# Patient Record
Sex: MALE | Race: WHITE | NOT HISPANIC OR LATINO
[De-identification: names, ages, dates, MRNs, and addresses within clinical notes are randomized per-mention and may not be internally consistent; named-entity substitution may affect disease eponyms.]

---

## 2017-02-24 ENCOUNTER — APPOINTMENT (OUTPATIENT)
Dept: HEMATOLOGY ONCOLOGY | Facility: CLINIC | Age: 82
End: 2017-02-24

## 2017-03-02 ENCOUNTER — APPOINTMENT (OUTPATIENT)
Dept: ORTHOPEDIC SURGERY | Facility: CLINIC | Age: 82
End: 2017-03-02

## 2017-03-02 VITALS
BODY MASS INDEX: 24.01 KG/M2 | HEIGHT: 67 IN | DIASTOLIC BLOOD PRESSURE: 73 MMHG | SYSTOLIC BLOOD PRESSURE: 131 MMHG | WEIGHT: 153 LBS

## 2017-03-02 DIAGNOSIS — C80.1 MALIGNANT (PRIMARY) NEOPLASM, UNSPECIFIED: ICD-10-CM

## 2017-03-02 DIAGNOSIS — Z87.891 PERSONAL HISTORY OF NICOTINE DEPENDENCE: ICD-10-CM

## 2017-03-02 DIAGNOSIS — I51.9 HEART DISEASE, UNSPECIFIED: ICD-10-CM

## 2017-03-02 RX ORDER — METOPROLOL SUCCINATE 50 MG/1
50 TABLET, EXTENDED RELEASE ORAL
Refills: 0 | Status: ACTIVE | COMMUNITY

## 2017-03-02 RX ORDER — ASPIRIN 325 MG/1
TABLET, FILM COATED ORAL
Refills: 0 | Status: ACTIVE | COMMUNITY

## 2017-03-02 RX ORDER — HYDROCHLOROTHIAZIDE 12.5 MG/1
CAPSULE, GELATIN COATED ORAL
Refills: 0 | Status: ACTIVE | COMMUNITY

## 2017-03-02 RX ORDER — TRAMADOL HYDROCHLORIDE 50 MG/1
50 TABLET, COATED ORAL
Qty: 60 | Refills: 0 | Status: ACTIVE | COMMUNITY
Start: 2017-03-02 | End: 1900-01-01

## 2017-06-05 ENCOUNTER — APPOINTMENT (OUTPATIENT)
Dept: GASTROENTEROLOGY | Facility: CLINIC | Age: 82
End: 2017-06-05

## 2017-06-05 VITALS
HEIGHT: 67 IN | BODY MASS INDEX: 24.64 KG/M2 | WEIGHT: 157 LBS | DIASTOLIC BLOOD PRESSURE: 70 MMHG | SYSTOLIC BLOOD PRESSURE: 130 MMHG | TEMPERATURE: 98 F | HEART RATE: 82 BPM | OXYGEN SATURATION: 99 %

## 2017-06-05 RX ORDER — METOPROLOL SUCCINATE 50 MG/1
50 TABLET, EXTENDED RELEASE ORAL
Qty: 180 | Refills: 0 | Status: ACTIVE | COMMUNITY
Start: 2016-06-29

## 2017-06-05 RX ORDER — ROSUVASTATIN CALCIUM 10 MG/1
10 TABLET, FILM COATED ORAL
Qty: 90 | Refills: 0 | Status: ACTIVE | COMMUNITY
Start: 2016-12-04

## 2017-06-14 ENCOUNTER — RESULT REVIEW (OUTPATIENT)
Age: 82
End: 2017-06-14

## 2017-06-14 ENCOUNTER — OUTPATIENT (OUTPATIENT)
Dept: OUTPATIENT SERVICES | Facility: HOSPITAL | Age: 82
LOS: 1 days | Discharge: HOME | End: 2017-06-14
Payer: MEDICARE

## 2017-06-14 ENCOUNTER — APPOINTMENT (OUTPATIENT)
Dept: GASTROENTEROLOGY | Facility: HOSPITAL | Age: 82
End: 2017-06-14

## 2017-06-14 DIAGNOSIS — K92.2 GASTROINTESTINAL HEMORRHAGE, UNSPECIFIED: ICD-10-CM

## 2017-06-14 DIAGNOSIS — Z95.1 PRESENCE OF AORTOCORONARY BYPASS GRAFT: Chronic | ICD-10-CM

## 2017-06-14 PROCEDURE — 88312 SPECIAL STAINS GROUP 1: CPT | Mod: 26

## 2017-06-14 PROCEDURE — 88305 TISSUE EXAM BY PATHOLOGIST: CPT | Mod: 26

## 2017-06-16 LAB — SURGICAL PATHOLOGY STUDY: SIGNIFICANT CHANGE UP

## 2017-06-19 ENCOUNTER — OTHER (OUTPATIENT)
Age: 82
End: 2017-06-19

## 2017-06-19 DIAGNOSIS — K29.80 DUODENITIS W/OUT BLEEDING: ICD-10-CM

## 2017-08-18 ENCOUNTER — RX RENEWAL (OUTPATIENT)
Age: 82
End: 2017-08-18

## 2017-09-27 ENCOUNTER — APPOINTMENT (OUTPATIENT)
Dept: GASTROENTEROLOGY | Facility: CLINIC | Age: 82
End: 2017-09-27
Payer: MEDICARE

## 2017-09-27 VITALS
TEMPERATURE: 97.8 F | HEIGHT: 67 IN | DIASTOLIC BLOOD PRESSURE: 70 MMHG | HEART RATE: 96 BPM | OXYGEN SATURATION: 98 % | BODY MASS INDEX: 24.8 KG/M2 | WEIGHT: 158 LBS | SYSTOLIC BLOOD PRESSURE: 130 MMHG

## 2017-09-27 PROCEDURE — 99214 OFFICE O/P EST MOD 30 MIN: CPT

## 2017-09-27 RX ORDER — PANTOPRAZOLE 40 MG/1
40 TABLET, DELAYED RELEASE ORAL DAILY
Qty: 90 | Refills: 3 | Status: DISCONTINUED | COMMUNITY
Start: 2017-06-19 | End: 2017-09-27

## 2018-03-28 ENCOUNTER — APPOINTMENT (OUTPATIENT)
Dept: ORTHOPEDIC SURGERY | Facility: CLINIC | Age: 83
End: 2018-03-28
Payer: MEDICARE

## 2018-03-28 PROCEDURE — 99213 OFFICE O/P EST LOW 20 MIN: CPT | Mod: 25

## 2018-03-28 PROCEDURE — 20610 DRAIN/INJ JOINT/BURSA W/O US: CPT | Mod: RT

## 2018-04-11 ENCOUNTER — APPOINTMENT (OUTPATIENT)
Dept: GASTROENTEROLOGY | Facility: CLINIC | Age: 83
End: 2018-04-11
Payer: MEDICARE

## 2018-04-11 VITALS
TEMPERATURE: 98.7 F | BODY MASS INDEX: 24.8 KG/M2 | SYSTOLIC BLOOD PRESSURE: 130 MMHG | WEIGHT: 158 LBS | RESPIRATION RATE: 16 BRPM | HEART RATE: 96 BPM | HEIGHT: 67 IN | DIASTOLIC BLOOD PRESSURE: 80 MMHG | OXYGEN SATURATION: 98 %

## 2018-04-11 DIAGNOSIS — M19.90 UNSPECIFIED OSTEOARTHRITIS, UNSPECIFIED SITE: ICD-10-CM

## 2018-04-11 PROCEDURE — 99214 OFFICE O/P EST MOD 30 MIN: CPT

## 2018-04-11 RX ORDER — DABIGATRAN ETEXILATE MESYLATE 75 MG/1
75 CAPSULE ORAL
Qty: 60 | Refills: 0 | Status: DISCONTINUED | COMMUNITY
End: 2018-04-11

## 2018-04-11 RX ORDER — SUCRALFATE 1 G/1
1 TABLET ORAL
Qty: 60 | Refills: 5 | Status: DISCONTINUED | COMMUNITY
Start: 2017-06-19 | End: 2018-04-11

## 2018-04-11 RX ORDER — FAMOTIDINE 20 MG/1
20 TABLET, FILM COATED ORAL TWICE DAILY
Qty: 180 | Refills: 3 | Status: DISCONTINUED | COMMUNITY
Start: 2016-06-29 | End: 2018-04-11

## 2018-04-17 ENCOUNTER — APPOINTMENT (OUTPATIENT)
Dept: ORTHOPEDIC SURGERY | Facility: CLINIC | Age: 83
End: 2018-04-17
Payer: MEDICARE

## 2018-04-17 PROCEDURE — 20610 DRAIN/INJ JOINT/BURSA W/O US: CPT | Mod: RT

## 2018-04-24 ENCOUNTER — APPOINTMENT (OUTPATIENT)
Dept: ORTHOPEDIC SURGERY | Facility: CLINIC | Age: 83
End: 2018-04-24
Payer: MEDICARE

## 2018-04-24 PROCEDURE — 20610 DRAIN/INJ JOINT/BURSA W/O US: CPT | Mod: RT

## 2018-05-02 ENCOUNTER — APPOINTMENT (OUTPATIENT)
Dept: ORTHOPEDIC SURGERY | Facility: CLINIC | Age: 83
End: 2018-05-02
Payer: MEDICARE

## 2018-05-02 DIAGNOSIS — M17.11 UNILATERAL PRIMARY OSTEOARTHRITIS, RIGHT KNEE: ICD-10-CM

## 2018-05-02 PROCEDURE — 20611 DRAIN/INJ JOINT/BURSA W/US: CPT | Mod: RT

## 2018-05-15 LAB — HEMOCCULT STL QL IA: NEGATIVE

## 2018-06-05 ENCOUNTER — APPOINTMENT (OUTPATIENT)
Dept: ORTHOPEDIC SURGERY | Facility: CLINIC | Age: 83
End: 2018-06-05

## 2018-09-05 ENCOUNTER — APPOINTMENT (OUTPATIENT)
Dept: GASTROENTEROLOGY | Facility: CLINIC | Age: 83
End: 2018-09-05
Payer: MEDICARE

## 2018-09-05 VITALS
HEIGHT: 67 IN | WEIGHT: 150 LBS | BODY MASS INDEX: 23.54 KG/M2 | HEART RATE: 70 BPM | TEMPERATURE: 98.3 F | DIASTOLIC BLOOD PRESSURE: 65 MMHG | OXYGEN SATURATION: 97 % | SYSTOLIC BLOOD PRESSURE: 119 MMHG | RESPIRATION RATE: 16 BRPM

## 2018-09-05 DIAGNOSIS — R63.4 ABNORMAL WEIGHT LOSS: ICD-10-CM

## 2018-09-05 DIAGNOSIS — K29.60 OTHER GASTRITIS W/OUT BLEEDING: ICD-10-CM

## 2018-09-05 PROCEDURE — 99214 OFFICE O/P EST MOD 30 MIN: CPT

## 2018-09-05 RX ORDER — MINERAL OIL 1000 MG/ML
LIQUID ORAL
Qty: 1 | Refills: 0 | Status: DISCONTINUED | OUTPATIENT
Start: 2018-04-11 | End: 2018-09-05

## 2018-09-05 RX ORDER — LINACLOTIDE 290 UG/1
290 CAPSULE, GELATIN COATED ORAL
Qty: 30 | Refills: 3 | Status: DISCONTINUED | OUTPATIENT
Start: 2018-04-11 | End: 2018-09-05

## 2018-09-05 RX ORDER — STANDARDIZED SENNA CONCENTRATE AND DOCUSATE SODIUM 8.6; 5 MG/1; MG/1
8.6-5 TABLET ORAL DAILY
Qty: 60 | Refills: 2 | Status: DISCONTINUED | COMMUNITY
Start: 2017-06-05 | End: 2018-09-05

## 2018-09-05 RX ORDER — STANDARDIZED SENNA CONCENTRATE AND DOCUSATE SODIUM 8.6; 5 MG/1; MG/1
8.6-5 TABLET ORAL DAILY
Qty: 60 | Refills: 2 | Status: DISCONTINUED | OUTPATIENT
Start: 2018-04-11 | End: 2018-09-05

## 2018-09-17 ENCOUNTER — RX RENEWAL (OUTPATIENT)
Age: 83
End: 2018-09-17

## 2018-09-26 ENCOUNTER — RX RENEWAL (OUTPATIENT)
Age: 83
End: 2018-09-26

## 2018-09-27 ENCOUNTER — RX RENEWAL (OUTPATIENT)
Age: 83
End: 2018-09-27

## 2018-10-16 ENCOUNTER — OTHER (OUTPATIENT)
Age: 83
End: 2018-10-16

## 2018-11-07 ENCOUNTER — APPOINTMENT (OUTPATIENT)
Dept: GASTROENTEROLOGY | Facility: CLINIC | Age: 83
End: 2018-11-07
Payer: MEDICARE

## 2018-11-07 VITALS
DIASTOLIC BLOOD PRESSURE: 80 MMHG | SYSTOLIC BLOOD PRESSURE: 122 MMHG | WEIGHT: 152 LBS | HEIGHT: 67 IN | OXYGEN SATURATION: 99 % | HEART RATE: 90 BPM | BODY MASS INDEX: 23.86 KG/M2

## 2018-11-07 DIAGNOSIS — M79.606 PAIN IN LEG, UNSPECIFIED: ICD-10-CM

## 2018-11-07 DIAGNOSIS — Z86.79 PERSONAL HISTORY OF OTHER DISEASES OF THE CIRCULATORY SYSTEM: ICD-10-CM

## 2018-11-07 DIAGNOSIS — Z86.73 PERSONAL HISTORY OF TRANSIENT ISCHEMIC ATTACK (TIA), AND CEREBRAL INFARCTION W/OUT RESIDUAL DEFICITS: ICD-10-CM

## 2018-11-07 DIAGNOSIS — K59.00 CONSTIPATION, UNSPECIFIED: ICD-10-CM

## 2018-11-07 PROCEDURE — 99214 OFFICE O/P EST MOD 30 MIN: CPT

## 2019-05-08 ENCOUNTER — APPOINTMENT (OUTPATIENT)
Dept: GASTROENTEROLOGY | Facility: CLINIC | Age: 84
End: 2019-05-08

## 2019-05-30 ENCOUNTER — APPOINTMENT (OUTPATIENT)
Dept: ORTHOPEDIC SURGERY | Facility: CLINIC | Age: 84
End: 2019-05-30
Payer: MEDICARE

## 2019-05-30 VITALS
BODY MASS INDEX: 23.07 KG/M2 | DIASTOLIC BLOOD PRESSURE: 75 MMHG | HEART RATE: 78 BPM | WEIGHT: 147 LBS | HEIGHT: 67 IN | SYSTOLIC BLOOD PRESSURE: 139 MMHG

## 2019-05-30 PROCEDURE — 73502 X-RAY EXAM HIP UNI 2-3 VIEWS: CPT | Mod: RT

## 2019-05-30 PROCEDURE — 99215 OFFICE O/P EST HI 40 MIN: CPT | Mod: 25

## 2019-05-30 PROCEDURE — 20610 DRAIN/INJ JOINT/BURSA W/O US: CPT | Mod: RT

## 2019-05-30 PROCEDURE — 73562 X-RAY EXAM OF KNEE 3: CPT | Mod: RT

## 2019-05-30 NOTE — HISTORY OF PRESENT ILLNESS
[0] : a current pain level of 0/10 [None] : No exacerbating factors are noted [de-identified] : Patient PMhx HTN, triple bypass (many years ago) on blood thinners due to afib, stroke 2004,  presents to the office walking with a rollator (x 3 years) complaining of right leg pain x 2 years, insidious onset, progressively worsening x few months. Reports instability, weakness and limping due to symptoms. Pain indicated to right groin radiating to right knee, 7/10, constant, achy and sharp at times.  Worsening with any activity that required leg to move to point affecting quality of life ans preventing adl's.  Some relief with rest, tramadol/oxycodone. \par Patient denies any fever, chills, trauma, swelling, erythema, hematomas, numbness or tingling sensation, buckling.\par

## 2019-05-30 NOTE — REASON FOR VISIT
[Initial Visit] : an initial visit for [Hip Pain] : hip pain [Knee Pain] : knee pain [FreeTextEntry2] : right hip and right knee pain

## 2019-05-30 NOTE — PHYSICAL EXAM
[Antalgic] : antalgic [Wide-Based] : wide-based [Cane] : ambulates with cane [Walker] : ambulates with walker [Normal RUE] : Right Upper Extremity: No scars, rashes, lesions, ulcers, skin intact [Normal Finger/nose] : finger to nose coordination [Poor Appearance] : well-appearing [Acute Distress] : not in acute distress [Obese] : not obese [Normal] : no peripheral adenopathy appreciated [de-identified] : Patient appears stated age in no acute respiratory distress. Patient is alert oriented x3. Patient has normal mood and affect. Gait \par Bilateral knee exam\par Range of motion of the knee is 0-120°. \par Skin is normal.  No rash.\par There is no effusion. No medial or lateral joint line tenderness. No swelling, no pitting edema.\par Overall alignment of the knee is then slight varus. Good anterior posterior stability. Firm endpoints on anterior and posterior drawer. \par Medial lateral stability is intact. Firm endpoint on medial and lateral stress testing .\par Jerry test is negative.\par Quadriceps strength 5/ 5. There is no loss of muscle volume in the thigh. \par Good anterior posterior and mediolateral stability.\par Sensation in the extremities intact. \par Discrimination is intact. Good DP and PT pulses.\par 		\par \par Left hip exam\par On inspection of the hip shows skin is normal. No evidence of rash. \par No loss of muscle.  Abductor strength is 5 out of 5. Hip flexor strength is 5.\par Range of motion of the hip at 90° flexion internal rotation is 15° external rotation is 30° pain-free. \par Hip has good stability in anterior and posterior direction. \par On lateral decubitus  examination there is no tenderness in the greater trochanter. \par Lower Extremity Examination \par Bilateral lower extremity skin is normal. There is no rash. There is no edema and lymphadenopathy.  DP and PT pulses intact. Sensation is intact.\par \par Right hip exam\par The hip is mildly painful at the groin on log roll. \par At 70° flexion patient has minimal discomfort in the groin. At 90° flexion internal rotation is limited to less than 10°. External rotation is 25 no pain.\par Skin is normal. \par There is no loss of muscle wall remained the extremity. On lateral decubitus examination there is no tenderness in the greater trochanter. \par Resisted abduction is 4-5. There is no pain on abduction.\par Straight leg raise up to his 80° pain-free. No pain in the lower back.\par There is no adduction contracture.\par \par  [de-identified] : X-rays of the right hip 2 views shows advanced degenerative bone-on-bone arthritis joint disruption and extensive osteophyte formation and joint subluxation\par \par Right knee 3 views has mild arthritis

## 2019-05-30 NOTE — DISCUSSION/SUMMARY
[Surgical risks reviewed] : Surgical risks reviewed [de-identified] : 86-year-old male with advanced right hip arthritis and mild right knee arthritis. We will try conservative treatment. NSAIDs physical therapy range of motion strengthening.\par \par For the right hip he has advanced arthritis it is not getting better. He can barely walk. He is very physically deconditioned.\par \par Patient has tried and failed all conservative treatment options including the activity modification NSAIDs. Patient is considering surgical treatment . All the risks and benefits of hip replacement especially anterior replacement discussed with the patient. All the possible risks including possible infection possible bleeding possible dislocation possible leg length discrepancy possible blood clots possible medical complications discussed with the patient. Also the risks of possible readmission discussed with the patient. Patient completely understands risks and benefits.\par The need for postoperative DVT prophylaxis discussed with the patient as well.\par Patient completely understands all this. He should do so also advised to get medical clearance. Patient is also advised to attend a joint replacement education class.\par patient also has a history of cardiac stents and previous history of stroke. He is a high-risk candidate \par He also has a history of well taking blood thinners and anticoagulation.\par \par The risks associated with this was discussed with the patient in detail. He understands the risks and benefits and wants to undergo a right hip replacement.\par patient is scheduled for a right hip replacement\par \par mild lRight kneeknee arthritis we will try conservative treatment and physical therapy range of motion strengthening.\par Right knee injection\par The risks and benefits of injection was discussed with the patient. Verbal consent was obtained from the patient. The area was prepped with Betadine. A lateral suprapatellar approach was used. The knee was injected with 2 cc of Depo-Medrol 2 cc of lidocaine. before the injection 35 cc of straw fluid was aspirated from the knee.  The procedure well. Band-Aid was applied.\par

## 2019-06-09 PROBLEM — I51.9 HEART DISEASE: Status: ACTIVE | Noted: 2017-03-02

## 2019-06-10 ENCOUNTER — OUTPATIENT (OUTPATIENT)
Dept: OUTPATIENT SERVICES | Facility: HOSPITAL | Age: 84
LOS: 1 days | End: 2019-06-10
Payer: MEDICARE

## 2019-06-10 VITALS
DIASTOLIC BLOOD PRESSURE: 87 MMHG | SYSTOLIC BLOOD PRESSURE: 152 MMHG | HEART RATE: 91 BPM | RESPIRATION RATE: 17 BRPM | WEIGHT: 139.99 LBS | OXYGEN SATURATION: 99 % | HEIGHT: 65 IN | TEMPERATURE: 98 F

## 2019-06-10 DIAGNOSIS — E03.9 HYPOTHYROIDISM, UNSPECIFIED: ICD-10-CM

## 2019-06-10 DIAGNOSIS — I48.91 UNSPECIFIED ATRIAL FIBRILLATION: ICD-10-CM

## 2019-06-10 DIAGNOSIS — Z29.9 ENCOUNTER FOR PROPHYLACTIC MEASURES, UNSPECIFIED: ICD-10-CM

## 2019-06-10 DIAGNOSIS — Z98.890 OTHER SPECIFIED POSTPROCEDURAL STATES: Chronic | ICD-10-CM

## 2019-06-10 DIAGNOSIS — I10 ESSENTIAL (PRIMARY) HYPERTENSION: ICD-10-CM

## 2019-06-10 DIAGNOSIS — M19.90 UNSPECIFIED OSTEOARTHRITIS, UNSPECIFIED SITE: ICD-10-CM

## 2019-06-10 DIAGNOSIS — Z95.1 PRESENCE OF AORTOCORONARY BYPASS GRAFT: Chronic | ICD-10-CM

## 2019-06-10 DIAGNOSIS — Z01.818 ENCOUNTER FOR OTHER PREPROCEDURAL EXAMINATION: ICD-10-CM

## 2019-06-10 DIAGNOSIS — M16.11 UNILATERAL PRIMARY OSTEOARTHRITIS, RIGHT HIP: ICD-10-CM

## 2019-06-10 DIAGNOSIS — I25.10 ATHEROSCLEROTIC HEART DISEASE OF NATIVE CORONARY ARTERY WITHOUT ANGINA PECTORIS: ICD-10-CM

## 2019-06-10 DIAGNOSIS — Z98.49 CATARACT EXTRACTION STATUS, UNSPECIFIED EYE: Chronic | ICD-10-CM

## 2019-06-10 DIAGNOSIS — N18.9 CHRONIC KIDNEY DISEASE, UNSPECIFIED: ICD-10-CM

## 2019-06-10 LAB
ANION GAP SERPL CALC-SCNC: 11 MMOL/L — SIGNIFICANT CHANGE UP (ref 5–17)
BLD GP AB SCN SERPL QL: NEGATIVE — SIGNIFICANT CHANGE UP
BUN SERPL-MCNC: 31 MG/DL — HIGH (ref 7–23)
CALCIUM SERPL-MCNC: 10.5 MG/DL — SIGNIFICANT CHANGE UP (ref 8.4–10.5)
CHLORIDE SERPL-SCNC: 102 MMOL/L — SIGNIFICANT CHANGE UP (ref 96–108)
CO2 SERPL-SCNC: 27 MMOL/L — SIGNIFICANT CHANGE UP (ref 22–31)
CREAT SERPL-MCNC: 1.25 MG/DL — SIGNIFICANT CHANGE UP (ref 0.5–1.3)
GLUCOSE SERPL-MCNC: 154 MG/DL — HIGH (ref 70–99)
POTASSIUM SERPL-MCNC: 5 MMOL/L — SIGNIFICANT CHANGE UP (ref 3.5–5.3)
POTASSIUM SERPL-SCNC: 5 MMOL/L — SIGNIFICANT CHANGE UP (ref 3.5–5.3)
RH IG SCN BLD-IMP: NEGATIVE — SIGNIFICANT CHANGE UP
SODIUM SERPL-SCNC: 140 MMOL/L — SIGNIFICANT CHANGE UP (ref 135–145)

## 2019-06-10 PROCEDURE — 86900 BLOOD TYPING SEROLOGIC ABO: CPT

## 2019-06-10 PROCEDURE — 80048 BASIC METABOLIC PNL TOTAL CA: CPT

## 2019-06-10 PROCEDURE — 86850 RBC ANTIBODY SCREEN: CPT

## 2019-06-10 PROCEDURE — 83036 HEMOGLOBIN GLYCOSYLATED A1C: CPT

## 2019-06-10 PROCEDURE — 87640 STAPH A DNA AMP PROBE: CPT

## 2019-06-10 PROCEDURE — G0463: CPT

## 2019-06-10 PROCEDURE — 86901 BLOOD TYPING SEROLOGIC RH(D): CPT

## 2019-06-10 PROCEDURE — 87641 MR-STAPH DNA AMP PROBE: CPT

## 2019-06-10 RX ORDER — VANCOMYCIN HCL 1 G
1000 VIAL (EA) INTRAVENOUS ONCE
Refills: 0 | Status: DISCONTINUED | OUTPATIENT
Start: 2019-06-25 | End: 2019-06-25

## 2019-06-10 NOTE — H&P PST ADULT - NSICDXPASTMEDICALHX_GEN_ALL_CORE_FT
PAST MEDICAL HISTORY:  Atrial fibrillation     CAD (coronary artery disease)     CRI (chronic renal insufficiency)     CVA (cerebral vascular accident)     HLD (hyperlipidemia)     HTN (hypertension)     Hyperthyroidism treated with radioactive iodine    Hypothyroid     Prostate cancer s/p radiation    Spinal stenosis PAST MEDICAL HISTORY:  Atrial fibrillation diagnosed many years ago   on anticoagulant    CAD (coronary artery disease)     Chronic dryness of both eyes     CRI (chronic renal insufficiency)     CVA (cerebral vascular accident) 2004  no residual    History of colitis     History of hemorrhoids     HLD (hyperlipidemia)     HTN (hypertension)     Hyperthyroidism treated with radioactive iodine    Hypothyroid     Lumbar spinal stenosis     PAD (peripheral artery disease)     Prostate cancer s/p radiation ? 10 years ago    Spinal stenosis h/o epidural injection PAST MEDICAL HISTORY:  Atrial fibrillation diagnosed many years ago   on anticoagulant    CAD (coronary artery disease)     Chronic dryness of both eyes     CRI (chronic renal insufficiency)     CVA (cerebral vascular accident) 2004  no residual    History of colitis     History of hemorrhoids     HLD (hyperlipidemia)     HTN (hypertension)     Hyperthyroidism treated with radioactive iodine    Hypothyroid     Lumbar spinal stenosis     OA (osteoarthritis) right hip    PAD (peripheral artery disease)     Prostate cancer s/p radiation ? 10 years ago    Spinal stenosis h/o epidural injection

## 2019-06-10 NOTE — H&P PST ADULT - NSICDXPASTSURGICALHX_GEN_ALL_CORE_FT
PAST SURGICAL HISTORY:  S/P CABG x 3 PAST SURGICAL HISTORY:  H/O cataract extraction     History of herniorrhaphy right groin    S/P CABG x 3 10/2011

## 2019-06-10 NOTE — H&P PST ADULT - NSICDXPROBLEM_GEN_ALL_CORE_FT
PROBLEM DIAGNOSES  Problem: Need for prophylactic measure  Assessment and Plan: The Caprini score indicates that this patient is at high risk for a VTE event (score 6 or greater). Surgical patients in this group will benefit from both pharmacologic prophylaxis and intermittent compression devices.  The surgical team will determine the balance between VTE risk and bleeding risk, and other clinical considerations      Problem: OA (osteoarthritis)  Assessment and Plan: scheduled for right anterior total hip replacement   preop instruction and chlorhexidine wash given, verbalized understanding     Problem: Hypothyroidism  Assessment and Plan: instructed to continue thyroid medication katherine-op    Problem: CAD (coronary artery disease)  Assessment and Plan: will continue aspirin katherine-op  most recent echo to obtain from cardiologist    Problem: Afib  Assessment and Plan: on eliquis to hold 3 days prior to surgery and will confirm plan with his cardiologist     Problem: CRI (chronic renal insufficiency)  Assessment and Plan: monitor     Problem: HTN (hypertension)  Assessment and Plan: EKG report from 4/2019 to obtain from cardiologist  will continue antihypertensive medication katherine-op  patient on diuretic medication, BMP repeat at PST

## 2019-06-10 NOTE — H&P PST ADULT - ASSESSMENT
CAPRINI SCORE [CLOT updated 18]    AGE RELATED RISK FACTORS                                                       MOBILITY RELATED FACTORS  [ ] Age 41-60 years                                            (1 Point)                    [ ] Bed rest                                                        (1 Point)  [ ] Age: 61-74 years                                           (2 Points)                  [ ] Plaster cast                                                   (2 Points)  [3 ] Age= 75 years                                              (3 Points)                    [ ] Bed bound for more than 72 hours                 (2 Points)    DISEASE RELATED RISK FACTORS                                               GENDER SPECIFIC FACTORS  [ ] Edema in the lower extremities                       (1 Point)              [ ] Pregnancy                                                     (1 Point)  [ ] Varicose veins                                               (1 Point)                     [ ] Post-partum < 6 weeks                                   (1 Point)             [ ] BMI > 25 Kg/m2                                            (1 Point)                     [ ] Hormonal therapy  or oral contraception          (1 Point)                 [ ] Sepsis (in the previous month)                        (1 Point)               [ ] History of pregnancy complications                 (1 point)  [ ] Pneumonia or serious lung disease                                               [ ] Unexplained or recurrent                     (1 Point)           (in the previous month)                               (1 Point)  [ ] Abnormal pulmonary function test                     (1 Point)                 SURGERY RELATED RISK FACTORS  [ ] Acute myocardial infarction                              (1 Point)               [ ]  Section                                             (1 Point)  [ ] Congestive heart failure (in the previous month)  (1 Point)      [ ] Minor surgery                                                  (1 Point)   [ ] Inflammatory bowel disease                             (1 Point)               [ ] Arthroscopic surgery                                        (2 Points)  [ ] Central venous access                                      (2 Points)                [ ] General surgery lasting more than 45 minutes (2 points)  [2 ] Present or previous malignancy                     (2 Points)                [ 5] Elective arthroplasty                                         (5 points)    [ ] Stroke (in the previous month)                          (5 Points)                                                                                                                                                           HEMATOLOGY RELATED FACTORS                                                 TRAUMA RELATED RISK FACTORS  [ ] Prior episodes of VTE                                     (3 Points)                [ ] Fracture of the hip, pelvis, or leg                       (5 Points)  [ ] Positive family history for VTE                         (3 Points)             [ ] Acute spinal cord injury (in the previous month)  (5 Points)  [ ] Prothrombin 82220 A                                     (3 Points)               [ ] Paralysis  (less than 1 month)                             (5 Points)  [ ] Factor V Leiden                                             (3 Points)                  [ ] Multiple Trauma within 1 month                        (5 Points)  [ ] Lupus anticoagulants                                     (3 Points)                                                           [ ] Anticardiolipin antibodies                               (3 Points)                                                       [ ] High homocysteine in the blood                      (3 Points)                                             [ ] Other congenital or acquired thrombophilia      (3 Points)                                                [ ] Heparin induced thrombocytopenia                  (3 Points)                                     Total Score [ 10 ]

## 2019-06-10 NOTE — H&P PST ADULT - HISTORY OF PRESENT ILLNESS
86 year old male with h/o chronic A-fib on anticoagulation, CAD s/p CABG x3 (2011), HTN, hyperlipidemia, previous CVA (2004, no residual), PAD, prostate cancer s/p radiation therapy (x ?10+years), hypothyroidism, lumbar spine stenosis, chronic renal insufficiency, is been seeing in preadmission testing for scheduled right anterior total hip replacement on 6/25/2019.

## 2019-06-11 LAB
HBA1C BLD-MCNC: 5.8 % — HIGH (ref 4–5.6)
MRSA PCR RESULT.: SIGNIFICANT CHANGE UP
S AUREUS DNA NOSE QL NAA+PROBE: SIGNIFICANT CHANGE UP

## 2019-06-24 ENCOUNTER — TRANSCRIPTION ENCOUNTER (OUTPATIENT)
Age: 84
End: 2019-06-24

## 2019-06-25 ENCOUNTER — INPATIENT (INPATIENT)
Facility: HOSPITAL | Age: 84
LOS: 5 days | Discharge: NURSING, REHAB OR LONG-TERM CARE | DRG: 470 | End: 2019-07-01
Attending: ORTHOPAEDIC SURGERY | Admitting: ORTHOPAEDIC SURGERY
Payer: MEDICARE

## 2019-06-25 ENCOUNTER — APPOINTMENT (OUTPATIENT)
Dept: ORTHOPEDIC SURGERY | Facility: HOSPITAL | Age: 84
End: 2019-06-25

## 2019-06-25 VITALS
HEIGHT: 65 IN | DIASTOLIC BLOOD PRESSURE: 82 MMHG | HEART RATE: 93 BPM | OXYGEN SATURATION: 100 % | RESPIRATION RATE: 18 BRPM | SYSTOLIC BLOOD PRESSURE: 130 MMHG | WEIGHT: 139.99 LBS | TEMPERATURE: 98 F

## 2019-06-25 DIAGNOSIS — M16.11 UNILATERAL PRIMARY OSTEOARTHRITIS, RIGHT HIP: ICD-10-CM

## 2019-06-25 DIAGNOSIS — Z98.49 CATARACT EXTRACTION STATUS, UNSPECIFIED EYE: Chronic | ICD-10-CM

## 2019-06-25 DIAGNOSIS — Z95.1 PRESENCE OF AORTOCORONARY BYPASS GRAFT: Chronic | ICD-10-CM

## 2019-06-25 DIAGNOSIS — Z98.890 OTHER SPECIFIED POSTPROCEDURAL STATES: Chronic | ICD-10-CM

## 2019-06-25 LAB
ANION GAP SERPL CALC-SCNC: 16 MMOL/L — SIGNIFICANT CHANGE UP (ref 5–17)
BUN SERPL-MCNC: 30 MG/DL — HIGH (ref 7–23)
CALCIUM SERPL-MCNC: 9.4 MG/DL — SIGNIFICANT CHANGE UP (ref 8.4–10.5)
CHLORIDE SERPL-SCNC: 99 MMOL/L — SIGNIFICANT CHANGE UP (ref 96–108)
CO2 SERPL-SCNC: 22 MMOL/L — SIGNIFICANT CHANGE UP (ref 22–31)
CREAT SERPL-MCNC: 1.2 MG/DL — SIGNIFICANT CHANGE UP (ref 0.5–1.3)
GLUCOSE BLDC GLUCOMTR-MCNC: 99 MG/DL — SIGNIFICANT CHANGE UP (ref 70–99)
GLUCOSE SERPL-MCNC: 174 MG/DL — HIGH (ref 70–99)
HCT VFR BLD CALC: 35.8 % — LOW (ref 39–50)
HGB BLD-MCNC: 11.9 G/DL — LOW (ref 13–17)
MCHC RBC-ENTMCNC: 33.1 GM/DL — SIGNIFICANT CHANGE UP (ref 32–36)
MCHC RBC-ENTMCNC: 34.5 PG — HIGH (ref 27–34)
MCV RBC AUTO: 104 FL — HIGH (ref 80–100)
PLATELET # BLD AUTO: 234 K/UL — SIGNIFICANT CHANGE UP (ref 150–400)
POTASSIUM SERPL-MCNC: 4.4 MMOL/L — SIGNIFICANT CHANGE UP (ref 3.5–5.3)
POTASSIUM SERPL-SCNC: 4.4 MMOL/L — SIGNIFICANT CHANGE UP (ref 3.5–5.3)
RBC # BLD: 3.44 M/UL — LOW (ref 4.2–5.8)
RBC # FLD: 14 % — SIGNIFICANT CHANGE UP (ref 10.3–14.5)
RH IG SCN BLD-IMP: NEGATIVE — SIGNIFICANT CHANGE UP
SODIUM SERPL-SCNC: 137 MMOL/L — SIGNIFICANT CHANGE UP (ref 135–145)
WBC # BLD: 21.8 K/UL — HIGH (ref 3.8–10.5)
WBC # FLD AUTO: 21.8 K/UL — HIGH (ref 3.8–10.5)

## 2019-06-25 PROCEDURE — 72170 X-RAY EXAM OF PELVIS: CPT | Mod: 26

## 2019-06-25 PROCEDURE — 27130 TOTAL HIP ARTHROPLASTY: CPT | Mod: RT

## 2019-06-25 RX ORDER — ACETAMINOPHEN 500 MG
975 TABLET ORAL EVERY 8 HOURS
Refills: 0 | Status: COMPLETED | OUTPATIENT
Start: 2019-06-26 | End: 2019-06-28

## 2019-06-25 RX ORDER — SODIUM CHLORIDE 9 MG/ML
1000 INJECTION INTRAMUSCULAR; INTRAVENOUS; SUBCUTANEOUS
Refills: 0 | Status: DISCONTINUED | OUTPATIENT
Start: 2019-06-25 | End: 2019-06-27

## 2019-06-25 RX ORDER — SODIUM CHLORIDE 9 MG/ML
3 INJECTION INTRAMUSCULAR; INTRAVENOUS; SUBCUTANEOUS EVERY 8 HOURS
Refills: 0 | Status: DISCONTINUED | OUTPATIENT
Start: 2019-06-25 | End: 2019-07-01

## 2019-06-25 RX ORDER — ONDANSETRON 8 MG/1
4 TABLET, FILM COATED ORAL EVERY 6 HOURS
Refills: 0 | Status: DISCONTINUED | OUTPATIENT
Start: 2019-06-25 | End: 2019-06-26

## 2019-06-25 RX ORDER — OXYCODONE HYDROCHLORIDE 5 MG/1
10 TABLET ORAL EVERY 4 HOURS
Refills: 0 | Status: DISCONTINUED | OUTPATIENT
Start: 2019-06-26 | End: 2019-06-26

## 2019-06-25 RX ORDER — TRAMADOL HYDROCHLORIDE 50 MG/1
50 TABLET ORAL EVERY 6 HOURS
Refills: 0 | Status: DISCONTINUED | OUTPATIENT
Start: 2019-06-26 | End: 2019-07-01

## 2019-06-25 RX ORDER — OXYCODONE HYDROCHLORIDE 5 MG/1
5 TABLET ORAL EVERY 4 HOURS
Refills: 0 | Status: DISCONTINUED | OUTPATIENT
Start: 2019-06-26 | End: 2019-06-26

## 2019-06-25 RX ORDER — SODIUM CHLORIDE 9 MG/ML
500 INJECTION INTRAMUSCULAR; INTRAVENOUS; SUBCUTANEOUS ONCE
Refills: 0 | Status: COMPLETED | OUTPATIENT
Start: 2019-06-25 | End: 2019-06-25

## 2019-06-25 RX ORDER — ATORVASTATIN CALCIUM 80 MG/1
40 TABLET, FILM COATED ORAL AT BEDTIME
Refills: 0 | Status: DISCONTINUED | OUTPATIENT
Start: 2019-06-25 | End: 2019-06-25

## 2019-06-25 RX ORDER — CHLORHEXIDINE GLUCONATE 213 G/1000ML
1 SOLUTION TOPICAL ONCE
Refills: 0 | Status: COMPLETED | OUTPATIENT
Start: 2019-06-25 | End: 2019-06-25

## 2019-06-25 RX ORDER — DOCUSATE SODIUM 100 MG
100 CAPSULE ORAL THREE TIMES A DAY
Refills: 0 | Status: DISCONTINUED | OUTPATIENT
Start: 2019-06-25 | End: 2019-07-01

## 2019-06-25 RX ORDER — PANTOPRAZOLE SODIUM 20 MG/1
40 TABLET, DELAYED RELEASE ORAL
Refills: 0 | Status: DISCONTINUED | OUTPATIENT
Start: 2019-06-25 | End: 2019-07-01

## 2019-06-25 RX ORDER — LIDOCAINE HCL 20 MG/ML
0.2 VIAL (ML) INJECTION ONCE
Refills: 0 | Status: COMPLETED | OUTPATIENT
Start: 2019-06-25 | End: 2019-06-25

## 2019-06-25 RX ORDER — LEVOTHYROXINE SODIUM 125 MCG
100 TABLET ORAL DAILY
Refills: 0 | Status: DISCONTINUED | OUTPATIENT
Start: 2019-06-25 | End: 2019-07-01

## 2019-06-25 RX ORDER — ASPIRIN/CALCIUM CARB/MAGNESIUM 324 MG
81 TABLET ORAL DAILY
Refills: 0 | Status: DISCONTINUED | OUTPATIENT
Start: 2019-06-25 | End: 2019-07-01

## 2019-06-25 RX ORDER — SENNA PLUS 8.6 MG/1
2 TABLET ORAL AT BEDTIME
Refills: 0 | Status: DISCONTINUED | OUTPATIENT
Start: 2019-06-25 | End: 2019-07-01

## 2019-06-25 RX ORDER — HYDROCHLOROTHIAZIDE 25 MG
12.5 TABLET ORAL DAILY
Refills: 0 | Status: DISCONTINUED | OUTPATIENT
Start: 2019-06-25 | End: 2019-06-30

## 2019-06-25 RX ORDER — LACTULOSE 10 G/15ML
15 SOLUTION ORAL DAILY
Refills: 0 | Status: DISCONTINUED | OUTPATIENT
Start: 2019-06-25 | End: 2019-07-01

## 2019-06-25 RX ORDER — ATORVASTATIN CALCIUM 80 MG/1
40 TABLET, FILM COATED ORAL AT BEDTIME
Refills: 0 | Status: DISCONTINUED | OUTPATIENT
Start: 2019-06-25 | End: 2019-07-01

## 2019-06-25 RX ORDER — MAGNESIUM HYDROXIDE 400 MG/1
30 TABLET, CHEWABLE ORAL DAILY
Refills: 0 | Status: DISCONTINUED | OUTPATIENT
Start: 2019-06-25 | End: 2019-07-01

## 2019-06-25 RX ORDER — GABAPENTIN 400 MG/1
100 CAPSULE ORAL ONCE
Refills: 0 | Status: COMPLETED | OUTPATIENT
Start: 2019-06-25 | End: 2019-06-25

## 2019-06-25 RX ORDER — ACETAMINOPHEN 500 MG
975 TABLET ORAL ONCE
Refills: 0 | Status: DISCONTINUED | OUTPATIENT
Start: 2019-06-25 | End: 2019-06-25

## 2019-06-25 RX ORDER — ONDANSETRON 8 MG/1
4 TABLET, FILM COATED ORAL ONCE
Refills: 0 | Status: DISCONTINUED | OUTPATIENT
Start: 2019-06-25 | End: 2019-06-25

## 2019-06-25 RX ORDER — SODIUM CHLORIDE 9 MG/ML
3 INJECTION INTRAMUSCULAR; INTRAVENOUS; SUBCUTANEOUS EVERY 8 HOURS
Refills: 0 | Status: DISCONTINUED | OUTPATIENT
Start: 2019-06-25 | End: 2019-06-25

## 2019-06-25 RX ORDER — SODIUM CHLORIDE 9 MG/ML
500 INJECTION INTRAMUSCULAR; INTRAVENOUS; SUBCUTANEOUS ONCE
Refills: 0 | Status: COMPLETED | OUTPATIENT
Start: 2019-06-26 | End: 2019-06-26

## 2019-06-25 RX ORDER — PANTOPRAZOLE SODIUM 20 MG/1
40 TABLET, DELAYED RELEASE ORAL ONCE
Refills: 0 | Status: COMPLETED | OUTPATIENT
Start: 2019-06-25 | End: 2019-06-25

## 2019-06-25 RX ORDER — HYDROMORPHONE HYDROCHLORIDE 2 MG/ML
0.25 INJECTION INTRAMUSCULAR; INTRAVENOUS; SUBCUTANEOUS
Refills: 0 | Status: DISCONTINUED | OUTPATIENT
Start: 2019-06-25 | End: 2019-06-25

## 2019-06-25 RX ORDER — ACETAMINOPHEN 500 MG
1000 TABLET ORAL ONCE
Refills: 0 | Status: COMPLETED | OUTPATIENT
Start: 2019-06-25 | End: 2019-06-25

## 2019-06-25 RX ORDER — ACETAMINOPHEN 500 MG
1000 TABLET ORAL ONCE
Refills: 0 | Status: COMPLETED | OUTPATIENT
Start: 2019-06-26 | End: 2019-06-26

## 2019-06-25 RX ORDER — TRAMADOL HYDROCHLORIDE 50 MG/1
50 TABLET ORAL ONCE
Refills: 0 | Status: DISCONTINUED | OUTPATIENT
Start: 2019-06-25 | End: 2019-06-25

## 2019-06-25 RX ORDER — HYDROMORPHONE HYDROCHLORIDE 2 MG/ML
0.5 INJECTION INTRAMUSCULAR; INTRAVENOUS; SUBCUTANEOUS
Refills: 0 | Status: DISCONTINUED | OUTPATIENT
Start: 2019-06-25 | End: 2019-06-25

## 2019-06-25 RX ORDER — APIXABAN 2.5 MG/1
5 TABLET, FILM COATED ORAL
Refills: 0 | Status: DISCONTINUED | OUTPATIENT
Start: 2019-06-26 | End: 2019-07-01

## 2019-06-25 RX ORDER — AMLODIPINE BESYLATE 2.5 MG/1
5 TABLET ORAL DAILY
Refills: 0 | Status: DISCONTINUED | OUTPATIENT
Start: 2019-06-25 | End: 2019-06-30

## 2019-06-25 RX ORDER — VANCOMYCIN HCL 1 G
1000 VIAL (EA) INTRAVENOUS ONCE
Refills: 0 | Status: COMPLETED | OUTPATIENT
Start: 2019-06-25 | End: 2019-06-25

## 2019-06-25 RX ORDER — POLYETHYLENE GLYCOL 3350 17 G/17G
17 POWDER, FOR SOLUTION ORAL DAILY
Refills: 0 | Status: DISCONTINUED | OUTPATIENT
Start: 2019-06-25 | End: 2019-06-25

## 2019-06-25 RX ORDER — SODIUM CHLORIDE 9 MG/ML
500 INJECTION INTRAMUSCULAR; INTRAVENOUS; SUBCUTANEOUS ONCE
Refills: 0 | Status: DISCONTINUED | OUTPATIENT
Start: 2019-06-25 | End: 2019-06-27

## 2019-06-25 RX ORDER — METOPROLOL TARTRATE 50 MG
50 TABLET ORAL
Refills: 0 | Status: DISCONTINUED | OUTPATIENT
Start: 2019-06-25 | End: 2019-06-28

## 2019-06-25 RX ADMIN — PANTOPRAZOLE SODIUM 40 MILLIGRAM(S): 20 TABLET, DELAYED RELEASE ORAL at 10:29

## 2019-06-25 RX ADMIN — Medication 250 MILLIGRAM(S): at 23:10

## 2019-06-25 RX ADMIN — TRAMADOL HYDROCHLORIDE 50 MILLIGRAM(S): 50 TABLET ORAL at 11:37

## 2019-06-25 RX ADMIN — Medication 0.2 MILLILITER(S): at 10:29

## 2019-06-25 RX ADMIN — HYDROMORPHONE HYDROCHLORIDE 0.25 MILLIGRAM(S): 2 INJECTION INTRAMUSCULAR; INTRAVENOUS; SUBCUTANEOUS at 15:55

## 2019-06-25 RX ADMIN — HYDROMORPHONE HYDROCHLORIDE 0.25 MILLIGRAM(S): 2 INJECTION INTRAMUSCULAR; INTRAVENOUS; SUBCUTANEOUS at 16:15

## 2019-06-25 RX ADMIN — CHLORHEXIDINE GLUCONATE 1 APPLICATION(S): 213 SOLUTION TOPICAL at 10:28

## 2019-06-25 RX ADMIN — Medication 50 MILLIGRAM(S): at 21:17

## 2019-06-25 RX ADMIN — SODIUM CHLORIDE 1000 MILLILITER(S): 9 INJECTION INTRAMUSCULAR; INTRAVENOUS; SUBCUTANEOUS at 15:45

## 2019-06-25 RX ADMIN — SODIUM CHLORIDE 3 MILLILITER(S): 9 INJECTION INTRAMUSCULAR; INTRAVENOUS; SUBCUTANEOUS at 22:52

## 2019-06-25 RX ADMIN — HYDROMORPHONE HYDROCHLORIDE 0.25 MILLIGRAM(S): 2 INJECTION INTRAMUSCULAR; INTRAVENOUS; SUBCUTANEOUS at 16:30

## 2019-06-25 RX ADMIN — ONDANSETRON 4 MILLIGRAM(S): 8 TABLET, FILM COATED ORAL at 23:09

## 2019-06-25 RX ADMIN — Medication 400 MILLIGRAM(S): at 21:17

## 2019-06-25 RX ADMIN — SODIUM CHLORIDE 3 MILLILITER(S): 9 INJECTION INTRAMUSCULAR; INTRAVENOUS; SUBCUTANEOUS at 10:31

## 2019-06-25 RX ADMIN — SODIUM CHLORIDE 75 MILLILITER(S): 9 INJECTION INTRAMUSCULAR; INTRAVENOUS; SUBCUTANEOUS at 15:13

## 2019-06-25 RX ADMIN — Medication 1000 MILLIGRAM(S): at 21:47

## 2019-06-25 RX ADMIN — HYDROMORPHONE HYDROCHLORIDE 0.25 MILLIGRAM(S): 2 INJECTION INTRAMUSCULAR; INTRAVENOUS; SUBCUTANEOUS at 16:10

## 2019-06-25 RX ADMIN — Medication 100 MILLIGRAM(S): at 21:17

## 2019-06-25 RX ADMIN — ATORVASTATIN CALCIUM 40 MILLIGRAM(S): 80 TABLET, FILM COATED ORAL at 21:16

## 2019-06-25 RX ADMIN — GABAPENTIN 100 MILLIGRAM(S): 400 CAPSULE ORAL at 10:29

## 2019-06-25 NOTE — PHYSICAL THERAPY INITIAL EVALUATION ADULT - ADDITIONAL COMMENTS
as per pt: PTA pt was living in an apartament co-op on 7th floor, elevator access (however co-op will have power shut down this thursday 6/27) and was independent in all functional mobility and ADL's. Rolator for gait.

## 2019-06-25 NOTE — CHART NOTE - NSCHARTNOTEFT_GEN_A_CORE
Resting in RR, still sleepy  Family @ Bedside   No Chest Pain, SOB, N/V.    T(C): 36.8 (06-25-19 @ 14:28), Max: 36.8 (06-25-19 @ 14:28)  HR: 105 (06-25-19 @ 16:30) (84 - 115)  BP: 125/70 (06-25-19 @ 16:30) (102/65 - 143/71)  RR: 17 (06-25-19 @ 16:30) (15 - 18)  SpO2: 98% (06-25-19 @ 16:30) (95% - 100%)  Wt(kg): --    Exam:  Alert and [place], No Acute Distress  Card: +S1/S2, RRR  Pulm: CTAB  Abdomen soft / benign  Lott  [n ]   EXT   RLE       Aquacel dressing C/D [x ]        Calves soft       (+) DF  PT;  EHL/FHL 5/5        No Sensory Deficits noted        2+ pulses    Xray:----  prosthesis in good alignment      A/P: S/p R MICHELLE    -PT/OT-WBAT-  -Chk AM Labs  -DVT PPx: Eliquis BID / ASA 81 mg QD  -Pain Control PO/IV Pain Rx  -Continue Current Tx  -Dispo planning: family already req rehab:  TBD      ***See Above  René MENDIETA  Orthopedics  B: [phone]  S: [phone]

## 2019-06-25 NOTE — PHYSICAL THERAPY INITIAL EVALUATION ADULT - GENERAL OBSERVATIONS, REHAB EVAL
Pt encountered supine in bed, AO x 3. + PIV, Pt encountered supine in bed, AO x 3. + PIV, 6/26 received PRBC; now Pt received supine, PRBC tx finished, BP monitored, orthostatics recorded, +IVL, A&Ox4, follows simple commands, spouse and dtr are at bedside.

## 2019-06-25 NOTE — PHYSICAL THERAPY INITIAL EVALUATION ADULT - PERTINENT HX OF CURRENT PROBLEM, REHAB EVAL
86 year old male with h/o chronic A-fib on anticoagulation, CAD s/p CABG x3 (2011), HTN, hyperlipidemia, previous CVA (2004, no residual), PAD, prostate cancer s/p radiation therapy (x ?10+years), hypothyroidism, lumbar spine stenosis, chronic renal insufficiency, s/p right anterior total hip replacement on 6/25/2019.

## 2019-06-25 NOTE — PHYSICAL THERAPY INITIAL EVALUATION ADULT - CRITERIA FOR SKILLED THERAPEUTIC INTERVENTIONS
impairments found/functional limitations in following categories/risk reduction/prevention/anticipated equipment needs at discharge/predicted duration of therapy intervention/therapy frequency/rehab potential/anticipated discharge recommendation

## 2019-06-26 LAB
ANION GAP SERPL CALC-SCNC: 11 MMOL/L — SIGNIFICANT CHANGE UP (ref 5–17)
BUN SERPL-MCNC: 33 MG/DL — HIGH (ref 7–23)
CALCIUM SERPL-MCNC: 8.7 MG/DL — SIGNIFICANT CHANGE UP (ref 8.4–10.5)
CHLORIDE SERPL-SCNC: 102 MMOL/L — SIGNIFICANT CHANGE UP (ref 96–108)
CO2 SERPL-SCNC: 24 MMOL/L — SIGNIFICANT CHANGE UP (ref 22–31)
CREAT SERPL-MCNC: 1.29 MG/DL — SIGNIFICANT CHANGE UP (ref 0.5–1.3)
GLUCOSE SERPL-MCNC: 160 MG/DL — HIGH (ref 70–99)
HCT VFR BLD CALC: 26.3 % — LOW (ref 39–50)
HGB BLD-MCNC: 8.4 G/DL — LOW (ref 13–17)
MCHC RBC-ENTMCNC: 31.9 GM/DL — LOW (ref 32–36)
MCHC RBC-ENTMCNC: 33.7 PG — SIGNIFICANT CHANGE UP (ref 27–34)
MCV RBC AUTO: 105.6 FL — HIGH (ref 80–100)
PLATELET # BLD AUTO: 185 K/UL — SIGNIFICANT CHANGE UP (ref 150–400)
POTASSIUM SERPL-MCNC: 4.6 MMOL/L — SIGNIFICANT CHANGE UP (ref 3.5–5.3)
POTASSIUM SERPL-SCNC: 4.6 MMOL/L — SIGNIFICANT CHANGE UP (ref 3.5–5.3)
RBC # BLD: 2.49 M/UL — LOW (ref 4.2–5.8)
RBC # FLD: 14.7 % — HIGH (ref 10.3–14.5)
SODIUM SERPL-SCNC: 137 MMOL/L — SIGNIFICANT CHANGE UP (ref 135–145)
WBC # BLD: 13.34 K/UL — HIGH (ref 3.8–10.5)
WBC # FLD AUTO: 13.34 K/UL — HIGH (ref 3.8–10.5)

## 2019-06-26 RX ORDER — METOCLOPRAMIDE HCL 10 MG
10 TABLET ORAL EVERY 6 HOURS
Refills: 0 | Status: DISCONTINUED | OUTPATIENT
Start: 2019-06-26 | End: 2019-07-01

## 2019-06-26 RX ORDER — OXYCODONE HYDROCHLORIDE 5 MG/1
2.5 TABLET ORAL ONCE
Refills: 0 | Status: DISCONTINUED | OUTPATIENT
Start: 2019-06-26 | End: 2019-06-26

## 2019-06-26 RX ADMIN — Medication 100 MILLIGRAM(S): at 05:52

## 2019-06-26 RX ADMIN — APIXABAN 5 MILLIGRAM(S): 2.5 TABLET, FILM COATED ORAL at 18:10

## 2019-06-26 RX ADMIN — ATORVASTATIN CALCIUM 40 MILLIGRAM(S): 80 TABLET, FILM COATED ORAL at 22:23

## 2019-06-26 RX ADMIN — Medication 975 MILLIGRAM(S): at 22:22

## 2019-06-26 RX ADMIN — Medication 100 MILLIGRAM(S): at 13:42

## 2019-06-26 RX ADMIN — Medication 100 MILLIGRAM(S): at 22:22

## 2019-06-26 RX ADMIN — SODIUM CHLORIDE 1000 MILLILITER(S): 9 INJECTION INTRAMUSCULAR; INTRAVENOUS; SUBCUTANEOUS at 05:53

## 2019-06-26 RX ADMIN — Medication 1000 MILLIGRAM(S): at 06:00

## 2019-06-26 RX ADMIN — SODIUM CHLORIDE 3 MILLILITER(S): 9 INJECTION INTRAMUSCULAR; INTRAVENOUS; SUBCUTANEOUS at 13:43

## 2019-06-26 RX ADMIN — Medication 10 MILLIGRAM(S): at 16:49

## 2019-06-26 RX ADMIN — Medication 975 MILLIGRAM(S): at 13:42

## 2019-06-26 RX ADMIN — SODIUM CHLORIDE 75 MILLILITER(S): 9 INJECTION INTRAMUSCULAR; INTRAVENOUS; SUBCUTANEOUS at 09:32

## 2019-06-26 RX ADMIN — Medication 975 MILLIGRAM(S): at 14:15

## 2019-06-26 RX ADMIN — LACTULOSE 15 GRAM(S): 10 SOLUTION ORAL at 13:42

## 2019-06-26 RX ADMIN — OXYCODONE HYDROCHLORIDE 5 MILLIGRAM(S): 5 TABLET ORAL at 05:54

## 2019-06-26 RX ADMIN — OXYCODONE HYDROCHLORIDE 2.5 MILLIGRAM(S): 5 TABLET ORAL at 22:21

## 2019-06-26 RX ADMIN — ONDANSETRON 4 MILLIGRAM(S): 8 TABLET, FILM COATED ORAL at 08:57

## 2019-06-26 RX ADMIN — SODIUM CHLORIDE 3 MILLILITER(S): 9 INJECTION INTRAMUSCULAR; INTRAVENOUS; SUBCUTANEOUS at 05:57

## 2019-06-26 RX ADMIN — Medication 81 MILLIGRAM(S): at 13:41

## 2019-06-26 RX ADMIN — Medication 400 MILLIGRAM(S): at 05:51

## 2019-06-26 RX ADMIN — OXYCODONE HYDROCHLORIDE 5 MILLIGRAM(S): 5 TABLET ORAL at 06:00

## 2019-06-26 RX ADMIN — OXYCODONE HYDROCHLORIDE 2.5 MILLIGRAM(S): 5 TABLET ORAL at 22:51

## 2019-06-26 RX ADMIN — PANTOPRAZOLE SODIUM 40 MILLIGRAM(S): 20 TABLET, DELAYED RELEASE ORAL at 05:52

## 2019-06-26 RX ADMIN — APIXABAN 5 MILLIGRAM(S): 2.5 TABLET, FILM COATED ORAL at 05:51

## 2019-06-26 RX ADMIN — SODIUM CHLORIDE 3 MILLILITER(S): 9 INJECTION INTRAMUSCULAR; INTRAVENOUS; SUBCUTANEOUS at 22:23

## 2019-06-26 RX ADMIN — Medication 100 MICROGRAM(S): at 05:52

## 2019-06-26 RX ADMIN — Medication 50 MILLIGRAM(S): at 05:52

## 2019-06-26 RX ADMIN — Medication 10 MILLIGRAM(S): at 09:50

## 2019-06-26 NOTE — OCCUPATIONAL THERAPY INITIAL EVALUATION ADULT - PERTINENT HX OF CURRENT PROBLEM, REHAB EVAL
85yo M with h/o chronic A-fib on anticoagulation, CAD s/p CABG x3 (2011), HTN, hyperlipidemia, previous CVA (2004, no residual), PAD, prostate cancer s/p radiation therapy (x ?10+years), hypothyroidism, lumbar spine stenosis, chronic renal insufficiency, is been seeing in preadmission testing for scheduled right anterior total hip replacement on 6/25/2019.

## 2019-06-26 NOTE — OCCUPATIONAL THERAPY INITIAL EVALUATION ADULT - TRANSFER TRAINING, PT EVAL
Goal: Pt will perform sit to stand, bed <-> chair, toilet and shower transfers independently within 4 weeks

## 2019-06-26 NOTE — OCCUPATIONAL THERAPY INITIAL EVALUATION ADULT - BALANCE TRAINING, PT EVAL
Goal: Pt will demonstrate improved static/dynamic standing balance by 1 grade in order to increase safety and independence in ADLs within 4 weeks

## 2019-06-26 NOTE — OCCUPATIONAL THERAPY INITIAL EVALUATION ADULT - DIAGNOSIS, OT EVAL
Pt currently presents with decreased endurance, balance, flexibility, ROM and strength limiting independence with ADLs and functional mobility.

## 2019-06-26 NOTE — OCCUPATIONAL THERAPY INITIAL EVALUATION ADULT - GENERAL OBSERVATIONS, REHAB EVAL
Pt received sitting in chair in care of spouse and daughter, A+Ox4, received blood transfusion  this AM, s/p R MICHELLE, anterior approach

## 2019-06-26 NOTE — OCCUPATIONAL THERAPY INITIAL EVALUATION ADULT - ADL RETRAINING, OT EVAL
Goal: Pt will perform bathing independently with appropriate AD within 4 weeks. Goal: Pt will perform UE/LE dressing using compensatory strategies independently within 4 weeks.

## 2019-06-26 NOTE — OCCUPATIONAL THERAPY INITIAL EVALUATION ADULT - ADDITIONAL COMMENTS
XRay pelvis 6/25/19: Status post right total hip arthroplasty with noncemented components in the usual position. Lucency in the native right acetabulum is consistent with subchondral cystic change from previous arthrosis. Soft tissue swelling and air around the right hip joint is consistent with recent operative change. Surgical clips are noted at the left proximal thigh medially.

## 2019-06-26 NOTE — OCCUPATIONAL THERAPY INITIAL EVALUATION ADULT - AMBULATORY DEVICES NEEDED
Pt ambulatory with rollator prior to admission; pt and wife use car service for community mobility/yes

## 2019-06-26 NOTE — OCCUPATIONAL THERAPY INITIAL EVALUATION ADULT - RANGE OF MOTION EXAMINATION, LOWER EXTREMITY
RLE limited 2/2 to discomfort/Left LE Active ROM was WFL (within functional limits)/Right LE Active Assistive ROM was WFL  (within functional limits)

## 2019-06-26 NOTE — OCCUPATIONAL THERAPY INITIAL EVALUATION ADULT - LIVES WITH, PROFILE
As per pt, lives with spouse in elevator coop bl, 7th flr with large ramp entrance from side of bldg, steps from front entrance to lobby. Apt is directly next to elevator well. tub shower with nonskid mat, 2 grab bars, shower chair. Daughter lives in [place]/spouse

## 2019-06-27 ENCOUNTER — TRANSCRIPTION ENCOUNTER (OUTPATIENT)
Age: 84
End: 2019-06-27

## 2019-06-27 LAB
ANION GAP SERPL CALC-SCNC: 12 MMOL/L — SIGNIFICANT CHANGE UP (ref 5–17)
BUN SERPL-MCNC: 36 MG/DL — HIGH (ref 7–23)
CALCIUM SERPL-MCNC: 8.7 MG/DL — SIGNIFICANT CHANGE UP (ref 8.4–10.5)
CHLORIDE SERPL-SCNC: 100 MMOL/L — SIGNIFICANT CHANGE UP (ref 96–108)
CO2 SERPL-SCNC: 26 MMOL/L — SIGNIFICANT CHANGE UP (ref 22–31)
CREAT SERPL-MCNC: 1.31 MG/DL — HIGH (ref 0.5–1.3)
GLUCOSE SERPL-MCNC: 130 MG/DL — HIGH (ref 70–99)
HCT VFR BLD CALC: 28.2 % — LOW (ref 39–50)
HGB BLD-MCNC: 9.7 G/DL — LOW (ref 13–17)
MCHC RBC-ENTMCNC: 33.8 PG — SIGNIFICANT CHANGE UP (ref 27–34)
MCHC RBC-ENTMCNC: 34.4 GM/DL — SIGNIFICANT CHANGE UP (ref 32–36)
MCV RBC AUTO: 98.3 FL — SIGNIFICANT CHANGE UP (ref 80–100)
PLATELET # BLD AUTO: 159 K/UL — SIGNIFICANT CHANGE UP (ref 150–400)
POTASSIUM SERPL-MCNC: 4.2 MMOL/L — SIGNIFICANT CHANGE UP (ref 3.5–5.3)
POTASSIUM SERPL-SCNC: 4.2 MMOL/L — SIGNIFICANT CHANGE UP (ref 3.5–5.3)
RBC # BLD: 2.87 M/UL — LOW (ref 4.2–5.8)
RBC # FLD: 19.2 % — HIGH (ref 10.3–14.5)
SODIUM SERPL-SCNC: 138 MMOL/L — SIGNIFICANT CHANGE UP (ref 135–145)
WBC # BLD: 11.1 K/UL — HIGH (ref 3.8–10.5)
WBC # FLD AUTO: 11.1 K/UL — HIGH (ref 3.8–10.5)

## 2019-06-27 RX ORDER — DOCUSATE SODIUM 100 MG
1 CAPSULE ORAL
Qty: 0 | Refills: 0 | DISCHARGE
Start: 2019-06-27

## 2019-06-27 RX ORDER — ACETAMINOPHEN 500 MG
3 TABLET ORAL
Qty: 0 | Refills: 0 | DISCHARGE
Start: 2019-06-27

## 2019-06-27 RX ORDER — OXYCODONE HYDROCHLORIDE 5 MG/1
2.5 TABLET ORAL ONCE
Refills: 0 | Status: DISCONTINUED | OUTPATIENT
Start: 2019-06-27 | End: 2019-06-27

## 2019-06-27 RX ORDER — OXYCODONE HYDROCHLORIDE 5 MG/1
2.5 TABLET ORAL ONCE
Refills: 0 | Status: DISCONTINUED | OUTPATIENT
Start: 2019-06-27 | End: 2019-06-28

## 2019-06-27 RX ORDER — TRAMADOL HYDROCHLORIDE 50 MG/1
1 TABLET ORAL
Qty: 0 | Refills: 0 | DISCHARGE
Start: 2019-06-27

## 2019-06-27 RX ORDER — PANTOPRAZOLE SODIUM 20 MG/1
1 TABLET, DELAYED RELEASE ORAL
Qty: 0 | Refills: 0 | DISCHARGE
Start: 2019-06-27

## 2019-06-27 RX ADMIN — Medication 975 MILLIGRAM(S): at 05:37

## 2019-06-27 RX ADMIN — Medication 50 MILLIGRAM(S): at 17:21

## 2019-06-27 RX ADMIN — APIXABAN 5 MILLIGRAM(S): 2.5 TABLET, FILM COATED ORAL at 05:36

## 2019-06-27 RX ADMIN — Medication 100 MILLIGRAM(S): at 05:36

## 2019-06-27 RX ADMIN — Medication 81 MILLIGRAM(S): at 12:18

## 2019-06-27 RX ADMIN — LACTULOSE 15 GRAM(S): 10 SOLUTION ORAL at 12:18

## 2019-06-27 RX ADMIN — SENNA PLUS 2 TABLET(S): 8.6 TABLET ORAL at 21:57

## 2019-06-27 RX ADMIN — SODIUM CHLORIDE 3 MILLILITER(S): 9 INJECTION INTRAMUSCULAR; INTRAVENOUS; SUBCUTANEOUS at 13:13

## 2019-06-27 RX ADMIN — Medication 100 MICROGRAM(S): at 05:36

## 2019-06-27 RX ADMIN — Medication 975 MILLIGRAM(S): at 13:13

## 2019-06-27 RX ADMIN — Medication 975 MILLIGRAM(S): at 22:25

## 2019-06-27 RX ADMIN — PANTOPRAZOLE SODIUM 40 MILLIGRAM(S): 20 TABLET, DELAYED RELEASE ORAL at 05:37

## 2019-06-27 RX ADMIN — ATORVASTATIN CALCIUM 40 MILLIGRAM(S): 80 TABLET, FILM COATED ORAL at 21:55

## 2019-06-27 RX ADMIN — SODIUM CHLORIDE 3 MILLILITER(S): 9 INJECTION INTRAMUSCULAR; INTRAVENOUS; SUBCUTANEOUS at 21:51

## 2019-06-27 RX ADMIN — Medication 975 MILLIGRAM(S): at 13:43

## 2019-06-27 RX ADMIN — Medication 100 MILLIGRAM(S): at 21:56

## 2019-06-27 RX ADMIN — OXYCODONE HYDROCHLORIDE 2.5 MILLIGRAM(S): 5 TABLET ORAL at 11:30

## 2019-06-27 RX ADMIN — OXYCODONE HYDROCHLORIDE 2.5 MILLIGRAM(S): 5 TABLET ORAL at 11:00

## 2019-06-27 RX ADMIN — APIXABAN 5 MILLIGRAM(S): 2.5 TABLET, FILM COATED ORAL at 17:21

## 2019-06-27 RX ADMIN — Medication 975 MILLIGRAM(S): at 21:55

## 2019-06-27 RX ADMIN — SODIUM CHLORIDE 3 MILLILITER(S): 9 INJECTION INTRAMUSCULAR; INTRAVENOUS; SUBCUTANEOUS at 07:08

## 2019-06-27 RX ADMIN — Medication 100 MILLIGRAM(S): at 13:12

## 2019-06-27 NOTE — DISCHARGE NOTE PROVIDER - CARE PROVIDER_API CALL
Mahesh Eduardo)  Orthopaedic Surgery  [address], [address]  [place] [address]  Phone: [phone]  Fax: [phone]  Follow Up Time:

## 2019-06-27 NOTE — DISCHARGE NOTE PROVIDER - NSDCACTIVITY_GEN_ALL_CORE
Walking - Outdoors allowed/Do not make important decisions/Walking - Indoors allowed/No heavy lifting/straining/Do not drive or operate machinery/Stairs allowed

## 2019-06-27 NOTE — DISCHARGE NOTE PROVIDER - HOSPITAL COURSE
86 year old male with h/o chronic A-fib on anticoagulation, CAD s/p CABG x3 (2011), HTN, hyperlipidemia, previous CVA (2004, no residual), PAD, prostate cancer s/p radiation therapy  hypothyroidism, lumbar spine stenosis, and chronic renal insufficiency, presented to [facility] on 6/25 for a scheduled R total hip replacement with Dr. Eduardo. Patient underwent procedure without complication. PT/OT evaluated patient and recommended discharge to subacute rehab. Rest of hospital stay unremarkable and was discharged to rehab when bed became available. Reason for Admission    "pain right hip, can't walk, swollen right knee, they drained the water out of my knee."         History of Present Illness:    History of Present Illness        86 year old male with h/o chronic A-fib on anticoagulation, CAD s/p CABG x3 (2011), HTN, hyperlipidemia, previous CVA (2004, no residual), PAD, prostate cancer s/p radiation therapy (x ?10+years), hypothyroidism, lumbar spine stenosis, chronic renal insufficiency, is been seeing in preadmission testing for scheduled right anterior total hip replacement on 6/25/2019.         Past Medical, Past Surgical History:    PAST MEDICAL HISTORY:    Atrial fibrillation diagnosed many years ago     on anticoagulant    CAD (coronary artery disease)     Chronic dryness of both eyes     CRI (chronic renal insufficiency)     CVA (cerebral vascular accident) 2004    no residual    History of colitis     History of hemorrhoids     HLD (hyperlipidemia)     HTN (hypertension)     Hyperthyroidism treated with radioactive iodine    Hypothyroid     Lumbar spinal stenosis     OA (osteoarthritis) right hip    PAD (peripheral artery disease)     Prostate cancer s/p radiation ? 10 years ago    Spinal stenosis h/o epidural injection.         PAST SURGICAL HISTORY:    H/O cataract extraction     History of herniorrhaphy right groin    S/P CABG x 3 10/2011.        HOSPITAL COURSE    86 year old male with h/o chronic A-fib on anticoagulation, CAD s/p CABG x3 (2011), HTN, hyperlipidemia, previous CVA (2004, no residual), PAD, prostate cancer s/p radiation therapy  hypothyroidism, lumbar spine stenosis, and chronic renal insufficiency, presented to [facility] on 6/25 for a scheduled R total hip replacement with Dr. Eduardo. Patient underwent procedure without complication.  Patient was evaluated by Cardiologist Dr. Le who advised increasing Metoprolol to 50mg BID, and holding norvasc and HCTZ until 7/2/19.  Physical and occupational therapists evaluated patient for weight bearing as tolerated ambulation with rolling walker and recommended discharge to subacute rehab. Rest of hospital stay unremarkable and was discharged to rehab when bed became available.

## 2019-06-27 NOTE — DISCHARGE NOTE PROVIDER - NSDCFUADDINST_GEN_ALL_CORE_FT
Rehab staff may remove sutures/staples on POD 14 if applicable. Keep dressing clean and dry. Schedule followup appointment with Dr. Eduardo upon discharge from rehab. Continue taking Eliquis and ASA 81 for DVT prophylaxis. PT/OT: CHETAN BECK. Recommend scheduling followup appointment with PMD within 1 month of surgery. Rehab staff may remove sutures/staples on POD 14 if applicable. TOV once patient is more ambulatory at rehab. Keep dressing clean and dry. Schedule followup appointment with Dr. Eduardo upon discharge from rehab. Continue taking Eliquis and ASA 81 for DVT prophylaxis. PT/OT: CHETAN BECK. Recommend scheduling followup appointment with PMD within 1 month of surgery. Rehab staff may remove sutures/staples on POD 14 if applicable.  Keep dressing clean and dry. Schedule followup appointment with Dr. Eduardo upon discharge from rehab. Continue taking Eliquis and ASA 81 for DVT prophylaxis. PT/OT: CHETAN BECK. Recommend scheduling followup appointment with PMD within 1 month of surgery.

## 2019-06-28 LAB
ANION GAP SERPL CALC-SCNC: 9 MMOL/L — SIGNIFICANT CHANGE UP (ref 5–17)
BUN SERPL-MCNC: 29 MG/DL — HIGH (ref 7–23)
CALCIUM SERPL-MCNC: 9 MG/DL — SIGNIFICANT CHANGE UP (ref 8.4–10.5)
CHLORIDE SERPL-SCNC: 105 MMOL/L — SIGNIFICANT CHANGE UP (ref 96–108)
CO2 SERPL-SCNC: 25 MMOL/L — SIGNIFICANT CHANGE UP (ref 22–31)
CREAT SERPL-MCNC: 1.12 MG/DL — SIGNIFICANT CHANGE UP (ref 0.5–1.3)
GLUCOSE SERPL-MCNC: 125 MG/DL — HIGH (ref 70–99)
HCT VFR BLD CALC: 25.3 % — LOW (ref 39–50)
HGB BLD-MCNC: 8.5 G/DL — LOW (ref 13–17)
MCHC RBC-ENTMCNC: 32.7 PG — SIGNIFICANT CHANGE UP (ref 27–34)
MCHC RBC-ENTMCNC: 33.5 GM/DL — SIGNIFICANT CHANGE UP (ref 32–36)
MCV RBC AUTO: 97.6 FL — SIGNIFICANT CHANGE UP (ref 80–100)
PLATELET # BLD AUTO: 162 K/UL — SIGNIFICANT CHANGE UP (ref 150–400)
POTASSIUM SERPL-MCNC: 3.6 MMOL/L — SIGNIFICANT CHANGE UP (ref 3.5–5.3)
POTASSIUM SERPL-SCNC: 3.6 MMOL/L — SIGNIFICANT CHANGE UP (ref 3.5–5.3)
RBC # BLD: 2.59 M/UL — LOW (ref 4.2–5.8)
RBC # FLD: 18.2 % — HIGH (ref 10.3–14.5)
SODIUM SERPL-SCNC: 139 MMOL/L — SIGNIFICANT CHANGE UP (ref 135–145)
WBC # BLD: 10.6 K/UL — HIGH (ref 3.8–10.5)
WBC # FLD AUTO: 10.6 K/UL — HIGH (ref 3.8–10.5)

## 2019-06-28 RX ORDER — METOPROLOL TARTRATE 50 MG
50 TABLET ORAL
Refills: 0 | Status: DISCONTINUED | OUTPATIENT
Start: 2019-06-29 | End: 2019-06-30

## 2019-06-28 RX ORDER — ONDANSETRON 8 MG/1
4 TABLET, FILM COATED ORAL ONCE
Refills: 0 | Status: COMPLETED | OUTPATIENT
Start: 2019-06-28 | End: 2019-06-28

## 2019-06-28 RX ORDER — METOPROLOL TARTRATE 50 MG
25 TABLET ORAL
Refills: 0 | Status: DISCONTINUED | OUTPATIENT
Start: 2019-06-28 | End: 2019-06-30

## 2019-06-28 RX ORDER — LANOLIN ALCOHOL/MO/W.PET/CERES
3 CREAM (GRAM) TOPICAL AT BEDTIME
Refills: 0 | Status: DISCONTINUED | OUTPATIENT
Start: 2019-06-28 | End: 2019-07-01

## 2019-06-28 RX ADMIN — Medication 81 MILLIGRAM(S): at 11:52

## 2019-06-28 RX ADMIN — ONDANSETRON 4 MILLIGRAM(S): 8 TABLET, FILM COATED ORAL at 23:54

## 2019-06-28 RX ADMIN — APIXABAN 5 MILLIGRAM(S): 2.5 TABLET, FILM COATED ORAL at 06:31

## 2019-06-28 RX ADMIN — OXYCODONE HYDROCHLORIDE 2.5 MILLIGRAM(S): 5 TABLET ORAL at 13:35

## 2019-06-28 RX ADMIN — OXYCODONE HYDROCHLORIDE 2.5 MILLIGRAM(S): 5 TABLET ORAL at 14:35

## 2019-06-28 RX ADMIN — Medication 975 MILLIGRAM(S): at 06:55

## 2019-06-28 RX ADMIN — PANTOPRAZOLE SODIUM 40 MILLIGRAM(S): 20 TABLET, DELAYED RELEASE ORAL at 06:31

## 2019-06-28 RX ADMIN — Medication 25 MILLIGRAM(S): at 18:10

## 2019-06-28 RX ADMIN — SODIUM CHLORIDE 3 MILLILITER(S): 9 INJECTION INTRAMUSCULAR; INTRAVENOUS; SUBCUTANEOUS at 23:55

## 2019-06-28 RX ADMIN — Medication 100 MICROGRAM(S): at 06:31

## 2019-06-28 RX ADMIN — Medication 100 MILLIGRAM(S): at 06:31

## 2019-06-28 RX ADMIN — LACTULOSE 15 GRAM(S): 10 SOLUTION ORAL at 11:51

## 2019-06-28 RX ADMIN — SODIUM CHLORIDE 3 MILLILITER(S): 9 INJECTION INTRAMUSCULAR; INTRAVENOUS; SUBCUTANEOUS at 13:39

## 2019-06-28 RX ADMIN — APIXABAN 5 MILLIGRAM(S): 2.5 TABLET, FILM COATED ORAL at 17:19

## 2019-06-28 RX ADMIN — Medication 50 MILLIGRAM(S): at 06:31

## 2019-06-28 RX ADMIN — Medication 975 MILLIGRAM(S): at 06:31

## 2019-06-28 RX ADMIN — SODIUM CHLORIDE 3 MILLILITER(S): 9 INJECTION INTRAMUSCULAR; INTRAVENOUS; SUBCUTANEOUS at 06:08

## 2019-06-28 RX ADMIN — Medication 100 MILLIGRAM(S): at 13:38

## 2019-06-28 NOTE — CONSULT NOTE ADULT - ASSESSMENT
1. pod 3 hip arthroplasy  2.anemia  3. releative hypotension  4. afib with increased VR    Recommend  no amlodipine  transfuse as needed  metoprolol 50 AM if HR increases 25 PM  hopefully BP and anemia will stabilize and physical therapy can be pursued

## 2019-06-28 NOTE — CONSULT NOTE ADULT - SUBJECTIVE AND OBJECTIVE BOX
relative hypotension  pod 3 hip arthroplasty  feels weak no sob hypotensive afib increased VR  family anxious about BP  Rexcent rperop echo normal LV funciton  s/p CABG long hx of afib on anticoagulation  MEDICATIONS:  MEDICATIONS  (STANDING):  acetaminophen   Tablet .. 975 milliGRAM(s) Oral every 8 hours  amLODIPine   Tablet 5 milliGRAM(s) Oral daily  apixaban 5 milliGRAM(s) Oral two times a day  aspirin enteric coated 81 milliGRAM(s) Oral daily  atorvastatin 40 milliGRAM(s) Oral at bedtime  docusate sodium 100 milliGRAM(s) Oral three times a day  hydrochlorothiazide 12.5 milliGRAM(s) Oral daily  lactulose Syrup 15 Gram(s) Oral daily  levothyroxine 100 MICROGram(s) Oral daily  metoprolol tartrate 50 milliGRAM(s) Oral two times a day  pantoprazole    Tablet 40 milliGRAM(s) Oral before breakfast  sodium chloride 0.9% lock flush 3 milliLiter(s) IV Push every 8 hours      PHYSICAL EXAM:  T(C): 36.9 (06-28-19 @ 13:18), Max: 37.1 (06-28-19 @ 06:05)  HR: 105 (06-28-19 @ 13:18) (71 - 109)  BP: 111/66 (06-28-19 @ 13:18) (94/62 - 118/68)  RR: 16 (06-28-19 @ 13:18) (16 - 18)  SpO2: 99% (06-28-19 @ 13:18) (95% - 99%)  Wt(kg): --  I&O's Summary    27 Jun 2019 07:01  -  28 Jun 2019 07:00  --------------------------------------------------------  IN: 980 mL / OUT: 1550 mL / NET: -570 mL    28 Jun 2019 07:01  -  28 Jun 2019 13:57  --------------------------------------------------------  IN: 880 mL / OUT: 350 mL / NET: 530 mL          Appearance: Normal NA anxious  HEENT:   Normal oral mucosa, PERRL, EOMI	  Cardiovascular: Normal S1 S2,irregularly irregular No JVD, No murmurs ,  Respiratory: Lungs clear to auscultation, normal effort 	  Gastrointestinal:  Soft, Non-tender, + BS	  1 + edema RLE    LABS:    CARDIAC MARKERS:                                8.5    10.6  )-----------( 162      ( 28 Jun 2019 06:45 )             25.3     06-28    139  |  105  |  29<H>  ----------------------------<  125<H>  3.6   |  25  |  1.12    Ca    9.0      28 Jun 2019 06:44      proBNP:   Lipid Profile:   HgA1c:   TSH:           TELEMETRY: 	    ECG:  	  RADIOLOGY:   DIAGNOSTIC TESTING:  [ ] Echocardiogram:  [ ]  Catheterization:  [ ] Stress Test:    OTHER:

## 2019-06-29 LAB
ANION GAP SERPL CALC-SCNC: 12 MMOL/L — SIGNIFICANT CHANGE UP (ref 5–17)
BLD GP AB SCN SERPL QL: NEGATIVE — SIGNIFICANT CHANGE UP
BUN SERPL-MCNC: 24 MG/DL — HIGH (ref 7–23)
CALCIUM SERPL-MCNC: 9 MG/DL — SIGNIFICANT CHANGE UP (ref 8.4–10.5)
CHLORIDE SERPL-SCNC: 104 MMOL/L — SIGNIFICANT CHANGE UP (ref 96–108)
CO2 SERPL-SCNC: 25 MMOL/L — SIGNIFICANT CHANGE UP (ref 22–31)
CREAT SERPL-MCNC: 1.07 MG/DL — SIGNIFICANT CHANGE UP (ref 0.5–1.3)
GLUCOSE SERPL-MCNC: 137 MG/DL — HIGH (ref 70–99)
HCT VFR BLD CALC: 29.4 % — LOW (ref 39–50)
HGB BLD-MCNC: 10 G/DL — LOW (ref 13–17)
MCHC RBC-ENTMCNC: 33.5 PG — SIGNIFICANT CHANGE UP (ref 27–34)
MCHC RBC-ENTMCNC: 34.1 GM/DL — SIGNIFICANT CHANGE UP (ref 32–36)
MCV RBC AUTO: 98.1 FL — SIGNIFICANT CHANGE UP (ref 80–100)
PLATELET # BLD AUTO: 171 K/UL — SIGNIFICANT CHANGE UP (ref 150–400)
POTASSIUM SERPL-MCNC: 4.5 MMOL/L — SIGNIFICANT CHANGE UP (ref 3.5–5.3)
POTASSIUM SERPL-SCNC: 4.5 MMOL/L — SIGNIFICANT CHANGE UP (ref 3.5–5.3)
RBC # BLD: 3 M/UL — LOW (ref 4.2–5.8)
RBC # FLD: 18.6 % — HIGH (ref 10.3–14.5)
RH IG SCN BLD-IMP: NEGATIVE — SIGNIFICANT CHANGE UP
SODIUM SERPL-SCNC: 141 MMOL/L — SIGNIFICANT CHANGE UP (ref 135–145)
WBC # BLD: 10.1 K/UL — SIGNIFICANT CHANGE UP (ref 3.8–10.5)
WBC # FLD AUTO: 10.1 K/UL — SIGNIFICANT CHANGE UP (ref 3.8–10.5)

## 2019-06-29 PROCEDURE — 93010 ELECTROCARDIOGRAM REPORT: CPT

## 2019-06-29 RX ORDER — METOPROLOL TARTRATE 50 MG
25 TABLET ORAL ONCE
Refills: 0 | Status: COMPLETED | OUTPATIENT
Start: 2019-06-29 | End: 2019-06-29

## 2019-06-29 RX ORDER — MAGNESIUM HYDROXIDE 400 MG/1
30 TABLET, CHEWABLE ORAL ONCE
Refills: 0 | Status: COMPLETED | OUTPATIENT
Start: 2019-06-29 | End: 2019-06-29

## 2019-06-29 RX ORDER — DIAZEPAM 5 MG
2 TABLET ORAL EVERY 12 HOURS
Refills: 0 | Status: DISCONTINUED | OUTPATIENT
Start: 2019-06-29 | End: 2019-07-01

## 2019-06-29 RX ADMIN — PANTOPRAZOLE SODIUM 40 MILLIGRAM(S): 20 TABLET, DELAYED RELEASE ORAL at 06:41

## 2019-06-29 RX ADMIN — Medication 25 MILLIGRAM(S): at 18:05

## 2019-06-29 RX ADMIN — Medication 25 MILLIGRAM(S): at 16:16

## 2019-06-29 RX ADMIN — SODIUM CHLORIDE 3 MILLILITER(S): 9 INJECTION INTRAMUSCULAR; INTRAVENOUS; SUBCUTANEOUS at 07:17

## 2019-06-29 RX ADMIN — Medication 100 MILLIGRAM(S): at 21:55

## 2019-06-29 RX ADMIN — Medication 100 MICROGRAM(S): at 06:41

## 2019-06-29 RX ADMIN — AMLODIPINE BESYLATE 5 MILLIGRAM(S): 2.5 TABLET ORAL at 09:07

## 2019-06-29 RX ADMIN — APIXABAN 5 MILLIGRAM(S): 2.5 TABLET, FILM COATED ORAL at 06:53

## 2019-06-29 RX ADMIN — ATORVASTATIN CALCIUM 40 MILLIGRAM(S): 80 TABLET, FILM COATED ORAL at 21:55

## 2019-06-29 RX ADMIN — Medication 100 MILLIGRAM(S): at 13:38

## 2019-06-29 RX ADMIN — SODIUM CHLORIDE 3 MILLILITER(S): 9 INJECTION INTRAMUSCULAR; INTRAVENOUS; SUBCUTANEOUS at 13:40

## 2019-06-29 RX ADMIN — Medication 81 MILLIGRAM(S): at 11:09

## 2019-06-29 RX ADMIN — Medication 10 MILLIGRAM(S): at 09:07

## 2019-06-29 RX ADMIN — MAGNESIUM HYDROXIDE 30 MILLILITER(S): 400 TABLET, CHEWABLE ORAL at 11:09

## 2019-06-29 RX ADMIN — SODIUM CHLORIDE 3 MILLILITER(S): 9 INJECTION INTRAMUSCULAR; INTRAVENOUS; SUBCUTANEOUS at 20:51

## 2019-06-29 RX ADMIN — Medication 10 MILLIGRAM(S): at 07:50

## 2019-06-29 RX ADMIN — APIXABAN 5 MILLIGRAM(S): 2.5 TABLET, FILM COATED ORAL at 18:05

## 2019-06-29 RX ADMIN — Medication 50 MILLIGRAM(S): at 06:41

## 2019-06-29 RX ADMIN — LACTULOSE 15 GRAM(S): 10 SOLUTION ORAL at 13:37

## 2019-06-29 RX ADMIN — Medication 12.5 MILLIGRAM(S): at 09:07

## 2019-06-30 RX ORDER — HYDROCHLOROTHIAZIDE 25 MG
12.5 TABLET ORAL DAILY
Refills: 0 | Status: DISCONTINUED | OUTPATIENT
Start: 2019-07-02 | End: 2019-07-01

## 2019-06-30 RX ORDER — METOPROLOL TARTRATE 50 MG
50 TABLET ORAL
Refills: 0 | Status: DISCONTINUED | OUTPATIENT
Start: 2019-06-30 | End: 2019-07-01

## 2019-06-30 RX ADMIN — Medication 81 MILLIGRAM(S): at 12:27

## 2019-06-30 RX ADMIN — Medication 100 MILLIGRAM(S): at 06:18

## 2019-06-30 RX ADMIN — AMLODIPINE BESYLATE 5 MILLIGRAM(S): 2.5 TABLET ORAL at 08:37

## 2019-06-30 RX ADMIN — SODIUM CHLORIDE 3 MILLILITER(S): 9 INJECTION INTRAMUSCULAR; INTRAVENOUS; SUBCUTANEOUS at 06:15

## 2019-06-30 RX ADMIN — Medication 50 MILLIGRAM(S): at 19:41

## 2019-06-30 RX ADMIN — SODIUM CHLORIDE 3 MILLILITER(S): 9 INJECTION INTRAMUSCULAR; INTRAVENOUS; SUBCUTANEOUS at 14:37

## 2019-06-30 RX ADMIN — APIXABAN 5 MILLIGRAM(S): 2.5 TABLET, FILM COATED ORAL at 18:43

## 2019-06-30 RX ADMIN — APIXABAN 5 MILLIGRAM(S): 2.5 TABLET, FILM COATED ORAL at 06:59

## 2019-06-30 RX ADMIN — Medication 12.5 MILLIGRAM(S): at 08:37

## 2019-06-30 RX ADMIN — Medication 100 MICROGRAM(S): at 06:18

## 2019-06-30 RX ADMIN — PANTOPRAZOLE SODIUM 40 MILLIGRAM(S): 20 TABLET, DELAYED RELEASE ORAL at 06:18

## 2019-06-30 RX ADMIN — Medication 50 MILLIGRAM(S): at 06:18

## 2019-06-30 RX ADMIN — SODIUM CHLORIDE 3 MILLILITER(S): 9 INJECTION INTRAMUSCULAR; INTRAVENOUS; SUBCUTANEOUS at 21:31

## 2019-06-30 RX ADMIN — Medication 2 MILLIGRAM(S): at 14:36

## 2019-06-30 RX ADMIN — ATORVASTATIN CALCIUM 40 MILLIGRAM(S): 80 TABLET, FILM COATED ORAL at 21:30

## 2019-06-30 RX ADMIN — Medication 3 MILLIGRAM(S): at 21:30

## 2019-06-30 NOTE — CHART NOTE - NSCHARTNOTEFT_GEN_A_CORE
Ortho PA Note    As per Dr. Le -Cardiology- in attendance, discontinuing Norvasc 5mg po daily.  Also, as per Dr. Le, HOLD HCTZ tomorrow AM and resume 7/2/19 and  continue Metoprolol 50mg PO BID as of today.      ANAM Abbasi  Orthopedic Surgery  [phone]

## 2019-06-30 NOTE — PROVIDER CONTACT NOTE (EICU) - ACTION/TREATMENT ORDERED:
NO FURTHER INTERVENTIONS FOR NOW AS PER MD. MOHAN. pLEASE CONT TO MONITOR. PLEASE CALL IF PT HR IS IN TEENS

## 2019-07-01 ENCOUNTER — TRANSCRIPTION ENCOUNTER (OUTPATIENT)
Age: 84
End: 2019-07-01

## 2019-07-01 VITALS
DIASTOLIC BLOOD PRESSURE: 72 MMHG | RESPIRATION RATE: 18 BRPM | SYSTOLIC BLOOD PRESSURE: 111 MMHG | OXYGEN SATURATION: 98 % | TEMPERATURE: 98 F | HEART RATE: 87 BPM

## 2019-07-01 PROCEDURE — 86923 COMPATIBILITY TEST ELECTRIC: CPT

## 2019-07-01 PROCEDURE — 86850 RBC ANTIBODY SCREEN: CPT

## 2019-07-01 PROCEDURE — 80048 BASIC METABOLIC PNL TOTAL CA: CPT

## 2019-07-01 PROCEDURE — 97116 GAIT TRAINING THERAPY: CPT

## 2019-07-01 PROCEDURE — P9040: CPT

## 2019-07-01 PROCEDURE — 85027 COMPLETE CBC AUTOMATED: CPT

## 2019-07-01 PROCEDURE — 82962 GLUCOSE BLOOD TEST: CPT

## 2019-07-01 PROCEDURE — C1713: CPT

## 2019-07-01 PROCEDURE — 93005 ELECTROCARDIOGRAM TRACING: CPT

## 2019-07-01 PROCEDURE — 97165 OT EVAL LOW COMPLEX 30 MIN: CPT

## 2019-07-01 PROCEDURE — 72170 X-RAY EXAM OF PELVIS: CPT

## 2019-07-01 PROCEDURE — 97161 PT EVAL LOW COMPLEX 20 MIN: CPT

## 2019-07-01 PROCEDURE — 76000 FLUOROSCOPY <1 HR PHYS/QHP: CPT

## 2019-07-01 PROCEDURE — 36430 TRANSFUSION BLD/BLD COMPNT: CPT

## 2019-07-01 PROCEDURE — 97530 THERAPEUTIC ACTIVITIES: CPT

## 2019-07-01 PROCEDURE — 97110 THERAPEUTIC EXERCISES: CPT

## 2019-07-01 PROCEDURE — 86900 BLOOD TYPING SEROLOGIC ABO: CPT

## 2019-07-01 PROCEDURE — P9016: CPT

## 2019-07-01 PROCEDURE — C1776: CPT

## 2019-07-01 PROCEDURE — 86901 BLOOD TYPING SEROLOGIC RH(D): CPT

## 2019-07-01 RX ORDER — ACETAMINOPHEN 500 MG
1000 TABLET ORAL ONCE
Refills: 0 | Status: COMPLETED | OUTPATIENT
Start: 2019-07-01 | End: 2019-07-01

## 2019-07-01 RX ORDER — LANOLIN ALCOHOL/MO/W.PET/CERES
1 CREAM (GRAM) TOPICAL
Qty: 0 | Refills: 0 | DISCHARGE
Start: 2019-07-01

## 2019-07-01 RX ADMIN — SODIUM CHLORIDE 3 MILLILITER(S): 9 INJECTION INTRAMUSCULAR; INTRAVENOUS; SUBCUTANEOUS at 06:33

## 2019-07-01 RX ADMIN — Medication 2 MILLIGRAM(S): at 09:32

## 2019-07-01 RX ADMIN — PANTOPRAZOLE SODIUM 40 MILLIGRAM(S): 20 TABLET, DELAYED RELEASE ORAL at 06:29

## 2019-07-01 RX ADMIN — Medication 400 MILLIGRAM(S): at 12:02

## 2019-07-01 RX ADMIN — APIXABAN 5 MILLIGRAM(S): 2.5 TABLET, FILM COATED ORAL at 06:29

## 2019-07-01 RX ADMIN — SODIUM CHLORIDE 3 MILLILITER(S): 9 INJECTION INTRAMUSCULAR; INTRAVENOUS; SUBCUTANEOUS at 13:37

## 2019-07-01 RX ADMIN — Medication 81 MILLIGRAM(S): at 12:02

## 2019-07-01 RX ADMIN — Medication 100 MICROGRAM(S): at 06:28

## 2019-07-01 RX ADMIN — Medication 50 MILLIGRAM(S): at 06:28

## 2019-07-01 RX ADMIN — Medication 1000 MILLIGRAM(S): at 12:15

## 2019-07-01 NOTE — PROGRESS NOTE ADULT - ASSESSMENT
1. anemia improved  2. afib with increased VR better today  3.     Recommend  discontinue amlodipine  hold HCTz  increase metoprolol 50 BID
1. anemia improved  2. afib with increased VR better today  3.     Recommend discharge to rehab   metoprolol 50 BID  eliquis  no further cardiac w/u or RX
85 y/o M  s/p right total hip arthroplasty (anterior approach)  POD#3, physical therapy, rehab  Leticia Gibbs PA-C  Orthopaedic Surgery  Team pager [phone]  [facility] [phone]  [phone]
85 y/o M s/p right total hip arthroplasty POD#4, dulcolax suppository, physical therapy, TOV overnight  Leticia Gibbs PA-C  Orthopaedic Surgery  Team pager [phone]  [facility] [phone]  [phone]
A/P: 85 y/o M POD#5 s/p R MICHELLE    DVT ppx- Eliquis, ASA 81mg  TOV  Encouraged PO fluid intake  Pain management prn  Metoprolol 50mg PO BID  Discharge planning to Rehab      ANAM Abbasi  Orthopedic Surgery  [phone]
A/P: 86M s/p R MICHELLE POD 6  Analgesia  DVT ppx  WBAT  PT/OT  Encourage incentive spirometry  Cardio recs appreciated: Metoprolol increased to 50BID, Hold HCTZ this AM (resume 7/2)  DC planning to [facility]  Will discuss with attending and advise if plan changes
A/P: 86y Male s/p R Total Hip Arthroplasty.  VSS. NAD.    PT/OT- WBAT RLE  DVT PPx with Eliquis and ASA 81 mg  IS  Followup AM labs   Pain Control  Continue Current Tx.    Nicole Hunt PA-C  Team Pager: [phone]
s/p  Rt THR    Plan    ck labs  monitor B/P's  IVF as needed  OOB/PT/WBAT       Liana Hart PA-C   Beeper    [phone]

## 2019-07-01 NOTE — DISCHARGE NOTE NURSING/CASE MANAGEMENT/SOCIAL WORK - NSDCDPATPORTLINK_GEN_ALL_CORE
You can access the [facility] Patient Portal, offered by [facility], by registering with the following website: [email or web address]

## 2019-07-01 NOTE — PROGRESS NOTE ADULT - SUBJECTIVE AND OBJECTIVE BOX
Subjective: Patient seen and examined. No new events except as noted.   fels better but weak not ambulating  no sob or palpitations    beta blocker increased yesterday  MEDICATIONS:  MEDICATIONS  (STANDING):  apixaban 5 milliGRAM(s) Oral two times a day  aspirin enteric coated 81 milliGRAM(s) Oral daily  atorvastatin 40 milliGRAM(s) Oral at bedtime  docusate sodium 100 milliGRAM(s) Oral three times a day  lactulose Syrup 15 Gram(s) Oral daily  levothyroxine 100 MICROGram(s) Oral daily  metoprolol tartrate 50 milliGRAM(s) Oral two times a day  pantoprazole    Tablet 40 milliGRAM(s) Oral before breakfast  sodium chloride 0.9% lock flush 3 milliLiter(s) IV Push every 8 hours      PHYSICAL EXAM:  T(C): 37.3 (06-30-19 @ 04:43), Max: 37.6 (06-29-19 @ 17:36)  HR: 103 (06-30-19 @ 08:43) (99 - 118)  BP: 106/71 (06-30-19 @ 08:43) (106/70 - 119/77)  RR: 16 (06-30-19 @ 04:43) (16 - 18)  SpO2: 96% (06-30-19 @ 04:43) (94% - 100%)  Wt(kg): --  I&O's Summary    29 Jun 2019 07:01  -  30 Jun 2019 07:00  --------------------------------------------------------  IN: 2160 mL / OUT: 950 mL / NET: 1210 mL          Appearance: Normal depressed anxious NAD	  HEENT:   Normal oral mucosa, PERRL, EOMI	  Cardiovascular: Normal S1 S2, No JVD, No murmurs ,  Respiratory: Lungs clear to auscultation, normal effort 	  Gastrointestinal:  Soft, Non-tender, + BS	  Skin: No rashes, No ecchymoses, No cyanosis, warm to touch  Musculoskeletal: Normal range of motion, normal strength  Psychiatry:  Mood & affect appropriate  Ext: No edema  Peripheral pulses palpable 2+ bilaterally      LABS:    CARDIAC MARKERS:                                10.0   10.1  )-----------( 171      ( 29 Jun 2019 07:22 )             29.4     06-29    141  |  104  |  24<H>  ----------------------------<  137<H>  4.5   |  25  |  1.07    Ca    9.0      29 Jun 2019 07:22      proBNP:   Lipid Profile:   HgA1c:   TSH:           TELEMETRY: 	    ECG:  afib 
Patient comfortable. Reports poor po fluid intake. Was OOB yesterday. No events overnight.  Denies cp, palpitations, Sob.    T(C): 37.3 (06-30-19 @ 04:43), Max: 37.6 (06-29-19 @ 17:36)  HR: 111 (06-30-19 @ 04:43) (94 - 118)  BP: 116/73 (06-30-19 @ 04:43) (106/70 - 119/77)  RR: 16 (06-30-19 @ 04:43) (16 - 18)  SpO2: 96% (06-30-19 @ 04:43) (94% - 100%)      PHYSICAL EXAM:  NAD, Alert and oriented X3   Right Hip: Aquacel Dressing C/D/I; dull sensation grossly intact to light touch; (+) DF/PF; (+) Distal Pulses; No Calf tenderness B/L, PAS     LABS:                        10.0   10.1  )-----------( 171      ( 29 Jun 2019 07:22 )             29.4     06-29    141  |  104  |  24<H>  ----------------------------<  137<H>  4.5   |  25  |  1.07    Ca    9.0      29 Jun 2019 07:22
Patient is a 86y old  Male who presents with a chief complaint of "pain right hip, can't walk, swollen right knee, they drained the water out of my knee." (10 Felice 2019 16:23)      POST OPERATIVE DAY #:  1  Patient comfortable. B/P's running a little low. Asympotmatic       VS:  T(C): 36.8 (06-26-19 @ 05:29), Max: 36.8 (06-25-19 @ 14:28)  T(F): 98.3 (06-26-19 @ 05:29), Max: 98.3 (06-26-19 @ 05:29)  HR: 84 (06-26-19 @ 05:29) (63 - 120)  BP: 90/56 (06-26-19 @ 05:29) (84/54 - 143/71)  RR: 18 (06-26-19 @ 05:29) (12 - 18)  SpO2: 96% (06-26-19 @ 05:29) (95% - 100%)  Wt(kg): --      PHYSICAL EXAM:  NAD, Alert  EXT:   Rt Hip: Aquacel Dressing C/D/I  No Calf Tenderness  (+) DF/PF; (+) Distal Pulses;  Sensation: No gross deficits noted  Pulses 2+   B/L, PAS     LABS:                        11.9<L>  21.8<H> )-----------( 234      ( 25 Jun 2019 19:53 )             35.8<L>    06-25    137  |  99  |  30<H>  ----------------------------<  174<H>  4.4   |  22  |  1.20
Patient is a 86y old  Male who presents with a chief complaint of Right hip arthritis  Patient s/p right total hip arthroplasty (anterior approach)   POST OPERATIVE DAY #:  [3]   Patient comfortable  No complaints    T(C): 37.1 (06-28-19 @ 06:05), Max: 37.1 (06-28-19 @ 06:05)  HR: 104 (06-28-19 @ 06:05) (71 - 109)  BP: 103/57 (06-28-19 @ 06:05) (100/65 - 118/68)  RR: 16 (06-28-19 @ 06:05) (16 - 18)  SpO2: 95% (06-28-19 @ 06:05) (94% - 99%)    PHYSICAL EXAM:  NAD, Alert  [Right ] Hip: Dressing C/D/I; sensation grossly intact to light touch; (+) DF/PF; (+) Distal Pulses; No Calf tenderness B/L, PAS     LABS:                      9.7    11.1  )-----------( 159      ( 27 Jun 2019 07:12 )             28.2   06-27    138  |  100  |  36<H>  ----------------------------<  130<H>  4.2   |  26  |  1.31<H>  Ca    8.7      27 Jun 2019 07:12
Patient is a 86y old  Male who presents with a chief complaint of right hip pain  Patient s/p right total hip arthroplasty  (AA)  POST OPERATIVE DAY #:  [4 ]   Patient comfortable, c/o constipation    T(C): 37.5 (06-29-19 @ 04:54), Max: 37.5 (06-29-19 @ 04:54)  HR: 96 (06-29-19 @ 04:54) (74 - 109)  BP: 112/70 (06-29-19 @ 04:54) (94/62 - 117/75)  RR: 16 (06-29-19 @ 04:54) (16 - 16)  SpO2: 94% (06-29-19 @ 04:54) (94% - 99%)    PHYSICAL EXAM:  NAD, Alert  [Right ] Hip: Aquacel dressing C/D/I; sensation grossly intact to light touch; (+) DF/PF; (+) Distal Pulses; No Calf tenderness B/L, PAS     LABS:                      8.5    10.6  )-----------( 162      ( 28 Jun 2019 06:45 )             25.3   06-28  139  |  105  |  29<H>  ----------------------------<  125<H>  3.6   |  25  |  1.12  Ca    9.0      28 Jun 2019 06:44
Patient seen and examined at bedside. Reports no acute complaints at this time. Pain is well controlled. HR low 100s overnight. No other acute events.    PHYSICAL EXAM:  Vital Signs Last 24 Hrs  T(C): 37.2 (01 Jul 2019 05:25), Max: 37.9 (30 Jun 2019 21:11)  T(F): 99 (01 Jul 2019 05:25), Max: 100.2 (30 Jun 2019 21:11)  HR: 99 (01 Jul 2019 05:25) (92 - 114)  BP: 122/70 (01 Jul 2019 05:25) (100/70 - 122/70)  BP(mean): --  RR: 17 (01 Jul 2019 05:25) (16 - 18)  SpO2: 93% (01 Jul 2019 05:25) (93% - 99%)    Gen: NAD, AAOx3    Right Lower Extremity:  Dressing clean dry intact  +EHL/FHL/TA/GS  SILT L3-S1  +DP/PT Pulses  Compartments soft  No calf TTP B/L
Post op Day [2]    Patient resting without complaints.  No chest pain, SOB, N/V.    T(C): 36.9 (06-27-19 @ 05:32), Max: 37.2 (06-26-19 @ 15:45)  HR: 95 (06-27-19 @ 05:32) (76 - 95)  BP: 96/59 (06-27-19 @ 05:32) (87/58 - 102/66)  RR: 18 (06-27-19 @ 05:32) (18 - 18)  SpO2: 96% (06-27-19 @ 05:32) (93% - 99%)    Exam:  Alert and Oriented, No Acute Distress  Lower Ext:  RLE hip aquacel in place, incision site C/D/I  Calves Soft, Non-tender bilaterally  +PF/DF/EHL/FHL  +DP Pulse  SILT                            8.4    13.34 )-----------( 185      ( 26 Jun 2019 09:12 )             26.3    06-26    137  |  102  |  33<H>  ----------------------------<  160<H>  4.6   |  24  |  1.29    Ca    8.7      26 Jun 2019 07:11
Subjective: Patient seen and examined. No new events except as noted.   tolerating physical therapy  BP and HR stable  ready for discharge to rehab  MEDICATIONS:  MEDICATIONS  (STANDING):  apixaban 5 milliGRAM(s) Oral two times a day  aspirin enteric coated 81 milliGRAM(s) Oral daily  atorvastatin 40 milliGRAM(s) Oral at bedtime  docusate sodium 100 milliGRAM(s) Oral three times a day  lactulose Syrup 15 Gram(s) Oral daily  levothyroxine 100 MICROGram(s) Oral daily  metoprolol tartrate 50 milliGRAM(s) Oral two times a day  pantoprazole    Tablet 40 milliGRAM(s) Oral before breakfast  sodium chloride 0.9% lock flush 3 milliLiter(s) IV Push every 8 hours      PHYSICAL EXAM:  T(C): 36.5 (07-01-19 @ 09:00), Max: 37.9 (06-30-19 @ 21:11)  HR: 71 (07-01-19 @ 09:00) (71 - 114)  BP: 105/70 (07-01-19 @ 09:00) (100/70 - 122/70)  RR: 18 (07-01-19 @ 09:00) (16 - 18)  SpO2: 96% (07-01-19 @ 09:00) (93% - 99%)  Wt(kg): --  I&O's Summary    30 Jun 2019 07:01  -  01 Jul 2019 07:00  --------------------------------------------------------  IN: 1200 mL / OUT: 1150 mL / NET: 50 mL    01 Jul 2019 07:01  -  01 Jul 2019 11:10  --------------------------------------------------------  IN: 0 mL / OUT: 200 mL / NET: -200 mL          Appearance: Normal	  HEENT:   Normal oral mucosa, PERRL, EOMI	  Cardiovascular: Normal S1 S2, No JVD, No murmurs ,  Respiratory: Lungs clear to auscultation, normal effort 	  Psychiatry:  Mood & affect appropriate depressed  Ext: No edema  Peripheral pulses palpable 2+ bilaterally      LABS:    CARDIAC MARKERS:                  proBNP:   Lipid Profile:   HgA1c:   TSH:           TELEMETRY: 	    ECG:  	  RADIOLOGY:   DIAGNOSTIC TESTING:  [ ] Echocardiogram:  [ ]  Catheterization:  [ ] Stress Test:    OTHER:

## 2019-07-08 PROBLEM — M19.90 UNSPECIFIED OSTEOARTHRITIS, UNSPECIFIED SITE: Chronic | Status: ACTIVE | Noted: 2019-06-10

## 2019-07-08 PROBLEM — M48.061 SPINAL STENOSIS, LUMBAR REGION WITHOUT NEUROGENIC CLAUDICATION: Chronic | Status: ACTIVE | Noted: 2019-06-10

## 2019-07-08 PROBLEM — H04.123 DRY EYE SYNDROME OF BILATERAL LACRIMAL GLANDS: Chronic | Status: ACTIVE | Noted: 2019-06-10

## 2019-07-08 PROBLEM — I73.9 PERIPHERAL VASCULAR DISEASE, UNSPECIFIED: Chronic | Status: ACTIVE | Noted: 2019-06-10

## 2019-07-08 PROBLEM — Z87.19 PERSONAL HISTORY OF OTHER DISEASES OF THE DIGESTIVE SYSTEM: Chronic | Status: ACTIVE | Noted: 2019-06-10

## 2019-07-18 ENCOUNTER — APPOINTMENT (OUTPATIENT)
Dept: ORTHOPEDIC SURGERY | Facility: CLINIC | Age: 84
End: 2019-07-18
Payer: MEDICARE

## 2019-07-18 VITALS
HEART RATE: 108 BPM | DIASTOLIC BLOOD PRESSURE: 77 MMHG | SYSTOLIC BLOOD PRESSURE: 115 MMHG | WEIGHT: 163 LBS | BODY MASS INDEX: 25.58 KG/M2 | HEIGHT: 67 IN

## 2019-07-18 PROCEDURE — 72170 X-RAY EXAM OF PELVIS: CPT

## 2019-07-18 PROCEDURE — 99024 POSTOP FOLLOW-UP VISIT: CPT

## 2019-08-20 NOTE — OCCUPATIONAL THERAPY INITIAL EVALUATION ADULT - LEVEL OF INDEPENDENCE: DRESS LOWER BODY, OT EVAL
[FreeTextEntry3] : This note accurately reflects the work and decisions made by me.  maximum assist (25% patients effort)

## 2019-09-08 ENCOUNTER — TRANSCRIPTION ENCOUNTER (OUTPATIENT)
Age: 84
End: 2019-09-08

## 2019-09-08 ENCOUNTER — INPATIENT (INPATIENT)
Facility: HOSPITAL | Age: 84
LOS: 3 days | Discharge: OTHER INSTITUTION | DRG: 668 | End: 2019-09-12
Attending: UROLOGY | Admitting: UROLOGY
Payer: MEDICARE

## 2019-09-08 VITALS
RESPIRATION RATE: 20 BRPM | WEIGHT: 149.03 LBS | SYSTOLIC BLOOD PRESSURE: 150 MMHG | OXYGEN SATURATION: 100 % | TEMPERATURE: 98 F | HEIGHT: 68 IN | DIASTOLIC BLOOD PRESSURE: 86 MMHG | HEART RATE: 119 BPM

## 2019-09-08 DIAGNOSIS — Z95.1 PRESENCE OF AORTOCORONARY BYPASS GRAFT: Chronic | ICD-10-CM

## 2019-09-08 DIAGNOSIS — Z98.890 OTHER SPECIFIED POSTPROCEDURAL STATES: Chronic | ICD-10-CM

## 2019-09-08 DIAGNOSIS — R31.9 HEMATURIA, UNSPECIFIED: ICD-10-CM

## 2019-09-08 DIAGNOSIS — Z98.49 CATARACT EXTRACTION STATUS, UNSPECIFIED EYE: Chronic | ICD-10-CM

## 2019-09-08 LAB
ALBUMIN SERPL ELPH-MCNC: 4.4 G/DL — SIGNIFICANT CHANGE UP (ref 3.3–5)
ALP SERPL-CCNC: 99 U/L — SIGNIFICANT CHANGE UP (ref 40–120)
ALT FLD-CCNC: 13 U/L — SIGNIFICANT CHANGE UP (ref 10–45)
ANION GAP SERPL CALC-SCNC: 16 MMOL/L — SIGNIFICANT CHANGE UP (ref 5–17)
APPEARANCE UR: ABNORMAL
APTT BLD: 33.8 SEC — SIGNIFICANT CHANGE UP (ref 27.5–36.3)
AST SERPL-CCNC: 21 U/L — SIGNIFICANT CHANGE UP (ref 10–40)
BACTERIA # UR AUTO: NEGATIVE — SIGNIFICANT CHANGE UP
BASOPHILS # BLD AUTO: 0 K/UL — SIGNIFICANT CHANGE UP (ref 0–0.2)
BASOPHILS NFR BLD AUTO: 0.2 % — SIGNIFICANT CHANGE UP (ref 0–2)
BILIRUB SERPL-MCNC: 0.8 MG/DL — SIGNIFICANT CHANGE UP (ref 0.2–1.2)
BILIRUB UR-MCNC: NEGATIVE — SIGNIFICANT CHANGE UP
BLD GP AB SCN SERPL QL: NEGATIVE — SIGNIFICANT CHANGE UP
BUN SERPL-MCNC: 34 MG/DL — HIGH (ref 7–23)
CALCIUM SERPL-MCNC: 9.9 MG/DL — SIGNIFICANT CHANGE UP (ref 8.4–10.5)
CHLORIDE SERPL-SCNC: 100 MMOL/L — SIGNIFICANT CHANGE UP (ref 96–108)
CO2 SERPL-SCNC: 23 MMOL/L — SIGNIFICANT CHANGE UP (ref 22–31)
COLOR SPEC: ABNORMAL
CREAT SERPL-MCNC: 1.32 MG/DL — HIGH (ref 0.5–1.3)
DIFF PNL FLD: ABNORMAL
EOSINOPHIL # BLD AUTO: 0.1 K/UL — SIGNIFICANT CHANGE UP (ref 0–0.5)
EOSINOPHIL NFR BLD AUTO: 0.8 % — SIGNIFICANT CHANGE UP (ref 0–6)
EPI CELLS # UR: 2 /HPF — SIGNIFICANT CHANGE UP
GLUCOSE SERPL-MCNC: 167 MG/DL — HIGH (ref 70–99)
GLUCOSE UR QL: NEGATIVE — SIGNIFICANT CHANGE UP
HCT VFR BLD CALC: 34.3 % — LOW (ref 39–50)
HGB BLD-MCNC: 10.7 G/DL — LOW (ref 13–17)
HYALINE CASTS # UR AUTO: 0 /LPF — SIGNIFICANT CHANGE UP (ref 0–2)
INR BLD: 1.69 RATIO — HIGH (ref 0.88–1.16)
KETONES UR-MCNC: NEGATIVE — SIGNIFICANT CHANGE UP
LEUKOCYTE ESTERASE UR-ACNC: NEGATIVE — SIGNIFICANT CHANGE UP
LYMPHOCYTES # BLD AUTO: 1.3 K/UL — SIGNIFICANT CHANGE UP (ref 1–3.3)
LYMPHOCYTES # BLD AUTO: 8.1 % — LOW (ref 13–44)
MCHC RBC-ENTMCNC: 31.1 GM/DL — LOW (ref 32–36)
MCHC RBC-ENTMCNC: 31.4 PG — SIGNIFICANT CHANGE UP (ref 27–34)
MCV RBC AUTO: 101 FL — HIGH (ref 80–100)
MONOCYTES # BLD AUTO: 0.9 K/UL — SIGNIFICANT CHANGE UP (ref 0–0.9)
MONOCYTES NFR BLD AUTO: 5.6 % — SIGNIFICANT CHANGE UP (ref 2–14)
NEUTROPHILS # BLD AUTO: 13.6 K/UL — HIGH (ref 1.8–7.4)
NEUTROPHILS NFR BLD AUTO: 85.3 % — HIGH (ref 43–77)
NITRITE UR-MCNC: NEGATIVE — SIGNIFICANT CHANGE UP
PH UR: 7.5 — SIGNIFICANT CHANGE UP (ref 5–8)
PLATELET # BLD AUTO: 328 K/UL — SIGNIFICANT CHANGE UP (ref 150–400)
POTASSIUM SERPL-MCNC: 4 MMOL/L — SIGNIFICANT CHANGE UP (ref 3.5–5.3)
POTASSIUM SERPL-SCNC: 4 MMOL/L — SIGNIFICANT CHANGE UP (ref 3.5–5.3)
PROT SERPL-MCNC: 7.3 G/DL — SIGNIFICANT CHANGE UP (ref 6–8.3)
PROT UR-MCNC: >600
PROTHROM AB SERPL-ACNC: 19.8 SEC — HIGH (ref 10–12.9)
RBC # BLD: 3.39 M/UL — LOW (ref 4.2–5.8)
RBC # FLD: 15.3 % — HIGH (ref 10.3–14.5)
RBC CASTS # UR COMP ASSIST: >50 /HPF — SIGNIFICANT CHANGE UP (ref 0–4)
RH IG SCN BLD-IMP: NEGATIVE — SIGNIFICANT CHANGE UP
SODIUM SERPL-SCNC: 139 MMOL/L — SIGNIFICANT CHANGE UP (ref 135–145)
SP GR SPEC: 1.03 — HIGH (ref 1.01–1.02)
UROBILINOGEN FLD QL: NEGATIVE — SIGNIFICANT CHANGE UP
WBC # BLD: 16 K/UL — HIGH (ref 3.8–10.5)
WBC # FLD AUTO: 16 K/UL — HIGH (ref 3.8–10.5)
WBC UR QL: 7 /HPF — HIGH (ref 0–5)

## 2019-09-08 PROCEDURE — 99285 EMERGENCY DEPT VISIT HI MDM: CPT | Mod: GC

## 2019-09-08 PROCEDURE — ZZZZZ: CPT

## 2019-09-08 PROCEDURE — 93010 ELECTROCARDIOGRAM REPORT: CPT

## 2019-09-08 PROCEDURE — 71045 X-RAY EXAM CHEST 1 VIEW: CPT | Mod: 26

## 2019-09-08 RX ORDER — METOPROLOL TARTRATE 50 MG
50 TABLET ORAL
Refills: 0 | Status: DISCONTINUED | OUTPATIENT
Start: 2019-09-08 | End: 2019-09-09

## 2019-09-08 RX ORDER — PANTOPRAZOLE SODIUM 20 MG/1
40 TABLET, DELAYED RELEASE ORAL
Refills: 0 | Status: DISCONTINUED | OUTPATIENT
Start: 2019-09-08 | End: 2019-09-09

## 2019-09-08 RX ORDER — METOPROLOL TARTRATE 50 MG
50 TABLET ORAL
Refills: 0 | Status: DISCONTINUED | OUTPATIENT
Start: 2019-09-08 | End: 2019-09-08

## 2019-09-08 RX ORDER — HEPARIN SODIUM 5000 [USP'U]/ML
5000 INJECTION INTRAVENOUS; SUBCUTANEOUS EVERY 8 HOURS
Refills: 0 | Status: DISCONTINUED | OUTPATIENT
Start: 2019-09-08 | End: 2019-09-09

## 2019-09-08 RX ORDER — ACETAMINOPHEN 500 MG
650 TABLET ORAL ONCE
Refills: 0 | Status: COMPLETED | OUTPATIENT
Start: 2019-09-08 | End: 2019-09-08

## 2019-09-08 RX ORDER — SODIUM CHLORIDE 9 MG/ML
1000 INJECTION INTRAMUSCULAR; INTRAVENOUS; SUBCUTANEOUS
Refills: 0 | Status: DISCONTINUED | OUTPATIENT
Start: 2019-09-08 | End: 2019-09-09

## 2019-09-08 RX ORDER — CIPROFLOXACIN LACTATE 400MG/40ML
250 VIAL (ML) INTRAVENOUS
Refills: 0 | Status: DISCONTINUED | OUTPATIENT
Start: 2019-09-08 | End: 2019-09-09

## 2019-09-08 RX ORDER — HYDROCHLOROTHIAZIDE 25 MG
12.5 TABLET ORAL DAILY
Refills: 0 | Status: DISCONTINUED | OUTPATIENT
Start: 2019-09-08 | End: 2019-09-09

## 2019-09-08 RX ORDER — LEVOTHYROXINE SODIUM 125 MCG
100 TABLET ORAL DAILY
Refills: 0 | Status: DISCONTINUED | OUTPATIENT
Start: 2019-09-09 | End: 2019-09-09

## 2019-09-08 RX ORDER — AMLODIPINE BESYLATE 2.5 MG/1
5 TABLET ORAL AT BEDTIME
Refills: 0 | Status: DISCONTINUED | OUTPATIENT
Start: 2019-09-08 | End: 2019-09-09

## 2019-09-08 RX ORDER — ATORVASTATIN CALCIUM 80 MG/1
20 TABLET, FILM COATED ORAL AT BEDTIME
Refills: 0 | Status: DISCONTINUED | OUTPATIENT
Start: 2019-09-08 | End: 2019-09-09

## 2019-09-08 RX ORDER — HYDROMORPHONE HYDROCHLORIDE 2 MG/ML
0.5 INJECTION INTRAMUSCULAR; INTRAVENOUS; SUBCUTANEOUS ONCE
Refills: 0 | Status: DISCONTINUED | OUTPATIENT
Start: 2019-09-08 | End: 2019-09-08

## 2019-09-08 RX ORDER — ACETAMINOPHEN 500 MG
975 TABLET ORAL EVERY 8 HOURS
Refills: 0 | Status: DISCONTINUED | OUTPATIENT
Start: 2019-09-08 | End: 2019-09-09

## 2019-09-08 RX ORDER — LEVOTHYROXINE SODIUM 125 MCG
100 TABLET ORAL DAILY
Refills: 0 | Status: DISCONTINUED | OUTPATIENT
Start: 2019-09-08 | End: 2019-09-08

## 2019-09-08 RX ORDER — DOCUSATE SODIUM 100 MG
100 CAPSULE ORAL THREE TIMES A DAY
Refills: 0 | Status: DISCONTINUED | OUTPATIENT
Start: 2019-09-08 | End: 2019-09-09

## 2019-09-08 RX ORDER — LANOLIN ALCOHOL/MO/W.PET/CERES
3 CREAM (GRAM) TOPICAL AT BEDTIME
Refills: 0 | Status: DISCONTINUED | OUTPATIENT
Start: 2019-09-08 | End: 2019-09-09

## 2019-09-08 RX ADMIN — Medication 100 MILLIGRAM(S): at 22:28

## 2019-09-08 RX ADMIN — HEPARIN SODIUM 5000 UNIT(S): 5000 INJECTION INTRAVENOUS; SUBCUTANEOUS at 22:27

## 2019-09-08 RX ADMIN — HYDROMORPHONE HYDROCHLORIDE 0.5 MILLIGRAM(S): 2 INJECTION INTRAMUSCULAR; INTRAVENOUS; SUBCUTANEOUS at 13:25

## 2019-09-08 RX ADMIN — Medication 975 MILLIGRAM(S): at 22:28

## 2019-09-08 RX ADMIN — SODIUM CHLORIDE 75 MILLILITER(S): 9 INJECTION INTRAMUSCULAR; INTRAVENOUS; SUBCUTANEOUS at 20:30

## 2019-09-08 RX ADMIN — Medication 975 MILLIGRAM(S): at 22:58

## 2019-09-08 RX ADMIN — Medication 50 MILLIGRAM(S): at 17:13

## 2019-09-08 RX ADMIN — Medication 3 MILLIGRAM(S): at 22:28

## 2019-09-08 RX ADMIN — AMLODIPINE BESYLATE 5 MILLIGRAM(S): 2.5 TABLET ORAL at 22:29

## 2019-09-08 RX ADMIN — Medication 250 MILLIGRAM(S): at 17:13

## 2019-09-08 RX ADMIN — ATORVASTATIN CALCIUM 20 MILLIGRAM(S): 80 TABLET, FILM COATED ORAL at 22:28

## 2019-09-08 NOTE — H&P ADULT - NSICDXPASTMEDICALHX_GEN_ALL_CORE_FT
PAST MEDICAL HISTORY:  Atrial fibrillation diagnosed many years ago   on anticoagulant    CAD (coronary artery disease)     Chronic dryness of both eyes     CRI (chronic renal insufficiency)     CVA (cerebral vascular accident) 2004  no residual    History of colitis     History of hemorrhoids     HLD (hyperlipidemia)     HTN (hypertension)     Hyperthyroidism treated with radioactive iodine    Hypothyroid     Lumbar spinal stenosis     OA (osteoarthritis) right hip    PAD (peripheral artery disease)     Prostate cancer s/p radiation ? 10 years ago    Spinal stenosis h/o epidural injection

## 2019-09-08 NOTE — ED PROVIDER NOTE - TOBACCO USE
Unknown if ever smoked
Pt. with multiple caries, c/o intermittent tooth pain, sent by private dentist for eval. Plan: dental consult.

## 2019-09-08 NOTE — ED CLERICAL - NS ED CLERK NOTE PRE-ARRIVAL INFORMATION; ADDITIONAL PRE-ARRIVAL INFORMATION
This patient is enrolled in the comprehensive joint replacement (CJR) program and has active care navigation.  This patient can be followed up by the care navigation team within 24 hours. To arrange close follow-up or to obtain additional clinical information about this patient, please call the contact number above. Please call the orthopedic resident ([phone]) for ALL patients who are admitted or placed in observation.

## 2019-09-08 NOTE — H&P ADULT - NSHPPHYSICALEXAM_GEN_ALL_CORE
Physical Exam  Gen: NAD  Pulm: No respiratory distress, no subcostal retractions  CV: RRR, no JVD  Abd: Pelvic distention  : Circumcised, no lesions.  Blood at urethral meatus.  Testes descended bilaterally.  Testes and epididymis nontender bilaterally.  Cremasteric reflex present bilaterally.  MSK: No edema present

## 2019-09-08 NOTE — ED PROVIDER NOTE - PROGRESS NOTE DETAILS
Attending Nicole Cross: dw urology will come to evaluate Attending Nicole Cross: d/w urology will admit

## 2019-09-08 NOTE — ED ADULT TRIAGE NOTE - CADM TRG TX PRIOR TO ARRIVAL
Patient and family would like to be able to go home. Hospice referral made. Patient and family in agreement with hospice at home. Palliative will sign off at this time. Please reconsult as needed. none

## 2019-09-08 NOTE — CONSULT NOTE ADULT - SUBJECTIVE AND OBJECTIVE BOX
HPI:  Patient is a 86y Male who presented with gross hematuria for 2 days. Patient with history of A-fib on Eliquis, HTN, HLD, CVA, CABG (2012), Prostate CA s/p radiation. Patient states he has been having blood in the urine on and off for the past one month, but became darker and with clots 4 days ago. Patient reports significant pelvic discomfort since this AM. Patient reports having a bladder polyp that was scheduled to be removed next week by Dr Smith. Patient states he has mild shortness of breath with ambulation for the past week. Patient denies fever, chills, chest pain, palpitations, burning or pain with urination.    PAST MEDICAL & SURGICAL HISTORY:  OA (osteoarthritis): right hip  Lumbar spinal stenosis  History of colitis  PAD (peripheral artery disease)  Chronic dryness of both eyes  History of hemorrhoids  CRI (chronic renal insufficiency)  CAD (coronary artery disease)  Prostate cancer: s/p radiation ? 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  Blood at urethral meatus.  Testes descended bilaterally.  Testes and epididymis nontender bilaterally.  Cremasteric reflex present bilaterally.  MSK: No edema present    LABS:          09-08 @ 12:45    WBC 16.0  / Hct 34.3  / SCr 1.32     09-08    139  |  100  |  34<H>  ----------------------------<  167<H>  4.0   |  23  |  1.32<H>    Ca    9.9      08 Sep 2019 12:45    TPro  7.3  /  Alb  4.4  /  TBili  0.8  /  DBili  x   /  AST  21  /  ALT  13  /  AlkPhos  99  09-08    PT/INR - ( 08 Sep 2019 12:45 )   PT: 19.8 sec;   INR: 1.69 ratio         PTT - ( 08 Sep 2019 12:45 )  PTT:33.8 sec  Urinalysis Basic - ( 08 Sep 2019 13:33 )    Color: Red / Appearance: Turbid / SG: x / pH: x  Gluc: x / Ketone: Negative  / Bili: Negative / Urobili: Negative   Blood: x / Protein: >600 / Nitrite: Negative   Leuk Esterase: Negative / RBC: x / WBC x   Sq Epi: x / Non Sq Epi: x / Bacteria: x    Urine Cx:   Blood Cx:    RADIOLOGY: HPI:  Patient is a 86y Male who presented with gross hematuria for 4 days. Patient with history of A-fib on Eliquis, HTN, HLD, CVA, CABG (2012), Prostate CA s/p radiation. Patient states he has been having blood in the urine on and off for the past one month, urine became darker and with clots 4 days ago and associated with incontinence. Patient reports significant pelvic discomfort since this morning. Patient reports having a bladder polyp that was scheduled to be removed next week by Dr Smith. States his cardiologist was sarbjit to stop Eliquis for 3 days prior the procedure. Last Eliquis dose- last night. Patient states he has mild shortness of breath with ambulation for the past week. Patient denies fever, chills, chest pain, palpitations, burning or pain with urination, diarrhea, constipation- last BM this morning.    PAST MEDICAL & SURGICAL HISTORY:  OA (osteoarthritis): right hip  Lumbar spinal stenosis  History of colitis  PAD (peripheral artery disease)  Chronic dryness of both eyes  History of hemorrhoids  CRI (chronic renal insufficiency)  CAD (coronary artery disease)  Prostate cancer: s/p radiation ? 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    MEDICATIONS:    acetaminophen 325 mg oral tablet: 3 tab(s) orally every 8 hours for mild pain (1-3) (27 Jun 2019 07:41)  amlodipine 5 mg oral tablet: 1 tab(s) orally once a day (at bedtime) resume 7/2/19 (01 Jul 2019 07:54)  Crestor 10 mg oral tablet: 1 tab(s) orally once a day (at bedtime) (25 Jun 2019 10:03)  docusate sodium 100 mg oral capsule: 1 cap(s) orally 3 times a day (27 Jun 2019 07:41)  Ecotrin 81 mg oral delayed release tablet: 1 tab(s) orally once a day (25 Jun 2019 10:03)  Eliquis 5 mg oral tablet: 1 tab(s) orally 2 times a day (25 Jun 2019 10:03)  hydrochlorothiazide 12.5 mg oral capsule: 1 cap(s) orally once a day resume 7/2/19 (01 Jul 2019 07:54)  lactulose 10 g oral powder for reconstitution: 1 each orally once a day (25 Jun 2019 10:03)  melatonin 3 mg oral tablet: 1 tab(s) orally once a day (at bedtime), As needed, Insomnia (01 Jul 2019 07:54)  metoprolol tartrate 50 mg oral tablet: 1 tab(s) orally 2 times a day (25 Jun 2019 10:03)  pantoprazole 40 mg oral delayed release tablet: 1 tab(s) orally once a day (before a meal) (27 Jun 2019 07:41)  Synthroid 100 mcg (0.1 mg) oral tablet: 1 tab(s) orally once a day (25 Jun 2019 10:03)  traMADol 50 mg oral tablet: 1 tab(s) orally every 6 hours, As needed, Moderate-Severe Pain (4 - 10) (27 Jun 2019 07:41)    Allergies: penicillin (Rash)    Intolerances      SOCIAL HISTORY:   Tobacco hx:    REVIEW OF SYSTEMS: Pertinent positives and negatives as stated in HPI, otherwise negative    Vital signs  T(C): 36.7, Max: 36.7 (09-08 @ 11:54)  HR: 102  BP: 149/77  SpO2: 100%    Output    UOP    Physical Exam  Gen: NAD  Pulm: No respiratory distress, no subcostal retractions  CV: RRR, no JVD  Abd: Pelvic distention  : Circumcised, no lesions.  Blood at urethral meatus.  Testes descended bilaterally.  Testes and epididymis nontender bilaterally.  Cremasteric reflex present bilaterally.  MSK: No edema present    LABS:          09-08 @ 12:45    WBC 16.0  / Hct 34.3  / SCr 1.32     09-08    139  |  100  |  34<H>  ----------------------------<  167<H>  4.0   |  23  |  1.32<H>    Ca    9.9      08 Sep 2019 12:45    TPro  7.3  /  Alb  4.4  /  TBili  0.8  /  DBili  x   /  AST  21  /  ALT  13  /  AlkPhos  99  09-08    PT/INR - ( 08 Sep 2019 12:45 )   PT: 19.8 sec;   INR: 1.69 ratio         PTT - ( 08 Sep 2019 12:45 )  PTT:33.8 sec  Urinalysis Basic - ( 08 Sep 2019 13:33 )    Color: Red / Appearance: Turbid / SG: x / pH: x  Gluc: x / Ketone: Negative  / Bili: Negative / Urobili: Negative   Blood: x / Protein: >600 / Nitrite: Negative   Leuk Esterase: Negative / RBC: x / WBC x   Sq Epi: x / Non Sq Epi: x / Bacteria: x    Urine Cx:   Blood Cx:    RADIOLOGY:

## 2019-09-08 NOTE — ED ADULT NURSE NOTE - CAS EDN DISCHARGE INTERVENTIONS
IV intact/Arm band on/Indwelling catheter secured and draining IV intact/Indwelling catheter secured and draining/Arm band on/Lott bag drained 2x in entirety in ED

## 2019-09-08 NOTE — ED PROVIDER NOTE - PHYSICAL EXAMINATION
VITALS: reviewed  GEN: NAD, A & O x 4  HEAD/EYES: NCAT, EOMI, anicteric sclerae  ENT: mucus membranes moist, oropharynx WNL, trachea midline, no JVD  RESP: lungs CTA with equal breath sounds bilaterally, chest wall nontender and atraumatic  CV: heart with irregular rhythm, S1, S2, distal pulses intact and symmetric bilaterally  ABDOMEN: soft, nondistended, suprapubic ttp, no palpable masses  : no CVAT, gross blood from meatus, with clots noted in diaper  MSK: extremities atraumatic and nontender, no edema, no asymmetry.  SKIN: warm, dry, no rash, no bruising, no cyanosis. color appropriate for ethnicity  NEURO: alert, mentating appropriately, no facial asymmetry.  PSYCH: Affect appropriate VITALS: reviewed  GEN: NAD, A & O x 4  HEAD/EYES: NCAT, EOMI, anicteric sclerae  ENT: mucus membranes moist, oropharynx WNL, trachea midline, no JVD  RESP: lungs CTA with equal breath sounds bilaterally, chest wall nontender and atraumatic  CV: heart with irregular rhythm, S1, S2, distal pulses intact and symmetric bilaterally  ABDOMEN: soft, nondistended, suprapubic ttp, no palpable masses  : no CVAT, gross blood from meatus, with clots noted in diaper  MSK: extremities atraumatic and nontender, no edema, no asymmetry.  SKIN: warm, dry, no rash, no bruising, no cyanosis. color appropriate for ethnicity  NEURO: alert, mentating appropriately, no facial asymmetry.  PSYCH: Affect appropriate  Attending Nicole Cross: Gen: NAD, heent: atrauamtic, eomi, perrla conjunctiva pale, mmm, op pink, uvula midline, neck; nttp, no nuchal rigidity, chest: nttp, no crepitus, cv: irregular, tachycardic, lungs: ctab, abd: soft, tenderness suprapubic area, no peritoneal signs, +BS, no guarding, ext: wwp,mild LE edema, skin: no rash, neuro: awake and alert, following commands, speech clear, sensation and strength intact, no focal deficits

## 2019-09-08 NOTE — ED PROVIDER NOTE - CLINICAL SUMMARY MEDICAL DECISION MAKING FREE TEXT BOX
85 yo M hx prostate ca s/p radiation therapy "years ago," presenting with hematuria, dysuria, clots, PVRV via US shows 500 cc urine with heterogeneous echogenicity likely clots in bladder concern for obstruction, R sided hydronephrosis,- labs, including t&s, ua/uc, urology cs 87 yo M hx prostate ca s/p radiation therapy "years ago," presenting with hematuria, dysuria, clots, PVRV via US shows 500 cc urine with heterogeneous echogenicity likely clots in bladder concern for obstruction, R sided hydronephrosis,- labs, including t&s, ua/uc, urology cs  Attending Nicole Cross: 85 y/o male h/o prostate ca in the plast as well as prostate polyp presenting with gross hematuria and lower abdominal pain. upon arrival pt complaining of suprarpubic pain. pocus shows bladder distention with likely sig clot within the bladder. pt afebrile. urology called as concern pt will likely need cbi. no fevers or chills. will d/w urology and re-eval

## 2019-09-08 NOTE — ED PROVIDER NOTE - OBJECTIVE STATEMENT
87 yo M hx A-fib on eliquis, CAD s/p CABG x3 (2011), HTN, hyperlipidemia, previous CVA (2004, no residual), PAD, prostate cancer s/p radiation therapy (x ?10+years), hypothyroidism, lumbar spine stenosis, chronic renal insufficiency presenting with two days of hematuria/bleeding from penis. Denies trauma. Known bladder polyp. Denies dysuria. Endorses bleeding even when not urinating, "as soon as I change my diaper, it fills with blood again."   Urologist: Dr. Smith, spoke with Dr. Gilliam 85 yo M hx A-fib on eliquis, CAD s/p CABG x3 (2011), HTN, hyperlipidemia, previous CVA (2004, no residual), PAD, prostate cancer s/p radiation therapy (x ?10+years), hypothyroidism, lumbar spine stenosis, chronic renal insufficiency presenting with 4 days of hematuria/bleeding from penis, urinary incontinence, some dysuria. Denies trauma. Known bladder polyp. Endorses bleeding even when not urinating, "as soon as I change my diaper, it fills with blood again."   Urologist: Dr. Smith, pt spoke with Dr. Gilliam today

## 2019-09-08 NOTE — H&P ADULT - HISTORY OF PRESENT ILLNESS
HPI:  Patient is a 86y Male who presented with gross hematuria for 4 days. Patient with history of A-fib on Eliquis, HTN, HLD, CVA, CABG (2012), Prostate CA s/p radiation. Patient states he has been having blood in the urine on and off for the past one month, urine became darker and with clots 4 days ago and associated with incontinence. Patient reports significant pelvic discomfort since this morning. Patient reports having a bladder polyp that was scheduled to be removed next week by Dr Smith. States his cardiologist was sarbjit to stop Eliquis for 3 days prior the procedure. Last Eliquis dose- last night. Patient states he has mild shortness of breath with ambulation for the past week. Patient denies fever, chills, chest pain, palpitations, burning or pain with urination, diarrhea, constipation- last BM this morning.    PAST MEDICAL & SURGICAL HISTORY:  OA (osteoarthritis): right hip  Lumbar spinal stenosis  History of colitis  PAD (peripheral artery disease)  Chronic dryness of both eyes  History of hemorrhoids  CRI (chronic renal insufficiency)  CAD (coronary artery disease)  Prostate cancer: s/p radiation ? 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    MEDICATIONS:    acetaminophen 325 mg oral tablet: 3 tab(s) orally every 8 hours for mild pain (1-3) (27 Jun 2019 07:41)  amlodipine 5 mg oral tablet: 1 tab(s) orally once a day (at bedtime) resume 7/2/19 (01 Jul 2019 07:54)  Crestor 10 mg oral tablet: 1 tab(s) orally once a day (at bedtime) (25 Jun 2019 10:03)  docusate sodium 100 mg oral capsule: 1 cap(s) orally 3 times a day (27 Jun 2019 07:41)  Ecotrin 81 mg oral delayed release tablet: 1 tab(s) orally once a day (25 Jun 2019 10:03)  Eliquis 5 mg oral tablet: 1 tab(s) orally 2 times a day (25 Jun 2019 10:03)  hydrochlorothiazide 12.5 mg oral capsule: 1 cap(s) orally once a day resume 7/2/19 (01 Jul 2019 07:54)  lactulose 10 g oral powder for reconstitution: 1 each orally once a day (25 Jun 2019 10:03)  melatonin 3 mg oral tablet: 1 tab(s) orally once a day (at bedtime), As needed, Insomnia (01 Jul 2019 07:54)  metoprolol tartrate 50 mg oral tablet: 1 tab(s) orally 2 times a day (25 Jun 2019 10:03)  pantoprazole 40 mg oral delayed release tablet: 1 tab(s) orally once a day (before a meal) (27 Jun 2019 07:41)  Synthroid 100 mcg (0.1 mg) oral tablet: 1 tab(s) orally once a day (25 Jun 2019 10:03)  traMADol 50 mg oral tablet: 1 tab(s) orally every 6 hours, As needed, Moderate-Severe Pain (4 - 10) (27 Jun 2019 07:41)    Allergies: penicillin (Rash)    Intolerances      SOCIAL HISTORY:   Tobacco hx:    REVIEW OF SYSTEMS: Pertinent positives and negatives as stated in HPI, otherwise negative    Vital signs  T(C): 36.7, Max: 36.7 (09-08 @ 11:54)  HR: 102  BP: 149/77  SpO2: 100%    Output    UOP    Physical Exam  Gen: NAD  Pulm: No respiratory distress, no subcostal retractions  CV: RRR, no JVD  Abd: Pelvic distention  : Circumcised, no lesions.  Blood at urethral meatus.  Testes descended bilaterally.  Testes and epididymis nontender bilaterally.  Cremasteric reflex present bilaterally.  MSK: No edema present    LABS:          09-08 @ 12:45    WBC 16.0  / Hct 34.3  / SCr 1.32     09-08    139  |  100  |  34<H>  ----------------------------<  167<H>  4.0   |  23  |  1.32<H>    Ca    9.9      08 Sep 2019 12:45    TPro  7.3  /  Alb  4.4  /  TBili  0.8  /  DBili  x   /  AST  21  /  ALT  13  /  AlkPhos  99  09-08    PT/INR - ( 08 Sep 2019 12:45 )   PT: 19.8 sec;   INR: 1.69 ratio         PTT - ( 08 Sep 2019 12:45 )  PTT:33.8 sec  Urinalysis Basic - ( 08 Sep 2019 13:33 )    Color: Red / Appearance: Turbid / SG: x / pH: x  Gluc: x / Ketone: Negative  / Bili: Negative / Urobili: Negative   Blood: x / Protein: >600 / Nitrite: Negative   Leuk Esterase: Negative / RBC: x / WBC x   Sq Epi: x / Non Sq Epi: x / Bacteria: x    Urine Cx:   Blood Cx:    RADIOLOGY:

## 2019-09-08 NOTE — ED PROVIDER NOTE - PMH
Atrial fibrillation  diagnosed many years ago   on anticoagulant  CAD (coronary artery disease)    Chronic dryness of both eyes    CRI (chronic renal insufficiency)    CVA (cerebral vascular accident)  2004  no residual  History of colitis    History of hemorrhoids    HLD (hyperlipidemia)    HTN (hypertension)    Hyperthyroidism  treated with radioactive iodine  Hypothyroid    Lumbar spinal stenosis    OA (osteoarthritis)  right hip  PAD (peripheral artery disease)    Prostate cancer  s/p radiation ? 10 years ago  Spinal stenosis  h/o epidural injection

## 2019-09-08 NOTE — ED ADULT NURSE NOTE - OBJECTIVE STATEMENT
86 year old male presented to ED with c/o of hematuria starting yesterday. PMH prostate ca, on Eliquis. pt denies pain, denies CP, SOB, nausea/vomiting, numbness/tingling, fever, cough, chills, dizziness, headache, blurred vision, neuro intact. pt a&ox3, lung sounds clear, heart rate tachy to ~112, EKG completed handed to MD, abdomen soft nontender nondistended to palp. skin intact. IV in right AC 20G and patent. pt currently resting in bed comfortably on cardiac monitor with RN and MD and family member at bedside. side rails up for safety. Will continue to monitor and assess while offering support and reassurance.

## 2019-09-08 NOTE — ED PROVIDER NOTE - ATTENDING CONTRIBUTION TO CARE
Attending MD Nicole Cross:  I personally have seen and examined this patient.  Resident note reviewed and agree on plan of care and except where noted.  See HPI, PE, and MDM for details.

## 2019-09-08 NOTE — H&P ADULT - NSICDXPASTSURGICALHX_GEN_ALL_CORE_FT
PAST SURGICAL HISTORY:  H/O cataract extraction     History of herniorrhaphy right groin    S/P CABG x 3 10/2011

## 2019-09-09 ENCOUNTER — RESULT REVIEW (OUTPATIENT)
Age: 84
End: 2019-09-09

## 2019-09-09 LAB
ANION GAP SERPL CALC-SCNC: 15 MMOL/L — SIGNIFICANT CHANGE UP (ref 5–17)
ANION GAP SERPL CALC-SCNC: 9 MMOL/L — SIGNIFICANT CHANGE UP (ref 5–17)
BUN SERPL-MCNC: 28 MG/DL — HIGH (ref 7–23)
BUN SERPL-MCNC: 30 MG/DL — HIGH (ref 7–23)
CALCIUM SERPL-MCNC: 8.9 MG/DL — SIGNIFICANT CHANGE UP (ref 8.4–10.5)
CALCIUM SERPL-MCNC: 9.4 MG/DL — SIGNIFICANT CHANGE UP (ref 8.4–10.5)
CHLORIDE SERPL-SCNC: 103 MMOL/L — SIGNIFICANT CHANGE UP (ref 96–108)
CHLORIDE SERPL-SCNC: 104 MMOL/L — SIGNIFICANT CHANGE UP (ref 96–108)
CO2 SERPL-SCNC: 22 MMOL/L — SIGNIFICANT CHANGE UP (ref 22–31)
CO2 SERPL-SCNC: 25 MMOL/L — SIGNIFICANT CHANGE UP (ref 22–31)
CREAT SERPL-MCNC: 1.23 MG/DL — SIGNIFICANT CHANGE UP (ref 0.5–1.3)
CREAT SERPL-MCNC: 1.32 MG/DL — HIGH (ref 0.5–1.3)
GLUCOSE SERPL-MCNC: 117 MG/DL — HIGH (ref 70–99)
GLUCOSE SERPL-MCNC: 124 MG/DL — HIGH (ref 70–99)
HCT VFR BLD CALC: 22.1 % — LOW (ref 39–50)
HCT VFR BLD CALC: 31.1 % — LOW (ref 39–50)
HGB BLD-MCNC: 10.2 G/DL — LOW (ref 13–17)
HGB BLD-MCNC: 7 G/DL — CRITICAL LOW (ref 13–17)
MCHC RBC-ENTMCNC: 31.3 PG — SIGNIFICANT CHANGE UP (ref 27–34)
MCHC RBC-ENTMCNC: 31.9 GM/DL — LOW (ref 32–36)
MCHC RBC-ENTMCNC: 32.2 PG — SIGNIFICANT CHANGE UP (ref 27–34)
MCHC RBC-ENTMCNC: 32.8 GM/DL — SIGNIFICANT CHANGE UP (ref 32–36)
MCV RBC AUTO: 101 FL — HIGH (ref 80–100)
MCV RBC AUTO: 95.4 FL — SIGNIFICANT CHANGE UP (ref 80–100)
PLATELET # BLD AUTO: 146 K/UL — LOW (ref 150–400)
PLATELET # BLD AUTO: 286 K/UL — SIGNIFICANT CHANGE UP (ref 150–400)
POTASSIUM SERPL-MCNC: 3.9 MMOL/L — SIGNIFICANT CHANGE UP (ref 3.5–5.3)
POTASSIUM SERPL-MCNC: 4.3 MMOL/L — SIGNIFICANT CHANGE UP (ref 3.5–5.3)
POTASSIUM SERPL-SCNC: 3.9 MMOL/L — SIGNIFICANT CHANGE UP (ref 3.5–5.3)
POTASSIUM SERPL-SCNC: 4.3 MMOL/L — SIGNIFICANT CHANGE UP (ref 3.5–5.3)
RBC # BLD: 2.19 M/UL — LOW (ref 4.2–5.8)
RBC # BLD: 3.26 M/UL — LOW (ref 4.2–5.8)
RBC # FLD: 15.2 % — HIGH (ref 10.3–14.5)
RBC # FLD: 18 % — HIGH (ref 10.3–14.5)
SODIUM SERPL-SCNC: 138 MMOL/L — SIGNIFICANT CHANGE UP (ref 135–145)
SODIUM SERPL-SCNC: 140 MMOL/L — SIGNIFICANT CHANGE UP (ref 135–145)
WBC # BLD: 14.5 K/UL — HIGH (ref 3.8–10.5)
WBC # BLD: 9.5 K/UL — SIGNIFICANT CHANGE UP (ref 3.8–10.5)
WBC # FLD AUTO: 14.5 K/UL — HIGH (ref 3.8–10.5)
WBC # FLD AUTO: 9.5 K/UL — SIGNIFICANT CHANGE UP (ref 3.8–10.5)

## 2019-09-09 PROCEDURE — 93010 ELECTROCARDIOGRAM REPORT: CPT

## 2019-09-09 PROCEDURE — 88305 TISSUE EXAM BY PATHOLOGIST: CPT | Mod: 26

## 2019-09-09 RX ORDER — DOCUSATE SODIUM 100 MG
100 CAPSULE ORAL THREE TIMES A DAY
Refills: 0 | Status: DISCONTINUED | OUTPATIENT
Start: 2019-09-09 | End: 2019-09-12

## 2019-09-09 RX ORDER — ATORVASTATIN CALCIUM 80 MG/1
20 TABLET, FILM COATED ORAL AT BEDTIME
Refills: 0 | Status: DISCONTINUED | OUTPATIENT
Start: 2019-09-09 | End: 2019-09-12

## 2019-09-09 RX ORDER — SODIUM CHLORIDE 9 MG/ML
1000 INJECTION INTRAMUSCULAR; INTRAVENOUS; SUBCUTANEOUS
Refills: 0 | Status: DISCONTINUED | OUTPATIENT
Start: 2019-09-09 | End: 2019-09-12

## 2019-09-09 RX ORDER — PANTOPRAZOLE SODIUM 20 MG/1
40 TABLET, DELAYED RELEASE ORAL
Refills: 0 | Status: DISCONTINUED | OUTPATIENT
Start: 2019-09-09 | End: 2019-09-12

## 2019-09-09 RX ORDER — CIPROFLOXACIN LACTATE 400MG/40ML
250 VIAL (ML) INTRAVENOUS
Refills: 0 | Status: DISCONTINUED | OUTPATIENT
Start: 2019-09-09 | End: 2019-09-09

## 2019-09-09 RX ORDER — HEPARIN SODIUM 5000 [USP'U]/ML
5000 INJECTION INTRAVENOUS; SUBCUTANEOUS EVERY 8 HOURS
Refills: 0 | Status: DISCONTINUED | OUTPATIENT
Start: 2019-09-09 | End: 2019-09-12

## 2019-09-09 RX ORDER — LEVOTHYROXINE SODIUM 125 MCG
100 TABLET ORAL DAILY
Refills: 0 | Status: DISCONTINUED | OUTPATIENT
Start: 2019-09-09 | End: 2019-09-12

## 2019-09-09 RX ORDER — ONDANSETRON 8 MG/1
4 TABLET, FILM COATED ORAL ONCE
Refills: 0 | Status: DISCONTINUED | OUTPATIENT
Start: 2019-09-09 | End: 2019-09-09

## 2019-09-09 RX ORDER — METOPROLOL TARTRATE 50 MG
50 TABLET ORAL
Refills: 0 | Status: DISCONTINUED | OUTPATIENT
Start: 2019-09-09 | End: 2019-09-10

## 2019-09-09 RX ORDER — MORPHINE SULFATE 50 MG/1
2 CAPSULE, EXTENDED RELEASE ORAL
Refills: 0 | Status: DISCONTINUED | OUTPATIENT
Start: 2019-09-09 | End: 2019-09-09

## 2019-09-09 RX ORDER — FUROSEMIDE 40 MG
20 TABLET ORAL ONCE
Refills: 0 | Status: COMPLETED | OUTPATIENT
Start: 2019-09-09 | End: 2019-09-09

## 2019-09-09 RX ORDER — HYDROCHLOROTHIAZIDE 25 MG
12.5 TABLET ORAL DAILY
Refills: 0 | Status: DISCONTINUED | OUTPATIENT
Start: 2019-09-09 | End: 2019-09-12

## 2019-09-09 RX ORDER — CIPROFLOXACIN LACTATE 400MG/40ML
500 VIAL (ML) INTRAVENOUS EVERY 12 HOURS
Refills: 0 | Status: DISCONTINUED | OUTPATIENT
Start: 2019-09-09 | End: 2019-09-10

## 2019-09-09 RX ORDER — LANOLIN ALCOHOL/MO/W.PET/CERES
3 CREAM (GRAM) TOPICAL AT BEDTIME
Refills: 0 | Status: DISCONTINUED | OUTPATIENT
Start: 2019-09-09 | End: 2019-09-12

## 2019-09-09 RX ADMIN — Medication 50 MILLIGRAM(S): at 18:54

## 2019-09-09 RX ADMIN — MORPHINE SULFATE 2 MILLIGRAM(S): 50 CAPSULE, EXTENDED RELEASE ORAL at 18:58

## 2019-09-09 RX ADMIN — HEPARIN SODIUM 5000 UNIT(S): 5000 INJECTION INTRAVENOUS; SUBCUTANEOUS at 22:20

## 2019-09-09 RX ADMIN — PANTOPRAZOLE SODIUM 40 MILLIGRAM(S): 20 TABLET, DELAYED RELEASE ORAL at 06:23

## 2019-09-09 RX ADMIN — Medication 100 MICROGRAM(S): at 06:23

## 2019-09-09 RX ADMIN — MORPHINE SULFATE 2 MILLIGRAM(S): 50 CAPSULE, EXTENDED RELEASE ORAL at 19:42

## 2019-09-09 RX ADMIN — SODIUM CHLORIDE 75 MILLILITER(S): 9 INJECTION INTRAMUSCULAR; INTRAVENOUS; SUBCUTANEOUS at 19:41

## 2019-09-09 RX ADMIN — Medication 20 MILLIGRAM(S): at 14:09

## 2019-09-09 RX ADMIN — Medication 100 MILLIGRAM(S): at 06:23

## 2019-09-09 RX ADMIN — MORPHINE SULFATE 2 MILLIGRAM(S): 50 CAPSULE, EXTENDED RELEASE ORAL at 19:30

## 2019-09-09 RX ADMIN — ATORVASTATIN CALCIUM 20 MILLIGRAM(S): 80 TABLET, FILM COATED ORAL at 22:20

## 2019-09-09 RX ADMIN — HEPARIN SODIUM 5000 UNIT(S): 5000 INJECTION INTRAVENOUS; SUBCUTANEOUS at 06:23

## 2019-09-09 RX ADMIN — Medication 975 MILLIGRAM(S): at 06:53

## 2019-09-09 RX ADMIN — Medication 975 MILLIGRAM(S): at 14:13

## 2019-09-09 RX ADMIN — Medication 12.5 MILLIGRAM(S): at 06:23

## 2019-09-09 RX ADMIN — Medication 100 MILLIGRAM(S): at 22:20

## 2019-09-09 RX ADMIN — Medication 975 MILLIGRAM(S): at 06:23

## 2019-09-09 RX ADMIN — Medication 250 MILLIGRAM(S): at 06:23

## 2019-09-09 RX ADMIN — Medication 3 MILLIGRAM(S): at 22:21

## 2019-09-09 RX ADMIN — MORPHINE SULFATE 2 MILLIGRAM(S): 50 CAPSULE, EXTENDED RELEASE ORAL at 20:05

## 2019-09-09 NOTE — PROVIDER CONTACT NOTE (OTHER) - SITUATION
Pt c/o L forearm throbbing pain. Pt states he has been having this pain for the past two weeks at bedtime indicating that some nights the pain is more worse than others

## 2019-09-09 NOTE — CONSULT NOTE ADULT - CONSULT REASON
Preop evaluation, Help in management
prop hematuria evaluate cardiac status
Gross hematuria, clot retention.

## 2019-09-09 NOTE — CONSULT NOTE ADULT - ASSESSMENT
86 years old male with hematuria with clot retention.   CBI stated.   H/h- stable    Discussed with dr Goldberg:  Will admit for observation and management of CBI
1. hematuria anemia  2. weakness  3. s/p cabg no angina or CHF hemodynamically stable  4. BP 90 amlodpine stopped as outpatient off eliquis  5. afib RBBB LAHB    Recommend  no cardiac contraindication to planned surgery  transfuse as needed  restart eliquis for afib when ok with urolgoy
86 m with     Hematuria  - CBI  - Urology follow   - possible cysto    Anemia posthemhoragic  - transfusion support  - DC Heparin  - PAS for DVT prophylaxis    Low BP  - hold Amlodipine, HCTZ  - follow    CAD  - stable  - cardiology evaluation noted    A Fib  - rate control with BB  - restart Eliquis when surgically cleared    Hypothyroidism  - follow TSH    No acute medical condition identified to postpone planned surgical intervention.     Nitin Foster MD pager [phone]

## 2019-09-09 NOTE — CONSULT NOTE ADULT - SUBJECTIVE AND OBJECTIVE BOX
HPI:  HPI:  Patient is a 86y Male who presented with gross hematuria for 4 days. Patient with history of A-fib on Eliquis, HTN, HLD, CVA, CABG (2012), Prostate CA s/p radiation. Patient states he has been having blood in the urine on and off for the past one month, urine became darker and with clots 4 days ago and associated with incontinence. Patient reports significant pelvic discomfort since this morning. Patient reports having a bladder polyp that was scheduled to be removed next week by Dr Smith. States his cardiologist was sarbjit to stop Eliquis for 3 days prior the procedure. Last Eliquis dose- last night. Patient states he has mild shortness of breath with ambulation for the past week. Patient denies fever, chills, chest pain, palpitations, burning or pain with urination, diarrhea, constipation- last BM this morning.    S/p recent hp surgery preop evalution cardiac unremarkable toelrated procedure well  now with hematuria anemia weakness no cp or sob    PAST MEDICAL & SURGICAL HISTORY:  OA (osteoarthritis): right hip  Lumbar spinal stenosis  History of colitis  PAD (peripheral artery disease)  Chronic dryness of both eyes  History of hemorrhoids  CRI (chronic renal insufficiency)  CAD (coronary artery disease)  Prostate cancer: s/p radiation ? 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    MEDICATIONS:    acetaminophen 325 mg oral tablet: 3 tab(s) orally every 8 hours for mild pain (1-3) (27 Jun 2019 07:41)  amlodipine 5 mg oral tablet: 1 tab(s) orally once a day (at bedtime) resume 7/2/19 (01 Jul 2019 07:54)  Crestor 10 mg oral tablet: 1 tab(s) orally once a day (at bedtime) (25 Jun 2019 10:03)  docusate sodium 100 mg oral capsule: 1 cap(s) orally 3 times a day (27 Jun 2019 07:41)  Ecotrin 81 mg oral delayed release tablet: 1 tab(s) orally once a day (25 Jun 2019 10:03)  Eliquis 5 mg oral tablet: 1 tab(s) orally 2 times a day (25 Jun 2019 10:03)  hydrochlorothiazide 12.5 mg oral capsule: 1 cap(s) orally once a day resume 7/2/19 (01 Jul 2019 07:54)  lactulose 10 g oral powder for reconstitution: 1 each orally once a day (25 Jun 2019 10:03)  melatonin 3 mg oral tablet: 1 tab(s) orally once a day (at bedtime), As needed, Insomnia (01 Jul 2019 07:54)  metoprolol tartrate 50 mg oral tablet: 1 tab(s) orally 2 times a day (25 Jun 2019 10:03)  pantoprazole 40 mg oral delayed release tablet: 1 tab(s) orally once a day (before a meal) (27 Jun 2019 07:41)  Synthroid 100 mcg (0.1 mg) oral tablet: 1 tab(s) orally once a day (25 Jun 2019 10:03)  traMADol 50 mg oral tablet: 1 tab(s) orally every 6 hours, As needed, Moderate-Severe Pain (4 - 10) (27 Jun 2019 07:41)    Allergies: penicillin (Rash)    Intolerances      SOCIAL HISTORY:   Tobacco hx:      LABS:          09-08 @ 12:45    WBC 16.0  / Hct 34.3  / SCr 1.32     09-08    139  |  100  |  34<H>  ----------------------------<  167<H>  4.0   |  23  |  1.32<H>    Ca    9.9      08 Sep 2019 12:45    TPro  7.3  /  Alb  4.4  /  TBili  0.8  /  DBili  x   /  AST  21  /  ALT  13  /  AlkPhos  99  09-08    PT/INR - ( 08 Sep 2019 12:45 )   PT: 19.8 sec;   INR: 1.69 ratio         PTT - ( 08 Sep 2019 12:45 )  PTT:33.8 sec  Urinalysis Basic - ( 08 Sep 2019 13:33 )    Color: Red / Appearance: Turbid / SG: x / pH: x  Gluc: x / Ketone: Negative  / Bili: Negative / Urobili: Negative   Blood: x / Protein: >600 / Nitrite: Negative   Leuk Esterase: Negative / RBC: x / WBC x   Sq Epi: x / Non Sq Epi: x / Bacteria: x    Urine Cx:   Blood Cx:    RADIOLOGY: (08 Sep 2019 15:18)      MEDICATIONS:  MEDICATIONS  (STANDING):  acetaminophen   Tablet .. 975 milliGRAM(s) Oral every 8 hours  amLODIPine   Tablet 5 milliGRAM(s) Oral at bedtime  atorvastatin 20 milliGRAM(s) Oral at bedtime  ciprofloxacin     Tablet 250 milliGRAM(s) Oral two times a day  docusate sodium 100 milliGRAM(s) Oral three times a day  furosemide   Injectable 20 milliGRAM(s) IV Push once  heparin  Injectable 5000 Unit(s) SubCutaneous every 8 hours  hydrochlorothiazide 12.5 milliGRAM(s) Oral daily  levothyroxine 100 MICROGram(s) Oral daily  metoprolol tartrate 50 milliGRAM(s) Oral two times a day  pantoprazole    Tablet 40 milliGRAM(s) Oral before breakfast  sodium chloride 0.9%. 1000 milliLiter(s) (75 mL/Hr) IV Continuous <Continuous>      PHYSICAL EXAM:  T(C): 36.9 (09-09-19 @ 09:17), Max: 36.9 (09-08-19 @ 21:59)  HR: 94 (09-09-19 @ 09:17) (73 - 110)  BP: 91/58 (09-09-19 @ 09:17) (91/58 - 142/82)  RR: 18 (09-09-19 @ 09:17) (17 - 18)  SpO2: 96% (09-09-19 @ 09:17) (95% - 98%)  Wt(kg): --  I&O's Summary    08 Sep 2019 07:01  -  09 Sep 2019 07:00  --------------------------------------------------------  IN: 1620 mL / OUT: 0 mL / NET: 1620 mL    09 Sep 2019 07:01  -  09 Sep 2019 13:21  --------------------------------------------------------  IN: 225 mL / OUT: 0 mL / NET: 225 mL          Appearance: Normal thin temporal wasting	  HEENT:   Normal oral mucosa, PERRL, EOMI	  Cardiovascular: Normal S1 S2,irregualrly irregular    Respiratory: Lungs clear to auscultation, normal effort 	  Gastrointestinal:  Soft, Non-tender, + BS	  Skin: No rashes, No ecchymoses, No cyanosis, warm to touch  Musculoskeletal: Normal range of motion, normal strength  Psychiatry:  Mood & affect appropriate  Ext: No edema        LABS:    CARDIAC MARKERS:                                7.0    9.5   )-----------( 146      ( 09 Sep 2019 07:07 )             22.1     09-09    138  |  104  |  30<H>  ----------------------------<  117<H>  3.9   |  25  |  1.23    Ca    8.9      09 Sep 2019 07:07    TPro  7.3  /  Alb  4.4  /  TBili  0.8  /  DBili  x   /  AST  21  /  ALT  13  /  AlkPhos  99  09-08    proBNP:   Lipid Profile:   HgA1c:   TSH:           TELEMETRY: 	    ECG:  	  RADIOLOGY:   DIAGNOSTIC TESTING:  [ ] Echocardiogram:  [ ]  Catheterization:  [ ] Stress Test:    OTHER:

## 2019-09-09 NOTE — CONSULT NOTE ADULT - SUBJECTIVE AND OBJECTIVE BOX
HPI:  HPI:  Patient is a 86y Male who presented with gross hematuria for 4 days. Patient with history of A-fib on Eliquis, HTN, HLD, CVA, CABG (), Prostate CA s/p radiation. Patient states he has been having blood in the urine on and off for the past one month, urine became darker and with clots 4 days ago and associated with incontinence. Patient reports significant pelvic discomfort since this morning. Patient reports having a bladder polyp that was scheduled to be removed next week by Dr Smith. States his cardiologist was sarbjit to stop Eliquis for 3 days prior the procedure. Last Eliquis dose- last night. Patient states he has mild shortness of breath with ambulation for the past week. Patient denies fever, chills, chest pain, palpitations, burning or pain with urination, diarrhea, constipation- last BM this morning.    PAST MEDICAL & SURGICAL HISTORY:  OA (osteoarthritis): right hip  Lumbar spinal stenosis  History of colitis  PAD (peripheral artery disease)  Chronic dryness of both eyes  History of hemorrhoids  CRI (chronic renal insufficiency)  CAD (coronary artery disease)  Prostate cancer: s/p radiation ? 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    MEDICATIONS:    acetaminophen 325 mg oral tablet: 3 tab(s) orally every 8 hours for mild pain (1-3) (2019 07:41)  amlodipine 5 mg oral tablet: 1 tab(s) orally once a day (at bedtime) resume 19 (2019 07:54)  Crestor 10 mg oral tablet: 1 tab(s) orally once a day (at bedtime) (2019 10:03)  docusate sodium 100 mg oral capsule: 1 cap(s) orally 3 times a day (2019 07:41)  Ecotrin 81 mg oral delayed release tablet: 1 tab(s) orally once a day (2019 10:03)  Eliquis 5 mg oral tablet: 1 tab(s) orally 2 times a day (2019 10:03)  hydrochlorothiazide 12.5 mg oral capsule: 1 cap(s) orally once a day resume 19 (2019 07:54)  lactulose 10 g oral powder for reconstitution: 1 each orally once a day (2019 10:03)  melatonin 3 mg oral tablet: 1 tab(s) orally once a day (at bedtime), As needed, Insomnia (2019 07:54)  metoprolol tartrate 50 mg oral tablet: 1 tab(s) orally 2 times a day (2019 10:03)  pantoprazole 40 mg oral delayed release tablet: 1 tab(s) orally once a day (before a meal) (2019 07:41)  Synthroid 100 mcg (0.1 mg) oral tablet: 1 tab(s) orally once a day (2019 10:03)  traMADol 50 mg oral tablet: 1 tab(s) orally every 6 hours, As needed, Moderate-Severe Pain (4 - 10) (2019 07:41)    Allergies: penicillin (Rash)    Intolerances      SOCIAL HISTORY:   Tobacco hx:    REVIEW OF SYSTEMS: Pertinent positives and negatives as stated in HPI, otherwise negative    Vital signs  T(C): 36.7, Max: 36.7 ( @ 11:54)  HR: 102  BP: 149/77  SpO2: 100%     @ 12:45    WBC 16.0  / Hct 34.3  / SCr 1.32         139  |  100  |  34<H>  ----------------------------<  167<H>  4.0   |  23  |  1.32<H>    Ca    9.9      08 Sep 2019 12:45    TPro  7.3  /  Alb  4.4  /  TBili  0.8  /  DBili  x   /  AST  21  /  ALT  13  /  AlkPhos  99      PT/INR - ( 08 Sep 2019 12:45 )   PT: 19.8 sec;   INR: 1.69 ratio         PTT - ( 08 Sep 2019 12:45 )  PTT:33.8 sec  Urinalysis Basic - ( 08 Sep 2019 13:33 )    Color: Red / Appearance: Turbid / SG: x / pH: x  Gluc: x / Ketone: Negative  / Bili: Negative / Urobili: Negative   Blood: x / Protein: >600 / Nitrite: Negative   Leuk Esterase: Negative / RBC: x / WBC x   Sq Epi: x / Non Sq Epi: x / Bacteria: x    Urine Cx:   Blood Cx:    RADIOLOGY: (08 Sep 2019 15:18)      PAST MEDICAL & SURGICAL HISTORY:  OA (osteoarthritis): right hip  Lumbar spinal stenosis  History of colitis  PAD (peripheral artery disease)  Chronic dryness of both eyes  History of hemorrhoids  CRI (chronic renal insufficiency)  CAD (coronary artery disease)  Prostate cancer: s/p radiation ? 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 No nausea, vomiting, or hematemesis; No diarrhea or constipation. No melena or hematochezia.  GENITOURINARY: No dysuria, frequency, + hematuria, or incontinence  NEUROLOGICAL: No headaches, memory loss, loss of strength, numbness, or tremors  SKIN: No itching, burning, rashes, or lesions   LYMPH NODES: No enlarged glands  ENDOCRINE: No heat or cold intolerance; No hair loss  MUSCULOSKELETAL: No joint pain or swelling; No muscle, back, or extremity pain  PSYCHIATRIC: No depression, anxiety, mood swings, or difficulty sleeping  HEME/LYMPH: No easy bruising, or bleeding gums  ALLERY AND IMMUNOLOGIC: No hives or eczema    Allergies    penicillin (Rash)    Intolerances        Social History:     FAMILY HISTORY:      MEDICATIONS  (STANDING):  acetaminophen   Tablet .. 975 milliGRAM(s) Oral every 8 hours  atorvastatin 20 milliGRAM(s) Oral at bedtime  ciprofloxacin     Tablet 250 milliGRAM(s) Oral two times a day  docusate sodium 100 milliGRAM(s) Oral three times a day  furosemide   Injectable 20 milliGRAM(s) IV Push once  heparin  Injectable 5000 Unit(s) SubCutaneous every 8 hours  hydrochlorothiazide 12.5 milliGRAM(s) Oral daily  levothyroxine 100 MICROGram(s) Oral daily  metoprolol tartrate 50 milliGRAM(s) Oral two times a day  pantoprazole    Tablet 40 milliGRAM(s) Oral before breakfast  sodium chloride 0.9%. 1000 milliLiter(s) (75 mL/Hr) IV Continuous <Continuous>    MEDICATIONS  (PRN):  melatonin 3 milliGRAM(s) Oral at bedtime PRN Insomnia        CAPILLARY BLOOD GLUCOSE        I&O's Summary    08 Sep 2019 07:  -  09 Sep 2019 07:00  --------------------------------------------------------  IN: 1620 mL / OUT: 0 mL / NET: 1620 mL    09 Sep 2019 07:  -  09 Sep 2019 13:52  --------------------------------------------------------  IN: 225 mL / OUT: 0 mL / NET: 225 mL        PHYSICAL EXAM:  GENERAL: NAD, well-developed  HEAD:  Atraumatic, Normocephalic  EYES: EOMI, PERRLA, conjunctiva and sclera clear  NECK: Supple, No JVD  CHEST/LUNG: Clear to auscultation bilaterally; No wheeze  HEART: Regular rate and rhythm; No murmurs, rubs, or gallops  ABDOMEN: Soft, Nontender, Nondistended; Bowel sounds present CBI + hematuria  EXTREMITIES:  2+ Peripheral Pulses, No clubbing, cyanosis, or edema  PSYCH: AAOx3  NEUROLOGY: non-focal  SKIN: No rashes or lesions    LABS:                        7.0    9.5   )-----------( 146      ( 09 Sep 2019 07:07 )             22.1         138  |  104  |  30<H>  ----------------------------<  117<H>  3.9   |  25  |  1.23    Ca    8.9      09 Sep 2019 07:07    TPro  7.3  /  Alb  4.4  /  TBili  0.8  /  DBili  x   /  AST  21  /  ALT  13  /  AlkPhos  99  -08    PT/INR - ( 08 Sep 2019 12:45 )   PT: 19.8 sec;   INR: 1.69 ratio         PTT - ( 08 Sep 2019 12:45 )  PTT:33.8 sec      Urinalysis Basic - ( 08 Sep 2019 13:33 )    Color: Red / Appearance: Turbid / S.027 / pH: x  Gluc: x / Ketone: Negative  / Bili: Negative / Urobili: Negative   Blood: x / Protein: >600 / Nitrite: Negative   Leuk Esterase: Negative / RBC: >50 /hpf / WBC 7 /HPF   Sq Epi: x / Non Sq Epi: 2 /hpf / Bacteria: Negative        RADIOLOGY & ADDITIONAL TESTS:    Imaging Personally Reviewed:  < from: Xray Chest 1 View- PORTABLE-Urgent (19 @ 12:57) >  IMPRESSION:   Left-sided pleural effusion. Clear right lung.      < end of copied text >  EKG A Fib NSST/T  Consultant(s) Notes Reviewed:      Care Discussed with Consultants/Other Providers:

## 2019-09-09 NOTE — BRIEF OPERATIVE NOTE - NSICDXBRIEFPREOP_GEN_ALL_CORE_FT
PRE-OP DIAGNOSIS:  Bladder mass 09-Sep-2019 16:50:11  Ann Keen  Gross hematuria 09-Sep-2019 16:49:54  Ann Keen

## 2019-09-09 NOTE — BRIEF OPERATIVE NOTE - OPERATION/FINDINGS
mass vs edematous post-radiation changes R lateral wall - resected w/ loop  b/l UOs observed  Areas of erythema/bleeding fulgurated

## 2019-09-09 NOTE — BRIEF OPERATIVE NOTE - NSICDXBRIEFPOSTOP_GEN_ALL_CORE_FT
POST-OP DIAGNOSIS:  Bladder tumor 09-Sep-2019 16:50:28  Ann Keen  Gross hematuria 09-Sep-2019 16:50:20  Ann Keen

## 2019-09-10 LAB
-  AMPICILLIN: SIGNIFICANT CHANGE UP
-  CIPROFLOXACIN: SIGNIFICANT CHANGE UP
-  LEVOFLOXACIN: SIGNIFICANT CHANGE UP
-  NITROFURANTOIN: SIGNIFICANT CHANGE UP
-  TETRACYCLINE: SIGNIFICANT CHANGE UP
-  VANCOMYCIN: SIGNIFICANT CHANGE UP
ANION GAP SERPL CALC-SCNC: 11 MMOL/L — SIGNIFICANT CHANGE UP (ref 5–17)
BUN SERPL-MCNC: 27 MG/DL — HIGH (ref 7–23)
CALCIUM SERPL-MCNC: 9.1 MG/DL — SIGNIFICANT CHANGE UP (ref 8.4–10.5)
CHLORIDE SERPL-SCNC: 104 MMOL/L — SIGNIFICANT CHANGE UP (ref 96–108)
CO2 SERPL-SCNC: 24 MMOL/L — SIGNIFICANT CHANGE UP (ref 22–31)
CREAT SERPL-MCNC: 1.3 MG/DL — SIGNIFICANT CHANGE UP (ref 0.5–1.3)
CULTURE RESULTS: SIGNIFICANT CHANGE UP
GLUCOSE SERPL-MCNC: 109 MG/DL — HIGH (ref 70–99)
HCT VFR BLD CALC: 28.5 % — LOW (ref 39–50)
HCT VFR BLD CALC: 28.7 % — LOW (ref 39–50)
HGB BLD-MCNC: 9.2 G/DL — LOW (ref 13–17)
HGB BLD-MCNC: 9.6 G/DL — LOW (ref 13–17)
MCHC RBC-ENTMCNC: 30.5 PG — SIGNIFICANT CHANGE UP (ref 27–34)
MCHC RBC-ENTMCNC: 31.9 PG — SIGNIFICANT CHANGE UP (ref 27–34)
MCHC RBC-ENTMCNC: 32.2 GM/DL — SIGNIFICANT CHANGE UP (ref 32–36)
MCHC RBC-ENTMCNC: 33.6 GM/DL — SIGNIFICANT CHANGE UP (ref 32–36)
MCV RBC AUTO: 95 FL — SIGNIFICANT CHANGE UP (ref 80–100)
MCV RBC AUTO: 95 FL — SIGNIFICANT CHANGE UP (ref 80–100)
METHOD TYPE: SIGNIFICANT CHANGE UP
ORGANISM # SPEC MICROSCOPIC CNT: SIGNIFICANT CHANGE UP
ORGANISM # SPEC MICROSCOPIC CNT: SIGNIFICANT CHANGE UP
PLATELET # BLD AUTO: 123 K/UL — LOW (ref 150–400)
PLATELET # BLD AUTO: 296 K/UL — SIGNIFICANT CHANGE UP (ref 150–400)
POTASSIUM SERPL-MCNC: 3.9 MMOL/L — SIGNIFICANT CHANGE UP (ref 3.5–5.3)
POTASSIUM SERPL-SCNC: 3.9 MMOL/L — SIGNIFICANT CHANGE UP (ref 3.5–5.3)
RBC # BLD: 3 M/UL — LOW (ref 4.2–5.8)
RBC # BLD: 3.02 M/UL — LOW (ref 4.2–5.8)
RBC # FLD: 17.3 % — HIGH (ref 10.3–14.5)
RBC # FLD: 17.9 % — HIGH (ref 10.3–14.5)
SODIUM SERPL-SCNC: 139 MMOL/L — SIGNIFICANT CHANGE UP (ref 135–145)
SPECIMEN SOURCE: SIGNIFICANT CHANGE UP
TSH SERPL-MCNC: 3.8 UIU/ML — SIGNIFICANT CHANGE UP (ref 0.27–4.2)
WBC # BLD: 17.6 K/UL — HIGH (ref 3.8–10.5)
WBC # BLD: 23.9 K/UL — HIGH (ref 3.8–10.5)
WBC # FLD AUTO: 17.6 K/UL — HIGH (ref 3.8–10.5)
WBC # FLD AUTO: 23.9 K/UL — HIGH (ref 3.8–10.5)

## 2019-09-10 RX ORDER — METOPROLOL TARTRATE 50 MG
50 TABLET ORAL
Refills: 0 | Status: DISCONTINUED | OUTPATIENT
Start: 2019-09-10 | End: 2019-09-12

## 2019-09-10 RX ORDER — CEFAZOLIN SODIUM 1 G
1000 VIAL (EA) INJECTION ONCE
Refills: 0 | Status: COMPLETED | OUTPATIENT
Start: 2019-09-10 | End: 2019-09-10

## 2019-09-10 RX ORDER — CEFAZOLIN SODIUM 1 G
VIAL (EA) INJECTION
Refills: 0 | Status: DISCONTINUED | OUTPATIENT
Start: 2019-09-10 | End: 2019-09-10

## 2019-09-10 RX ORDER — ATROPA BELLADONNA AND OPIUM 16.2; 6 MG/1; MG/1
1 SUPPOSITORY RECTAL EVERY 8 HOURS
Refills: 0 | Status: DISCONTINUED | OUTPATIENT
Start: 2019-09-10 | End: 2019-09-12

## 2019-09-10 RX ORDER — METOCLOPRAMIDE HCL 10 MG
10 TABLET ORAL EVERY 6 HOURS
Refills: 0 | Status: DISCONTINUED | OUTPATIENT
Start: 2019-09-10 | End: 2019-09-12

## 2019-09-10 RX ORDER — VANCOMYCIN HCL 1 G
750 VIAL (EA) INTRAVENOUS EVERY 12 HOURS
Refills: 0 | Status: DISCONTINUED | OUTPATIENT
Start: 2019-09-10 | End: 2019-09-12

## 2019-09-10 RX ORDER — ONDANSETRON 8 MG/1
4 TABLET, FILM COATED ORAL EVERY 6 HOURS
Refills: 0 | Status: DISCONTINUED | OUTPATIENT
Start: 2019-09-10 | End: 2019-09-12

## 2019-09-10 RX ORDER — ONDANSETRON 8 MG/1
4 TABLET, FILM COATED ORAL ONCE
Refills: 0 | Status: COMPLETED | OUTPATIENT
Start: 2019-09-10 | End: 2019-09-10

## 2019-09-10 RX ORDER — OXYCODONE AND ACETAMINOPHEN 5; 325 MG/1; MG/1
1 TABLET ORAL EVERY 4 HOURS
Refills: 0 | Status: DISCONTINUED | OUTPATIENT
Start: 2019-09-10 | End: 2019-09-12

## 2019-09-10 RX ADMIN — Medication 100 MILLIGRAM(S): at 14:45

## 2019-09-10 RX ADMIN — Medication 250 MILLIGRAM(S): at 23:31

## 2019-09-10 RX ADMIN — Medication 10 MILLIGRAM(S): at 23:31

## 2019-09-10 RX ADMIN — ONDANSETRON 4 MILLIGRAM(S): 8 TABLET, FILM COATED ORAL at 18:58

## 2019-09-10 RX ADMIN — OXYCODONE AND ACETAMINOPHEN 1 TABLET(S): 5; 325 TABLET ORAL at 02:31

## 2019-09-10 RX ADMIN — Medication 100 MILLIGRAM(S): at 19:03

## 2019-09-10 RX ADMIN — OXYCODONE AND ACETAMINOPHEN 1 TABLET(S): 5; 325 TABLET ORAL at 02:01

## 2019-09-10 RX ADMIN — ATROPA BELLADONNA AND OPIUM 1 SUPPOSITORY(S): 16.2; 6 SUPPOSITORY RECTAL at 02:01

## 2019-09-10 RX ADMIN — Medication 12.5 MILLIGRAM(S): at 05:19

## 2019-09-10 RX ADMIN — HEPARIN SODIUM 5000 UNIT(S): 5000 INJECTION INTRAVENOUS; SUBCUTANEOUS at 23:31

## 2019-09-10 RX ADMIN — HEPARIN SODIUM 5000 UNIT(S): 5000 INJECTION INTRAVENOUS; SUBCUTANEOUS at 14:45

## 2019-09-10 RX ADMIN — Medication 100 MILLIGRAM(S): at 05:19

## 2019-09-10 RX ADMIN — Medication 500 MILLIGRAM(S): at 05:19

## 2019-09-10 RX ADMIN — Medication 500 MILLIGRAM(S): at 17:49

## 2019-09-10 RX ADMIN — PANTOPRAZOLE SODIUM 40 MILLIGRAM(S): 20 TABLET, DELAYED RELEASE ORAL at 05:19

## 2019-09-10 RX ADMIN — Medication 50 MILLIGRAM(S): at 17:49

## 2019-09-10 RX ADMIN — Medication 100 MICROGRAM(S): at 05:19

## 2019-09-10 RX ADMIN — ATROPA BELLADONNA AND OPIUM 1 SUPPOSITORY(S): 16.2; 6 SUPPOSITORY RECTAL at 02:31

## 2019-09-10 RX ADMIN — HEPARIN SODIUM 5000 UNIT(S): 5000 INJECTION INTRAVENOUS; SUBCUTANEOUS at 05:20

## 2019-09-11 LAB
ANION GAP SERPL CALC-SCNC: 12 MMOL/L — SIGNIFICANT CHANGE UP (ref 5–17)
BLD GP AB SCN SERPL QL: NEGATIVE — SIGNIFICANT CHANGE UP
BUN SERPL-MCNC: 23 MG/DL — SIGNIFICANT CHANGE UP (ref 7–23)
CALCIUM SERPL-MCNC: 8.8 MG/DL — SIGNIFICANT CHANGE UP (ref 8.4–10.5)
CHLORIDE SERPL-SCNC: 103 MMOL/L — SIGNIFICANT CHANGE UP (ref 96–108)
CO2 SERPL-SCNC: 23 MMOL/L — SIGNIFICANT CHANGE UP (ref 22–31)
CREAT SERPL-MCNC: 1.34 MG/DL — HIGH (ref 0.5–1.3)
GLUCOSE SERPL-MCNC: 158 MG/DL — HIGH (ref 70–99)
HCT VFR BLD CALC: 21.6 % — LOW (ref 39–50)
HCT VFR BLD CALC: 28 % — LOW (ref 39–50)
HGB BLD-MCNC: 7 G/DL — CRITICAL LOW (ref 13–17)
HGB BLD-MCNC: 9 G/DL — LOW (ref 13–17)
LACTATE SERPL-SCNC: 1.8 MMOL/L — SIGNIFICANT CHANGE UP (ref 0.7–2)
MCHC RBC-ENTMCNC: 30.4 PG — SIGNIFICANT CHANGE UP (ref 27–34)
MCHC RBC-ENTMCNC: 31.2 PG — SIGNIFICANT CHANGE UP (ref 27–34)
MCHC RBC-ENTMCNC: 32.3 GM/DL — SIGNIFICANT CHANGE UP (ref 32–36)
MCHC RBC-ENTMCNC: 32.5 GM/DL — SIGNIFICANT CHANGE UP (ref 32–36)
MCV RBC AUTO: 94.2 FL — SIGNIFICANT CHANGE UP (ref 80–100)
MCV RBC AUTO: 95.8 FL — SIGNIFICANT CHANGE UP (ref 80–100)
PLATELET # BLD AUTO: 142 K/UL — LOW (ref 150–400)
PLATELET # BLD AUTO: 206 K/UL — SIGNIFICANT CHANGE UP (ref 150–400)
POTASSIUM SERPL-MCNC: 3.9 MMOL/L — SIGNIFICANT CHANGE UP (ref 3.5–5.3)
POTASSIUM SERPL-SCNC: 3.9 MMOL/L — SIGNIFICANT CHANGE UP (ref 3.5–5.3)
RBC # BLD: 2.25 M/UL — LOW (ref 4.2–5.8)
RBC # BLD: 2.97 M/UL — LOW (ref 4.2–5.8)
RBC # FLD: 15.7 % — HIGH (ref 10.3–14.5)
RBC # FLD: 17.3 % — HIGH (ref 10.3–14.5)
RH IG SCN BLD-IMP: NEGATIVE — SIGNIFICANT CHANGE UP
SODIUM SERPL-SCNC: 138 MMOL/L — SIGNIFICANT CHANGE UP (ref 135–145)
SURGICAL PATHOLOGY STUDY: SIGNIFICANT CHANGE UP
WBC # BLD: 23 K/UL — HIGH (ref 3.8–10.5)
WBC # BLD: 23.8 K/UL — HIGH (ref 3.8–10.5)
WBC # FLD AUTO: 23 K/UL — HIGH (ref 3.8–10.5)
WBC # FLD AUTO: 23.8 K/UL — HIGH (ref 3.8–10.5)

## 2019-09-11 RX ORDER — METOPROLOL TARTRATE 50 MG
25 TABLET ORAL ONCE
Refills: 0 | Status: COMPLETED | OUTPATIENT
Start: 2019-09-11 | End: 2019-09-11

## 2019-09-11 RX ORDER — METOPROLOL TARTRATE 50 MG
25 TABLET ORAL ONCE
Refills: 0 | Status: DISCONTINUED | OUTPATIENT
Start: 2019-09-11 | End: 2019-09-11

## 2019-09-11 RX ORDER — METOPROLOL TARTRATE 50 MG
5 TABLET ORAL ONCE
Refills: 0 | Status: COMPLETED | OUTPATIENT
Start: 2019-09-11 | End: 2019-09-11

## 2019-09-11 RX ORDER — MULTIVIT WITH MIN/MFOLATE/K2 340-15/3 G
1 POWDER (GRAM) ORAL ONCE
Refills: 0 | Status: COMPLETED | OUTPATIENT
Start: 2019-09-11 | End: 2019-09-11

## 2019-09-11 RX ORDER — SODIUM CHLORIDE 9 MG/ML
500 INJECTION, SOLUTION INTRAVENOUS ONCE
Refills: 0 | Status: COMPLETED | OUTPATIENT
Start: 2019-09-11 | End: 2019-09-11

## 2019-09-11 RX ORDER — POLYETHYLENE GLYCOL 3350 17 G/17G
17 POWDER, FOR SOLUTION ORAL DAILY
Refills: 0 | Status: DISCONTINUED | OUTPATIENT
Start: 2019-09-12 | End: 2019-09-12

## 2019-09-11 RX ADMIN — Medication 250 MILLIGRAM(S): at 12:25

## 2019-09-11 RX ADMIN — SODIUM CHLORIDE 1000 MILLILITER(S): 9 INJECTION, SOLUTION INTRAVENOUS at 04:40

## 2019-09-11 RX ADMIN — ONDANSETRON 4 MILLIGRAM(S): 8 TABLET, FILM COATED ORAL at 00:40

## 2019-09-11 RX ADMIN — HEPARIN SODIUM 5000 UNIT(S): 5000 INJECTION INTRAVENOUS; SUBCUTANEOUS at 16:02

## 2019-09-11 RX ADMIN — Medication 5 MILLIGRAM(S): at 04:04

## 2019-09-11 RX ADMIN — Medication 250 MILLIGRAM(S): at 21:17

## 2019-09-11 RX ADMIN — Medication 1 BOTTLE: at 16:03

## 2019-09-11 RX ADMIN — PANTOPRAZOLE SODIUM 40 MILLIGRAM(S): 20 TABLET, DELAYED RELEASE ORAL at 06:19

## 2019-09-11 RX ADMIN — Medication 25 MILLIGRAM(S): at 02:44

## 2019-09-11 RX ADMIN — Medication 100 MICROGRAM(S): at 06:19

## 2019-09-11 RX ADMIN — ATORVASTATIN CALCIUM 20 MILLIGRAM(S): 80 TABLET, FILM COATED ORAL at 21:16

## 2019-09-11 RX ADMIN — HEPARIN SODIUM 5000 UNIT(S): 5000 INJECTION INTRAVENOUS; SUBCUTANEOUS at 21:17

## 2019-09-11 RX ADMIN — Medication 50 MILLIGRAM(S): at 18:50

## 2019-09-11 RX ADMIN — Medication 3 MILLIGRAM(S): at 21:17

## 2019-09-11 RX ADMIN — Medication 100 MILLIGRAM(S): at 16:02

## 2019-09-11 RX ADMIN — Medication 100 MILLIGRAM(S): at 21:16

## 2019-09-11 RX ADMIN — HEPARIN SODIUM 5000 UNIT(S): 5000 INJECTION INTRAVENOUS; SUBCUTANEOUS at 06:19

## 2019-09-11 RX ADMIN — ATORVASTATIN CALCIUM 20 MILLIGRAM(S): 80 TABLET, FILM COATED ORAL at 01:04

## 2019-09-11 NOTE — PROVIDER CONTACT NOTE (OTHER) - ACTION/TREATMENT ORDERED:
ANAM Dejesus stated he will notify the Urology team to discuss with pt during morning rounds. Will continue to monitor pt
MD White made aware. Will continue to monitor pt
PA aware, hydrochlorothiazide held. no new orders at this time, will continue to monitor
ANAM Dejesus aware, pt has allyvn to L heel, b/l zflow boots in place
ANAM Noonan made aware and ordered PRN Zofran and Reglan. Will continue to monitor pt
ANAM Noonan stated she will call RN back in a few minutes after discussing with team. Will continue to monitor pt and follow up

## 2019-09-11 NOTE — PROVIDER CONTACT NOTE (OTHER) - ASSESSMENT
Pt is A&Ox4, , all other vitals stable. No c/o chest pain, SOB, or headache at this time
Pt is A&Ox4, VSS at this time. Pt in normal sinus rhythm at this time
Pt is A&Ox4, VSS. At this time, pt states the L forearm throbbing pain is a 3/10
pt asymptomatic, no c/o of CP, palpitations, discomfort
see flowsheet for measurements
Pt is A&Ox4, VSS. C/o nausea and vomiting. So far total of 75cc of emesis noted

## 2019-09-11 NOTE — PHYSICAL THERAPY INITIAL EVALUATION ADULT - PERTINENT HX OF CURRENT PROBLEM, REHAB EVAL
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

## 2019-09-11 NOTE — PROVIDER CONTACT NOTE (OTHER) - BACKGROUND
S/p cysto w/ biopsy of bladder tumor and fulguration of bladder. Admitted w/ hematuria. CBI currently in progress

## 2019-09-11 NOTE — PROVIDER CONTACT NOTE (CRITICAL VALUE NOTIFICATION) - ASSESSMENT
py assymptomatic, resting in bed at this time with no c/o no pain/discomfort
pt resting in bed, asymptomatic

## 2019-09-11 NOTE — PROVIDER CONTACT NOTE (OTHER) - SITUATION
Call from tele technician at 0450 that pt was in Atrial flutter from 0311 to 0314, which HR went up to the 150s at that time

## 2019-09-11 NOTE — PROVIDER CONTACT NOTE (OTHER) - BACKGROUND
S/p cysto w/ biopsy of bladder tumor and fulguration of bladder. Admitted w/ S/p cysto w/ biopsy of bladder tumor and fulguration of bladder. Admitted w/ hematuria. CBI currently in progress

## 2019-09-11 NOTE — PROVIDER CONTACT NOTE (CRITICAL VALUE NOTIFICATION) - BACKGROUND
pt admitted for hematuria, francis placed with CBI
admitted for hematuria s/p cysto w/ bladder biopsy

## 2019-09-12 ENCOUNTER — INPATIENT (INPATIENT)
Facility: HOSPITAL | Age: 84
LOS: 5 days | Discharge: NURSING, REHAB OR LONG-TERM CARE | End: 2019-09-18
Attending: UROLOGY | Admitting: UROLOGY
Payer: MEDICARE

## 2019-09-12 ENCOUNTER — TRANSCRIPTION ENCOUNTER (OUTPATIENT)
Age: 84
End: 2019-09-12

## 2019-09-12 VITALS
SYSTOLIC BLOOD PRESSURE: 109 MMHG | HEART RATE: 116 BPM | OXYGEN SATURATION: 92 % | RESPIRATION RATE: 18 BRPM | DIASTOLIC BLOOD PRESSURE: 60 MMHG | TEMPERATURE: 98 F

## 2019-09-12 VITALS
DIASTOLIC BLOOD PRESSURE: 69 MMHG | RESPIRATION RATE: 20 BRPM | OXYGEN SATURATION: 98 % | HEART RATE: 111 BPM | SYSTOLIC BLOOD PRESSURE: 117 MMHG | TEMPERATURE: 98 F

## 2019-09-12 DIAGNOSIS — R31.9 HEMATURIA, UNSPECIFIED: ICD-10-CM

## 2019-09-12 DIAGNOSIS — Z98.890 OTHER SPECIFIED POSTPROCEDURAL STATES: Chronic | ICD-10-CM

## 2019-09-12 DIAGNOSIS — I48.2 CHRONIC ATRIAL FIBRILLATION: ICD-10-CM

## 2019-09-12 DIAGNOSIS — Z98.49 CATARACT EXTRACTION STATUS, UNSPECIFIED EYE: Chronic | ICD-10-CM

## 2019-09-12 DIAGNOSIS — K59.00 CONSTIPATION, UNSPECIFIED: ICD-10-CM

## 2019-09-12 DIAGNOSIS — R31.0 GROSS HEMATURIA: ICD-10-CM

## 2019-09-12 DIAGNOSIS — Z95.1 PRESENCE OF AORTOCORONARY BYPASS GRAFT: Chronic | ICD-10-CM

## 2019-09-12 DIAGNOSIS — N39.0 URINARY TRACT INFECTION, SITE NOT SPECIFIED: ICD-10-CM

## 2019-09-12 LAB
ANION GAP SERPL CALC-SCNC: 12 MMOL/L — SIGNIFICANT CHANGE UP (ref 5–17)
BUN SERPL-MCNC: 30 MG/DL — HIGH (ref 7–23)
CALCIUM SERPL-MCNC: 8.4 MG/DL — SIGNIFICANT CHANGE UP (ref 8.4–10.5)
CHLORIDE SERPL-SCNC: 103 MMOL/L — SIGNIFICANT CHANGE UP (ref 96–108)
CO2 SERPL-SCNC: 23 MMOL/L — SIGNIFICANT CHANGE UP (ref 22–31)
CREAT SERPL-MCNC: 1.56 MG/DL — HIGH (ref 0.5–1.3)
GLUCOSE SERPL-MCNC: 145 MG/DL — HIGH (ref 70–99)
HCT VFR BLD CALC: 24.6 % — LOW (ref 39–50)
HCT VFR BLD CALC: 28.5 % — LOW (ref 39–50)
HGB BLD-MCNC: 8 G/DL — LOW (ref 13–17)
HGB BLD-MCNC: 9.3 G/DL — LOW (ref 13–17)
MCHC RBC-ENTMCNC: 30.2 PG — SIGNIFICANT CHANGE UP (ref 27–34)
MCHC RBC-ENTMCNC: 31.1 PG — SIGNIFICANT CHANGE UP (ref 27–34)
MCHC RBC-ENTMCNC: 32.6 % — SIGNIFICANT CHANGE UP (ref 32–36)
MCHC RBC-ENTMCNC: 32.7 GM/DL — SIGNIFICANT CHANGE UP (ref 32–36)
MCV RBC AUTO: 92.5 FL — SIGNIFICANT CHANGE UP (ref 80–100)
MCV RBC AUTO: 94.9 FL — SIGNIFICANT CHANGE UP (ref 80–100)
NRBC # FLD: 0.04 K/UL — SIGNIFICANT CHANGE UP (ref 0–0)
PLATELET # BLD AUTO: 272 K/UL — SIGNIFICANT CHANGE UP (ref 150–400)
PLATELET # BLD AUTO: ABNORMAL (ref 150–400)
PMV BLD: 10.7 FL — SIGNIFICANT CHANGE UP (ref 7–13)
POTASSIUM SERPL-MCNC: 4 MMOL/L — SIGNIFICANT CHANGE UP (ref 3.5–5.3)
POTASSIUM SERPL-SCNC: 4 MMOL/L — SIGNIFICANT CHANGE UP (ref 3.5–5.3)
RBC # BLD: 2.59 M/UL — LOW (ref 4.2–5.8)
RBC # BLD: 3.08 M/UL — LOW (ref 4.2–5.8)
RBC # FLD: 16.1 % — HIGH (ref 10.3–14.5)
RBC # FLD: 18.6 % — HIGH (ref 10.3–14.5)
SODIUM SERPL-SCNC: 138 MMOL/L — SIGNIFICANT CHANGE UP (ref 135–145)
VANCOMYCIN TROUGH SERPL-MCNC: 13.3 UG/ML — SIGNIFICANT CHANGE UP (ref 10–20)
WBC # BLD: 21.1 K/UL — HIGH (ref 3.8–10.5)
WBC # BLD: 23.17 K/UL — HIGH (ref 3.8–10.5)
WBC # FLD AUTO: 21.1 K/UL — HIGH (ref 3.8–10.5)
WBC # FLD AUTO: 23.17 K/UL — HIGH (ref 3.8–10.5)

## 2019-09-12 RX ORDER — ATORVASTATIN CALCIUM 80 MG/1
40 TABLET, FILM COATED ORAL AT BEDTIME
Refills: 0 | Status: DISCONTINUED | OUTPATIENT
Start: 2019-09-12 | End: 2019-09-18

## 2019-09-12 RX ORDER — VANCOMYCIN HCL 1 G
750 VIAL (EA) INTRAVENOUS EVERY 12 HOURS
Refills: 0 | Status: DISCONTINUED | OUTPATIENT
Start: 2019-09-12 | End: 2019-09-12

## 2019-09-12 RX ORDER — MINERAL OIL
133 OIL (ML) MISCELLANEOUS ONCE
Refills: 0 | Status: DISCONTINUED | OUTPATIENT
Start: 2019-09-12 | End: 2019-09-12

## 2019-09-12 RX ORDER — POLYETHYLENE GLYCOL 3350 17 G/17G
17 POWDER, FOR SOLUTION ORAL DAILY
Refills: 0 | Status: DISCONTINUED | OUTPATIENT
Start: 2019-09-12 | End: 2019-09-13

## 2019-09-12 RX ORDER — LEVOTHYROXINE SODIUM 125 MCG
100 TABLET ORAL DAILY
Refills: 0 | Status: DISCONTINUED | OUTPATIENT
Start: 2019-09-12 | End: 2019-09-18

## 2019-09-12 RX ORDER — ATROPA BELLADONNA AND OPIUM 16.2; 6 MG/1; MG/1
1 SUPPOSITORY RECTAL EVERY 8 HOURS
Refills: 0 | Status: DISCONTINUED | OUTPATIENT
Start: 2019-09-12 | End: 2019-09-18

## 2019-09-12 RX ORDER — HEPARIN SODIUM 5000 [USP'U]/ML
5000 INJECTION INTRAVENOUS; SUBCUTANEOUS EVERY 8 HOURS
Refills: 0 | Status: DISCONTINUED | OUTPATIENT
Start: 2019-09-12 | End: 2019-09-18

## 2019-09-12 RX ORDER — LANOLIN ALCOHOL/MO/W.PET/CERES
3 CREAM (GRAM) TOPICAL AT BEDTIME
Refills: 0 | Status: DISCONTINUED | OUTPATIENT
Start: 2019-09-12 | End: 2019-09-18

## 2019-09-12 RX ORDER — VANCOMYCIN HCL 1 G
750 VIAL (EA) INTRAVENOUS DAILY
Refills: 0 | Status: DISCONTINUED | OUTPATIENT
Start: 2019-09-12 | End: 2019-09-17

## 2019-09-12 RX ORDER — METOPROLOL TARTRATE 50 MG
50 TABLET ORAL
Refills: 0 | Status: DISCONTINUED | OUTPATIENT
Start: 2019-09-12 | End: 2019-09-18

## 2019-09-12 RX ORDER — METOPROLOL TARTRATE 50 MG
25 TABLET ORAL ONCE
Refills: 0 | Status: COMPLETED | OUTPATIENT
Start: 2019-09-12 | End: 2019-09-12

## 2019-09-12 RX ORDER — SENNA PLUS 8.6 MG/1
2 TABLET ORAL AT BEDTIME
Refills: 0 | Status: DISCONTINUED | OUTPATIENT
Start: 2019-09-12 | End: 2019-09-13

## 2019-09-12 RX ORDER — PANTOPRAZOLE SODIUM 20 MG/1
40 TABLET, DELAYED RELEASE ORAL
Refills: 0 | Status: DISCONTINUED | OUTPATIENT
Start: 2019-09-12 | End: 2019-09-18

## 2019-09-12 RX ORDER — DOCUSATE SODIUM 100 MG
100 CAPSULE ORAL THREE TIMES A DAY
Refills: 0 | Status: DISCONTINUED | OUTPATIENT
Start: 2019-09-12 | End: 2019-09-13

## 2019-09-12 RX ORDER — APIXABAN 2.5 MG/1
1 TABLET, FILM COATED ORAL
Qty: 0 | Refills: 0 | DISCHARGE

## 2019-09-12 RX ADMIN — ATROPA BELLADONNA AND OPIUM 1 SUPPOSITORY(S): 16.2; 6 SUPPOSITORY RECTAL at 22:30

## 2019-09-12 RX ADMIN — POLYETHYLENE GLYCOL 3350 17 GRAM(S): 17 POWDER, FOR SOLUTION ORAL at 14:01

## 2019-09-12 RX ADMIN — Medication 25 MILLIGRAM(S): at 21:14

## 2019-09-12 RX ADMIN — Medication 100 MILLIGRAM(S): at 14:02

## 2019-09-12 RX ADMIN — Medication 50 MILLIGRAM(S): at 05:22

## 2019-09-12 RX ADMIN — Medication 100 MICROGRAM(S): at 05:22

## 2019-09-12 RX ADMIN — PANTOPRAZOLE SODIUM 40 MILLIGRAM(S): 20 TABLET, DELAYED RELEASE ORAL at 05:22

## 2019-09-12 RX ADMIN — HEPARIN SODIUM 5000 UNIT(S): 5000 INJECTION INTRAVENOUS; SUBCUTANEOUS at 22:32

## 2019-09-12 RX ADMIN — ATORVASTATIN CALCIUM 40 MILLIGRAM(S): 80 TABLET, FILM COATED ORAL at 22:31

## 2019-09-12 RX ADMIN — HEPARIN SODIUM 5000 UNIT(S): 5000 INJECTION INTRAVENOUS; SUBCUTANEOUS at 14:02

## 2019-09-12 RX ADMIN — Medication 10 MILLIGRAM(S): at 07:52

## 2019-09-12 RX ADMIN — ONDANSETRON 4 MILLIGRAM(S): 8 TABLET, FILM COATED ORAL at 05:22

## 2019-09-12 RX ADMIN — HEPARIN SODIUM 5000 UNIT(S): 5000 INJECTION INTRAVENOUS; SUBCUTANEOUS at 05:22

## 2019-09-12 RX ADMIN — ATROPA BELLADONNA AND OPIUM 1 SUPPOSITORY(S): 16.2; 6 SUPPOSITORY RECTAL at 23:30

## 2019-09-12 RX ADMIN — Medication 100 MILLIGRAM(S): at 05:22

## 2019-09-12 RX ADMIN — Medication 12.5 MILLIGRAM(S): at 05:22

## 2019-09-12 NOTE — DISCHARGE NOTE PROVIDER - REASON FOR ADMISSION
Gross hematuria, on CBI requiring transfer to [facility] for Alum CBI Gross hematuria, on CBI

## 2019-09-12 NOTE — H&P ADULT - ASSESSMENT
86 year old male with a medical history significant for Afib (on Eliquis, currently held), HTN, HLD, CVA (no residual), CABG 2012, and prostate cancer s/p radiation, presenting for transfer from [facility] for persistent hematuria, despite CBI, fulguration and 4 units of PRBCs, transferred for Alum administration, continued Vancomycin therapy for Enterococcus UTI, and telemetry monitoring for rapid Afib and hypotension.

## 2019-09-12 NOTE — H&P ADULT - NSHPLABSRESULTS_GEN_ALL_CORE
8.0    21.1  )-----------( CLUMPED    ( 12 Sep 2019 07:12 )             24.6   09-12    138  |  103  |  30<H>  ----------------------------<  145<H>  4.0   |  23  |  1.56<H>    Ca    8.4      12 Sep 2019 07:12    Culture - Urine (09.08.19 @ 17:48)    -  Ampicillin: S <=2 Predicts results to ampicillin/sulbactam, amoxacillin-clavulanate and  piperacillin-tazobactam.    -  Ciprofloxacin: R >2    -  Levofloxacin: R >4    -  Nitrofurantoin: S <=32 Should not be used to treat pyelonephritis.    -  Tetra/Doxy: R >8    -  Vancomycin: S 2    Specimen Source: .Urine    Culture Results:   >100,000 CFU/ml Enterococcus faecalis    Organism Identification: Enterococcus faecalis    Organism: Enterococcus faecalis    Method Type: MAURO    < from: Xray Chest 1 View- PORTABLE-Urgent (09.08.19 @ 12:57) >    IMPRESSION:   Left-sided pleural effusion. Clear right lung.    < end of copied text >    < from: US PV Residual Urine/Bladder Cap (ED) (09.08.19 @ 12:49) >    IMPRESSION:  A distended bladder was visualized containing mixed   echogenicities and hyperechoic foci. In the setting of hematuria this   could represent clot however an underlying malignancy cannot be excluded  Bilateral mild hydronephrosis seen  .     < end of copied text >

## 2019-09-12 NOTE — CHART NOTE - NSCHARTNOTEFT_GEN_A_CORE
Upon Nutritional Assessment by the Registered Dietitian your patient was determined to meet criteria / has evidence of the following diagnosis/diagnoses:          [x ]  Mild Protein Calorie Malnutrition        [ ]  Moderate Protein Calorie Malnutrition        [ ] Severe Protein Calorie Malnutrition        [ ] Unspecified Protein Calorie Malnutrition        [ ] Underweight / BMI <19        [ ] Morbid Obesity / BMI > 40      Findings as based on:  [x ] Comprehensive nutrition assessment   [x ] Nutrition Focused Physical Exam  [x ] Other:    WT loss 4% BW x 3 months, poor po <75% >1 week.     Nutrition Plan/Recommendations:  Regular diet, Add Glucerna 8 ounces x2, multivitamin and Vit D3        PROVIDER Section:     By signing this assessment you are acknowledging and agree with the diagnosis/diagnoses assigned by the Registered Dietitian    Comments:

## 2019-09-12 NOTE — DISCHARGE NOTE PROVIDER - HOSPITAL COURSE
Pt is a 87 yo male with a pmh of Pt is a 87 yo male with a pmh of afib on eliquis, prostate cancer s/p radiation 20 yrs ago, cabg and hypothy who arrived at [facility] on 9/8/19 with gross hematuria     pt was admitted for cbi. He suffered from acute blood loss anemia requiring blood transfusion. this was attributed to past radiation exposure ( radiation cystitis)    he underwent cystoscopy, turbt and fulguration of the bladder on 9/9/19. Please see operative report  for further details.    postop he remained on fast cbi and hct cont to drop. he was transfused an additional three units of blood.    His urine cx grew enterococcus and he was changed to vacno.    He was seen by his cardiologist Dr. Trip Le for his afib. While admitted he suffered from rapid afib and hypotension. It improved with blood transfusion    he remained on tele for observation. He remains asymptomatic.    Decision was made to transfer the pt to [facility] to get Alum CBI after blood transfusion today

## 2019-09-12 NOTE — DIETITIAN INITIAL EVALUATION ADULT. - ENERGY NEEDS
HT 68 inches,  pounds,  pounds, BMI 2.7, Highest wt 175 pounds  Dxd with Hematuria and clot retention.    Skin with IAD

## 2019-09-12 NOTE — PROGRESS NOTE ADULT - REASON FOR ADMISSION
Gross hematuria, on CBI

## 2019-09-12 NOTE — H&P ADULT - PROBLEM SELECTOR PLAN 3
-Monitor on Tele  -Continue Metoprolol  -Eliquis remains on hold  -Dr. Le, cardiology aware of transfer  -Monitor vitals closely    -Plan discussed with Dr. Smith

## 2019-09-12 NOTE — PROGRESS NOTE ADULT - ASSESSMENT
86 m with     Hematuria  - CBI  - Urology follow   - s/p cysto    Anemia posthemhoragic  - transfusion support  - DC Heparin  - PAS for DVT prophylaxis    Low BP  - hold Amlodipine, HCTZ  - follow    CAD  - stable  - cardiology evaluation noted    A Fib  - rate control with BB  - restart Eliquis when surgically cleared    Hypothyroidism  - follow TSH    Postop care.    Nitin Foster MD pager [phone]
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
1. anemia   2. hypotension ?dehydration sepsis  3. elevated WBC- sepsis  4. bladder tumor s/p bx  5. afib elevated VR    Recommend  transfuse  hydrate  check lactate  antibiotics blood cultures
1. anemia  bleeding  2. hypotension improved  3. elevated WBC- sepsis  4. bladder tumor s/p bx  5. afib elevated VR  6. renal insufficiency    Recommend  transfuse as needed  hydrate  antibiotics blood cultures  may need repeat urologic procedure-no cardiac contraindication
1. bladder tumor  2. afib VR 95  3. hemodynamically stable  4. no angina or sob    Recommend  continue present regimen of medications  keep hydrated  continue lopressor 50 BID  restart eliquis when OK with urology
86 m with     Hematuria  - CBI  - Urology follow   - s/p cysto    Anemia posthemhoragic  - transfusion support  - DC Heparin  - PAS for DVT prophylaxis    Low BP  - hold Amlodipine, HCTZ  - follow    CAD  - stable  - cardiology evaluation noted    A Fib  - rate control with BB  - restart Eliquis when surgically cleared    Hypothyroidism  - follow TSH    Postop care.    Nitin Foster MD pager [phone]
86 m with     Hematuria  - CBI  - Urology follow   - s/p cysto    Anemia posthemhoragic  - transfusion support  - DC Heparin  - PAS for DVT prophylaxis    Low BP  - hold Amlodipine, HCTZ  - follow    CAD  - stable  - cardiology evaluation noted    A Fib  - rate control with BB  - restart Eliquis when surgically cleared    Hypothyroidism  - follow TSH    Postop care.    Nitin Foster MD pager [phone]
86 year old male admitted with gross hematuria s/p cystoscopy, TURBT, clot evacuation, fulguration    - wean CBI  -AM labs  -rate control for afib  - f/u urine cx  - cont cipro  - eliquis on hold  - PT / OOB
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
86 year old male s/p cystoscopy, TURBT, clot evacuation, fulguration    -continue and wean CBI  -AM labs  -DVT prophylaxis  -incentive spirometry  -pain control as needed  -rate control for afib
Hematuria  - transfuse 2 U PRBC  - NPO  - Medicine clearance  - Continue CBI  - Holding Eliquis   - possible OR later today   - f/u urine culture

## 2019-09-12 NOTE — DIETITIAN INITIAL EVALUATION ADULT. - PERTINENT MEDS FT
MEDICATIONS  (STANDING):  atorvastatin 20 milliGRAM(s) Oral at bedtime  docusate sodium 100 milliGRAM(s) Oral three times a day  heparin  Injectable 5000 Unit(s) SubCutaneous every 8 hours  hydrochlorothiazide 12.5 milliGRAM(s) Oral daily  levothyroxine 100 MICROGram(s) Oral daily  metoprolol tartrate 50 milliGRAM(s) Oral two times a day  pantoprazole    Tablet 40 milliGRAM(s) Oral before breakfast  polyethylene glycol 3350 17 Gram(s) Oral daily  sodium chloride 0.9%. 1000 milliLiter(s) (75 mL/Hr) IV Continuous <Continuous>  vancomycin  IVPB 750 milliGRAM(s) IV Intermittent every 12 hours    MEDICATIONS  (PRN):  belladonna 16.2 mG/opium 30 mg Suppository 1 Suppository(s) Rectal every 8 hours PRN bladder spasms  melatonin 3 milliGRAM(s) Oral at bedtime PRN Insomnia  metoclopramide Injectable 10 milliGRAM(s) IV Push every 6 hours PRN N/V  ondansetron Injectable 4 milliGRAM(s) IV Push every 6 hours PRN Nausea and/or Vomiting  oxyCODONE    5 mG/acetaminophen 325 mG 1 Tablet(s) Oral every 4 hours PRN Mild Pain (1 - 3)

## 2019-09-12 NOTE — DISCHARGE NOTE PROVIDER - CARE PROVIDER_API CALL
Denilson Smith)  Urology  [address] [place] [address]  Phone: [phone]  Fax: [phone]  Follow Up Time:

## 2019-09-12 NOTE — DIETITIAN INITIAL EVALUATION ADULT. - OTHER INFO
Patient referred by patient to speak with RDN.  Patient reports a poor appetite in last week and fair appetite and intake since June.  Patient admits that he has been debilitated since June when he had a hip replacement and was then in rehab until August 1st.  Patient also notes hx of prostate cancer with radiation tx and hx of hematuria in past.  Complains of chronic nausea with no vomiting, as well as constipation.  Highest weight 175 pounds years back.  Gradual recent weight loss from 155 pounds to 149 pounds likely since June 25th with hip surgery.  Patient lives with someone who helps with shopping and cooking but also receives meals on wheels which has been a big help.  Enjoys Glucerna and accepts once a day to replace a meal and would like in hospital.

## 2019-09-12 NOTE — DISCHARGE NOTE NURSING/CASE MANAGEMENT/SOCIAL WORK - PATIENT PORTAL LINK FT
You can access the FollowMyHealth Patient Portal offered by [facility] by registering at the following website: [email or web address]. By joining [facility]’s FollowMyHealth portal, you will also be able to view your health information using other applications (apps) compatible with our system.

## 2019-09-12 NOTE — PROGRESS NOTE ADULT - PROVIDER SPECIALTY LIST ADULT
Cardiology
Internal Medicine
Internal Medicine
Urology
Internal Medicine

## 2019-09-12 NOTE — DIETITIAN INITIAL EVALUATION ADULT. - ADD RECOMMEND
Resume regular diet with Glucerna 8 ounces x2, Monitor diet tolerance, po intake, GI tolerance, weight trends, labs, and skin integrity, Vit D3

## 2019-09-12 NOTE — H&P ADULT - PROBLEM SELECTOR PLAN 1
-Continue CBI  -Start Alum in the am  -Monitor color  -s/p 1 unit PRBC prior to transfer, check CBC  -monitor H+H

## 2019-09-12 NOTE — PROGRESS NOTE ADULT - SUBJECTIVE AND OBJECTIVE BOX
Patient is a 86y old  Male who presents with a chief complaint of Gross hematuria, on CBI (11 Sep 2019 10:31)      SUBJECTIVE / OVERNIGHT EVENTS: Comfortable without new complaints.   Review of Systems  chest pain no  palpitations no  sob no  nausea no  headache no    MEDICATIONS  (STANDING):  atorvastatin 20 milliGRAM(s) Oral at bedtime  docusate sodium 100 milliGRAM(s) Oral three times a day  heparin  Injectable 5000 Unit(s) SubCutaneous every 8 hours  hydrochlorothiazide 12.5 milliGRAM(s) Oral daily  levothyroxine 100 MICROGram(s) Oral daily  metoprolol tartrate 50 milliGRAM(s) Oral two times a day  pantoprazole    Tablet 40 milliGRAM(s) Oral before breakfast  sodium chloride 0.9%. 1000 milliLiter(s) (75 mL/Hr) IV Continuous <Continuous>  vancomycin  IVPB 750 milliGRAM(s) IV Intermittent every 12 hours    MEDICATIONS  (PRN):  belladonna 16.2 mG/opium 30 mg Suppository 1 Suppository(s) Rectal every 8 hours PRN bladder spasms  melatonin 3 milliGRAM(s) Oral at bedtime PRN Insomnia  metoclopramide Injectable 10 milliGRAM(s) IV Push every 6 hours PRN N/V  ondansetron Injectable 4 milliGRAM(s) IV Push every 6 hours PRN Nausea and/or Vomiting  oxyCODONE    5 mG/acetaminophen 325 mG 1 Tablet(s) Oral every 4 hours PRN Mild Pain (1 - 3)      Vital Signs Last 24 Hrs  T(C): 36.7 (11 Sep 2019 19:40), Max: 37.6 (11 Sep 2019 06:07)  T(F): 98.1 (11 Sep 2019 19:40), Max: 99.6 (11 Sep 2019 06:07)  HR: 116 (11 Sep 2019 19:40) (108 - 132)  BP: 112/64 (11 Sep 2019 19:40) (84/50 - 112/72)  BP(mean): --  RR: 16 (11 Sep 2019 19:40) (16 - 18)  SpO2: 97% (11 Sep 2019 19:40) (93% - 98%)    PHYSICAL EXAM:  GENERAL: NAD, well-developed  HEAD:  Atraumatic, Normocephalic  EYES: EOMI, PERRLA, conjunctiva and sclera clear  NECK: Supple, No JVD  CHEST/LUNG: Clear to auscultation bilaterally; No wheeze  HEART: Regular rate and rhythm; No murmurs, rubs, or gallops  ABDOMEN: Soft, Nontender, Nondistended; Bowel sounds present CBI in place + hematuria  EXTREMITIES:  2+ Peripheral Pulses, No clubbing, cyanosis, or edema  PSYCH: AAOx3  NEUROLOGY: non-focal  SKIN: No rashes or lesions    LABS:                        9.0    23.0  )-----------( 206      ( 11 Sep 2019 20:15 )             28.0     09-11    138  |  103  |  23  ----------------------------<  158<H>  3.9   |  23  |  1.34<H>    Ca    8.8      11 Sep 2019 06:52                  RADIOLOGY & ADDITIONAL TESTS:    Imaging Personally Reviewed:    Consultant(s) Notes Reviewed:      Care Discussed with Consultants/Other Providers:
Subjective  feeling well, cbi ran overnight medium pace   Objective    Vital signs  T(F): , Max: 99.7 (09-10-19 @ 20:54)  HR: 108 (09-11-19 @ 06:07)  BP: 84/50 (09-11-19 @ 06:07)  SpO2: 95% (09-11-19 @ 06:07)  Wt(kg): --    Output     09-10 @ 07:01  -  09-11 @ 07:00  --------------------------------------------------------  IN: 3870 mL / OUT: 75 mL / NET: 3795 mL        Gen awake alert nad axox3  Abd soft ntnd    no cvat bl   urine light pink on medium drip     Labs      09-11 @ 06:52    WBC 23.8  / Hct 21.6  / SCr 1.34     09-10 @ 15:06    WBC 23.9  / Hct 28.7  / SCr --         Urine Cx: Culture - Urine (09.08.19 @ 17:48)    -  Ampicillin: S <=2 Predicts results to ampicillin/sulbactam, amoxacillin-clavulanate and  piperacillin-tazobactam.    -  Ciprofloxacin: R >2    -  Levofloxacin: R >4    -  Nitrofurantoin: S <=32 Should not be used to treat pyelonephritis.    -  Tetra/Doxy: R >8    -  Vancomycin: S 2    Specimen Source: .Urine    Culture Results:   >100,000 CFU/ml Enterococcus faecalis    Organism Identification: Enterococcus faecalis    Organism: Enterococcus faecalis    Method Type: MAURO
Patient is a 86y old  Male who presents with a chief complaint of Gross hematuria, on CBI (10 Sep 2019 10:37)      SUBJECTIVE / OVERNIGHT EVENTS: Comfortable without new complaints. CBI with hematuria but improving   Review of Systems  chest pain no  palpitations no  sob no  nausea no  headache no    MEDICATIONS  (STANDING):  atorvastatin 20 milliGRAM(s) Oral at bedtime  ceFAZolin   IVPB      docusate sodium 100 milliGRAM(s) Oral three times a day  heparin  Injectable 5000 Unit(s) SubCutaneous every 8 hours  hydrochlorothiazide 12.5 milliGRAM(s) Oral daily  levothyroxine 100 MICROGram(s) Oral daily  metoprolol tartrate 50 milliGRAM(s) Oral two times a day  pantoprazole    Tablet 40 milliGRAM(s) Oral before breakfast  sodium chloride 0.9%. 1000 milliLiter(s) (75 mL/Hr) IV Continuous <Continuous>    MEDICATIONS  (PRN):  belladonna 16.2 mG/opium 30 mg Suppository 1 Suppository(s) Rectal every 8 hours PRN bladder spasms  melatonin 3 milliGRAM(s) Oral at bedtime PRN Insomnia  oxyCODONE    5 mG/acetaminophen 325 mG 1 Tablet(s) Oral every 4 hours PRN Mild Pain (1 - 3)      Vital Signs Last 24 Hrs  T(C): 36.8 (10 Sep 2019 16:44), Max: 37 (10 Sep 2019 12:33)  T(F): 98.2 (10 Sep 2019 16:44), Max: 98.6 (10 Sep 2019 12:33)  HR: 91 (10 Sep 2019 16:44) (91 - 102)  BP: 104/72 (10 Sep 2019 16:44) (98/65 - 126/63)  BP(mean): 85 (09 Sep 2019 21:00) (82 - 85)  RR: 16 (10 Sep 2019 16:44) (16 - 17)  SpO2: 98% (10 Sep 2019 16:44) (95% - 100%)    PHYSICAL EXAM:  GENERAL: NAD, well-developed  HEAD:  Atraumatic, Normocephalic  EYES: EOMI, PERRLA, conjunctiva and sclera clear  NECK: Supple, No JVD  CHEST/LUNG: Clear to auscultation bilaterally; No wheeze  HEART: Regular rate and rhythm; No murmurs, rubs, or gallops  ABDOMEN: Soft, Nontender, Nondistended; Bowel sounds present CBI  EXTREMITIES:  2+ Peripheral Pulses, No clubbing, cyanosis, or edema  PSYCH: AAOx3  NEUROLOGY: non-focal  SKIN: No rashes or lesions    LABS:                        9.2    23.9  )-----------( 296      ( 10 Sep 2019 15:06 )             28.7     09-10    139  |  104  |  27<H>  ----------------------------<  109<H>  3.9   |  24  |  1.30    Ca    9.1      10 Sep 2019 07:05                Culture - Urine (collected 08 Sep 2019 17:48)  Source: .Urine  Preliminary Report (09 Sep 2019 18:10):    >100,000 CFU/ml Gram positive organisms        RADIOLOGY & ADDITIONAL TESTS:    Imaging Personally Reviewed:    Consultant(s) Notes Reviewed:      Care Discussed with Consultants/Other Providers:
Patient is a 86y old  Male who presents with a chief complaint of Gross hematuria, on CBI (12 Sep 2019 11:44)      SUBJECTIVE / OVERNIGHT EVENTS: Comfortable without new complaints.   Review of Systems  chest pain no  palpitations no  sob no  nausea no  headache no    MEDICATIONS  (STANDING):  atorvastatin 20 milliGRAM(s) Oral at bedtime  docusate sodium 100 milliGRAM(s) Oral three times a day  heparin  Injectable 5000 Unit(s) SubCutaneous every 8 hours  hydrochlorothiazide 12.5 milliGRAM(s) Oral daily  levothyroxine 100 MICROGram(s) Oral daily  metoprolol tartrate 50 milliGRAM(s) Oral two times a day  mineral oil enema 133 milliLiter(s) Rectal once  pantoprazole    Tablet 40 milliGRAM(s) Oral before breakfast  polyethylene glycol 3350 17 Gram(s) Oral daily  sodium chloride 0.9%. 1000 milliLiter(s) (75 mL/Hr) IV Continuous <Continuous>  vancomycin  IVPB 750 milliGRAM(s) IV Intermittent every 12 hours    MEDICATIONS  (PRN):  belladonna 16.2 mG/opium 30 mg Suppository 1 Suppository(s) Rectal every 8 hours PRN bladder spasms  melatonin 3 milliGRAM(s) Oral at bedtime PRN Insomnia  metoclopramide Injectable 10 milliGRAM(s) IV Push every 6 hours PRN N/V  ondansetron Injectable 4 milliGRAM(s) IV Push every 6 hours PRN Nausea and/or Vomiting  oxyCODONE    5 mG/acetaminophen 325 mG 1 Tablet(s) Oral every 4 hours PRN Mild Pain (1 - 3)      Vital Signs Last 24 Hrs  T(C): 37.2 (12 Sep 2019 19:58), Max: 37.4 (12 Sep 2019 12:00)  T(F): 99 (12 Sep 2019 19:58), Max: 99.3 (12 Sep 2019 12:00)  HR: 121 (12 Sep 2019 19:58) (101 - 121)  BP: 112/57 (12 Sep 2019 19:58) (90/53 - 117/69)  BP(mean): --  RR: 18 (12 Sep 2019 19:58) (16 - 20)  SpO2: 93% (12 Sep 2019 19:58) (91% - 98%)    PHYSICAL EXAM:  GENERAL: NAD, well-developed  HEAD:  Atraumatic, Normocephalic  EYES: EOMI, PERRLA, conjunctiva and sclera clear  NECK: Supple, No JVD  CHEST/LUNG: Clear to auscultation bilaterally; No wheeze  HEART: Regular rate and rhythm; No murmurs, rubs, or gallops  ABDOMEN: Soft, Nontender, Nondistended; Bowel sounds present CBI in place with hematuria  EXTREMITIES:  2+ Peripheral Pulses, No clubbing, cyanosis, or edema  PSYCH: AAOx3  NEUROLOGY: non-focal  SKIN: No rashes or lesions    LABS:                        9.3    23.17 )-----------( 272      ( 12 Sep 2019 22:20 )             28.5     09-12    138  |  103  |  30<H>  ----------------------------<  145<H>  4.0   |  23  |  1.56<H>    Ca    8.4      12 Sep 2019 07:12                  RADIOLOGY & ADDITIONAL TESTS:    Imaging Personally Reviewed:    Consultant(s) Notes Reviewed:      Care Discussed with Consultants/Other Providers:
Subjective: Patient seen and examined. No new events except as noted.   Nausea and vomiting last nite  elevatee WBC hypotensive  no cp or sob   MEDICATIONS:  MEDICATIONS  (STANDING):  atorvastatin 20 milliGRAM(s) Oral at bedtime  docusate sodium 100 milliGRAM(s) Oral three times a day  heparin  Injectable 5000 Unit(s) SubCutaneous every 8 hours  hydrochlorothiazide 12.5 milliGRAM(s) Oral daily  levothyroxine 100 MICROGram(s) Oral daily  metoprolol tartrate 50 milliGRAM(s) Oral two times a day  pantoprazole    Tablet 40 milliGRAM(s) Oral before breakfast  sodium chloride 0.9%. 1000 milliLiter(s) (75 mL/Hr) IV Continuous <Continuous>  vancomycin  IVPB 750 milliGRAM(s) IV Intermittent every 12 hours      PHYSICAL EXAM:  T(C): 37.1 (09-11-19 @ 09:00), Max: 37.6 (09-10-19 @ 20:54)  HR: 120 (09-11-19 @ 09:00) (91 - 132)  BP: 90/56 (09-11-19 @ 09:00) (84/50 - 104/72)  RR: 16 (09-11-19 @ 09:00) (16 - 18)  SpO2: 95% (09-11-19 @ 09:00) (95% - 98%)  Wt(kg): --  I&O's Summary    10 Sep 2019 07:01  -  11 Sep 2019 07:00  --------------------------------------------------------  IN: 3870 mL / OUT: 75 mL / NET: 3795 mL          Appearance: Normal anxious	  HEENT:   Normal oral mucosa, PERRL, EOMI	  Cardiovascular: Normal S1 S2, irregualrly irregular LD340Ik JVD, No murmurs ,  Respiratory: Lungs clear to auscultation, normal effort 	  Gastrointestinal:  Soft, Non-tender, + BS	  Skin: No rashes, No ecchymoses, No cyanosis, warm to touch  Musculoskeletal: Normal range of motion, normal strength  Psychiatry:  Mood & affect appropriate  Ext: No edema        LABS:    CARDIAC MARKERS:                                7.0    23.8  )-----------( 142      ( 11 Sep 2019 06:52 )             21.6     09-11    138  |  103  |  23  ----------------------------<  158<H>  3.9   |  23  |  1.34<H>    Ca    8.8      11 Sep 2019 06:52      proBNP:   Lipid Profile:   HgA1c:   TSH: Thyroid Stimulating Hormone, Serum: 3.80 uIU/mL (09-10 @ 09:32)            TELEMETRY: 	    ECG:  	  RADIOLOGY:   DIAGNOSTIC TESTING:  [ ] Echocardiogram:  [ ]  Catheterization:  [ ] Stress Test:    OTHER:
Subjective: Patient seen and examined. No new events except as noted.   No longer hypotensive received 2 units hgb/hct styill low   elevated creatinine  no fever or chills    MEDICATIONS:  MEDICATIONS  (STANDING):  atorvastatin 20 milliGRAM(s) Oral at bedtime  docusate sodium 100 milliGRAM(s) Oral three times a day  heparin  Injectable 5000 Unit(s) SubCutaneous every 8 hours  hydrochlorothiazide 12.5 milliGRAM(s) Oral daily  levothyroxine 100 MICROGram(s) Oral daily  metoprolol tartrate 50 milliGRAM(s) Oral two times a day  pantoprazole    Tablet 40 milliGRAM(s) Oral before breakfast  polyethylene glycol 3350 17 Gram(s) Oral daily  sodium chloride 0.9%. 1000 milliLiter(s) (75 mL/Hr) IV Continuous <Continuous>  vancomycin  IVPB 750 milliGRAM(s) IV Intermittent every 12 hours      PHYSICAL EXAM:  T(C): 37.2 (09-12-19 @ 05:01), Max: 37.6 (09-11-19 @ 21:54)  HR: 101 (09-12-19 @ 05:01) (101 - 121)  BP: 106/64 (09-12-19 @ 05:01) (85/50 - 115/73)  RR: 17 (09-12-19 @ 05:01) (16 - 18)  SpO2: 92% (09-12-19 @ 05:01) (92% - 97%)  Wt(kg): --  I&O's Summary    11 Sep 2019 07:01  -  12 Sep 2019 07:00  --------------------------------------------------------  IN: 2290 mL / OUT: 0 mL / NET: 2290 mL          Appearance: Normal NAD depressed anxious	  HEENT:   Normal oral mucosa, PERRL, EOMI	  Cardiovascular: Normal S1 S2, irregulrly irregular 110 No JVD, No murmurs ,  Respiratory: Lungs clear to auscultation, normal effort 	  Gastrointestinal:  Soft, Non-tender, + BS	  Skin: No rashes, No ecchymoses, No cyanosis, warm to touch  Musculoskeletal: Normal range of motion, normal strength  Psychiatry:  Mood & affect appropriate  Ext: No edema  francis in place      LABS:    CARDIAC MARKERS:                                8.0    21.1  )-----------( CLUMPED    ( 12 Sep 2019 07:12 )             24.6     09-12    138  |  103  |  30<H>  ----------------------------<  145<H>  4.0   |  23  |  1.56<H>    Ca    8.4      12 Sep 2019 07:12      proBNP:   Lipid Profile:   HgA1c:   TSH:           TELEMETRY: 	    ECG:  	  RADIOLOGY:   DIAGNOSTIC TESTING:  [ ] Echocardiogram:  [ ]  Catheterization:  [ ] Stress Test:    OTHER:
Subjective: Patient seen and examined. No new events except as noted.   POD 1 biopsy of bladder tumor less hematuria hct stable  no cp orsob  MEDICATIONS:  MEDICATIONS  (STANDING):  atorvastatin 20 milliGRAM(s) Oral at bedtime  ciprofloxacin     Tablet 500 milliGRAM(s) Oral every 12 hours  docusate sodium 100 milliGRAM(s) Oral three times a day  heparin  Injectable 5000 Unit(s) SubCutaneous every 8 hours  hydrochlorothiazide 12.5 milliGRAM(s) Oral daily  levothyroxine 100 MICROGram(s) Oral daily  metoprolol tartrate 50 milliGRAM(s) Oral two times a day  pantoprazole    Tablet 40 milliGRAM(s) Oral before breakfast  sodium chloride 0.9%. 1000 milliLiter(s) (75 mL/Hr) IV Continuous <Continuous>      PHYSICAL EXAM:  T(C): 36.9 (09-10-19 @ 10:00), Max: 36.9 (09-10-19 @ 05:15)  HR: 102 (09-10-19 @ 10:00) (86 - 122)  BP: 98/65 (09-10-19 @ 10:00) (93/58 - 155/69)  RR: 16 (09-10-19 @ 10:00) (12 - 17)  SpO2: 98% (09-10-19 @ 10:00) (93% - 100%)  Wt(kg): --  I&O's Summary    09 Sep 2019 07:01  -  10 Sep 2019 07:00  --------------------------------------------------------  IN: 1815 mL / OUT: 0 mL / NET: 1815 mL    10 Sep 2019 07:01  -  10 Sep 2019 10:37  --------------------------------------------------------  IN: 360 mL / OUT: 0 mL / NET: 360 mL      Height (cm): 172.72 (09-09 @ 16:44)  Weight (kg): 67.6 (09-09 @ 16:44)  BMI (kg/m2): 22.7 (09-09 @ 16:44)  BSA (m2): 1.8 (09-09 @ 16:44)    Appearance: Normal thin in goo spirits NAD	  HEENT:   Normal oral mucosa, PERRL, EOMI	  Cardiovascular: Normal S1 S2, irregularly irregular No JVD, No murmurs ,  Respiratory: Lungs clear to auscultation, normal effort 	  Gastrointestinal:  Soft, Non-tender, + BS	  Ext: No edema  Peripheral pulses palpable 2+ bilaterally  francis in place CBI    LABS:    CARDIAC MARKERS:                                9.6    17.6  )-----------( 123      ( 10 Sep 2019 07:08 )             28.5     09-10    139  |  104  |  27<H>  ----------------------------<  109<H>  3.9   |  24  |  1.30    Ca    9.1      10 Sep 2019 07:05    TPro  7.3  /  Alb  4.4  /  TBili  0.8  /  DBili  x   /  AST  21  /  ALT  13  /  AlkPhos  99  09-08    proBNP:   Lipid Profile:   HgA1c:   TSH:           TELEMETRY: 	    ECG:  	  RADIOLOGY:   DIAGNOSTIC TESTING:  [ ] Echocardiogram:  [ ]  Catheterization:  [ ] Stress Test:    OTHER:
The patient continues with gross hematuria. Will plan cytoscopy under anesthesia.    Vital Signs Last 24 Hrs  T(C): 36.9 (09 Sep 2019 09:17), Max: 36.9 (08 Sep 2019 21:59)  T(F): 98.5 (09 Sep 2019 09:17), Max: 98.5 (09 Sep 2019 09:17)  HR: 94 (09 Sep 2019 09:17) (73 - 110)  BP: 91/58 (09 Sep 2019 09:17) (91/58 - 142/82)  BP(mean): --  RR: 18 (09 Sep 2019 09:17) (17 - 18)  SpO2: 96% (09 Sep 2019 09:17) (95% - 98%)    PHYSICAL EXAM:    NAD, well-developed  Abd: soft, NT/ND, No CVAT    Urine:     I&O's Summary    08 Sep 2019 07:01  -  09 Sep 2019 07:00  --------------------------------------------------------  IN: 1620 mL / OUT: 0 mL / NET: 1620 mL    09 Sep 2019 07:01  -  09 Sep 2019 14:20  --------------------------------------------------------  IN: 225 mL / OUT: 0 mL / NET: 225 mL        LABS:                        7.0    9.5   )-----------( 146      ( 09 Sep 2019 07:07 )             22.1         138  |  104  |  30<H>  ----------------------------<  117<H>  3.9   |  25  |  1.23    Ca    8.9      09 Sep 2019 07:07    TPro  7.3  /  Alb  4.4  /  TBili  0.8  /  DBili  x   /  AST  21  /  ALT  13  /  AlkPhos  99      Urinalysis Basic - ( 08 Sep 2019 13:33 )    Color: Red / Appearance: Turbid / S.027 / pH: x  Gluc: x / Ketone: Negative  / Bili: Negative / Urobili: Negative   Blood: x / Protein: >600 / Nitrite: Negative   Leuk Esterase: Negative / RBC: >50 /hpf / WBC 7 /HPF   Sq Epi: x / Non Sq Epi: 2 /hpf / Bacteria: Negative      Urine Culture: p    Prior notes/chart reviewed.        Office: [phone]
The patient is clear on slow rip CBI.    Vital Signs Last 24 Hrs  T(C): 36.7 (11 Sep 2019 09:55), Max: 37.6 (10 Sep 2019 20:54)  T(F): 98.1 (11 Sep 2019 09:55), Max: 99.7 (10 Sep 2019 20:54)  HR: 121 (11 Sep 2019 09:55) (91 - 132)  BP: 90/50 (11 Sep 2019 09:55) (84/50 - 104/72)  BP(mean): --  RR: 18 (11 Sep 2019 09:55) (16 - 18)  SpO2: 93% (11 Sep 2019 09:55) (93% - 98%)    PHYSICAL EXAM:    NAD, well-developed  Abd: soft, NT/ND, No CVAT    Urine: clear    I&O's Summary    10 Sep 2019 07:01  -  11 Sep 2019 07:00  --------------------------------------------------------  IN: 3870 mL / OUT: 75 mL / NET: 3795 mL        LABS:                        7.0    23.8  )-----------( 142      ( 11 Sep 2019 06:52 )             21.6     09-11    138  |  103  |  23  ----------------------------<  158<H>  3.9   |  23  |  1.34<H>    Ca    8.8      11 Sep 2019 06:52        Urine Culture:  neg  Prior notes/chart reviewed.    Continue CBI as needed.  Will observe closely.  Monitor labs.  Will consider bladder wash, Alum, Formalin if needed.  Likely candidate for hyperbaric oxygen as outpatient.    Office: [phone]
The patient was seen and examined at bedside.  He admits to having pain in the groin area.  Denies complaints of chest pain, shortness of breath, dizziness/lightheadedness, nausea.    T(C): 36.7 (09-09-19 @ 16:44), Max: 36.9 (09-08-19 @ 21:59)  HR: 110 (09-09-19 @ 18:45) (73 - 122)  BP: 143/65 (09-09-19 @ 18:45) (91/58 - 144/65)  RR: 14 (09-09-19 @ 18:45) (12 - 18)  SpO2: 100% (09-09-19 @ 18:45) (93% - 100%)  Wt(kg): --    Physical Exam:    General: NAD, A+Ox3  Abdomen: soft, non-tender, non-distended      09-08 @ 07:01  -  09-09 @ 07:00  --------------------------------------------------------  IN: 1620 mL / OUT: 0 mL / NET: 1620 mL    09-09 @ 07:01  -  09-09 @ 20:00  --------------------------------------------------------  IN: 225 mL / OUT: 0 mL / NET: 225 mL    CBI - light red                          10.2   14.5  )-----------( 286      ( 09 Sep 2019 18:00 )             31.1       140  |  103  |  28<H>  ----------------------------<  124<H>  4.3   |  22  |  1.32<H>    Ca    9.4      09 Sep 2019 18:00    TPro  7.3  /  Alb  4.4  /  TBili  0.8  /  DBili  x   /  AST  21  /  ALT  13  /  AlkPhos  99  09-08      EKG: atrial fibrillation
UROLOGY DAILY PROGRESS NOTE:     Subjective: Patient seen and examined at bedside.   No acute events overnight  CBI continue at moderate rate     Objective:  Vital signs  T(F): , Max: 99.7 (19 @ 21:54)  HR: 101 (19 @ 05:01)  BP: 106/64 (19 @ 05:01)  SpO2: 92% (19 @ 05:01)  Wt(kg): --    Output     I&O's Detail    10 Sep 2019 07:  -  11 Sep 2019 07:00  --------------------------------------------------------  IN:    IV PiggyBack: 250 mL    Lactated Ringers IV Bolus: 500 mL    Oral Fluid: 1320 mL    sodium chloride 0.9%.: 1800 mL  Total IN: 3870 mL    OUT:    Emesis: 75 mL  Total OUT: 75 mL    Total NET: 3795 mL      11 Sep 2019 07:01  -  12 Sep 2019 06:51  --------------------------------------------------------  IN:    IV PiggyBack: 250 mL    Oral Fluid: 840 mL    Packed Red Blood Cells: 300 mL    sodium chloride 0.9%.: 900 mL  Total IN: 2290 mL    OUT:  Total OUT: 0 mL    Total NET: 2290 mL          Physical Exam:  Gen: NAD  Abd: soft NTND  Back: no CVAT  : CBI light red no clots CBI weaned to slow drip     Labs:    23.0  / 28.0  /x        23.8  / 21.6  /1.34                           9.0    23.0  )-----------( 206      ( 11 Sep 2019 20:15 )             28.0         138  |  103  |  23  ----------------------------<  158<H>  3.9   |  23  |  1.34<H>    Ca    8.8      11 Sep 2019 06:52    LABS/RADIOLOGY RESULTS:                          9.0    23.0  )-----------( 206      ( 11 Sep 2019 20:15 )             28.0   09-11    138  |  103  |  23  ----------------------------<  158<H>  3.9   |  23  |  1.34<H>    Ca    8.8      11 Sep 2019 06:52    Blood Cultures    Urinalysis Basic - ( 08 Sep 2019 13:33 )    Color: Red / Appearance: Turbid / S.027 / pH:   Gluc:  / Ketone: Negative  / Bili: Negative / Urobili: Negative   Blood:  / Protein: >600 / Nitrite: Negative   Leuk Esterase: Negative / RBC: >50 /hpf / WBC 7 /HPF   Sq Epi:  / Non Sq Epi: 2 /hpf / Bacteria: Negative                Urine Cx:
UROLOGY DAILY PROGRESS NOTE:     Subjective: Patient seen and examined at bedside. Unable to wean CBI overnight.  Denies any dizziness or SOB.       Objective:  Vital signs  T(F): , Max: 98.4 (09-08-19 @ 21:59)  HR: 73 (09-09-19 @ 06:17)  BP: 94/59 (09-09-19 @ 06:17)  SpO2: 98% (09-09-19 @ 06:17)  Wt(kg): --    I&O's Summary    08 Sep 2019 07:01  -  09 Sep 2019 07:00  --------------------------------------------------------  IN: 1620 mL / OUT: 0 mL / NET: 1620 mL        Gen: NAD  Pulm: No respiratory distress, no subcostal retractions  CV: RRR, no JVD  Abd: Soft, NT, ND  : CBI running, light pink but darkens when stopped; irrigated some clots     Labs:  09-09  9.5   / 22.1  /1.23   09-08  16.0  / 34.3  /1.32                           7.0    9.5   )-----------( 146      ( 09 Sep 2019 07:07 )             22.1     09-09    138  |  104  |  30<H>  ----------------------------<  117<H>  3.9   |  25  |  1.23    Ca    8.9      09 Sep 2019 07:07    TPro  7.3  /  Alb  4.4  /  TBili  0.8  /  DBili  x   /  AST  21  /  ALT  13  /  AlkPhos  99  09-08    PT/INR - ( 08 Sep 2019 12:45 )   PT: 19.8 sec;   INR: 1.69 ratio         PTT - ( 08 Sep 2019 12:45 )  PTT:33.8 sec    Urine Cx:
UROLOGY PA PROGRESS NOTE:     Subjective:  no acute events overnight, feeling well, no complaints. unable to wean cbi overnight.     Objective:  Vital signs  T(F): , Max: 98.5 (19 @ 09:17)  HR: 95 (09-10-19 @ 05:15)  BP: 104/68 (09-10-19 @ 05:15)  SpO2: 96% (09-10-19 @ 05:15)  Wt(kg): --    Output     CBI    Physical Exam:  Gen: NAD  Abd: soft, NT/ND  : +francis draining red on CBI--> irrigated small amount of clot, resumed CBI at a moderately fast rate--> now draining light pink     Labs:    14.5  / 31.1  /1.32     9.5   / 22.1  /1.23                           10.2   14.5  )-----------( 286      ( 09 Sep 2019 18:00 )             31.1         140  |  103  |  28<H>  ----------------------------<  124<H>  4.3   |  22  |  1.32<H>    Ca    9.4      09 Sep 2019 18:00    TPro  7.3  /  Alb  4.4  /  TBili  0.8  /  DBili  x   /  AST  21  /  ALT  13  /  AlkPhos  99  08    PT/INR - ( 08 Sep 2019 12:45 )   PT: 19.8 sec;   INR: 1.69 ratio         PTT - ( 08 Sep 2019 12:45 )  PTT:33.8 sec  Urinalysis Basic - ( 08 Sep 2019 13:33 )    Color: Red / Appearance: Turbid / S.027 / pH: x  Gluc: x / Ketone: Negative  / Bili: Negative / Urobili: Negative   Blood: x / Protein: >600 / Nitrite: Negative   Leuk Esterase: Negative / RBC: >50 /hpf / WBC 7 /HPF   Sq Epi: x / Non Sq Epi: 2 /hpf / Bacteria: Negative        Urine Cx:    Culture - Urine (collected 08 Sep 2019 17:48)  Source: .Urine  Preliminary Report (09 Sep 2019 18:10):    >100,000 CFU/ml Gram positive organisms

## 2019-09-12 NOTE — H&P ADULT - HISTORY OF PRESENT ILLNESS
This is an 86 year old male with a medical history significant for Afib (on Eliquis, currently held), HTN, HLD, CVA (no residual), CABG 2012, and prostate cancer s/p radiation, with hematuria that started about a week ago.  He presented to [facility] on 9/8 where he was admitted until today and transferred to [facility].  There he has been on CBI, underwent cystoscopy, TURBT, and bladder fulguration on 9/9, and required 4 units of PRBCS.  Hematuria has persisted despite these efforts and he has been transferred to [facility] to administer Alum to attempt hemostasis.  Patient reports he feels fatigued and weak, and overall deconditioned wince being hospitalized.  He denies chest pain, shortness of breath, dizziness or lightheadedness.  Eliquis has remained on hold due to hematuria. Hospitalization significant for rapid Afib for which he is followed by his cardiologist, Dr. Le, followed by hypotension in setting of medications. Patient is transferred to a telemetry bed for further monitoring. He has been on Vancomycin for an Enterococcus UTI.  Denies fevers. This is an 86 year old male with a medical history significant for Afib (on Eliquis, currently held), HTN, HLD, CVA (no residual), CABG 2012, and prostate cancer s/p radiation, with hematuria that started about a week ago.  He presented to [facility] on 9/8 where he was admitted until today and transferred to [facility].  There he has been on CBI, underwent cystoscopy, TURBT, and bladder fulguration on 9/9, and required 4 units of PRBCS.  Hematuria has persisted despite these efforts and he has been transferred to [facility] to administer Alum to attempt hemostasis.  Patient reports he feels fatigued and weak, and overall deconditioned wince being hospitalized.  He denies chest pain, shortness of breath, dizziness or lightheadedness.  Eliquis has remained on hold due to hematuria. Hospitalization significant for rapid Afib for which he is followed by his cardiologist, Dr. Le, followed by hypotension in setting of medications. Patient is transferred to a telemetry bed for further monitoring. He has been on Vancomycin for an Enterococcus UTI.  Denies fevers.  Also reports constipation that persists, but he did have a small bowel movement earlier in the day.

## 2019-09-12 NOTE — H&P ADULT - NSHPPHYSICALEXAM_GEN_ALL_CORE
Vital Signs Last 24 Hrs  T(C): 37.2 (12 Sep 2019 19:58), Max: 37.6 (11 Sep 2019 21:54)  T(F): 99 (12 Sep 2019 19:58), Max: 99.7 (11 Sep 2019 21:54)  HR: 121 (12 Sep 2019 19:58) (101 - 121)  BP: 112/57 (12 Sep 2019 19:58) (90/53 - 117/69)  BP(mean): --  RR: 18 (12 Sep 2019 19:58) (16 - 20)  SpO2: 93% (12 Sep 2019 19:58) (91% - 98%)  GENERAL: well appearing, A+O, no acute distress  PULM: clear to auscultation bilaterally  CV: Irregularly irregular  ABDOMEN: soft, nontender, no distention, no CVAT bilaterally  URO: circumcised, francis in place, no drainage or blood at meatus.  CBI running on full blast, clear pink, no clots present.  Bilateral descended testicles, no tenderness or abnormalities appreciated.   EXTREMITIES: no edema, no tenderness

## 2019-09-12 NOTE — DISCHARGE NOTE PROVIDER - NSDCCPCAREPLAN_GEN_ALL_CORE_FT
PRINCIPAL DISCHARGE DIAGNOSIS  Diagnosis: Hematuria  Assessment and Plan of Treatment: you are being transferred to [facility] for special bladder washes that we are unable to produce at [place].         SECONDARY DISCHARGE DIAGNOSES  Diagnosis: Urinary tract infection  Assessment and Plan of Treatment: you will continue to be on antibiotics while we treat your bladder infection    Diagnosis: Afib  Assessment and Plan of Treatment: we are holding your Eliquis    Diagnosis: Acute anemia  Assessment and Plan of Treatment: you required blood transfusions due to the bleeding in your bladder   [facility] will continue to check your blood count levels.

## 2019-09-13 LAB
ANION GAP SERPL CALC-SCNC: 11 MMO/L — SIGNIFICANT CHANGE UP (ref 7–14)
BLD GP AB SCN SERPL QL: NEGATIVE — SIGNIFICANT CHANGE UP
BUN SERPL-MCNC: 34 MG/DL — HIGH (ref 7–23)
CALCIUM SERPL-MCNC: 8.5 MG/DL — SIGNIFICANT CHANGE UP (ref 8.4–10.5)
CHLORIDE SERPL-SCNC: 106 MMOL/L — SIGNIFICANT CHANGE UP (ref 98–107)
CO2 SERPL-SCNC: 22 MMOL/L — SIGNIFICANT CHANGE UP (ref 22–31)
CREAT SERPL-MCNC: 1.83 MG/DL — HIGH (ref 0.5–1.3)
GLUCOSE SERPL-MCNC: 125 MG/DL — HIGH (ref 70–99)
HCT VFR BLD CALC: 26 % — LOW (ref 39–50)
HCT VFR BLD CALC: 26.7 % — LOW (ref 39–50)
HCT VFR BLD CALC: 27.8 % — LOW (ref 39–50)
HGB BLD-MCNC: 8.2 G/DL — LOW (ref 13–17)
HGB BLD-MCNC: 8.5 G/DL — LOW (ref 13–17)
HGB BLD-MCNC: 8.5 G/DL — LOW (ref 13–17)
INR BLD: 1.3 — HIGH (ref 0.88–1.17)
MCHC RBC-ENTMCNC: 29.6 PG — SIGNIFICANT CHANGE UP (ref 27–34)
MCHC RBC-ENTMCNC: 29.9 PG — SIGNIFICANT CHANGE UP (ref 27–34)
MCHC RBC-ENTMCNC: 30.1 PG — SIGNIFICANT CHANGE UP (ref 27–34)
MCHC RBC-ENTMCNC: 30.6 % — LOW (ref 32–36)
MCHC RBC-ENTMCNC: 31.5 % — LOW (ref 32–36)
MCHC RBC-ENTMCNC: 31.8 % — LOW (ref 32–36)
MCV RBC AUTO: 93.9 FL — SIGNIFICANT CHANGE UP (ref 80–100)
MCV RBC AUTO: 94.7 FL — SIGNIFICANT CHANGE UP (ref 80–100)
MCV RBC AUTO: 97.9 FL — SIGNIFICANT CHANGE UP (ref 80–100)
NRBC # FLD: 0 K/UL — SIGNIFICANT CHANGE UP (ref 0–0)
NRBC # FLD: 0.02 K/UL — SIGNIFICANT CHANGE UP (ref 0–0)
NRBC # FLD: 0.03 K/UL — SIGNIFICANT CHANGE UP (ref 0–0)
PLATELET # BLD AUTO: 154 K/UL — SIGNIFICANT CHANGE UP (ref 150–400)
PLATELET # BLD AUTO: 233 K/UL — SIGNIFICANT CHANGE UP (ref 150–400)
PLATELET # BLD AUTO: 246 K/UL — SIGNIFICANT CHANGE UP (ref 150–400)
PMV BLD: 11.3 FL — SIGNIFICANT CHANGE UP (ref 7–13)
POTASSIUM SERPL-MCNC: 3.5 MMOL/L — SIGNIFICANT CHANGE UP (ref 3.5–5.3)
POTASSIUM SERPL-SCNC: 3.5 MMOL/L — SIGNIFICANT CHANGE UP (ref 3.5–5.3)
PROTHROM AB SERPL-ACNC: 14.6 SEC — HIGH (ref 9.8–13.1)
RBC # BLD: 2.77 M/UL — LOW (ref 4.2–5.8)
RBC # BLD: 2.82 M/UL — LOW (ref 4.2–5.8)
RBC # BLD: 2.84 M/UL — LOW (ref 4.2–5.8)
RBC # FLD: 18.6 % — HIGH (ref 10.3–14.5)
RBC # FLD: 18.6 % — HIGH (ref 10.3–14.5)
RBC # FLD: 18.8 % — HIGH (ref 10.3–14.5)
RH IG SCN BLD-IMP: NEGATIVE — SIGNIFICANT CHANGE UP
SODIUM SERPL-SCNC: 139 MMOL/L — SIGNIFICANT CHANGE UP (ref 135–145)
WBC # BLD: 17.65 K/UL — HIGH (ref 3.8–10.5)
WBC # BLD: 18.04 K/UL — HIGH (ref 3.8–10.5)
WBC # BLD: 19.14 K/UL — HIGH (ref 3.8–10.5)
WBC # FLD AUTO: 17.65 K/UL — HIGH (ref 3.8–10.5)
WBC # FLD AUTO: 18.04 K/UL — HIGH (ref 3.8–10.5)
WBC # FLD AUTO: 19.14 K/UL — HIGH (ref 3.8–10.5)

## 2019-09-13 PROCEDURE — 99221 1ST HOSP IP/OBS SF/LOW 40: CPT

## 2019-09-13 RX ORDER — OXYCODONE HYDROCHLORIDE 5 MG/1
5 TABLET ORAL ONCE
Refills: 0 | Status: DISCONTINUED | OUTPATIENT
Start: 2019-09-13 | End: 2019-09-13

## 2019-09-13 RX ORDER — ACETAMINOPHEN 500 MG
1000 TABLET ORAL ONCE
Refills: 0 | Status: COMPLETED | OUTPATIENT
Start: 2019-09-13 | End: 2019-09-13

## 2019-09-13 RX ORDER — ALUMINUM SULFATE
1 POWDER (GRAM) MISCELLANEOUS DAILY
Refills: 0 | Status: DISCONTINUED | OUTPATIENT
Start: 2019-09-13 | End: 2019-09-14

## 2019-09-13 RX ADMIN — Medication 50 MILLIGRAM(S): at 06:35

## 2019-09-13 RX ADMIN — Medication 250 MILLIGRAM(S): at 21:24

## 2019-09-13 RX ADMIN — PANTOPRAZOLE SODIUM 40 MILLIGRAM(S): 20 TABLET, DELAYED RELEASE ORAL at 06:35

## 2019-09-13 RX ADMIN — OXYCODONE HYDROCHLORIDE 5 MILLIGRAM(S): 5 TABLET ORAL at 21:47

## 2019-09-13 RX ADMIN — Medication 250 MILLIGRAM(S): at 00:57

## 2019-09-13 RX ADMIN — Medication 50 MILLIGRAM(S): at 17:57

## 2019-09-13 RX ADMIN — Medication 1 APPLICATION(S): at 14:55

## 2019-09-13 RX ADMIN — HEPARIN SODIUM 5000 UNIT(S): 5000 INJECTION INTRAVENOUS; SUBCUTANEOUS at 06:34

## 2019-09-13 RX ADMIN — Medication 100 MICROGRAM(S): at 06:35

## 2019-09-13 RX ADMIN — Medication 3 MILLIGRAM(S): at 21:24

## 2019-09-13 RX ADMIN — OXYCODONE HYDROCHLORIDE 5 MILLIGRAM(S): 5 TABLET ORAL at 20:47

## 2019-09-13 RX ADMIN — ATROPA BELLADONNA AND OPIUM 1 SUPPOSITORY(S): 16.2; 6 SUPPOSITORY RECTAL at 22:53

## 2019-09-13 RX ADMIN — ATORVASTATIN CALCIUM 40 MILLIGRAM(S): 80 TABLET, FILM COATED ORAL at 21:24

## 2019-09-13 RX ADMIN — HEPARIN SODIUM 5000 UNIT(S): 5000 INJECTION INTRAVENOUS; SUBCUTANEOUS at 13:48

## 2019-09-13 RX ADMIN — POLYETHYLENE GLYCOL 3350 17 GRAM(S): 17 POWDER, FOR SOLUTION ORAL at 11:41

## 2019-09-13 RX ADMIN — HEPARIN SODIUM 5000 UNIT(S): 5000 INJECTION INTRAVENOUS; SUBCUTANEOUS at 21:24

## 2019-09-13 NOTE — CONSULT NOTE ADULT - ASSESSMENT
This is an 86 year old male with a medical history significant for Afib (on Eliquis, currently held), HTN, HLD, CVA (no residual), CABG 2012, and prostate cancer s/p radiation, with hematuria that started about a week ago.  He presented to [facility] on 9/8 where he was admitted until today and transferred to [facility].  There he has been on CBI, underwent cystoscopy, TURBT, and bladder fulguration on 9/9, and required 4 units of PRBCS.  Hematuria has persisted despite these efforts and he has been transferred to [facility] to administer Alum to attempt hemostasis.  Patient reports he feels fatigued and weak, and overall deconditioned while being hospitalized.  He denies chest pain, shortness of breath, dizziness or lightheadedness.  Eliquis has remained on hold due to hematuria. Hospitalization significant for rapid Afib for which he is followed by his cardiologist, Dr. Le, followed by hypotension in setting of medications. Patient is transferred to a telemetry bed for further monitoring. He has been on Vancomycin for an Enterococcus UTI.  Denies fevers.  Also reports constipation that persists, but he did have a small bowel movement earlier in the day. (12 Sep 2019 20:08)    1- hematuria : underoing CBI with Alum per urology     2- UTI : on vacno for enterococous .. monitor Cr and monitor troph.    3- acute on chronic anemia sec to acute blood loss from hematruai: check INR , if elevated would give vit k   holding AC : f/u with urolgoy and Cardiology   monitor H/H post transfusion     4- CAD s/p CABG : not on Antiplt therapy : f/u with cardio     5- afib: rate control with BB and holding AC given hematuria     6- TIM : avoid nephrotoxins     7- leukocytosis : as above.. possible reactive/stress induced ,  however with diarreah   would hold laxative and if persistent diarreah would check D stacey adn stool studies

## 2019-09-13 NOTE — PROGRESS NOTE ADULT - SUBJECTIVE AND OBJECTIVE BOX
Subjective: Patient seen and examined. No new events except as noted.   I have been following patients cardiac status at [facility] and now transferred to [facility] for further urologic care  receiving alum CBI  denies sob or cp  afib increased VR  elevated WBC acute renal failure ?ATN  MEDICATIONS:  MEDICATIONS  (STANDING):  aluminum sulfate 1% Irrigation in sterile water 1 Application(s) Topical daily  atorvastatin 40 milliGRAM(s) Oral at bedtime  heparin  Injectable 5000 Unit(s) SubCutaneous every 8 hours  levothyroxine 100 MICROGram(s) Oral daily  melatonin 3 milliGRAM(s) Oral at bedtime  metoprolol tartrate 50 milliGRAM(s) Oral two times a day  pantoprazole    Tablet 40 milliGRAM(s) Oral before breakfast  vancomycin  IVPB 750 milliGRAM(s) IV Intermittent daily      PHYSICAL EXAM:  T(C): 36.8 (09-13-19 @ 16:01), Max: 37.2 (09-12-19 @ 19:58)  HR: 114 (09-13-19 @ 16:01) (106 - 121)  BP: 92/52 (09-13-19 @ 16:01) (86/54 - 119/64)  RR: 18 (09-13-19 @ 16:01) (18 - 20)  SpO2: 92% (09-13-19 @ 16:01) (91% - 98%)  Wt(kg): --  I&O's Summary    12 Sep 2019 07:01  -  13 Sep 2019 07:00  --------------------------------------------------------  IN: 780 mL / OUT: 0 mL / NET: 780 mL      Height (cm): 172.7 (09-12 @ 22:46)  Weight (kg): 70 (09-12 @ 22:46)  BMI (kg/m2): 23.5 (09-12 @ 22:46)  BSA (m2): 1.83 (09-12 @ 22:46)    Appearance: Normal	NAD comfortable  HEENT:   Normal oral mucosa, PERRL, EOMI	  Cardiovascular: Normal S1 [id number] irregular , mild JVD  Respiratory: Lungs mild JVD  Gastrointestinal:  Soft, Non-tender, + BS	  Ext: trace edema  Peripheral pulses palpable 2+ bilaterally      LABS:    CARDIAC MARKERS:                                8.5    19.14 )-----------( 246      ( 13 Sep 2019 10:21 )             26.7     09-13    139  |  106  |  34<H>  ----------------------------<  125<H>  3.5   |  22  |  1.83<H>    Ca    8.5      13 Sep 2019 05:24      proBNP:   Lipid Profile:   HgA1c:   TSH:           TELEMETRY: 	    ECG:  	  RADIOLOGY:   DIAGNOSTIC TESTING:  [ ] Echocardiogram:  [ ]  Catheterization:  [ ] Stress Test:    OTHER:

## 2019-09-13 NOTE — PHYSICAL THERAPY INITIAL EVALUATION ADULT - PERTINENT HX OF CURRENT PROBLEM, REHAB EVAL
85 y/o male transferred from [facility] 85 y/o male transferred from [facility] for persistent hematuria, CBI and administration of Alum

## 2019-09-13 NOTE — CONSULT NOTE ADULT - SUBJECTIVE AND OBJECTIVE BOX
HPI:  This is an 86 year old male with a medical history significant for Afib (on Eliquis, currently held), HTN, HLD, CVA (no residual), CABG 2012, and prostate cancer s/p radiation, with hematuria that started about a week ago.  He presented to [facility] on 9/8 where he was admitted until today and transferred to [facility].  There he has been on CBI, underwent cystoscopy, TURBT, and bladder fulguration on 9/9, and required 4 units of PRBCS.  Hematuria has persisted despite these efforts and he has been transferred to [facility] to administer Alum to attempt hemostasis.  Patient reports he feels fatigued and weak, and overall deconditioned wince being hospitalized.  He denies chest pain, shortness of breath, dizziness or lightheadedness.  Eliquis has remained on hold due to hematuria. Hospitalization significant for rapid Afib for which he is followed by his cardiologist, Dr. Le, followed by hypotension in setting of medications. Patient is transferred to a telemetry bed for further monitoring. He has been on Vancomycin for an Enterococcus UTI.  Denies fevers.  Also reports constipation that persists, but he did have a small bowel movement earlier in the day. (12 Sep 2019 20:08)      REVIEW OF SYSTEMS:    CONSTITUTIONAL: No weakness, fevers or chills..reports decreased Appetite and weight loss   EYES/ENT: No visual changes;  No vertigo or throat pain   NECK: No pain or stiffness  RESPIRATORY: No cough, wheezing, hemoptysis; No shortness of breath  CARDIOVASCULAR: No chest pain or palpitations  GASTROINTESTINAL: No abdominal or epigastric pain. No nausea, vomiting,    diarrhea  GENITOURINARY: CBI inplace  , hematuria   NEUROLOGICAL: No numbness or weakness  SKIN: No itching, burning, rashes, or lesions   All other review of systems is negative unless indicated above.      Medications:   MEDICATIONS  (STANDING):  aluminum sulfate 1% Irrigation in sterile water 1 Application(s) Topical daily  atorvastatin 40 milliGRAM(s) Oral at bedtime  docusate sodium 100 milliGRAM(s) Oral three times a day  heparin  Injectable 5000 Unit(s) SubCutaneous every 8 hours  levothyroxine 100 MICROGram(s) Oral daily  melatonin 3 milliGRAM(s) Oral at bedtime  metoprolol tartrate 50 milliGRAM(s) Oral two times a day  pantoprazole    Tablet 40 milliGRAM(s) Oral before breakfast  polyethylene glycol 3350 17 Gram(s) Oral daily  senna 2 Tablet(s) Oral at bedtime  vancomycin  IVPB 750 milliGRAM(s) IV Intermittent daily    MEDICATIONS  (PRN):  belladonna 16.2 mG/opium 30 mg Suppository 1 Suppository(s) Rectal every 8 hours PRN bladder spasms      Allergies    penicillin (Rash)    Intolerances        PAST MEDICAL & SURGICAL HISTORY:  OA (osteoarthritis): right hip  Lumbar spinal stenosis  History of colitis  PAD (peripheral artery disease)  Chronic dryness of both eyes  History of hemorrhoids  CRI (chronic renal insufficiency)  CAD (coronary artery disease)  Prostate cancer: s/p radiation ? 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      Social :    No smoking       No ETOH use            Vital Signs Last 24 Hrs  T(C): 37 (13 Sep 2019 12:22), Max: 37.2 (12 Sep 2019 19:58)  T(F): 98.6 (13 Sep 2019 12:22), Max: 99 (12 Sep 2019 19:58)  HR: 117 (13 Sep 2019 12:22) (106 - 121)  BP: 103/58 (13 Sep 2019 12:22) (86/54 - 119/64)  BP(mean): --  RR: 18 (13 Sep 2019 12:22) (18 - 20)  SpO2: 96% (13 Sep 2019 12:22) (91% - 98%)  CAPILLARY BLOOD GLUCOSE          09-12 @ 07:01  -  09-13 @ 07:00  --------------------------------------------------------  IN: 780 mL / OUT: 0 mL / NET: 780 mL        Physical Exam:    Daily Height in cm: 172.72 (12 Sep 2019 22:46)    Daily   General:  cachetic  HEENT:  Nonicteric, PERRLA  CV:  irreg irreg   Lungs:  CTA  Abdomen:  Soft, non-tender, no distended, positive BS, no hepatosplenomegaly  Extremities:  2+ pulses, no c/c, no edema    :  CBI ongoing   blood tinged urine   Neuro:  AAOx3, non-focal, CN II-XII grossly intact  No Restraints    LABS:                        8.5    19.14 )-----------( 246      ( 13 Sep 2019 10:21 )             26.7     09-13    139  |  106  |  34<H>  ----------------------------<  125<H>  3.5   |  22  |  1.83<H>    Ca    8.5      13 Sep 2019 05:24

## 2019-09-13 NOTE — CHART NOTE - NSCHARTNOTEFT_GEN_A_CORE
Patient did not receive any Vancomycin infusions on 9/12.  Discussed with pharmacy, and based on creatinine clearance of 33, decision made to dose Vanco 750mg daily.  Vanco dosing starting night of 9/12.  Vanco trough will be due prior to 4th dose on 9/15.

## 2019-09-13 NOTE — PROGRESS NOTE ADULT - SUBJECTIVE AND OBJECTIVE BOX
Overnight events:  Pt had episode of afib/RVR to 145 with stable BP, po metoprolol given -118 for remainder of night, BP stable, did not require any manual irrigations, CBI remained clear, post transfusion CBC acceptable    Subjective:  Pt offers no complaints, denies CP, SOB, dizziness    Objective:    Vital signs  T(C): , Max: 37.4 (09-12-19 @ 12:00)  HR: 118 (09-13-19 @ 06:29)  BP: 102/60 (09-13-19 @ 06:29)  SpO2: 98% (09-13-19 @ 06:29)  Wt(kg): --    Output   CBI clear  09-12 @ 07:01  -  09-13 @ 07:00  --------------------------------------------------------  IN: 780 mL / OUT: 0 mL / NET: 780 mL        Gen: NAD  Abd: soft, nontender, no suprapubic tenderness or fullness  : francis secured    Labs                        8.2    17.65 )-----------( 154      ( 13 Sep 2019 05:24 )             26.0     13 Sep 2019 05:24    139    |  106    |  34     ----------------------------<  125    3.5     |  22     |  1.83     Ca    8.5        13 Sep 2019 05:24        Urine Cx:   Culture - Urine (09.08.19 @ 17:48)    -  Ampicillin: S <=2 Predicts results to ampicillin/sulbactam, amoxacillin-clavulanate and  piperacillin-tazobactam.    -  Ciprofloxacin: R >2    -  Levofloxacin: R >4    -  Nitrofurantoin: S <=32 Should not be used to treat pyelonephritis.    -  Tetra/Doxy: R >8    -  Vancomycin: S 2    Specimen Source: .Urine    Culture Results:   >100,000 CFU/ml Enterococcus faecalis    Organism Identification: Enterococcus faecalis    Organism: Enterococcus faecalis    Method Type: MAURO    Vancomycin Level, Trough (09.12.19 @ 07:58)    Vancomycin Level, Trough: 13.3

## 2019-09-14 LAB
ANION GAP SERPL CALC-SCNC: 10 MMO/L — SIGNIFICANT CHANGE UP (ref 7–14)
BUN SERPL-MCNC: 40 MG/DL — HIGH (ref 7–23)
CALCIUM SERPL-MCNC: 8.2 MG/DL — LOW (ref 8.4–10.5)
CHLORIDE SERPL-SCNC: 105 MMOL/L — SIGNIFICANT CHANGE UP (ref 98–107)
CO2 SERPL-SCNC: 23 MMOL/L — SIGNIFICANT CHANGE UP (ref 22–31)
CREAT SERPL-MCNC: 1.65 MG/DL — HIGH (ref 0.5–1.3)
GLUCOSE SERPL-MCNC: 130 MG/DL — HIGH (ref 70–99)
HCT VFR BLD CALC: 24.1 % — LOW (ref 39–50)
HCT VFR BLD CALC: 27.6 % — LOW (ref 39–50)
HGB BLD-MCNC: 7.5 G/DL — LOW (ref 13–17)
HGB BLD-MCNC: 8.7 G/DL — LOW (ref 13–17)
MCHC RBC-ENTMCNC: 30 PG — SIGNIFICANT CHANGE UP (ref 27–34)
MCHC RBC-ENTMCNC: 30.3 PG — SIGNIFICANT CHANGE UP (ref 27–34)
MCHC RBC-ENTMCNC: 31.1 % — LOW (ref 32–36)
MCHC RBC-ENTMCNC: 31.5 % — LOW (ref 32–36)
MCV RBC AUTO: 96.2 FL — SIGNIFICANT CHANGE UP (ref 80–100)
MCV RBC AUTO: 96.4 FL — SIGNIFICANT CHANGE UP (ref 80–100)
NRBC # FLD: 0.02 K/UL — SIGNIFICANT CHANGE UP (ref 0–0)
NRBC # FLD: 0.02 K/UL — SIGNIFICANT CHANGE UP (ref 0–0)
PLATELET # BLD AUTO: 173 K/UL — SIGNIFICANT CHANGE UP (ref 150–400)
PLATELET # BLD AUTO: 264 K/UL — SIGNIFICANT CHANGE UP (ref 150–400)
PMV BLD: 10.9 FL — SIGNIFICANT CHANGE UP (ref 7–13)
PMV BLD: 11.3 FL — SIGNIFICANT CHANGE UP (ref 7–13)
POTASSIUM SERPL-MCNC: 3.6 MMOL/L — SIGNIFICANT CHANGE UP (ref 3.5–5.3)
POTASSIUM SERPL-SCNC: 3.6 MMOL/L — SIGNIFICANT CHANGE UP (ref 3.5–5.3)
RBC # BLD: 2.5 M/UL — LOW (ref 4.2–5.8)
RBC # BLD: 2.87 M/UL — LOW (ref 4.2–5.8)
RBC # FLD: 17.8 % — HIGH (ref 10.3–14.5)
RBC # FLD: 18.4 % — HIGH (ref 10.3–14.5)
SODIUM SERPL-SCNC: 138 MMOL/L — SIGNIFICANT CHANGE UP (ref 135–145)
WBC # BLD: 16.68 K/UL — HIGH (ref 3.8–10.5)
WBC # BLD: 18.53 K/UL — HIGH (ref 3.8–10.5)
WBC # FLD AUTO: 16.68 K/UL — HIGH (ref 3.8–10.5)
WBC # FLD AUTO: 18.53 K/UL — HIGH (ref 3.8–10.5)

## 2019-09-14 RX ORDER — SENNA PLUS 8.6 MG/1
2 TABLET ORAL AT BEDTIME
Refills: 0 | Status: DISCONTINUED | OUTPATIENT
Start: 2019-09-14 | End: 2019-09-18

## 2019-09-14 RX ADMIN — Medication 3 MILLIGRAM(S): at 21:19

## 2019-09-14 RX ADMIN — Medication 100 MICROGRAM(S): at 05:28

## 2019-09-14 RX ADMIN — HEPARIN SODIUM 5000 UNIT(S): 5000 INJECTION INTRAVENOUS; SUBCUTANEOUS at 05:28

## 2019-09-14 RX ADMIN — Medication 50 MILLIGRAM(S): at 17:11

## 2019-09-14 RX ADMIN — Medication 250 MILLIGRAM(S): at 11:42

## 2019-09-14 RX ADMIN — PANTOPRAZOLE SODIUM 40 MILLIGRAM(S): 20 TABLET, DELAYED RELEASE ORAL at 05:28

## 2019-09-14 RX ADMIN — Medication 50 MILLIGRAM(S): at 07:11

## 2019-09-14 RX ADMIN — HEPARIN SODIUM 5000 UNIT(S): 5000 INJECTION INTRAVENOUS; SUBCUTANEOUS at 21:19

## 2019-09-14 RX ADMIN — Medication 400 MILLIGRAM(S): at 02:10

## 2019-09-14 RX ADMIN — ATORVASTATIN CALCIUM 40 MILLIGRAM(S): 80 TABLET, FILM COATED ORAL at 21:19

## 2019-09-14 RX ADMIN — HEPARIN SODIUM 5000 UNIT(S): 5000 INJECTION INTRAVENOUS; SUBCUTANEOUS at 13:24

## 2019-09-14 NOTE — PROGRESS NOTE ADULT - SUBJECTIVE AND OBJECTIVE BOX
Overnight events/subjective:  Required manual irrigation x 1 overnight, this am called re: , /101, pt currently passing soft formed stool, c/o bladder spasms,  CBI not running, flushed, in retention for approx 400cc.  Irrigated, no clots to light punch, CBI restarted, HR to 120, pt feels better, no CP, SOB, palpitations  Hgb/HCT 7.5/24.1      Objective:    Vital signs  T(C): , Max: 37.1 (09-13-19 @ 19:44)  HR: 171 (09-14-19 @ 07:12)  BP: 151/101 (09-14-19 @ 07:12)  SpO2: 93% (09-14-19 @ 07:12)  Wt(kg): --    Output   CBI  09-13 @ 07:01  -  09-14 @ 07:00  --------------------------------------------------------  IN: 100 mL / OUT: 0 mL / NET: 100 mL        Gen: NAD now  Abd: no suprapubic tenderness or fullness  : francis resecured    Labs                        7.5    16.68 )-----------( 173      ( 14 Sep 2019 06:00 )             24.1     14 Sep 2019 06:00    138    |  105    |  40     ----------------------------<  130    3.6     |  23     |  1.65     Ca    8.2        14 Sep 2019 06:00

## 2019-09-14 NOTE — PROGRESS NOTE ADULT - SUBJECTIVE AND OBJECTIVE BOX
Urology Progress Note    Overnight Events:  Needed bedside irrigation. Urine yellow now.    Review of Systems:   Constitutional: No weight loss, no weakness  CV: No chest pain, no chest pressure  Pulm: No shortness of breath, cough, or sputum    Physical Exam:  Vital signs  T(C): 37.1 (09-14-19 @ 11:41), Max: 37.1 (09-13-19 @ 19:44)  HR: 104 (09-14-19 @ 11:41)  BP: 100/46 (09-14-19 @ 11:41)  SpO2: 97% (09-14-19 @ 11:41)  Wt(kg): --    Gen: No acute distress. Normal mood  Abd: soft, non-tender, non-distended  : Non-palpable bladder    Labs:      09-14 @ 13:12    WBC 18.53 / Hct 27.6  / SCr --       09-14 @ 06:00    WBC 16.68 / Hct 24.1  / SCr 1.65     09-14    138  |  105  |  40<H>  ----------------------------<  130<H>  3.6   |  23  |  1.65<H>    Ca    8.2<L>      14 Sep 2019 06:00      PT/INR - ( 13 Sep 2019 18:42 )   PT: 14.6 SEC;   INR: 1.30

## 2019-09-14 NOTE — PROVIDER CONTACT NOTE (OTHER) - RECOMMENDATIONS
notify urology to evaluate patient, give toprol previously held for parameter that patient is now able to get

## 2019-09-14 NOTE — PROGRESS NOTE ADULT - SUBJECTIVE AND OBJECTIVE BOX
Patient is a 87y old  Male who presents with a chief complaint of Transfer from [facility] for persistent hematuria, CBI and administration of Alum (14 Sep 2019 15:44)                                                               INTERVAL HPI/OVERNIGHT EVENTS: spasms eralier today        REVIEW OF SYSTEMS:     CONSTITUTIONAL: No weakness, fevers or chills  RESPIRATORY: No cough, wheezing,  No shortness of breath  CARDIOVASCULAR: No chest pain or palpitations  GASTROINTESTINAL: No abdominal pain  . No nausea, vomiting, or hematemesis; No diarrhea or constipation. No melena or hematochezia.  GENITOURINARY: francis in place /CBI   NEUROLOGICAL: No numbness or weakness                                                                                                                                                                                                                                                                                Medications:  MEDICATIONS  (STANDING):  atorvastatin 40 milliGRAM(s) Oral at bedtime  heparin  Injectable 5000 Unit(s) SubCutaneous every 8 hours  levothyroxine 100 MICROGram(s) Oral daily  melatonin 3 milliGRAM(s) Oral at bedtime  metoprolol tartrate 50 milliGRAM(s) Oral two times a day  pantoprazole    Tablet 40 milliGRAM(s) Oral before breakfast  senna 2 Tablet(s) Oral at bedtime  vancomycin  IVPB 750 milliGRAM(s) IV Intermittent daily    MEDICATIONS  (PRN):  belladonna 16.2 mG/opium 30 mg Suppository 1 Suppository(s) Rectal every 8 hours PRN bladder spasms       Allergies    penicillin (Rash)    Intolerances      Vital Signs Last 24 Hrs  T(C): 37.5 (14 Sep 2019 20:19), Max: 37.7 (14 Sep 2019 16:40)  T(F): 99.5 (14 Sep 2019 20:19), Max: 99.9 (14 Sep 2019 16:40)  HR: 103 (14 Sep 2019 20:19) (100 - 171)  BP: 100/55 (14 Sep 2019 20:19) (96/51 - 151/101)  BP(mean): 60 (14 Sep 2019 11:41) (60 - 111)  RR: 18 (14 Sep 2019 20:19) (16 - 20)  SpO2: 90% (14 Sep 2019 20:19) (90% - 97%)  CAPILLARY BLOOD GLUCOSE          09-13 @ 07:01  -  09-14 @ 07:00  --------------------------------------------------------  IN: 100 mL / OUT: 0 mL / NET: 100 mL    09-14 @ 07:01  -  09-14 @ 22:08  --------------------------------------------------------  IN: 300 mL / OUT: 0 mL / NET: 300 mL      Physical Exam:    General:  NAD   HEENT:  Nonicteric, PERRLA  CV:  RRR, S1S2   Lungs:  CTA   Abdomen:  Soft, non-tender, no distended, positive BS  Extremities:  2+ pulses, no c/c, no edema  Skin:  Warm and dry, no rashes  :  penny ERNST inplace   Neuro:  AAOx3, non-focal, grossly intact                                                                                                                                                                                                                                                                                                LABS:                               8.7    18.53 )-----------( 264      ( 14 Sep 2019 13:12 )             27.6                      09-14    138  |  105  |  40<H>  ----------------------------<  130<H>  3.6   |  23  |  1.65<H>    Ca    8.2<L>      14 Sep 2019 06:00

## 2019-09-15 LAB
ANION GAP SERPL CALC-SCNC: 12 MMO/L — SIGNIFICANT CHANGE UP (ref 7–14)
ANION GAP SERPL CALC-SCNC: 12 MMO/L — SIGNIFICANT CHANGE UP (ref 7–14)
BUN SERPL-MCNC: 34 MG/DL — HIGH (ref 7–23)
BUN SERPL-MCNC: 38 MG/DL — HIGH (ref 7–23)
CALCIUM SERPL-MCNC: 8.2 MG/DL — LOW (ref 8.4–10.5)
CALCIUM SERPL-MCNC: 8.4 MG/DL — SIGNIFICANT CHANGE UP (ref 8.4–10.5)
CHLORIDE SERPL-SCNC: 103 MMOL/L — SIGNIFICANT CHANGE UP (ref 98–107)
CHLORIDE SERPL-SCNC: 103 MMOL/L — SIGNIFICANT CHANGE UP (ref 98–107)
CO2 SERPL-SCNC: 22 MMOL/L — SIGNIFICANT CHANGE UP (ref 22–31)
CO2 SERPL-SCNC: 25 MMOL/L — SIGNIFICANT CHANGE UP (ref 22–31)
CREAT SERPL-MCNC: 1.39 MG/DL — HIGH (ref 0.5–1.3)
CREAT SERPL-MCNC: 1.45 MG/DL — HIGH (ref 0.5–1.3)
GLUCOSE SERPL-MCNC: 115 MG/DL — HIGH (ref 70–99)
GLUCOSE SERPL-MCNC: 141 MG/DL — HIGH (ref 70–99)
HCT VFR BLD CALC: 29.5 % — LOW (ref 39–50)
HGB BLD-MCNC: 8.8 G/DL — LOW (ref 13–17)
MCHC RBC-ENTMCNC: 29.6 PG — SIGNIFICANT CHANGE UP (ref 27–34)
MCHC RBC-ENTMCNC: 29.8 % — LOW (ref 32–36)
MCV RBC AUTO: 99.3 FL — SIGNIFICANT CHANGE UP (ref 80–100)
NRBC # FLD: 0.02 K/UL — SIGNIFICANT CHANGE UP (ref 0–0)
PLATELET # BLD AUTO: 266 K/UL — SIGNIFICANT CHANGE UP (ref 150–400)
PMV BLD: 12 FL — SIGNIFICANT CHANGE UP (ref 7–13)
POTASSIUM SERPL-MCNC: 3.8 MMOL/L — SIGNIFICANT CHANGE UP (ref 3.5–5.3)
POTASSIUM SERPL-MCNC: 5.8 MMOL/L — HIGH (ref 3.5–5.3)
POTASSIUM SERPL-SCNC: 3.8 MMOL/L — SIGNIFICANT CHANGE UP (ref 3.5–5.3)
POTASSIUM SERPL-SCNC: 5.8 MMOL/L — HIGH (ref 3.5–5.3)
RBC # BLD: 2.97 M/UL — LOW (ref 4.2–5.8)
RBC # FLD: 18.5 % — HIGH (ref 10.3–14.5)
SODIUM SERPL-SCNC: 137 MMOL/L — SIGNIFICANT CHANGE UP (ref 135–145)
SODIUM SERPL-SCNC: 140 MMOL/L — SIGNIFICANT CHANGE UP (ref 135–145)
VANCOMYCIN TROUGH SERPL-MCNC: 11.8 UG/ML — SIGNIFICANT CHANGE UP (ref 10–20)
WBC # BLD: 14.27 K/UL — HIGH (ref 3.8–10.5)
WBC # FLD AUTO: 14.27 K/UL — HIGH (ref 3.8–10.5)

## 2019-09-15 PROCEDURE — 71045 X-RAY EXAM CHEST 1 VIEW: CPT | Mod: 26

## 2019-09-15 PROCEDURE — 72192 CT PELVIS W/O DYE: CPT | Mod: 26

## 2019-09-15 PROCEDURE — 99232 SBSQ HOSP IP/OBS MODERATE 35: CPT

## 2019-09-15 RX ORDER — ACETAMINOPHEN 500 MG
975 TABLET ORAL EVERY 6 HOURS
Refills: 0 | Status: DISCONTINUED | OUTPATIENT
Start: 2019-09-15 | End: 2019-09-18

## 2019-09-15 RX ADMIN — HEPARIN SODIUM 5000 UNIT(S): 5000 INJECTION INTRAVENOUS; SUBCUTANEOUS at 21:44

## 2019-09-15 RX ADMIN — Medication 100 MICROGRAM(S): at 05:14

## 2019-09-15 RX ADMIN — PANTOPRAZOLE SODIUM 40 MILLIGRAM(S): 20 TABLET, DELAYED RELEASE ORAL at 05:14

## 2019-09-15 RX ADMIN — HEPARIN SODIUM 5000 UNIT(S): 5000 INJECTION INTRAVENOUS; SUBCUTANEOUS at 05:14

## 2019-09-15 RX ADMIN — Medication 250 MILLIGRAM(S): at 12:35

## 2019-09-15 RX ADMIN — Medication 50 MILLIGRAM(S): at 05:14

## 2019-09-15 RX ADMIN — ATORVASTATIN CALCIUM 40 MILLIGRAM(S): 80 TABLET, FILM COATED ORAL at 21:44

## 2019-09-15 RX ADMIN — Medication 3 MILLIGRAM(S): at 21:44

## 2019-09-15 RX ADMIN — Medication 975 MILLIGRAM(S): at 14:00

## 2019-09-15 RX ADMIN — Medication 50 MILLIGRAM(S): at 17:32

## 2019-09-15 RX ADMIN — HEPARIN SODIUM 5000 UNIT(S): 5000 INJECTION INTRAVENOUS; SUBCUTANEOUS at 13:08

## 2019-09-15 RX ADMIN — Medication 975 MILLIGRAM(S): at 13:07

## 2019-09-15 NOTE — PROGRESS NOTE ADULT - SUBJECTIVE AND OBJECTIVE BOX
Patient is a 87y old  Male who presents with a chief complaint of Transfer from [facility] for persistent hematuria, CBI and administration of Alum (15 Sep 2019 12:06)                                                               INTERVAL HPI/OVERNIGHT EVENTS:    REVIEW OF SYSTEMS:     CONSTITUTIONAL: No weakness, fevers or chills  EYES/ENT: No visual changes , no ear ache   NECK: No pain or stiffness  RESPIRATORY: No cough, wheezing,  No shortness of breath  CARDIOVASCULAR: No chest pain or palpitations  GASTROINTESTINAL: No abdominal pain  . No nausea, vomiting, or hematemesis; No diarrhea or constipation. No melena or hematochezia.  GENITOURINARY: No dysuria, frequency or hematuria  NEUROLOGICAL: No numbness or weakness                                                                                                                                                                                                                                                                                  Medications:  MEDICATIONS  (STANDING):  atorvastatin 40 milliGRAM(s) Oral at bedtime  heparin  Injectable 5000 Unit(s) SubCutaneous every 8 hours  levothyroxine 100 MICROGram(s) Oral daily  melatonin 3 milliGRAM(s) Oral at bedtime  metoprolol tartrate 50 milliGRAM(s) Oral two times a day  pantoprazole    Tablet 40 milliGRAM(s) Oral before breakfast  senna 2 Tablet(s) Oral at bedtime  vancomycin  IVPB 750 milliGRAM(s) IV Intermittent daily    MEDICATIONS  (PRN):  acetaminophen   Tablet .. 975 milliGRAM(s) Oral every 6 hours PRN Mild Pain (1 - 3)  belladonna 16.2 mG/opium 30 mg Suppository 1 Suppository(s) Rectal every 8 hours PRN bladder spasms       Allergies    penicillin (Rash)    Intolerances      Vital Signs Last 24 Hrs  T(C): 37 (15 Sep 2019 21:10), Max: 37.1 (14 Sep 2019 23:26)  T(F): 98.6 (15 Sep 2019 21:10), Max: 98.7 (14 Sep 2019 23:26)  HR: 103 (15 Sep 2019 21:10) (94 - 114)  BP: 101/53 (15 Sep 2019 21:10) (90/51 - 106/61)  BP(mean): 68 (15 Sep 2019 07:45) (68 - 68)  RR: 18 (15 Sep 2019 21:10) (17 - 20)  SpO2: 96% (15 Sep 2019 21:10) (88% - 99%)  CAPILLARY BLOOD GLUCOSE          09-14 @ 07:01  -  09-15 @ 07:00  --------------------------------------------------------  IN: 300 mL / OUT: 0 mL / NET: 300 mL    09-15 @ 07:01  -  09-15 @ 21:13  --------------------------------------------------------  IN: 250 mL / OUT: 0 mL / NET: 250 mL      Physical Exam:    General:  NAD   HEENT:  Nonicteric, PERRLA  CV:  RRR, S1S2   Lungs:  CTA B/L, no wheezes, rales, rhonchi  Abdomen:  Soft, non-tender, no distended, positive BS  Extremities:  2+ pulses, no c/c, no edema  :   francis/CBI in place   Neuro:  AAOx3, non-focal, grossly intact                                                                                                                                                                                                                                                                                                LABS:                               8.8    14.27 )-----------( 266      ( 15 Sep 2019 05:33 )             29.5                      09-15    137  |  103  |  34<H>  ----------------------------<  115<H>  5.8<H>   |  22  |  1.39<H>    Ca    8.2<L>      15 Sep 2019 07:43 Patient is a 87y old  Male who presents with a chief complaint of Transfer from [facility] for persistent hematuria, CBI and administration of Alum (15 Sep 2019 12:06)                                                               INTERVAL HPI/OVERNIGHT EVENTS: mild sob overnight now improved otherwise feels well     REVIEW OF SYSTEMS:     CONSTITUTIONAL: No weakness, fevers or chills  RESPIRATORY: No cough, wheezing,  No shortness of breath  CARDIOVASCULAR: No chest pain or palpitations  GASTROINTESTINAL: No abdominal pain  . No nausea, vomiting, or hematemesis; No diarrhea or constipation. No melena or hematochezia.  GENITOURINARY: francis in place   NEUROLOGICAL: No numbness or weakness                                                                                                                                                                                                                                                                                  Medications:  MEDICATIONS  (STANDING):  atorvastatin 40 milliGRAM(s) Oral at bedtime  heparin  Injectable 5000 Unit(s) SubCutaneous every 8 hours  levothyroxine 100 MICROGram(s) Oral daily  melatonin 3 milliGRAM(s) Oral at bedtime  metoprolol tartrate 50 milliGRAM(s) Oral two times a day  pantoprazole    Tablet 40 milliGRAM(s) Oral before breakfast  senna 2 Tablet(s) Oral at bedtime  vancomycin  IVPB 750 milliGRAM(s) IV Intermittent daily    MEDICATIONS  (PRN):  acetaminophen   Tablet .. 975 milliGRAM(s) Oral every 6 hours PRN Mild Pain (1 - 3)  belladonna 16.2 mG/opium 30 mg Suppository 1 Suppository(s) Rectal every 8 hours PRN bladder spasms       Allergies    penicillin (Rash)    Intolerances      Vital Signs Last 24 Hrs  T(C): 37 (15 Sep 2019 21:10), Max: 37.1 (14 Sep 2019 23:26)  T(F): 98.6 (15 Sep 2019 21:10), Max: 98.7 (14 Sep 2019 23:26)  HR: 103 (15 Sep 2019 21:10) (94 - 114)  BP: 101/53 (15 Sep 2019 21:10) (90/51 - 106/61)  BP(mean): 68 (15 Sep 2019 07:45) (68 - 68)  RR: 18 (15 Sep 2019 21:10) (17 - 20)  SpO2: 96% (15 Sep 2019 21:10) (88% - 99%)  CAPILLARY BLOOD GLUCOSE          09-14 @ 07:01  -  09-15 @ 07:00  --------------------------------------------------------  IN: 300 mL / OUT: 0 mL / NET: 300 mL    09-15 @ 07:01  -  09-15 @ 21:13  --------------------------------------------------------  IN: 250 mL / OUT: 0 mL / NET: 250 mL      Physical Exam:    General:  NAD   HEENT:  Nonicteric, PERRLA  CV:  RRR, S1S2   Lungs:  CTA B/L, no wheezes, rales, rhonchi  Abdomen:  Soft, non-tender, no distended, positive BS  Extremities:  2+ pulses, no c/c, no edema  :   francis/CBI in place   Neuro:  AAOx3, non-focal, grossly intact                                                                                                                                                                                                                                                                                                LABS:                               8.8    14.27 )-----------( 266      ( 15 Sep 2019 05:33 )             29.5                      09-15    137  |  103  |  34<H>  ----------------------------<  115<H>  5.8<H>   |  22  |  1.39<H>    Ca    8.2<L>      15 Sep 2019 07:43

## 2019-09-15 NOTE — PROGRESS NOTE ADULT - SUBJECTIVE AND OBJECTIVE BOX
Subjective    Irrigated 200cc clot yesterday with return to crystal clear CBI. CT pelvis for clot burden pending.    Patient received last dose of vancomycin early, issue discussed with nursing, will monitor labs, doses rescheduled to be administered daily at 1200. Vanc trough at 11am today prior to dose.     Feeling well this morning, no issues overnight. CBI draining crystal clear irrigant, slowed to a drip.    Objective    Vital signs  T(F): , Max: 99.9 (09-14-19 @ 16:40)  HR: 94 (09-15-19 @ 06:30)  BP: 105/65 (09-15-19 @ 06:28)  SpO2: 98% (09-15-19 @ 06:30)  Wt(kg): --    Output         Physical Exam  Gen: NAD  Abd: soft, NT, ND  : francis draining clear irrigant    Labs      09-15 @ 05:33    WBC 14.27 / Hct 29.5  / SCr --       09-14 @ 13:12    WBC 18.53 / Hct 27.6  / SCr --         Urine Cx:   Culture - Urine (09.08.19 @ 17:48)    -  Ampicillin: S <=2 Predicts results to ampicillin/sulbactam, amoxacillin-clavulanate and  piperacillin-tazobactam.    -  Ciprofloxacin: R >2    -  Levofloxacin: R >4    -  Nitrofurantoin: S <=32 Should not be used to treat pyelonephritis.    -  Tetra/Doxy: R >8    -  Vancomycin: S 2    Specimen Source: .Urine    Culture Results:   >100,000 CFU/ml Enterococcus faecalis    Organism Identification: Enterococcus faecalis    Organism: Enterococcus faecalis    Method Type: MAURO

## 2019-09-15 NOTE — PROGRESS NOTE ADULT - SUBJECTIVE AND OBJECTIVE BOX
Urology Progress Note    Overnight Events:  No acute urologic events overnight. On CBI     Review of Systems:   Constitutional: No weight loss, no weakness  CV: No chest pain, no chest pressure  Pulm: No shortness of breath, cough, or sputum    Physical Exam:  Vital signs  T(C): 36.6 (09-15-19 @ 07:45), Max: 37.7 (09-14-19 @ 16:40)  HR: 98 (09-15-19 @ 07:45)  BP: 99/58 (09-15-19 @ 07:45)  SpO2: 99% (09-15-19 @ 07:45)  Wt(kg): --    Gen: No acute distress. Normal mood  Abd: soft, non-tender, non-distended  : Non-palpable bladder    Labs:      09-15 @ 07:43    WBC --    / Hct --    / SCr 1.39     09-15 @ 05:33    WBC 14.27 / Hct 29.5  / SCr --       09-15    137  |  103  |  34<H>  ----------------------------<  115<H>  5.8<H>   |  22  |  1.39<H>    Ca    8.2<L>      15 Sep 2019 07:43      PT/INR - ( 13 Sep 2019 18:42 )   PT: 14.6 SEC;   INR: 1.30

## 2019-09-16 ENCOUNTER — TRANSCRIPTION ENCOUNTER (OUTPATIENT)
Age: 84
End: 2019-09-16

## 2019-09-16 LAB
ANION GAP SERPL CALC-SCNC: 11 MMO/L — SIGNIFICANT CHANGE UP (ref 7–14)
BLD GP AB SCN SERPL QL: NEGATIVE — SIGNIFICANT CHANGE UP
BUN SERPL-MCNC: 38 MG/DL — HIGH (ref 7–23)
CALCIUM SERPL-MCNC: 8.3 MG/DL — LOW (ref 8.4–10.5)
CHLORIDE SERPL-SCNC: 103 MMOL/L — SIGNIFICANT CHANGE UP (ref 98–107)
CO2 SERPL-SCNC: 25 MMOL/L — SIGNIFICANT CHANGE UP (ref 22–31)
CREAT SERPL-MCNC: 1.4 MG/DL — HIGH (ref 0.5–1.3)
GLUCOSE SERPL-MCNC: 108 MG/DL — HIGH (ref 70–99)
HCT VFR BLD CALC: 26.9 % — LOW (ref 39–50)
HGB BLD-MCNC: 8.2 G/DL — LOW (ref 13–17)
MCHC RBC-ENTMCNC: 30.1 PG — SIGNIFICANT CHANGE UP (ref 27–34)
MCHC RBC-ENTMCNC: 30.5 % — LOW (ref 32–36)
MCV RBC AUTO: 98.9 FL — SIGNIFICANT CHANGE UP (ref 80–100)
NRBC # FLD: 0 K/UL — SIGNIFICANT CHANGE UP (ref 0–0)
PLATELET # BLD AUTO: 167 K/UL — SIGNIFICANT CHANGE UP (ref 150–400)
PMV BLD: 11.5 FL — SIGNIFICANT CHANGE UP (ref 7–13)
POTASSIUM SERPL-MCNC: 4 MMOL/L — SIGNIFICANT CHANGE UP (ref 3.5–5.3)
POTASSIUM SERPL-SCNC: 4 MMOL/L — SIGNIFICANT CHANGE UP (ref 3.5–5.3)
RBC # BLD: 2.72 M/UL — LOW (ref 4.2–5.8)
RBC # FLD: 17.9 % — HIGH (ref 10.3–14.5)
RH IG SCN BLD-IMP: NEGATIVE — SIGNIFICANT CHANGE UP
SODIUM SERPL-SCNC: 139 MMOL/L — SIGNIFICANT CHANGE UP (ref 135–145)
WBC # BLD: 13.53 K/UL — HIGH (ref 3.8–10.5)
WBC # FLD AUTO: 13.53 K/UL — HIGH (ref 3.8–10.5)

## 2019-09-16 RX ORDER — FUROSEMIDE 40 MG
20 TABLET ORAL ONCE
Refills: 0 | Status: COMPLETED | OUTPATIENT
Start: 2019-09-16 | End: 2019-09-16

## 2019-09-16 RX ADMIN — Medication 50 MILLIGRAM(S): at 18:25

## 2019-09-16 RX ADMIN — ATORVASTATIN CALCIUM 40 MILLIGRAM(S): 80 TABLET, FILM COATED ORAL at 21:53

## 2019-09-16 RX ADMIN — HEPARIN SODIUM 5000 UNIT(S): 5000 INJECTION INTRAVENOUS; SUBCUTANEOUS at 14:09

## 2019-09-16 RX ADMIN — Medication 3 MILLIGRAM(S): at 21:53

## 2019-09-16 RX ADMIN — Medication 100 MICROGRAM(S): at 05:24

## 2019-09-16 RX ADMIN — Medication 20 MILLIGRAM(S): at 18:20

## 2019-09-16 RX ADMIN — HEPARIN SODIUM 5000 UNIT(S): 5000 INJECTION INTRAVENOUS; SUBCUTANEOUS at 05:24

## 2019-09-16 RX ADMIN — Medication 975 MILLIGRAM(S): at 01:12

## 2019-09-16 RX ADMIN — Medication 975 MILLIGRAM(S): at 00:18

## 2019-09-16 RX ADMIN — HEPARIN SODIUM 5000 UNIT(S): 5000 INJECTION INTRAVENOUS; SUBCUTANEOUS at 21:53

## 2019-09-16 RX ADMIN — Medication 250 MILLIGRAM(S): at 11:28

## 2019-09-16 RX ADMIN — PANTOPRAZOLE SODIUM 40 MILLIGRAM(S): 20 TABLET, DELAYED RELEASE ORAL at 05:24

## 2019-09-16 RX ADMIN — Medication 50 MILLIGRAM(S): at 05:24

## 2019-09-16 NOTE — PROGRESS NOTE ADULT - ATTENDING COMMENTS
7:30 pm    as above  voiding well , clear urine    for d/c when vanco is completed and medicne/cardiology agree    for f/u HBO before additional bleed

## 2019-09-16 NOTE — PROGRESS NOTE ADULT - SUBJECTIVE AND OBJECTIVE BOX
Overnight events:  Episode of asymptomatic bradycardia, ?third degree heart block for few seconds overnight, has not recurred, CBI clamped at 5am, urine remains clear yellow    Subjective:  Pt offers no complaints    Objective:    Vital signs  T(C): , Max: 37.1 (09-16-19 @ 00:17)  HR: 109 (09-16-19 @ 05:22)  BP: 120/62 (09-16-19 @ 05:22)  SpO2: 93% (09-16-19 @ 05:22)  Wt(kg): --    Output   CBI  09-15 @ 07:01  -  09-16 @ 07:00  --------------------------------------------------------  IN: 970 mL / OUT: 0 mL / NET: 970 mL        Gen: NAD  Abd: soft, nontender  : penny liang    Labs: pending

## 2019-09-16 NOTE — DISCHARGE NOTE PROVIDER - NSDCCPCAREPLAN_GEN_ALL_CORE_FT
PRINCIPAL DISCHARGE DIAGNOSIS  Diagnosis: Hematuria, gross  Assessment and Plan of Treatment: Hold aspirin and eliquis until re-eval by cardiology and urology; give lasix tomorrow (Thursday) and Friday only; Lopressor is a new dose

## 2019-09-16 NOTE — PROGRESS NOTE ADULT - SUBJECTIVE AND OBJECTIVE BOX
Subjective: Patient seen and examined. No new events except as noted.   francis removed pink urine  residual bladder minimal  abnormal CR atelectasis retrocadiac opacities  denies significant sob or cp  renal function improved  MEDICATIONS:  MEDICATIONS  (STANDING):  atorvastatin 40 milliGRAM(s) Oral at bedtime  furosemide   Injectable 20 milliGRAM(s) IV Push once  heparin  Injectable 5000 Unit(s) SubCutaneous every 8 hours  levothyroxine 100 MICROGram(s) Oral daily  melatonin 3 milliGRAM(s) Oral at bedtime  metoprolol tartrate 50 milliGRAM(s) Oral two times a day  pantoprazole    Tablet 40 milliGRAM(s) Oral before breakfast  senna 2 Tablet(s) Oral at bedtime  vancomycin  IVPB 750 milliGRAM(s) IV Intermittent daily      PHYSICAL EXAM:  T(C): 36.4 (09-16-19 @ 13:53), Max: 37.1 (09-16-19 @ 00:17)  HR: 97 (09-16-19 @ 13:53) (97 - 114)  BP: 106/50 (09-16-19 @ 13:53) (100/65 - 120/62)  RR: 18 (09-16-19 @ 13:53) (17 - 18)  SpO2: 97% (09-16-19 @ 13:53) (93% - 99%)  Wt(kg): --  I&O's Summary    15 Sep 2019 07:01  -  16 Sep 2019 07:00  --------------------------------------------------------  IN: 970 mL / OUT: 0 mL / NET: 970 mL    16 Sep 2019 07:01  -  16 Sep 2019 14:19  --------------------------------------------------------  IN: 0 mL / OUT: 175 mL / NET: -175 mL          Appearance: Normal sitting in chair confortable  HEENT:   Normal oral mucosa, PERRL, EOMI	  Cardiovascular: Normal S1 S2, irregualrly irregular   Respiratory: Lungs clear to auscultation, normal effort no rales heard  Gastrointestinal:  Soft, Non-tender, + BS	  Skin: No rashes, No ecchymoses, No cyanosis, warm to touch  Musculoskeletal: Normal range of motion, normal strength  Psychiatry:  Mood & affect appropriate  Ext:  trace edema  Peripheral pulses palpable 2+ bilaterally      LABS:    CARDIAC MARKERS:                                8.2    13.53 )-----------( 167      ( 16 Sep 2019 05:39 )             26.9     09-16    139  |  103  |  38<H>  ----------------------------<  108<H>  4.0   |  25  |  1.40<H>    Ca    8.3<L>      16 Sep 2019 05:39      proBNP:   Lipid Profile:   HgA1c:   TSH:       < from: Xray Chest 1 View- PORTABLE-Urgent (09.15.19 @ 16:49) >  NTERPRETATION:     Heart size and the mediastinum cannot be accurately evaluated on this   projection. Median sternotomy sutures and surgical clips again seen.  There are bibasilar and retrocardiac opacities with obscuration of the   diaphragm, new on the right and worse on the left. The upper and mid   lungs are clear. No pneumothorax.              IMPRESSION:  Bibasilar and retrocardiac opacities,, new on the right and   worse on the left, likely small pleural effusions with passive   atelectasis. Atelectasis of other cause and/or pneumonia in the   appropriate clinical context is not excluded.      < end of copied text >

## 2019-09-16 NOTE — PROGRESS NOTE ADULT - SUBJECTIVE AND OBJECTIVE BOX
Patient is a 87y old  Male who presents with a chief complaint of Transfer from [facility] for persistent hematuria, CBI and administration of Alum (16 Sep 2019 14:19)                                                               INTERVAL HPI/OVERNIGHT EVENTS: could not fall aslpeep overnight   no cp or soB    REVIEW OF SYSTEMS:     CONSTITUTIONAL: No weakness, fevers or chills  EYES/ENT: No visual changes , no ear ache   NECK: No pain or stiffness  RESPIRATORY: No cough, wheezing,  No shortness of breath  CARDIOVASCULAR: No chest pain or palpitations  GASTROINTESTINAL: No abdominal pain  . No nausea, vomiting, or hematemesis; No diarrhea or constipation. No melena or hematochezia.  GENITOURINARY: No dysuria, frequency or hematuria  NEUROLOGICAL: No numbness or weakness                                                                                                                                                                                                                                                                                 Medications:  MEDICATIONS  (STANDING):  atorvastatin 40 milliGRAM(s) Oral at bedtime  furosemide   Injectable 20 milliGRAM(s) IV Push once  heparin  Injectable 5000 Unit(s) SubCutaneous every 8 hours  levothyroxine 100 MICROGram(s) Oral daily  melatonin 3 milliGRAM(s) Oral at bedtime  metoprolol tartrate 50 milliGRAM(s) Oral two times a day  pantoprazole    Tablet 40 milliGRAM(s) Oral before breakfast  senna 2 Tablet(s) Oral at bedtime  vancomycin  IVPB 750 milliGRAM(s) IV Intermittent daily    MEDICATIONS  (PRN):  acetaminophen   Tablet .. 975 milliGRAM(s) Oral every 6 hours PRN Mild Pain (1 - 3)  belladonna 16.2 mG/opium 30 mg Suppository 1 Suppository(s) Rectal every 8 hours PRN bladder spasms       Allergies    penicillin (Rash)    Intolerances      Vital Signs Last 24 Hrs  T(C): 36.4 (16 Sep 2019 13:53), Max: 37.1 (16 Sep 2019 00:17)  T(F): 97.6 (16 Sep 2019 13:53), Max: 98.7 (16 Sep 2019 00:17)  HR: 97 (16 Sep 2019 13:53) (97 - 114)  BP: 106/50 (16 Sep 2019 13:53) (100/65 - 120/62)  BP(mean): --  RR: 18 (16 Sep 2019 13:53) (17 - 18)  SpO2: 97% (16 Sep 2019 13:53) (93% - 99%)  CAPILLARY BLOOD GLUCOSE          09-15 @ 07:01  -  09-16 @ 07:00  --------------------------------------------------------  IN: 970 mL / OUT: 0 mL / NET: 970 mL    09-16 @ 07:01 - 09-16 @ 15:30  --------------------------------------------------------  IN: 0 mL / OUT: 175 mL / NET: -175 mL      Physical Exam:    General:  Well appearing, NAD, not cachetic  HEENT:  Nonicteric, PERRLA  CV:  RRR, S1S2   Lungs:  CTA B/L, no wheezes, rales, rhonchi  Abdomen:  Soft, non-tender, no distended, positive BS  Extremities:  2+ pulses, no c/c, no edema  Skin:  Warm and dry, no rashes  :  No francis  Neuro:  AAOx3, non-focal, grossly intact                                                                                                                                                                                                                                                                                                LABS:                               8.2    13.53 )-----------( 167      ( 16 Sep 2019 05:39 )             26.9                      09-16    139  |  103  |  38<H>  ----------------------------<  108<H>  4.0   |  25  |  1.40<H>    Ca    8.3<L>      16 Sep 2019 05:39

## 2019-09-16 NOTE — DISCHARGE NOTE PROVIDER - CARE PROVIDER_API CALL
Denilson Smith)  Urology  [address] [facility], [address] [place] [address]  Phone: [phone]  Fax: [phone]  Follow Up Time:     Trip Le)  Cardiovascular Disease; Internal Medicine; Interventional Cardiology  [address] [place] [id number]  Phone: [phone]  Fax: [phone]  Follow Up Time:

## 2019-09-16 NOTE — DISCHARGE NOTE PROVIDER - HOSPITAL COURSE
This is an 86 year old male with a medical history significant for Afib (on Eliquis, currently held), HTN, HLD, CVA (no residual), CABG 2012, and prostate cancer s/p radiation, presented to [facility] on 9/8/19 with hematuria requiring 6 eye irrigation and CBI.  He then underwent cystoscopy, TURBT, and bladder fulguration on 9/9, during that admission he required 4 units of PRBCS for acute blood loss anemia.  Pt was in afib with RVR, requiring IV metroprolol, followed by hypotensive episodes, TIM.  Cardiology, Dr. Le, consulted.  Ucx E faecalis, placed on vanco (PCN allergy).  Hematuria persisted pt was then transferred to [facility] 8T 0m 9/12 for intravesicle alum.  Upon arrival CBI acceptable in color, afib w/ RVR persisted with soft BP, but asymptomatic.  Alum CBI started on 9/13/19.  Pt was in clot/alum retention the morning of 9/14, irrigated, required 1u PRBC for acute blood loss anemia, bedside u/s with large clot, irrigated again with return of 200cc clot.  CT pelvis 9/15, no significant clot burden.  CBI remained clear, clamped on 9/16, urine remained clear yellow, francis removed.    Episode of asymptomatic bradycardia/complete heart block with stable BP. This is an 86 year old male with a medical history significant for Afib (on Eliquis, currently held), HTN, HLD, CVA (no residual), CABG 2012, and prostate cancer s/p radiation, presented to [facility] on 9/8/19 with hematuria requiring 6 eye irrigation and CBI.  He then underwent cystoscopy, TURBT, and bladder fulguration on 9/9, during that admission he required 4 units of PRBCS for acute blood loss anemia.  Pt was in afib with RVR, requiring IV metroprolol, followed by hypotensive episodes, TIM.  Cardiology, Dr. Le, consulted.  Ucx E faecalis, placed on vanco (PCN allergy).  Hematuria persisted pt was then transferred to [facility] 8T 0m 9/12 for intravesicle alum.  Upon arrival CBI acceptable in color, afib w/ RVR persisted with soft BP, but asymptomatic.  Alum CBI started on 9/13/19.  Pt was in clot/alum retention the morning of 9/14, irrigated, required 1u PRBC for acute blood loss anemia, bedside u/s with large clot, irrigated again with return of 200cc clot.  CT pelvis 9/15, no significant clot burden.  CBI remained clear, clamped on 9/16, urine remained clear yellow, francis removed.    Episode of asymptomatic bradycardia/complete heart block with stable BP.        PT rec rehab This is an 86 year old male with a medical history significant for Afib (on Eliquis, currently held), HTN, HLD, CVA (no residual), CABG 2012, and prostate cancer s/p radiation, presented to [facility] on 9/8/19 with hematuria requiring 6 eye irrigation and CBI.  He then underwent cystoscopy, TURBT, and bladder fulguration on 9/9, during that admission he required 4 units of PRBCS for acute blood loss anemia.  Pt was in afib with RVR, requiring IV metroprolol, followed by hypotensive episodes, TIM.  Cardiology, Dr. Le, consulted.  Ucx E faecalis, placed on vanco (PCN allergy).  Hematuria persisted pt was then transferred to [facility] 8T 0m 9/12 for intravesicle alum.  Upon arrival CBI acceptable in color, afib w/ RVR persisted with soft BP, but asymptomatic.  Alum CBI started on 9/13/19.  Pt was in clot/alum retention the morning of 9/14, irrigated, required 1u PRBC for acute blood loss anemia, bedside u/s with large clot, irrigated again with return of 200cc clot.  CT pelvis 9/15, no significant clot burden.  CBI remained clear, clamped on 9/16, urine remained clear yellow, francis removed.    Episode of asymptomatic bradycardia/complete heart block with stable BP.    Meds adjusted by med att    Voiding (with some incont) clear yellow and small PVR    rehab today

## 2019-09-16 NOTE — PROVIDER CONTACT NOTE (OTHER) - ACTION/TREATMENT ORDERED:
MD ordered Metoprolol 25mg PO and was administered.
Provider aware, no new orders. Will continue to monitor pt.
cont to monitor. will evaluate pt during rounds.
toprol given. Antonia at bedside, irrigated francis, pt with complete relief of pain and HR 96. Safety maintained, will continue to monitor.

## 2019-09-16 NOTE — DISCHARGE NOTE PROVIDER - REASON FOR ADMISSION
Transfer from [facility] for persistent hematuria, CBI and administration of Alum

## 2019-09-16 NOTE — PROVIDER CONTACT NOTE (OTHER) - SITUATION
multiple attempts at working IVS by RNs. pt complaining of pain at IV sites however IVs flushing and no swelling noted. x1 infiltrated IV with pain and slight redness to L AC. new IV to be placed by

## 2019-09-16 NOTE — DISCHARGE NOTE PROVIDER - CARE PROVIDERS DIRECT ADDRESSES
[email or web address]

## 2019-09-17 LAB
ANION GAP SERPL CALC-SCNC: 11 MMO/L — SIGNIFICANT CHANGE UP (ref 7–14)
BUN SERPL-MCNC: 34 MG/DL — HIGH (ref 7–23)
CALCIUM SERPL-MCNC: 8.5 MG/DL — SIGNIFICANT CHANGE UP (ref 8.4–10.5)
CHLORIDE SERPL-SCNC: 105 MMOL/L — SIGNIFICANT CHANGE UP (ref 98–107)
CO2 SERPL-SCNC: 23 MMOL/L — SIGNIFICANT CHANGE UP (ref 22–31)
CREAT SERPL-MCNC: 1.25 MG/DL — SIGNIFICANT CHANGE UP (ref 0.5–1.3)
GLUCOSE SERPL-MCNC: 108 MG/DL — HIGH (ref 70–99)
HCT VFR BLD CALC: 29.1 % — LOW (ref 39–50)
HGB BLD-MCNC: 9.1 G/DL — LOW (ref 13–17)
MCHC RBC-ENTMCNC: 30.4 PG — SIGNIFICANT CHANGE UP (ref 27–34)
MCHC RBC-ENTMCNC: 31.3 % — LOW (ref 32–36)
MCV RBC AUTO: 97.3 FL — SIGNIFICANT CHANGE UP (ref 80–100)
NRBC # FLD: 0 K/UL — SIGNIFICANT CHANGE UP (ref 0–0)
PLATELET # BLD AUTO: 275 K/UL — SIGNIFICANT CHANGE UP (ref 150–400)
PMV BLD: 11 FL — SIGNIFICANT CHANGE UP (ref 7–13)
POTASSIUM SERPL-MCNC: 3.8 MMOL/L — SIGNIFICANT CHANGE UP (ref 3.5–5.3)
POTASSIUM SERPL-SCNC: 3.8 MMOL/L — SIGNIFICANT CHANGE UP (ref 3.5–5.3)
RBC # BLD: 2.99 M/UL — LOW (ref 4.2–5.8)
RBC # FLD: 18.2 % — HIGH (ref 10.3–14.5)
SODIUM SERPL-SCNC: 139 MMOL/L — SIGNIFICANT CHANGE UP (ref 135–145)
WBC # BLD: 14.46 K/UL — HIGH (ref 3.8–10.5)
WBC # FLD AUTO: 14.46 K/UL — HIGH (ref 3.8–10.5)

## 2019-09-17 PROCEDURE — 99231 SBSQ HOSP IP/OBS SF/LOW 25: CPT

## 2019-09-17 RX ADMIN — ATORVASTATIN CALCIUM 40 MILLIGRAM(S): 80 TABLET, FILM COATED ORAL at 21:43

## 2019-09-17 RX ADMIN — Medication 50 MILLIGRAM(S): at 18:29

## 2019-09-17 RX ADMIN — Medication 50 MILLIGRAM(S): at 05:10

## 2019-09-17 RX ADMIN — PANTOPRAZOLE SODIUM 40 MILLIGRAM(S): 20 TABLET, DELAYED RELEASE ORAL at 05:10

## 2019-09-17 RX ADMIN — HEPARIN SODIUM 5000 UNIT(S): 5000 INJECTION INTRAVENOUS; SUBCUTANEOUS at 13:08

## 2019-09-17 RX ADMIN — HEPARIN SODIUM 5000 UNIT(S): 5000 INJECTION INTRAVENOUS; SUBCUTANEOUS at 05:10

## 2019-09-17 RX ADMIN — Medication 3 MILLIGRAM(S): at 21:43

## 2019-09-17 RX ADMIN — HEPARIN SODIUM 5000 UNIT(S): 5000 INJECTION INTRAVENOUS; SUBCUTANEOUS at 21:43

## 2019-09-17 RX ADMIN — Medication 100 MICROGRAM(S): at 05:10

## 2019-09-17 NOTE — PROGRESS NOTE ADULT - SUBJECTIVE AND OBJECTIVE BOX
Patient is a 87y old  Male who presents with a chief complaint of Transfer from [facility] for persistent hematuria, CBI and administration of Alum (17 Sep 2019 07:23)                                                               INTERVAL HPI/OVERNIGHT EVENTS:    REVIEW OF SYSTEMS:     CONSTITUTIONAL: No weakness, fevers or chills  RESPIRATORY: No cough, wheezing,  No shortness of breath  CARDIOVASCULAR: No chest pain or palpitations  GASTROINTESTINAL: No abdominal pain  . No nausea, vomiting, or hematemesis; No diarrhea or constipation. No melena or hematochezia.  GENITOURINARY: No dysuria, frequency or hematuria  NEUROLOGICAL: No numbness or weakness  SKIN: No itching, burning, rashes, or lesions                                                                                                                                                                                                                                                                                 Medications:  MEDICATIONS  (STANDING):  atorvastatin 40 milliGRAM(s) Oral at bedtime  heparin  Injectable 5000 Unit(s) SubCutaneous every 8 hours  levothyroxine 100 MICROGram(s) Oral daily  melatonin 3 milliGRAM(s) Oral at bedtime  metoprolol tartrate 50 milliGRAM(s) Oral two times a day  pantoprazole    Tablet 40 milliGRAM(s) Oral before breakfast  senna 2 Tablet(s) Oral at bedtime    MEDICATIONS  (PRN):  acetaminophen   Tablet .. 975 milliGRAM(s) Oral every 6 hours PRN Mild Pain (1 - 3)  belladonna 16.2 mG/opium 30 mg Suppository 1 Suppository(s) Rectal every 8 hours PRN bladder spasms       Allergies    penicillin (Rash)    Intolerances      Vital Signs Last 24 Hrs  T(C): 36.3 (17 Sep 2019 12:40), Max: 37.2 (16 Sep 2019 18:00)  T(F): 97.4 (17 Sep 2019 12:40), Max: 99 (16 Sep 2019 18:00)  HR: 88 (17 Sep 2019 12:40) (76 - 116)  BP: 119/62 (17 Sep 2019 12:40) (102/66 - 126/59)  BP(mean): --  RR: 17 (17 Sep 2019 12:40) (17 - 18)  SpO2: 95% (17 Sep 2019 12:40) (95% - 98%)  CAPILLARY BLOOD GLUCOSE          09-16 @ 07:01  -  09-17 @ 07:00  --------------------------------------------------------  IN: 790 mL / OUT: 1375 mL / NET: -585 mL    09-17 @ 07:01 - 09-17 @ 15:51  --------------------------------------------------------  IN: 0 mL / OUT: 300 mL / NET: -300 mL      Physical Exam:    Daily     Daily   General:  NAD   HEENT:  Nonicteric, PERRLA  CV:  RRR, S1S2   Lungs:  CTA B  Abdomen:  Soft, non-tender, no distended, positive BS  Extremities: RLE ed3ema chronic   :  No francis  Neuro:  AAOx3, non-focal, grossly intact                                                                                                                                                                                                                                                                                                LABS:                               9.1    14.46 )-----------( 275      ( 17 Sep 2019 06:00 )             29.1                      09-17    139  |  105  |  34<H>  ----------------------------<  108<H>  3.8   |  23  |  1.25    Ca    8.5      17 Sep 2019 06:00

## 2019-09-18 ENCOUNTER — TRANSCRIPTION ENCOUNTER (OUTPATIENT)
Age: 84
End: 2019-09-18

## 2019-09-18 VITALS
TEMPERATURE: 98 F | RESPIRATION RATE: 18 BRPM | OXYGEN SATURATION: 98 % | HEART RATE: 112 BPM | SYSTOLIC BLOOD PRESSURE: 97 MMHG | DIASTOLIC BLOOD PRESSURE: 53 MMHG

## 2019-09-18 RX ORDER — METOPROLOL TARTRATE 50 MG
25 TABLET ORAL ONCE
Refills: 0 | Status: COMPLETED | OUTPATIENT
Start: 2019-09-18 | End: 2019-09-18

## 2019-09-18 RX ORDER — METOPROLOL TARTRATE 50 MG
1 TABLET ORAL
Qty: 0 | Refills: 0 | DISCHARGE

## 2019-09-18 RX ORDER — AMLODIPINE BESYLATE 2.5 MG/1
1 TABLET ORAL
Qty: 0 | Refills: 0 | DISCHARGE

## 2019-09-18 RX ADMIN — Medication 50 MILLIGRAM(S): at 05:21

## 2019-09-18 RX ADMIN — PANTOPRAZOLE SODIUM 40 MILLIGRAM(S): 20 TABLET, DELAYED RELEASE ORAL at 05:21

## 2019-09-18 RX ADMIN — HEPARIN SODIUM 5000 UNIT(S): 5000 INJECTION INTRAVENOUS; SUBCUTANEOUS at 05:21

## 2019-09-18 RX ADMIN — Medication 100 MICROGRAM(S): at 05:21

## 2019-09-18 RX ADMIN — Medication 25 MILLIGRAM(S): at 11:19

## 2019-09-18 NOTE — PROGRESS NOTE ADULT - REASON FOR ADMISSION
Transfer from [facility] for persistent hematuria, CBI and administration of Alum
Transfer from [facility] for persistent hematuria, CBI and administration of Alum
Transfer from [facility] for persistent hematuria, CBI and administration of Alum
Transfer from [facility] for persistent hematuria, CBI and administration of Alum
Transfer from [facility] for persistent hematuria, CBI and administration of Alum
Transfer from [facility] for persistent hematuria, CBI and administration of Alum
Transfer from [facility] for persistent hematuria, CBI and administration of Alum
Transfer from [facility] for persistent hematuria, CBI and administration of Alum
Transfer from [facility] for persistent hematuria, CBI and administration of Alum
Transfer from [facility] for persistent hematuria, CBI and administration of Alum
Transfer from [facility] for persistent hematuria, CBI and administration of Alum
Transfer from [facility] for persistent hematuria, CBI and administration of Alum
Transfer from [facility] for persistent hematuria, CBI and administration of Alum
Transfer from [facility] for persistent hematuria, CBI and administration of Alum
Transfer from [facility] for persistent hematuria, CBI and administration of Alum

## 2019-09-18 NOTE — PROGRESS NOTE ADULT - SUBJECTIVE AND OBJECTIVE BOX
Patient is a 87y old  Male who presents with a chief complaint of Transfer from [facility] for persistent hematuria, CBI and administration of Alum (18 Sep 2019 07:08)                                                               INTERVAL HPI/OVERNIGHT EVENTS:    REVIEW OF SYSTEMS:     CONSTITUTIONAL: No weakness, fevers or chills  RESPIRATORY: No cough, wheezing,  No shortness of breath at rest  but had sob on exertion   CARDIOVASCULAR: No chest pain or palpitations  GASTROINTESTINAL: No abdominal pain  . No nausea, vomiting, or hematemesis; No diarrhea or constipation. No melena or hematochezia.  GENITOURINARY: No dysuria, frequency or hematuria  NEUROLOGICAL: No numbness or weakness                                                                                                                                                                                                                                                                                  Medications:  MEDICATIONS  (STANDING):  atorvastatin 40 milliGRAM(s) Oral at bedtime  heparin  Injectable 5000 Unit(s) SubCutaneous every 8 hours  levothyroxine 100 MICROGram(s) Oral daily  melatonin 3 milliGRAM(s) Oral at bedtime  metoprolol tartrate 50 milliGRAM(s) Oral two times a day  pantoprazole    Tablet 40 milliGRAM(s) Oral before breakfast  senna 2 Tablet(s) Oral at bedtime    MEDICATIONS  (PRN):  acetaminophen   Tablet .. 975 milliGRAM(s) Oral every 6 hours PRN Mild Pain (1 - 3)  belladonna 16.2 mG/opium 30 mg Suppository 1 Suppository(s) Rectal every 8 hours PRN bladder spasms       Allergies    penicillin (Rash)    Intolerances      Vital Signs Last 24 Hrs  T(C): 36.4 (18 Sep 2019 11:15), Max: 37.4 (18 Sep 2019 00:22)  T(F): 97.6 (18 Sep 2019 11:15), Max: 99.3 (18 Sep 2019 00:22)  HR: 112 (18 Sep 2019 11:15) (100 - 116)  BP: 97/53 (18 Sep 2019 11:15) (97/53 - 121/73)  BP(mean): --  RR: 18 (18 Sep 2019 11:15) (17 - 18)  SpO2: 98% (18 Sep 2019 11:15) (96% - 98%)  CAPILLARY BLOOD GLUCOSE          09-17 @ 07:01  -  09-18 @ 07:00  --------------------------------------------------------  IN: 960 mL / OUT: 1500 mL / NET: -540 mL    09-18 @ 07:01  -  09-18 @ 16:58  --------------------------------------------------------  IN: 0 mL / OUT: 200 mL / NET: -200 mL      Physical Exam:      General: NAD   HEENT:  Nonicteric, PERRLA  CV:  RRR, S1S2   Lungs:  decreased BS at bases otherwise CTA   Abdomen:  Soft, non-tender, no distended, positive BS  Extremities: edema ( RLE )   :  No penny  Neuro:  AAOx3, non-focal, grossly intact                                                                                                                                                                                                                                                                                                LABS:                               9.1    14.46 )-----------( 275      ( 17 Sep 2019 06:00 )             29.1                      09-17    139  |  105  |  34<H>  ----------------------------<  108<H>  3.8   |  23  |  1.25    Ca    8.5      17 Sep 2019 06:00

## 2019-09-18 NOTE — PROGRESS NOTE ADULT - PROVIDER SPECIALTY LIST ADULT
Cardiology
Internal Medicine
Urology
Cardiology

## 2019-09-18 NOTE — DISCHARGE NOTE NURSING/CASE MANAGEMENT/SOCIAL WORK - NSDCPNDISPN_GEN_ALL_CORE
Education provided on the pain management plan of care/Side effects of pain management treatment/Opioids not applicable/not prescribed/Activities of daily living, including home environment that might     exacerbate pain or reduce effectiveness of the pain management plan of care as well as strategies to address these issues

## 2019-09-18 NOTE — DISCHARGE NOTE NURSING/CASE MANAGEMENT/SOCIAL WORK - NSDCPNINST_GEN_ALL_CORE
pt discharged to rehab. daughter at bedside. vital signs were within normal limits. patient did not complain of shortness of breath or chest pain. no hematuria noted with urination. patient has no signs or symptoms of infection. no pain present at discharge. D/C tele and IV as per orders and Pt. is discharged to rehab as per orders.

## 2019-09-18 NOTE — PROGRESS NOTE ADULT - ASSESSMENT
1. hematuria bladder tumor recurrent bleeding  2.anemia  3. ARF ?ATN secondary to hypotension  4. afib rapid VR  5. soft BP    Recommned  continue as per urology and medicine  continue beta blocker  careful followup of renal fucntio hgb hct
1. hematuria bladder tumor recurrent bleeding now stable  2.anemia receing 1 unit RBC  3. renal function improved  4. afib VR controlled now  abnormal cxr-?infection mild CHF    Recommned  continue as per urology and medicine  continue beta blocker  anataibiotics low dose lasix
85 yo M transferred from [facility] for persistent hematuria, s/p alum instillation 9/13, retention 9/14, irrigated 200cc clot to clear, CBI restarted draining clear irrigant.; CBI now off and [place] cath clear urine  Plan:  -F/u AM labs   - f/u cards  - rehab
87 yo M transferred from [facility] for persistent hematuria, s/p alum instillation 9/13, retention 9/14, irrigated 200cc clot to clear, CBI restarted draining clear irrigant.    -F/u AM labs   -TOV, check PVR  -senna  -monitor VS  -f/u cardiology re: bradycardia  -cont to hold eliquis  -cont vanco, trough 9/18  -f/u medicine  -PT consult recs rehab  -DVT prophy, IS, OOB, ambulate
87 yo M transferred from [facility] for persistent hematuria, s/p alum instillation 9/13, retention 9/14, irrigated 200cc clot to clear, CBI restarted draining clear irrigant.; CBI now off and [place] cath clear urine; ready for rehab  Plan:  - rehab
87 yo M transferred from [facility] for persistent hematuria, possible alum instillation, CBI crystal clear this am    -AM labs reviewed  -CBI weaned, monitor color  -bowel regimen  -monitor VS  -f/u cardiology  -cont to hold eliquis  -cont vanco, monitor trough prior to 9/15 dose  -f/u medicine  -PT consult  -DVT prophy, IS, OOB, ambulate
87 yo M transferred from [facility] for persistent hematuria, s/p alum instillation 9/13, retention 9/14, irrigated 200cc clot to clear, CBI restarted draining clear irrigant.    -AM labs reviewed  -continue CBI  -senna  -monitor VS  -f/u cardiology  -cont to hold eliquis  -cont vanco,  -f/u medicine  -PT consult  -DVT prophy, IS, OOB, ambulate
87 yo M transferred from [facility] for persistent hematuria, s/p alum instillation 9/13, retention this am, irrigated to light punch, sterile water CBI restarted    -AM labs reviewed  -transfuse 1u PRBC, post transfusion CBC  -continue CBI, irrigate and change back to NS, monitor color  -senna  -monitor VS  -f/u cardiology  -cont to hold eliquis  -cont vanco, monitor trough prior to 9/15 dose  -f/u medicine  -PT consult  -DVT prophy, IS, OOB, ambulate
87yM with gross hematuria    -Maintain francis   -Continue to titrate CBI   -Ok to irrigate francis with irrigation PRN for clot retention  -Hydration  -Pain control  -I am the covering on call urologist for the weekend.
87yM with gross hematuria.    -Titrate CBI to light pink, clear  -Maintain francis  -Monitor O2 sats--if O2 sat is low next shift, please order KUB and blood gas  -Hydration  -Pain control  -Ambulation
This is an 86 year old male with a medical history significant for Afib (on Eliquis, currently held), HTN, HLD, CVA (no residual), CABG 2012, and prostate cancer s/p radiation, with hematuria that started about a week ago.  He presented to [facility] on 9/8 where he was admitted until today and transferred to [facility].  There he has been on CBI, underwent cystoscopy, TURBT, and bladder fulguration on 9/9, and required 4 units of PRBCS.  Hematuria has persisted despite these efforts and he has been transferred to [facility] to administer Alum to attempt hemostasis.  Patient reports he feels fatigued and weak, and overall deconditioned while being hospitalized.  He denies chest pain, shortness of breath, dizziness or lightheadedness.  Eliquis has remained on hold due to hematuria. Hospitalization significant for rapid Afib for which he is followed by his cardiologist, Dr. Le, followed by hypotension in setting of medications. Patient is transferred to a telemetry bed for further monitoring. He has been on Vancomycin for an Enterococcus UTI.  Denies fevers.  Also reports constipation that persists, but he did have a small bowel movement earlier in the day. (12 Sep 2019 20:08)    1- hematuria :s/p CBI with Alum per urology   now s/p francis removal   pvr of 36     2- UTI : on vacno for enterococous ..can limit to 5 days of    3- acute on chronic anemia sec to acute blood loss from hematruiaholding AC : f/u with urolgoy and Cardiology   given trending down: would transfuse ubiyte     4- CAD s/p CABG : not on Antiplt therapy : f/u with cardio     5- afib: rate control with BB and holding AC given hematuria   monitor BP while on BB ( baseline 110-120 sys)     6- TIM : avoid nephrotoxins ..improving     7- leukocytosis : sec to uti and  reactive/stress induced   improving diarreah   CT neg  will monitor for now     8- hypoxia: resolved   doubt pna   cont IS  consider lasix if recurred    9
This is an 86 year old male with a medical history significant for Afib (on Eliquis, currently held), HTN, HLD, CVA (no residual), CABG 2012, and prostate cancer s/p radiation, with hematuria that started about a week ago.  He presented to [facility] on 9/8 where he was admitted until today and transferred to [facility].  There he has been on CBI, underwent cystoscopy, TURBT, and bladder fulguration on 9/9, and required 4 units of PRBCS.  Hematuria has persisted despite these efforts and he has been transferred to [facility] to administer Alum to attempt hemostasis.  Patient reports he feels fatigued and weak, and overall deconditioned while being hospitalized.  He denies chest pain, shortness of breath, dizziness or lightheadedness.  Eliquis has remained on hold due to hematuria. Hospitalization significant for rapid Afib for which he is followed by his cardiologist, Dr. Le, followed by hypotension in setting of medications. Patient is transferred to a telemetry bed for further monitoring. He has been on Vancomycin for an Enterococcus UTI.  Denies fevers.  Also reports constipation that persists, but he did have a small bowel movement earlier in the day. (12 Sep 2019 20:08)    1- hematuria :s/p CBI with Alum per urology   cont .care per urolgy     2- UTI : on vacno for enterococous .. monitor Cr and monitor troph.    3- acute on chronic anemia sec to acute blood loss from hematruai: check INR , if elevated would give vit k   holding AC : f/u with urolgoy and Cardiology   monitor H/H post transfusion      4- CAD s/p CABG : not on Antiplt therapy : f/u with cardio     5- afib: rate control with BB and holding AC given hematuria   monitor BP while on BB ( baseline 110-120 sys)     6- TIM : avoid nephrotoxins     7- leukocytosis : sec to uti and  reactive/stress induced   improving diarreah   CT neg  will monitor for now     8- hypoxia: d/w urology : will check Xray ..  doubt pe..  lasix if congestion   IS if atelectasis
This is an 86 year old male with a medical history significant for Afib (on Eliquis, currently held), HTN, HLD, CVA (no residual), CABG 2012, and prostate cancer s/p radiation, with hematuria that started about a week ago.  He presented to [facility] on 9/8 where he was admitted until today and transferred to [facility].  There he has been on CBI, underwent cystoscopy, TURBT, and bladder fulguration on 9/9, and required 4 units of PRBCS.  Hematuria has persisted despite these efforts and he has been transferred to [facility] to administer Alum to attempt hemostasis.  Patient reports he feels fatigued and weak, and overall deconditioned while being hospitalized.  He denies chest pain, shortness of breath, dizziness or lightheadedness.  Eliquis has remained on hold due to hematuria. Hospitalization significant for rapid Afib for which he is followed by his cardiologist, Dr. Le, followed by hypotension in setting of medications. Patient is transferred to a telemetry bed for further monitoring. He has been on Vancomycin for an Enterococcus UTI.  Denies fevers.  Also reports constipation that persists, but he did have a small bowel movement earlier in the day. (12 Sep 2019 20:08)    1- hematuria :s/p CBI with Alum per urology   now s/p francis removal   PVR repeated 184 c  dicused with urolgoy   f/u as o/p.       2- UTI : completed abx     3- acute on chronic anemia sec to acute blood loss from hematruiaholding AC : f/u with urolgoy and Cardiology   s/p transfusion    monitor H/H   not on antiplt and holding AC : f/u with pmd and cardio re: timing of restarting     4- CAD s/p CABG : not on Antiplt therapy : f/u with cardio     5- afib: with RVR : will increase BB     6- TIM : avoid nephrotoxins ..improving     7- leukocytosis : sec to uti and  reactive/stress induced   improving diarreah   CT neg  will monitor for now   no fever or cough    doubt pna       8- hypoxia: resolved   doubt pna   cont IS  will give few days of lasix given sob on exertion which is most likely sec to afib with RVR       d/w pt and daughter at length   d/w urology
This is an 86 year old male with a medical history significant for Afib (on Eliquis, currently held), HTN, HLD, CVA (no residual), CABG 2012, and prostate cancer s/p radiation, with hematuria that started about a week ago.  He presented to [facility] on 9/8 where he was admitted until today and transferred to [facility]  There he has been on CBI, underwent cystoscopy, TURBT, and bladder fulguration on 9/9, and required 4 units of PRBCS.  Hematuria has persisted despite these efforts and he has been transferred to [facility] to administer Alum to attempt hemostasis.  Patient reports he feels fatigued and weak, and overall deconditioned while being hospitalized.  He denies chest pain, shortness of breath, dizziness or lightheadedness.  Eliquis has remained on hold due to hematuria. Hospitalization significant for rapid Afib for which he is followed by his cardiologist, Dr. Le, followed by hypotension in setting of medications. Patient is transferred to a telemetry bed for further monitoring. He has been on Vancomycin for an Enterococcus UTI.  Denies fevers.  Also reports constipation that persists, but he did have a small bowel movement earlier in the day. (12 Sep 2019 20:08)    1- hematuria :s/p CBI with Alum per urology   had retention today   cont .care per urolgy     2- UTI : on vacno for enterococous .. monitor Cr and monitor troph.    3- acute on chronic anemia sec to acute blood loss from hematruai: check INR , if elevated would give vit k   holding AC : f/u with urolgoy and Cardiology   monitor H/H post transfusion ; was given one unit earlier ..monitor for fluid overload ..lasix prn     4- CAD s/p CABG : not on Antiplt therapy : f/u with cardio     5- afib: rate control with BB and holding AC given hematuria     6- TIM : avoid nephrotoxins     7- leukocytosis : sec to uti and  reactive/stress induced ,  no further diarreah .. if persists would check CT a/p
This is an 86 year old male with a medical history significant for Afib (on Eliquis, currently held), HTN, HLD, CVA (no residual), CABG 2012, and prostate cancer s/p radiation, with hematuria that started about a week ago.  He presented to [facility] on 9/8 where he was admitted until today and transferred to [facility].  There he has been on CBI, underwent cystoscopy, TURBT, and bladder fulguration on 9/9, and required 4 units of PRBCS.  Hematuria has persisted despite these efforts and he has been transferred to [facility] to administer Alum to attempt hemostasis.  Patient reports he feels fatigued and weak, and overall deconditioned while being hospitalized.  He denies chest pain, shortness of breath, dizziness or lightheadedness.  Eliquis has remained on hold due to hematuria. Hospitalization significant for rapid Afib for which he is followed by his cardiologist, Dr. Le, followed by hypotension in setting of medications. Patient is transferred to a telemetry bed for further monitoring. He has been on Vancomycin for an Enterococcus UTI.  Denies fevers.  Also reports constipation that persists, but he did have a small bowel movement earlier in the day. (12 Sep 2019 20:08)    1- hematuria :s/p CBI with Alum per urology   now s/p francis removal   PVR repeated 184 c  would re evaluate for need for francis at rehab       2- UTI : completed abx     3- acute on chronic anemia sec to acute blood loss from hematruiaholding AC : f/u with urolgoy and Cardiology   s/p transfusion    monitor H/H     4- CAD s/p CABG : not on Antiplt therapy : f/u with cardio     5- afib: rate control with BB and holding AC given hematuria   monitor BP while on BB ( baseline 110-120 sys)     6- TIM : avoid nephrotoxins ..improving     7- leukocytosis : sec to uti and  reactive/stress induced   improving diarreah   CT neg  will monitor for now     8- hypoxia: resolved   doubt pna   cont IS      dispo per urolgy ...  d/w pt and daughter

## 2019-09-18 NOTE — DISCHARGE NOTE NURSING/CASE MANAGEMENT/SOCIAL WORK - PATIENT PORTAL LINK FT
You can access the FollowMyHealth Patient Portal offered by [facility] by registering at the following website: [email or web address]. By joining [facility]’s FollowMyHealth portal, you will also be able to view your health information using other applications (apps) compatible with our system.

## 2019-09-18 NOTE — CHART NOTE - NSCHARTNOTEFT_GEN_A_CORE
Pt accepted to rehab  Spoke with Dr. Yip (medicine)  Will give extra dose lopressor now; increase to 75mg bid starting tomorrow  Give lasix 20 mg tomorrow and friday only  Pt's output 1100 clear last nite; this AM voided 300 cc with PVR 90 cc (done by me); some incontinence, some control  Will let Dr. Smith know he is discharged

## 2019-09-23 ENCOUNTER — FORM ENCOUNTER (OUTPATIENT)
Age: 84
End: 2019-09-23

## 2019-10-01 PROCEDURE — P9016: CPT

## 2019-10-01 PROCEDURE — 86900 BLOOD TYPING SEROLOGIC ABO: CPT

## 2019-10-01 PROCEDURE — 51798 US URINE CAPACITY MEASURE: CPT

## 2019-10-01 PROCEDURE — 85027 COMPLETE CBC AUTOMATED: CPT

## 2019-10-01 PROCEDURE — 71045 X-RAY EXAM CHEST 1 VIEW: CPT

## 2019-10-01 PROCEDURE — 85610 PROTHROMBIN TIME: CPT

## 2019-10-01 PROCEDURE — 83605 ASSAY OF LACTIC ACID: CPT

## 2019-10-01 PROCEDURE — 86850 RBC ANTIBODY SCREEN: CPT

## 2019-10-01 PROCEDURE — 93005 ELECTROCARDIOGRAM TRACING: CPT

## 2019-10-01 PROCEDURE — 85730 THROMBOPLASTIN TIME PARTIAL: CPT

## 2019-10-01 PROCEDURE — 80202 ASSAY OF VANCOMYCIN: CPT

## 2019-10-01 PROCEDURE — 88305 TISSUE EXAM BY PATHOLOGIST: CPT

## 2019-10-01 PROCEDURE — 87186 SC STD MICRODIL/AGAR DIL: CPT

## 2019-10-01 PROCEDURE — 80048 BASIC METABOLIC PNL TOTAL CA: CPT

## 2019-10-01 PROCEDURE — 99285 EMERGENCY DEPT VISIT HI MDM: CPT | Mod: 25

## 2019-10-01 PROCEDURE — 36430 TRANSFUSION BLD/BLD COMPNT: CPT

## 2019-10-01 PROCEDURE — 84443 ASSAY THYROID STIM HORMONE: CPT

## 2019-10-01 PROCEDURE — 87086 URINE CULTURE/COLONY COUNT: CPT

## 2019-10-01 PROCEDURE — 86923 COMPATIBILITY TEST ELECTRIC: CPT

## 2019-10-01 PROCEDURE — 81001 URINALYSIS AUTO W/SCOPE: CPT

## 2019-10-01 PROCEDURE — 96374 THER/PROPH/DIAG INJ IV PUSH: CPT

## 2019-10-01 PROCEDURE — 86901 BLOOD TYPING SEROLOGIC RH(D): CPT

## 2019-10-01 PROCEDURE — 80053 COMPREHEN METABOLIC PANEL: CPT

## 2019-10-02 ENCOUNTER — APPOINTMENT (OUTPATIENT)
Dept: ORTHOPEDIC SURGERY | Facility: CLINIC | Age: 84
End: 2019-10-02
Payer: MEDICARE

## 2019-10-02 VITALS — HEIGHT: 67 IN | BODY MASS INDEX: 25.58 KG/M2 | WEIGHT: 163 LBS

## 2019-10-02 DIAGNOSIS — M77.02 MEDIAL EPICONDYLITIS, LEFT ELBOW: ICD-10-CM

## 2019-10-02 PROCEDURE — 99214 OFFICE O/P EST MOD 30 MIN: CPT

## 2019-10-02 PROCEDURE — 73502 X-RAY EXAM HIP UNI 2-3 VIEWS: CPT | Mod: RT

## 2019-10-02 PROCEDURE — 73080 X-RAY EXAM OF ELBOW: CPT | Mod: RT

## 2019-10-20 NOTE — DISCHARGE NOTE NURSING/CASE MANAGEMENT/SOCIAL WORK - MODE OF TRANSPORTATION
VN cued into patients room for rounding. VN role explained and informed pt and family at bedside that VN will be working alongside the bedside care team throughout the day. Pt verbalized understanding that VN is available for any questions and education, and nurse and PCT will continue hourly rounding at bedside. Fall education provided. Patient informed to be NPO after midnight for ERCP tomorrow. Allotted time given for questions - all questions answered. Will continue to monitor and intervene PRN.   Ambulance

## 2020-03-15 NOTE — DISCHARGE NOTE NURSING/CASE MANAGEMENT/SOCIAL WORK - NSTOBACCONEVERSMOKERY/N_GEN_A
Vancomycin Assessment    Wilton Guillen is a 80 y o  male who is currently receiving vancomycin 1750 mg IV once  for urinary tract infection, other Sepsis secondary to UTI       Relevant clinical data and objective history reviewed:  Creatinine   Date Value Ref Range Status   03/15/2020 7 78 (H) 0 60 - 1 30 mg/dL Final     Comment:     Standardized to IDMS reference method   03/15/2020 8 24 (H) 0 60 - 1 30 mg/dL Final     Comment:     Standardized to IDMS reference method   01/09/2020 1 41 (H) 0 60 - 1 30 mg/dL Final     Comment:     Standardized to IDMS reference method   12/02/2014 0 97 0 60 - 1 30 mg/dL Final     Comment:     Standardized to IDMS reference method   02/18/2013 0 96  Final     /89   Pulse 91   Temp 98 2 °F (36 8 °C) (Temporal)   Resp 20   Wt 86 4 kg (190 lb 7 6 oz)   SpO2 97%   BMI 28 13 kg/m²   No intake/output data recorded  Lab Results   Component Value Date/Time     (H) 03/15/2020 03:18 PM    BUN 16 12/02/2014 02:37 AM    WBC 9 60 03/15/2020 11:03 AM    WBC 7 20 12/02/2014 02:37 AM    HGB 11 3 (L) 03/15/2020 11:03 AM    HGB 15 7 12/02/2014 02:37 AM    HCT 34 1 (L) 03/15/2020 11:03 AM    HCT 44 6 12/02/2014 02:37 AM    MCV 90 03/15/2020 11:03 AM    MCV 90 12/02/2014 02:37 AM     03/15/2020 11:03 AM     12/02/2014 02:37 AM     Temp Readings from Last 3 Encounters:   03/15/20 98 2 °F (36 8 °C) (Temporal)   01/09/20 97 6 °F (36 4 °C) (Oral)   10/12/19 98 3 °F (36 8 °C)     Vancomycin Days of Therapy: 1    Assessment/Plan  The patient is currently on vancomycin utilizing pulse dosing based on actual body weight (has FRANCOIS)  Baseline risks associated with therapy include: pre-existing renal impairment, advanced age and dehydration  The patient was ordered 1250 mg IV every 12 hours and after clinical evaluation will be changed to 1750 mg IV once (20 mg/kg)    Pharmacy will also follow closely for s/sx of nephrotoxicity, infusion reactions and appropriateness of Yes therapy  BMP and CBC will be ordered per protocol  Plan for a random level tomorrow on 3/16/20 at approximately 1730  The patient will have random levels drawn daily and be redosed 10-15 mg/kg when the random level is below 20  Due to infection severity, will target a trough of 15-20 (appropriate for most indications)   Pharmacy will continue to follow the patients culture results and clinical progress daily      Rayshawn Oconnor, Pharmacist

## 2020-07-28 NOTE — PRE-OP CHECKLIST - WEIGHT IN KG
Render Post-Care Instructions In Note?: yes Post-Care Instructions: I reviewed with the patient in detail post-care instructions. Patient is to wear sunprotection, and avoid picking at any of the treated lesions. Pt may apply aquaphor to crusted or scabbing areas. Detail Level: Simple Consent: The patient's consent was obtained including but not limited to risks of crusting, scabbing, blistering, scarring, darker or lighter pigmentary change, recurrence, incomplete removal and infection. Duration Of Freeze Thaw-Cycle (Seconds): 0 Render Note In Bullet Format When Appropriate: No 63.5

## 2020-12-07 DIAGNOSIS — I48.0 PAROXYSMAL ATRIAL FIBRILLATION: ICD-10-CM

## 2020-12-11 RX ORDER — APIXABAN 5 MG/1
5 TABLET, FILM COATED ORAL
Qty: 180 | Refills: 3 | Status: ACTIVE | COMMUNITY
Start: 2020-12-07

## 2021-01-01 ENCOUNTER — APPOINTMENT (OUTPATIENT)
Dept: CARDIOLOGY | Facility: CLINIC | Age: 86
End: 2021-01-01
Payer: MEDICARE

## 2021-01-01 ENCOUNTER — NON-APPOINTMENT (OUTPATIENT)
Age: 86
End: 2021-01-01

## 2021-01-01 VITALS
HEIGHT: 67 IN | HEART RATE: 82 BPM | DIASTOLIC BLOOD PRESSURE: 85 MMHG | SYSTOLIC BLOOD PRESSURE: 143 MMHG | WEIGHT: 148 LBS | OXYGEN SATURATION: 98 % | BODY MASS INDEX: 23.23 KG/M2

## 2021-01-01 DIAGNOSIS — M16.11 UNILATERAL PRIMARY OSTEOARTHRITIS, RIGHT HIP: ICD-10-CM

## 2021-01-01 DIAGNOSIS — M12.50 TRAUMATIC ARTHROPATHY, UNSPECIFIED SITE: ICD-10-CM

## 2021-01-01 PROCEDURE — 99212 OFFICE O/P EST SF 10 MIN: CPT

## 2021-01-01 PROCEDURE — 93000 ELECTROCARDIOGRAM COMPLETE: CPT

## 2021-01-01 PROCEDURE — 99202 OFFICE O/P NEW SF 15 MIN: CPT

## 2021-07-14 NOTE — PHYSICAL THERAPY INITIAL EVALUATION ADULT - TRANSFER SKILLS, REHAB EVAL
[facility]  Certificate of Child Health Examination  Student's Name:   Lexie Stroud Birth Date  2004  Sex  female  Race/Ethnicity   School/Grade Level/ID#     Address:   [address] [place] [id number]  Parent/Guardian             Telephone#                                            Home:                               Work:   IMMUNIZATIONS: To be completed by health care provider. The mo/da/yr for every dose administered is required. If a specific vaccine is medically contraindicated, a separate written statement must be attached by the health care responsible for completing the health examination explaining the medical reason for the contraindication.      Immunization History   Administered Date(s) Administered   • COVID-19 Pfizer-BioNtech 05/06/2021, 06/01/2021   • DTaP HIB 04/13/2006   • DTaP, 5 Pertussis Antigens (DAPTACEL) 2004, 01/04/2005, 02/24/2005, 10/07/2009   • Dtap, Unspecified Formulation 01/04/2005, 02/24/2005, 04/13/2006, 10/07/2009, 12/04/2014   • HIB (HbOC) 2004, 01/04/2005, 02/24/2005   • HIB, Unspecified Formulation 2004, 01/04/2005, 02/24/2005, 04/13/2006   • Hep A, Unspecified formulation 08/25/2006, 09/22/2008   • Hep B, Unspecified Formulation 2004, 2004, 09/20/2005   • Influenza, Unspecified Formulation 01/10/2013, 11/11/2014   • Influenza, injectable, quadrivalent, preservative-free 01/22/2020   • Influenza, seasonal, injectable, preservative free 11/16/2005, 01/18/2006, 10/28/2007, 09/22/2008, 10/07/2009   • MMR 01/18/2006, 10/07/2009   • Meningococcal Conjugate MCV4P (Menactra) 11/18/2015   • PPD 01/22/2020   • Pneumococcal Conjugate 7 Valent 2004, 01/04/2005, 02/24/2005, 09/20/2005   • Polio, INACTIVATED 2004, 01/04/2005, 04/13/2006, 10/07/2009   • Tdap 11/18/2015, 04/29/2021   • Varicella 01/18/2006, 10/07/2009   Pended Date(s) Pended   • Meningococcal Conjugate MCV4O (Menveo) 07/14/2021      Immunizations given  7/14/2021: None      Health care provider (MD, , APN, PA, school health professional, health official) verifying above immunization history must sign below. If adding dates to the above immunization history section, put your initials by date(s) and sign here.)  Signature                                        7/14/21                                                  Date  _____________________________________________________________________________________  Signature                                                                 Title                                                Date  _____________________________________________________________________________________   ALTERNATIVE PROOF OF IMMUNITY   1. Clinical diagnosis (measles, mumps, hepatitis B) is allowed when verified by physician and supported with lab confirmation.  Attach copy of lab result.    * MEASLES (Rubeola)  MO   DA  YR          **MUMPS  MO   DA  YR             HEPATITIS B  MO   DA   YR           VARICELLA  MO   DA  YR      2. History of varicella (chickenpox) disease is acceptable if verified by health care provider, school health professional or health official.  Person signing below verifies that the parent/guardian’s description of varicella disease history is indicative of past infection and is accepting such history as documentation of disease.  Date of Disease                                  Signature                                                           Title   3. Laboratory Evidence of Immunity (check one)      __ Measles*         __ Mumps**        __ Rubella        __Varicella    (Attach copy of lab result)         *All measles cases diagnosed on or after July 1, 2002, must be confirmed by laboratory evidence.      **All mumps cases diagnosed on or after July 1, 2013, must be confirmed by laboratory evidence.     Completion of Alternative 1 or 3 MUST be accompanied by Labs & Physician Signature:  ___________________________  Physician Statements of Immunity MUST be submitted to [facility] for review.     Certificates of [employer] Exemption to Immunizations or Physician Medical Statements of Medical Contraindication Are Reviewed   and Maintained by the School Authority     (11/2015)                                         (COMPLETE BOTH SIDES)                                  Approved [facility] SHP 3/2016      Student's Name:  Lexie Stroud Birth Date 2004 Sex female School Grade Level/ID#     Page 2 of 2   HEALTH HISTORY      TO BE COMPLETED AND SIGNED BY PARENT/GUARDIAN AND VERIFIED BY HEALTH CARE PROVIDER  ALLERGIES: (Food, drug, insect, other) No has No Known Allergies.   MEDICATION: (List all prescribed or taken on a regular basis.)   No   Diagnosis of asthma?  Child wakes during night coughing? Yes    No  Yes    No  Loss of function of one of paired  organs?(eye/ear/kidney/testicle) Yes    No    Birth defects? Yes    No  Hospitalizations? Yes    No    Developmental delay? Yes    No   When? What for? Yes    No    Blood disorders? Hemophilia,  Sickle Cell, Other? Explain. Yes    No  Surgery? (List all.)  When? What for? Yes    No    Diabetes? Yes    No  Serious injury or illness? Yes    No    Head injury/Concussion/Passed out? Yes    No  TB skin test positive (past/present)? * Yes    No *[facility] yes, [facility]   Seizures? What are they like?  Yes    No  TB disease (past or present)? * Yes    No *If yes, [facility]   Heart problem/Shortness of breath?  Yes    No  Tobacco use (type, frequency)?  Yes    No    Heart murmur/High blood pressure? Yes    No  Alcohol/Drug use?  Yes    No    Dizziness or chest pain with exercise? Yes    No  Family history of sudden death  before age 50? (Cause?) Yes    No    Eye/Vision problems? ______           __Glasses          __Contacts  Last exam by eye doctor ______  Other concerns? (crossed eye, drooping lids, squinting, difficulty reading) Dental?    __ Braces     __ Bridge     __ Plate   __Other   Ear/Hearing problems? Yes    No  Information may be shared with appropriate personnel for health and educational purposes.   Bone/Joint problem/injury/scoliosis? Yes    No  Parent/Guardian  Signature                                               Date   PHYSICAL EXAMINATION REQUIREMENTS   Entire section below to be completed by MD//SMITHN/PA Head Circumference if <2-3 years old - /61 (BP Location: RUE - Right upper extremity, Patient Position: Sitting, Cuff Size: Regular)   Pulse 80   Temp 97.8 °F (36.6 °C) (Tympanic)   Resp 16   Ht 5' 7\" (1.702 m)   Wt 65.5 kg (144 lb 8.2 oz)   LMP 02/02/2021 (Exact Date)   BMI 22.63 kg/m²   BSA 1.76 m²    69 %ile (Z= 0.51) based on CDC (Girls, 2-20 Years) BMI-for-age based on BMI available as of 7/14/2021.   DIABETES SCREENING (NOT REQUIRED FOR ) BMI>85% age/sex No     And any two of the following: Family History: Yes  Ethnic Minority: No    Signs of Insulin Resistance (hypertension, dyslipidemia, polycystic ovarian syndrome, acanthosis nigricans): No  At Risk: No   LEAD RISK QUESTIONNAIRE Required for children age 6 months through 6 years enrolled in licensed or public school operated day care, , nursery school and/or . (Blood test required if resides in [place] or high risk zip code.)  Questionnaire Administered? Yes  Blood Test Indicated? No   Blood Test Date _____   Blood Test Result ______________   TB SKIN OR BLOOD TEST Recommended only for children in high-risk groups including children immunosuppressed due to HIV infection or other conditions, frequent travel to or born in high prevalence countries or those exposed to adults in high-risk categories. See CDC guidelines. http://www.cdc.gov/tb/publications/factsheets/testing/TB_testing.htm.  Test Needed: No     Test performed: No                          Skin Test:    Date Read              /      /             Result:   Positive__       Negative__        mm________                          Blood Test: Date Reported       /      /               Result:  Positive__      Negative__      Value________  LAB TESTS (recommended) Date/Result  Date/Results   Hemoglobin or Hematocrit NA Sickle Cell (when indicated) NA   Urinalysis NA Developmental Screening Tool NA        SYSTEM REVIEW Normal  Comments/Follow-up/Needs  Normal Comments/Follow-up/Needs   Skin  Yes  Endocrine Yes    Ears Yes                  Screening Result Gastrointestinal Yes    Eyes Yes                  Screening Result: Genito-Urinary Yes                                            LMP   Nose Yes  Neurologic Yes    Throat Yes  Musculoskeletal Yes    Mouth/Dental Yes  Spinal Exam Yes    Cardiovascular/HTN Yes  Nutritional status Yes    Respiratory Yes Diagnosis of Asthma: No   Mental Health Yes    Currently Prescribed Asthma Medication:        No  Quick-relief medication (e.g. Short Acting Beta Antagonist)        No   Controller medication (e.g. inhaled corticosteroid) Other     NEEDS/MODIFICATIONS required in the school setting: No restrictions DIETARY Needs/Restrictions: No restrictions   SPECIAL INSTRUCTIONS/DEVICES e.g. safety glasses, glass eye, chest protector for arrhythmia, pacemaker, prosthetic device, dental bridge, false teeth, athletic support/cup: No restrictions   MENTAL HEALTH/OTHER Is there anything else the school should know about this student?  If you would like to discuss this student’s health with school or school health personnel:  Not needed   EMERGENCY ACTION needed while at school due to child’s health condition (e.g. ,seizures, asthma, insect sting, food, peanut allergy, bleeding problem, diabetes, heart problem)?   No   On the basis of the examination on this day, I approve this child’s participation in                                (If No or Modified please attach explanation.)  PHYSICAL EDUCATION:  Yes                               INTERSCHOLASTIC SPORTS   Yes    Print Name  Agustina Vazquez MD         Signature                                                  Date: 7/14/2021   Address:  [facility] [address] [facility] [address] [address]  [phone]         (Complete Both Sides)     Approved [facility] SHP 3/2016   independent

## 2021-12-15 PROBLEM — M12.50: Status: ACTIVE | Noted: 2019-05-30

## 2021-12-15 PROBLEM — M16.11 PRIMARY OSTEOARTHRITIS OF RIGHT HIP: Status: ACTIVE | Noted: 2017-03-02

## 2021-12-16 NOTE — ASSESSMENT
[FreeTextEntry1] : 1.  Chronic atrial fibrillation ventricular rate controlled on Eliquis which she is tolerating\par \par 2.  Status post coronary bypass surgery no active angina or CHF\par \par 3.  EKG atrial fibrillation ventricular rate controlled right bundle branch block\par \par 4.  Severe osteoarthritis spinal stenosis with chronic back pain which is his major complaint and problem\par \par Presently he is hemodynamically stable on stable medications.

## 2021-12-16 NOTE — HISTORY OF PRESENT ILLNESS
[FreeTextEntry1] : Albino is 89 years old with a long history of chronic atrial fibrillation on long-term anticoagulation with Eliquis.  He has a history of preserved LV function on previous echo and is status post coronary bypass surgery many years ago at [facility] for multivessel coronary disease.\par \par He has mostly been plagued by severe back pain which has been chronic and fairly unrelieved over many years.  He is status post hip replacement.\par \par Presently from a cardiac point of view he offers no complaints of shortness of breath palpitations edema orthopnea or PND.  He has no chest pain\par \par He remains on stable medications which include Eliquis 5 twice daily I believe aspirin 81 Synthroid 88 mcg Norvasc 5 mg rosuvastatin 10 mg Toprol-XL 50 mg twice a day and chronic tramadol as well as Trulance 3 mg\par \par EKG today atrial fibrillation ventricular rate controlled right bundle branch block\par \par Recent blood tests\par Creatinine 0.97 GFR 62 potassium 3.9

## 2021-12-16 NOTE — DISCUSSION/SUMMARY
[FreeTextEntry1] : 1.  Follow-up in 3 months and at that point 2D echo to reevaluate LV and RV function and to rule out any valvular disease\par \par 2.  Continue present regimen of medications.  Samples of Eliquis given to the patient\par \par 3.  Follow-up with me in 3 months at the time that I will have the echocardiogram

## 2021-12-16 NOTE — PHYSICAL EXAM
[Normal Conjunctiva] : normal conjunctiva [Clear Lung Fields] : clear lung fields [Soft] : abdomen soft [Non Tender] : non-tender [No Edema] : no edema [de-identified] : Thin no acute distress appears chronically depressed [de-identified] : S1-S2 irregularly regular no murmur no S3 [de-identified] : No rales rhonchi or wheezes

## 2021-12-16 NOTE — REASON FOR VISIT
[FreeTextEntry1] : resolved  PNEUMONIA and Fever\par finish augmentin \par return as needed [FreeTextEntry1] : Keyshawn Arroyocaryl 89 years old here for evaluation of his cardiac status.  I have not seen this patient for over 2 years but has been a long-term patient of mine.  His records from [facility] have been requested

## 2022-01-01 ENCOUNTER — APPOINTMENT (OUTPATIENT)
Dept: THORACIC SURGERY | Facility: CLINIC | Age: 87
End: 2022-01-01

## 2022-01-01 ENCOUNTER — OUTPATIENT (OUTPATIENT)
Dept: OUTPATIENT SERVICES | Facility: HOSPITAL | Age: 87
LOS: 1 days | End: 2022-01-01

## 2022-01-01 ENCOUNTER — OUTPATIENT (OUTPATIENT)
Dept: INPATIENT UNIT | Facility: HOSPITAL | Age: 87
LOS: 1 days | Discharge: HOME | End: 2022-01-01

## 2022-01-01 ENCOUNTER — TRANSCRIPTION ENCOUNTER (OUTPATIENT)
Age: 87
End: 2022-01-01

## 2022-01-01 ENCOUNTER — NON-APPOINTMENT (OUTPATIENT)
Age: 87
End: 2022-01-01

## 2022-01-01 ENCOUNTER — INPATIENT (INPATIENT)
Facility: HOSPITAL | Age: 87
LOS: 4 days | DRG: 695 | End: 2022-12-13
Attending: INTERNAL MEDICINE | Admitting: INTERNAL MEDICINE
Payer: MEDICARE

## 2022-01-01 ENCOUNTER — RESULT REVIEW (OUTPATIENT)
Age: 87
End: 2022-01-01

## 2022-01-01 ENCOUNTER — APPOINTMENT (OUTPATIENT)
Dept: CARDIOLOGY | Facility: CLINIC | Age: 87
End: 2022-01-01
Payer: MEDICARE

## 2022-01-01 ENCOUNTER — LABORATORY RESULT (OUTPATIENT)
Age: 87
End: 2022-01-01

## 2022-01-01 ENCOUNTER — APPOINTMENT (OUTPATIENT)
Dept: RADIOLOGY | Facility: HOSPITAL | Age: 87
End: 2022-01-01

## 2022-01-01 ENCOUNTER — APPOINTMENT (OUTPATIENT)
Dept: CARDIOLOGY | Facility: CLINIC | Age: 87
End: 2022-01-01

## 2022-01-01 ENCOUNTER — APPOINTMENT (OUTPATIENT)
Dept: PULMONOLOGY | Facility: CLINIC | Age: 87
End: 2022-01-01
Payer: MEDICARE

## 2022-01-01 ENCOUNTER — APPOINTMENT (OUTPATIENT)
Dept: PULMONOLOGY | Facility: CLINIC | Age: 87
End: 2022-01-01

## 2022-01-01 ENCOUNTER — APPOINTMENT (OUTPATIENT)
Dept: THORACIC SURGERY | Facility: HOSPITAL | Age: 87
End: 2022-01-01

## 2022-01-01 ENCOUNTER — INPATIENT (INPATIENT)
Facility: HOSPITAL | Age: 87
LOS: 18 days | Discharge: NURSING, REHAB OR LONG-TERM CARE | End: 2022-12-07
Attending: THORACIC SURGERY (CARDIOTHORACIC VASCULAR SURGERY) | Admitting: THORACIC SURGERY (CARDIOTHORACIC VASCULAR SURGERY)
Payer: MEDICARE

## 2022-01-01 VITALS
BODY MASS INDEX: 21.97 KG/M2 | OXYGEN SATURATION: 94 % | DIASTOLIC BLOOD PRESSURE: 78 MMHG | HEART RATE: 118 BPM | WEIGHT: 140 LBS | SYSTOLIC BLOOD PRESSURE: 134 MMHG | HEIGHT: 67 IN

## 2022-01-01 VITALS
HEIGHT: 67 IN | DIASTOLIC BLOOD PRESSURE: 60 MMHG | BODY MASS INDEX: 21.97 KG/M2 | OXYGEN SATURATION: 98 % | WEIGHT: 140 LBS | SYSTOLIC BLOOD PRESSURE: 110 MMHG | HEART RATE: 64 BPM

## 2022-01-01 VITALS
OXYGEN SATURATION: 95 % | DIASTOLIC BLOOD PRESSURE: 76 MMHG | RESPIRATION RATE: 20 BRPM | SYSTOLIC BLOOD PRESSURE: 117 MMHG | TEMPERATURE: 98 F | HEART RATE: 68 BPM | HEIGHT: 67 IN

## 2022-01-01 VITALS
DIASTOLIC BLOOD PRESSURE: 71 MMHG | BODY MASS INDEX: 21.97 KG/M2 | SYSTOLIC BLOOD PRESSURE: 121 MMHG | HEART RATE: 88 BPM | RESPIRATION RATE: 16 BRPM | HEIGHT: 67 IN | OXYGEN SATURATION: 96 % | WEIGHT: 140 LBS

## 2022-01-01 VITALS
OXYGEN SATURATION: 95 % | HEART RATE: 99 BPM | SYSTOLIC BLOOD PRESSURE: 137 MMHG | TEMPERATURE: 97.9 F | DIASTOLIC BLOOD PRESSURE: 77 MMHG

## 2022-01-01 VITALS
HEART RATE: 83 BPM | WEIGHT: 140 LBS | SYSTOLIC BLOOD PRESSURE: 145 MMHG | HEIGHT: 67 IN | BODY MASS INDEX: 21.97 KG/M2 | DIASTOLIC BLOOD PRESSURE: 86 MMHG | RESPIRATION RATE: 18 BRPM

## 2022-01-01 VITALS
HEART RATE: 83 BPM | OXYGEN SATURATION: 99 % | HEIGHT: 67 IN | SYSTOLIC BLOOD PRESSURE: 136 MMHG | DIASTOLIC BLOOD PRESSURE: 80 MMHG | BODY MASS INDEX: 22.13 KG/M2 | WEIGHT: 141 LBS

## 2022-01-01 VITALS
OXYGEN SATURATION: 99 % | WEIGHT: 139.99 LBS | TEMPERATURE: 97 F | SYSTOLIC BLOOD PRESSURE: 140 MMHG | DIASTOLIC BLOOD PRESSURE: 77 MMHG | HEIGHT: 64 IN | HEART RATE: 71 BPM | RESPIRATION RATE: 15 BRPM

## 2022-01-01 VITALS
TEMPERATURE: 98 F | RESPIRATION RATE: 16 BRPM | HEART RATE: 85 BPM | SYSTOLIC BLOOD PRESSURE: 129 MMHG | OXYGEN SATURATION: 99 % | WEIGHT: 139.99 LBS | HEIGHT: 64 IN | DIASTOLIC BLOOD PRESSURE: 72 MMHG

## 2022-01-01 VITALS
TEMPERATURE: 98 F | RESPIRATION RATE: 18 BRPM | OXYGEN SATURATION: 98 % | DIASTOLIC BLOOD PRESSURE: 89 MMHG | SYSTOLIC BLOOD PRESSURE: 132 MMHG | HEART RATE: 70 BPM

## 2022-01-01 VITALS — SYSTOLIC BLOOD PRESSURE: 135 MMHG | OXYGEN SATURATION: 99 % | HEART RATE: 86 BPM | DIASTOLIC BLOOD PRESSURE: 79 MMHG

## 2022-01-01 VITALS
DIASTOLIC BLOOD PRESSURE: 53 MMHG | TEMPERATURE: 98 F | SYSTOLIC BLOOD PRESSURE: 85 MMHG | RESPIRATION RATE: 18 BRPM | HEART RATE: 109 BPM | OXYGEN SATURATION: 92 %

## 2022-01-01 VITALS
HEIGHT: 67 IN | WEIGHT: 148 LBS | DIASTOLIC BLOOD PRESSURE: 88 MMHG | SYSTOLIC BLOOD PRESSURE: 160 MMHG | OXYGEN SATURATION: 99 % | BODY MASS INDEX: 23.23 KG/M2 | HEART RATE: 82 BPM

## 2022-01-01 VITALS
OXYGEN SATURATION: 96 % | BODY MASS INDEX: 22.81 KG/M2 | HEART RATE: 77 BPM | HEIGHT: 67 IN | DIASTOLIC BLOOD PRESSURE: 81 MMHG | WEIGHT: 145.31 LBS | TEMPERATURE: 98.4 F | SYSTOLIC BLOOD PRESSURE: 136 MMHG

## 2022-01-01 VITALS
OXYGEN SATURATION: 100 % | RESPIRATION RATE: 18 BRPM | HEART RATE: 112 BPM | DIASTOLIC BLOOD PRESSURE: 70 MMHG | TEMPERATURE: 98 F | SYSTOLIC BLOOD PRESSURE: 117 MMHG

## 2022-01-01 VITALS
DIASTOLIC BLOOD PRESSURE: 71 MMHG | TEMPERATURE: 98 F | RESPIRATION RATE: 18 BRPM | HEART RATE: 140 BPM | SYSTOLIC BLOOD PRESSURE: 115 MMHG | HEIGHT: 64 IN

## 2022-01-01 DIAGNOSIS — J90 PLEURAL EFFUSION, NOT ELSEWHERE CLASSIFIED: ICD-10-CM

## 2022-01-01 DIAGNOSIS — Z96.641 PRESENCE OF RIGHT ARTIFICIAL HIP JOINT: Chronic | ICD-10-CM

## 2022-01-01 DIAGNOSIS — Z95.1 PRESENCE OF AORTOCORONARY BYPASS GRAFT: Chronic | ICD-10-CM

## 2022-01-01 DIAGNOSIS — Z98.890 OTHER SPECIFIED POSTPROCEDURAL STATES: Chronic | ICD-10-CM

## 2022-01-01 DIAGNOSIS — I10 ESSENTIAL (PRIMARY) HYPERTENSION: ICD-10-CM

## 2022-01-01 DIAGNOSIS — G93.40 ENCEPHALOPATHY, UNSPECIFIED: ICD-10-CM

## 2022-01-01 DIAGNOSIS — N17.9 ACUTE KIDNEY FAILURE, UNSPECIFIED: ICD-10-CM

## 2022-01-01 DIAGNOSIS — Z96.641 PRESENCE OF RIGHT ARTIFICIAL HIP JOINT: ICD-10-CM

## 2022-01-01 DIAGNOSIS — Z01.818 ENCOUNTER FOR OTHER PREPROCEDURAL EXAMINATION: ICD-10-CM

## 2022-01-01 DIAGNOSIS — R06.03 ACUTE RESPIRATORY DISTRESS: ICD-10-CM

## 2022-01-01 DIAGNOSIS — Z98.49 CATARACT EXTRACTION STATUS, UNSPECIFIED EYE: Chronic | ICD-10-CM

## 2022-01-01 DIAGNOSIS — Z95.1 PRESENCE OF AORTOCORONARY BYPASS GRAFT: ICD-10-CM

## 2022-01-01 DIAGNOSIS — Z71.89 OTHER SPECIFIED COUNSELING: ICD-10-CM

## 2022-01-01 DIAGNOSIS — E03.9 HYPOTHYROIDISM, UNSPECIFIED: ICD-10-CM

## 2022-01-01 DIAGNOSIS — R06.02 SHORTNESS OF BREATH: ICD-10-CM

## 2022-01-01 DIAGNOSIS — R93.5 ABNORMAL FINDINGS ON DIAGNOSTIC IMAGING OF OTHER ABDOMINAL REGIONS, INCLUDING RETROPERITONEUM: ICD-10-CM

## 2022-01-01 DIAGNOSIS — I48.20 CHRONIC ATRIAL FIBRILLATION, UNSPECIFIED: ICD-10-CM

## 2022-01-01 DIAGNOSIS — R31.9 HEMATURIA, UNSPECIFIED: ICD-10-CM

## 2022-01-01 DIAGNOSIS — I45.10 UNSPECIFIED RIGHT BUNDLE-BRANCH BLOCK: ICD-10-CM

## 2022-01-01 DIAGNOSIS — I35.9 NONRHEUMATIC AORTIC VALVE DISORDER, UNSPECIFIED: ICD-10-CM

## 2022-01-01 DIAGNOSIS — I27.20 PULMONARY HYPERTENSION, UNSPECIFIED: ICD-10-CM

## 2022-01-01 DIAGNOSIS — M48.00 SPINAL STENOSIS, SITE UNSPECIFIED: ICD-10-CM

## 2022-01-01 DIAGNOSIS — I48.20 CHRONIC ATRIAL FIBRILLATION, UNSP: ICD-10-CM

## 2022-01-01 DIAGNOSIS — R52 PAIN, UNSPECIFIED: ICD-10-CM

## 2022-01-01 DIAGNOSIS — Z86.39 PERSONAL HISTORY OF OTHER ENDOCRINE, NUTRITIONAL AND METABOLIC DISEASE: ICD-10-CM

## 2022-01-01 DIAGNOSIS — Z91.89 OTHER SPECIFIED PERSONAL RISK FACTORS, NOT ELSEWHERE CLASSIFIED: ICD-10-CM

## 2022-01-01 DIAGNOSIS — Z51.5 ENCOUNTER FOR PALLIATIVE CARE: ICD-10-CM

## 2022-01-01 LAB
% ALBUMIN: 40.9 % — SIGNIFICANT CHANGE UP
% ALPHA 1: 5.4 % — SIGNIFICANT CHANGE UP
% ALPHA 2: 15.2 % — SIGNIFICANT CHANGE UP
% BETA: 13.1 % — SIGNIFICANT CHANGE UP
% GAMMA: 25.4 % — SIGNIFICANT CHANGE UP
% M SPIKE: 10.2 % — SIGNIFICANT CHANGE UP
A1C WITH ESTIMATED AVERAGE GLUCOSE RESULT: 6.8 % — HIGH (ref 4–5.6)
ACID FAST STN FLD: NORMAL
ALBUMIN FLD-MCNC: 0.9 G/DL — SIGNIFICANT CHANGE UP
ALBUMIN FLD-MCNC: 1.5 G/DL — SIGNIFICANT CHANGE UP
ALBUMIN SERPL ELPH-MCNC: 2.49 G/DL — LOW (ref 3.3–4.4)
ALBUMIN SERPL ELPH-MCNC: 2.6 G/DL — LOW (ref 3.3–5)
ALBUMIN SERPL ELPH-MCNC: 2.7 G/DL — LOW (ref 3.3–5)
ALBUMIN SERPL ELPH-MCNC: 2.7 G/DL — LOW (ref 3.3–5)
ALBUMIN SERPL ELPH-MCNC: 2.8 G/DL — LOW (ref 3.3–5)
ALBUMIN SERPL ELPH-MCNC: 2.8 G/DL — LOW (ref 3.3–5)
ALBUMIN SERPL ELPH-MCNC: 2.9 G/DL — LOW (ref 3.3–5)
ALBUMIN SERPL ELPH-MCNC: 3.5 G/DL — SIGNIFICANT CHANGE UP (ref 3.3–5)
ALBUMIN SERPL ELPH-MCNC: 4.2 G/DL — SIGNIFICANT CHANGE UP (ref 3.3–5)
ALBUMIN/GLOB SERPL ELPH: 0.7 RATIO — SIGNIFICANT CHANGE UP
ALP SERPL-CCNC: 102 U/L — SIGNIFICANT CHANGE UP (ref 40–120)
ALP SERPL-CCNC: 112 U/L — SIGNIFICANT CHANGE UP (ref 40–120)
ALP SERPL-CCNC: 114 U/L — SIGNIFICANT CHANGE UP (ref 40–120)
ALP SERPL-CCNC: 126 U/L — HIGH (ref 40–120)
ALP SERPL-CCNC: 142 U/L — HIGH (ref 40–120)
ALP SERPL-CCNC: 145 U/L — HIGH (ref 40–120)
ALP SERPL-CCNC: 172 U/L — HIGH (ref 40–120)
ALP SERPL-CCNC: 176 U/L — HIGH (ref 40–120)
ALP SERPL-CCNC: 176 U/L — HIGH (ref 40–120)
ALP SERPL-CCNC: 177 U/L — HIGH (ref 40–120)
ALP SERPL-CCNC: 178 U/L — HIGH (ref 40–120)
ALP SERPL-CCNC: 195 U/L — HIGH (ref 40–120)
ALP SERPL-CCNC: 82 U/L — SIGNIFICANT CHANGE UP (ref 40–120)
ALPHA1 GLOB SERPL ELPH-MCNC: 0.33 G/DL — HIGH (ref 0.1–0.3)
ALPHA2 GLOB SERPL ELPH-MCNC: 0.9 G/DL — SIGNIFICANT CHANGE UP (ref 0.6–1)
ALT FLD-CCNC: 103 U/L — HIGH (ref 4–41)
ALT FLD-CCNC: 124 U/L — HIGH (ref 4–41)
ALT FLD-CCNC: 14 U/L — SIGNIFICANT CHANGE UP (ref 4–41)
ALT FLD-CCNC: 161 U/L — HIGH (ref 4–41)
ALT FLD-CCNC: 198 U/L — HIGH (ref 4–41)
ALT FLD-CCNC: 22 U/L — SIGNIFICANT CHANGE UP (ref 4–41)
ALT FLD-CCNC: 26 U/L — SIGNIFICANT CHANGE UP (ref 4–41)
ALT FLD-CCNC: 28 U/L — SIGNIFICANT CHANGE UP (ref 10–45)
ALT FLD-CCNC: 34 U/L — SIGNIFICANT CHANGE UP (ref 10–45)
ALT FLD-CCNC: 58 U/L — HIGH (ref 4–41)
ALT FLD-CCNC: 75 U/L — HIGH (ref 4–41)
ANION GAP SERPL CALC-SCNC: 10 MMOL/L — SIGNIFICANT CHANGE UP (ref 7–14)
ANION GAP SERPL CALC-SCNC: 10 MMOL/L — SIGNIFICANT CHANGE UP (ref 7–14)
ANION GAP SERPL CALC-SCNC: 11 MMOL/L — SIGNIFICANT CHANGE UP (ref 5–17)
ANION GAP SERPL CALC-SCNC: 11 MMOL/L — SIGNIFICANT CHANGE UP (ref 7–14)
ANION GAP SERPL CALC-SCNC: 12 MMOL/L — SIGNIFICANT CHANGE UP (ref 7–14)
ANION GAP SERPL CALC-SCNC: 12 MMOL/L — SIGNIFICANT CHANGE UP (ref 7–14)
ANION GAP SERPL CALC-SCNC: 13 MMOL/L — SIGNIFICANT CHANGE UP (ref 5–17)
ANION GAP SERPL CALC-SCNC: 13 MMOL/L — SIGNIFICANT CHANGE UP (ref 5–17)
ANION GAP SERPL CALC-SCNC: 13 MMOL/L — SIGNIFICANT CHANGE UP (ref 7–14)
ANION GAP SERPL CALC-SCNC: 13 MMOL/L — SIGNIFICANT CHANGE UP (ref 7–14)
ANION GAP SERPL CALC-SCNC: 14 MMOL/L — SIGNIFICANT CHANGE UP (ref 5–17)
ANION GAP SERPL CALC-SCNC: 14 MMOL/L — SIGNIFICANT CHANGE UP (ref 7–14)
ANION GAP SERPL CALC-SCNC: 15 MMOL/L — HIGH (ref 7–14)
ANION GAP SERPL CALC-SCNC: 19 MMOL/L — HIGH (ref 7–14)
ANION GAP SERPL CALC-SCNC: 7 MMOL/L — SIGNIFICANT CHANGE UP (ref 7–14)
ANION GAP SERPL CALC-SCNC: 8 MMOL/L — SIGNIFICANT CHANGE UP (ref 7–14)
ANION GAP SERPL CALC-SCNC: 9 MMOL/L — SIGNIFICANT CHANGE UP (ref 7–14)
ANISOCYTOSIS BLD QL: SLIGHT — SIGNIFICANT CHANGE UP
APPEARANCE UR: ABNORMAL
APPEARANCE UR: ABNORMAL
APPEARANCE UR: CLEAR — SIGNIFICANT CHANGE UP
APTT 50/50 2HOUR INCUB: SIGNIFICANT CHANGE UP SEC (ref 27.5–37.4)
APTT BLD: 25.7 SEC — LOW (ref 27–36.3)
APTT BLD: 30.8 SEC — SIGNIFICANT CHANGE UP (ref 27–36.3)
APTT BLD: 31 SEC — SIGNIFICANT CHANGE UP (ref 27–36.3)
APTT BLD: 31.6 SEC — SIGNIFICANT CHANGE UP (ref 27–36.3)
APTT BLD: 31.7 SEC — SIGNIFICANT CHANGE UP (ref 27–36.3)
APTT BLD: 31.8 SEC — SIGNIFICANT CHANGE UP (ref 27–36.3)
APTT BLD: 32.1 SEC — SIGNIFICANT CHANGE UP (ref 27–36.3)
APTT BLD: 32.7 SEC — SIGNIFICANT CHANGE UP (ref 27.5–35.5)
APTT BLD: 33.1 SEC — SIGNIFICANT CHANGE UP (ref 27.5–35.5)
APTT BLD: 33.2 SEC — SIGNIFICANT CHANGE UP (ref 27–36.3)
APTT BLD: 34.6 SEC — SIGNIFICANT CHANGE UP (ref 27–36.3)
APTT BLD: 35.8 SEC — SIGNIFICANT CHANGE UP (ref 27–36.3)
APTT BLD: 36.4 SEC
APTT BLD: 36.6 SEC — HIGH (ref 27–36.3)
APTT BLD: 37.5 SEC — HIGH (ref 27–36.3)
APTT BLD: SIGNIFICANT CHANGE UP SEC (ref 27.5–37.4)
AST SERPL-CCNC: 122 U/L — HIGH (ref 4–40)
AST SERPL-CCNC: 211 U/L — HIGH (ref 4–40)
AST SERPL-CCNC: 22 U/L — SIGNIFICANT CHANGE UP (ref 4–40)
AST SERPL-CCNC: 34 U/L — SIGNIFICANT CHANGE UP (ref 4–40)
AST SERPL-CCNC: 367 U/L — HIGH (ref 4–40)
AST SERPL-CCNC: 41 U/L — HIGH (ref 4–40)
AST SERPL-CCNC: 55 U/L — HIGH (ref 10–40)
AST SERPL-CCNC: 55 U/L — HIGH (ref 4–40)
AST SERPL-CCNC: 56 U/L — HIGH (ref 10–40)
AST SERPL-CCNC: 59 U/L — HIGH (ref 10–40)
AST SERPL-CCNC: 73 U/L — HIGH (ref 10–40)
AST SERPL-CCNC: 78 U/L — HIGH (ref 4–40)
AST SERPL-CCNC: 83 U/L — HIGH (ref 4–40)
B PERT IGG+IGM PNL SER: ABNORMAL
B PERT IGG+IGM PNL SER: ABNORMAL
B PERT IGG+IGM PNL SER: CLEAR — SIGNIFICANT CHANGE UP
B-GLOBULIN SERPL ELPH-MCNC: 0.8 G/DL — SIGNIFICANT CHANGE UP (ref 0.6–1.1)
B2 GLYCOPROT1 AB SER QL: NEGATIVE — SIGNIFICANT CHANGE UP
BACTERIA # UR AUTO: NEGATIVE — SIGNIFICANT CHANGE UP
BACTERIA # UR AUTO: NEGATIVE — SIGNIFICANT CHANGE UP
BACTERIA FLD CULT: NORMAL
BASE EXCESS BLDV CALC-SCNC: 2 MMOL/L — SIGNIFICANT CHANGE UP (ref -2–3)
BASOPHILS # BLD AUTO: 0 K/UL — SIGNIFICANT CHANGE UP (ref 0–0.2)
BASOPHILS # BLD AUTO: 0.05 K/UL
BASOPHILS # BLD AUTO: 0.2 K/UL — SIGNIFICANT CHANGE UP (ref 0–0.2)
BASOPHILS NFR BLD AUTO: 0 % — SIGNIFICANT CHANGE UP (ref 0–2)
BASOPHILS NFR BLD AUTO: 0.6 %
BASOPHILS NFR BLD AUTO: 0.9 % — SIGNIFICANT CHANGE UP (ref 0–2)
BETA CAROTENE: 5 UG/DL — SIGNIFICANT CHANGE UP (ref 3–91)
BILIRUB DIRECT SERPL-MCNC: 0.3 MG/DL — SIGNIFICANT CHANGE UP (ref 0–0.3)
BILIRUB DIRECT SERPL-MCNC: 0.3 MG/DL — SIGNIFICANT CHANGE UP (ref 0–0.3)
BILIRUB DIRECT SERPL-MCNC: 0.5 MG/DL — HIGH (ref 0–0.3)
BILIRUB INDIRECT FLD-MCNC: 0.4 MG/DL — SIGNIFICANT CHANGE UP (ref 0–1)
BILIRUB INDIRECT FLD-MCNC: 0.5 MG/DL — SIGNIFICANT CHANGE UP (ref 0–1)
BILIRUB INDIRECT FLD-MCNC: 0.5 MG/DL — SIGNIFICANT CHANGE UP (ref 0–1)
BILIRUB SERPL-MCNC: 0.6 MG/DL — SIGNIFICANT CHANGE UP (ref 0.2–1.2)
BILIRUB SERPL-MCNC: 0.7 MG/DL — SIGNIFICANT CHANGE UP (ref 0.2–1.2)
BILIRUB SERPL-MCNC: 0.7 MG/DL — SIGNIFICANT CHANGE UP (ref 0.2–1.2)
BILIRUB SERPL-MCNC: 0.8 MG/DL — SIGNIFICANT CHANGE UP (ref 0.2–1.2)
BILIRUB SERPL-MCNC: 0.9 MG/DL — SIGNIFICANT CHANGE UP (ref 0.2–1.2)
BILIRUB SERPL-MCNC: 1 MG/DL — SIGNIFICANT CHANGE UP (ref 0.2–1.2)
BILIRUB SERPL-MCNC: 1.1 MG/DL — SIGNIFICANT CHANGE UP (ref 0.2–1.2)
BILIRUB SERPL-MCNC: 1.4 MG/DL — HIGH (ref 0.2–1.2)
BILIRUB UR-MCNC: NEGATIVE — SIGNIFICANT CHANGE UP
BLD GP AB SCN SERPL QL: NEGATIVE — SIGNIFICANT CHANGE UP
BLOOD GAS VENOUS COMPREHENSIVE RESULT: SIGNIFICANT CHANGE UP
BUN SERPL-MCNC: 105 MG/DL — HIGH (ref 7–23)
BUN SERPL-MCNC: 31 MG/DL — HIGH (ref 7–23)
BUN SERPL-MCNC: 36 MG/DL — HIGH (ref 7–23)
BUN SERPL-MCNC: 37 MG/DL — HIGH (ref 7–23)
BUN SERPL-MCNC: 38 MG/DL — HIGH (ref 7–23)
BUN SERPL-MCNC: 40 MG/DL — HIGH (ref 7–23)
BUN SERPL-MCNC: 41 MG/DL — HIGH (ref 7–23)
BUN SERPL-MCNC: 41 MG/DL — HIGH (ref 7–23)
BUN SERPL-MCNC: 42 MG/DL — HIGH (ref 7–23)
BUN SERPL-MCNC: 43 MG/DL — HIGH (ref 7–23)
BUN SERPL-MCNC: 43 MG/DL — HIGH (ref 7–23)
BUN SERPL-MCNC: 44 MG/DL — HIGH (ref 7–23)
BUN SERPL-MCNC: 45 MG/DL — HIGH (ref 7–23)
BUN SERPL-MCNC: 46 MG/DL — HIGH (ref 7–23)
BUN SERPL-MCNC: 49 MG/DL — HIGH (ref 7–23)
BUN SERPL-MCNC: 49 MG/DL — HIGH (ref 7–23)
BUN SERPL-MCNC: 50 MG/DL — HIGH (ref 7–23)
BUN SERPL-MCNC: 52 MG/DL — HIGH (ref 7–23)
BUN SERPL-MCNC: 54 MG/DL — HIGH (ref 7–23)
BUN SERPL-MCNC: 54 MG/DL — HIGH (ref 7–23)
BUN SERPL-MCNC: 67 MG/DL — HIGH (ref 7–23)
BUN SERPL-MCNC: 69 MG/DL — HIGH (ref 7–23)
BUN SERPL-MCNC: 75 MG/DL — HIGH (ref 7–23)
BUN SERPL-MCNC: 93 MG/DL — HIGH (ref 7–23)
BUN SERPL-MCNC: 95 MG/DL — HIGH (ref 7–23)
CA-I BLD-SCNC: 1.22 MMOL/L — SIGNIFICANT CHANGE UP (ref 1.15–1.29)
CALCIUM SERPL-MCNC: 8.7 MG/DL — SIGNIFICANT CHANGE UP (ref 8.4–10.5)
CALCIUM SERPL-MCNC: 8.8 MG/DL — SIGNIFICANT CHANGE UP (ref 8.4–10.5)
CALCIUM SERPL-MCNC: 8.8 MG/DL — SIGNIFICANT CHANGE UP (ref 8.4–10.5)
CALCIUM SERPL-MCNC: 8.9 MG/DL — SIGNIFICANT CHANGE UP (ref 8.4–10.5)
CALCIUM SERPL-MCNC: 9 MG/DL — SIGNIFICANT CHANGE UP (ref 8.4–10.5)
CALCIUM SERPL-MCNC: 9.1 MG/DL — SIGNIFICANT CHANGE UP (ref 8.4–10.5)
CALCIUM SERPL-MCNC: 9.1 MG/DL — SIGNIFICANT CHANGE UP (ref 8.4–10.5)
CALCIUM SERPL-MCNC: 9.2 MG/DL — SIGNIFICANT CHANGE UP (ref 8.4–10.5)
CALCIUM SERPL-MCNC: 9.3 MG/DL — SIGNIFICANT CHANGE UP (ref 8.4–10.5)
CALCIUM SERPL-MCNC: 9.8 MG/DL — SIGNIFICANT CHANGE UP (ref 8.4–10.5)
CALCIUM SERPL-MCNC: 9.8 MG/DL — SIGNIFICANT CHANGE UP (ref 8.4–10.5)
CARDIOLIPIN AB SER-ACNC: POSITIVE
CHLORIDE BLDV-SCNC: 104 MMOL/L — SIGNIFICANT CHANGE UP (ref 96–108)
CHLORIDE SERPL-SCNC: 101 MMOL/L — SIGNIFICANT CHANGE UP (ref 96–108)
CHLORIDE SERPL-SCNC: 101 MMOL/L — SIGNIFICANT CHANGE UP (ref 98–107)
CHLORIDE SERPL-SCNC: 101 MMOL/L — SIGNIFICANT CHANGE UP (ref 98–107)
CHLORIDE SERPL-SCNC: 102 MMOL/L — SIGNIFICANT CHANGE UP (ref 98–107)
CHLORIDE SERPL-SCNC: 103 MMOL/L — SIGNIFICANT CHANGE UP (ref 98–107)
CHLORIDE SERPL-SCNC: 104 MMOL/L — SIGNIFICANT CHANGE UP (ref 96–108)
CHLORIDE SERPL-SCNC: 104 MMOL/L — SIGNIFICANT CHANGE UP (ref 96–108)
CHLORIDE SERPL-SCNC: 104 MMOL/L — SIGNIFICANT CHANGE UP (ref 98–107)
CHLORIDE SERPL-SCNC: 105 MMOL/L — SIGNIFICANT CHANGE UP (ref 96–108)
CHLORIDE SERPL-SCNC: 105 MMOL/L — SIGNIFICANT CHANGE UP (ref 98–107)
CHLORIDE SERPL-SCNC: 106 MMOL/L — SIGNIFICANT CHANGE UP (ref 96–108)
CHLORIDE SERPL-SCNC: 106 MMOL/L — SIGNIFICANT CHANGE UP (ref 96–108)
CHLORIDE SERPL-SCNC: 106 MMOL/L — SIGNIFICANT CHANGE UP (ref 98–107)
CHLORIDE SERPL-SCNC: 106 MMOL/L — SIGNIFICANT CHANGE UP (ref 98–107)
CHLORIDE SERPL-SCNC: 107 MMOL/L — SIGNIFICANT CHANGE UP (ref 98–107)
CHLORIDE SERPL-SCNC: 108 MMOL/L — HIGH (ref 98–107)
CHLORIDE SERPL-SCNC: 97 MMOL/L — LOW (ref 98–107)
CHOLEST FLD-MCNC: 12 MG/DL — SIGNIFICANT CHANGE UP
CHOLEST SERPL-MCNC: 64 MG/DL — SIGNIFICANT CHANGE UP
CO2 BLDV-SCNC: 29.7 MMOL/L — HIGH (ref 22–26)
CO2 SERPL-SCNC: 22 MMOL/L — SIGNIFICANT CHANGE UP (ref 22–31)
CO2 SERPL-SCNC: 23 MMOL/L — SIGNIFICANT CHANGE UP (ref 22–31)
CO2 SERPL-SCNC: 24 MMOL/L — SIGNIFICANT CHANGE UP (ref 22–31)
CO2 SERPL-SCNC: 25 MMOL/L — SIGNIFICANT CHANGE UP (ref 22–31)
CO2 SERPL-SCNC: 26 MMOL/L — SIGNIFICANT CHANGE UP (ref 22–31)
CO2 SERPL-SCNC: 27 MMOL/L — SIGNIFICANT CHANGE UP (ref 22–31)
CO2 SERPL-SCNC: 27 MMOL/L — SIGNIFICANT CHANGE UP (ref 22–31)
CO2 SERPL-SCNC: 28 MMOL/L — SIGNIFICANT CHANGE UP (ref 22–31)
COLOR FLD: ABNORMAL
COLOR FLD: NORMAL
COLOR FLD: SIGNIFICANT CHANGE UP
COLOR SPEC: ABNORMAL
COLOR SPEC: YELLOW — SIGNIFICANT CHANGE UP
COLOR SPEC: YELLOW — SIGNIFICANT CHANGE UP
COMMENT - URINE: SIGNIFICANT CHANGE UP
CREAT SERPL-MCNC: 1.15 MG/DL — SIGNIFICANT CHANGE UP (ref 0.5–1.3)
CREAT SERPL-MCNC: 1.22 MG/DL — SIGNIFICANT CHANGE UP (ref 0.5–1.3)
CREAT SERPL-MCNC: 1.25 MG/DL — SIGNIFICANT CHANGE UP (ref 0.5–1.3)
CREAT SERPL-MCNC: 1.25 MG/DL — SIGNIFICANT CHANGE UP (ref 0.5–1.3)
CREAT SERPL-MCNC: 1.26 MG/DL — SIGNIFICANT CHANGE UP (ref 0.5–1.3)
CREAT SERPL-MCNC: 1.27 MG/DL — SIGNIFICANT CHANGE UP (ref 0.5–1.3)
CREAT SERPL-MCNC: 1.28 MG/DL — SIGNIFICANT CHANGE UP (ref 0.5–1.3)
CREAT SERPL-MCNC: 1.29 MG/DL — SIGNIFICANT CHANGE UP (ref 0.5–1.3)
CREAT SERPL-MCNC: 1.33 MG/DL — HIGH (ref 0.5–1.3)
CREAT SERPL-MCNC: 1.36 MG/DL — HIGH (ref 0.5–1.3)
CREAT SERPL-MCNC: 1.38 MG/DL — HIGH (ref 0.5–1.3)
CREAT SERPL-MCNC: 1.39 MG/DL — HIGH (ref 0.5–1.3)
CREAT SERPL-MCNC: 1.39 MG/DL — HIGH (ref 0.5–1.3)
CREAT SERPL-MCNC: 1.5 MG/DL — HIGH (ref 0.5–1.3)
CREAT SERPL-MCNC: 1.54 MG/DL — HIGH (ref 0.5–1.3)
CREAT SERPL-MCNC: 1.55 MG/DL — HIGH (ref 0.5–1.3)
CREAT SERPL-MCNC: 1.57 MG/DL — HIGH (ref 0.5–1.3)
CREAT SERPL-MCNC: 1.63 MG/DL — HIGH (ref 0.5–1.3)
CREAT SERPL-MCNC: 1.67 MG/DL — HIGH (ref 0.5–1.3)
CREAT SERPL-MCNC: 1.67 MG/DL — HIGH (ref 0.5–1.3)
CREAT SERPL-MCNC: 1.81 MG/DL — HIGH (ref 0.5–1.3)
CREAT SERPL-MCNC: 1.96 MG/DL — HIGH (ref 0.5–1.3)
CREAT SERPL-MCNC: 2.89 MG/DL — HIGH (ref 0.5–1.3)
CREAT SERPL-MCNC: 2.97 MG/DL — HIGH (ref 0.5–1.3)
CREAT SERPL-MCNC: 3.76 MG/DL — HIGH (ref 0.5–1.3)
CULTURE RESULTS: NO GROWTH — SIGNIFICANT CHANGE UP
CULTURE RESULTS: SIGNIFICANT CHANGE UP
D DIMER BLD IA.RAPID-MCNC: 942 NG/ML DDU — HIGH
DIFF PNL FLD: ABNORMAL
DIFF PNL FLD: ABNORMAL
DIFF PNL FLD: NEGATIVE — SIGNIFICANT CHANGE UP
EGFR: 15 ML/MIN/1.73M2 — LOW
EGFR: 19 ML/MIN/1.73M2 — LOW
EGFR: 20 ML/MIN/1.73M2 — LOW
EGFR: 32 ML/MIN/1.73M2 — LOW
EGFR: 35 ML/MIN/1.73M2 — LOW
EGFR: 39 ML/MIN/1.73M2 — LOW
EGFR: 39 ML/MIN/1.73M2 — LOW
EGFR: 40 ML/MIN/1.73M2 — LOW
EGFR: 42 ML/MIN/1.73M2 — LOW
EGFR: 42 ML/MIN/1.73M2 — LOW
EGFR: 43 ML/MIN/1.73M2 — LOW
EGFR: 44 ML/MIN/1.73M2 — LOW
EGFR: 48 ML/MIN/1.73M2 — LOW
EGFR: 48 ML/MIN/1.73M2 — LOW
EGFR: 49 ML/MIN/1.73M2 — LOW
EGFR: 49 ML/MIN/1.73M2 — LOW
EGFR: 51 ML/MIN/1.73M2 — LOW
EGFR: 53 ML/MIN/1.73M2 — LOW
EGFR: 53 ML/MIN/1.73M2 — LOW
EGFR: 54 ML/MIN/1.73M2 — LOW
EGFR: 54 ML/MIN/1.73M2 — LOW
EGFR: 55 ML/MIN/1.73M2 — LOW
EGFR: 55 ML/MIN/1.73M2 — LOW
EGFR: 56 ML/MIN/1.73M2 — LOW
EGFR: 60 ML/MIN/1.73M2 — SIGNIFICANT CHANGE UP
ELLIPTOCYTES BLD QL SMEAR: SLIGHT — SIGNIFICANT CHANGE UP
EOSINOPHIL # BLD AUTO: 0 K/UL — SIGNIFICANT CHANGE UP (ref 0–0.5)
EOSINOPHIL # BLD AUTO: 0.02 K/UL
EOSINOPHIL # BLD AUTO: 0.2 K/UL — SIGNIFICANT CHANGE UP (ref 0–0.5)
EOSINOPHIL # FLD MANUAL: 0 %
EOSINOPHIL # FLD: 0 % — SIGNIFICANT CHANGE UP
EOSINOPHIL NFR BLD AUTO: 0 % — SIGNIFICANT CHANGE UP (ref 0–6)
EOSINOPHIL NFR BLD AUTO: 0.2 %
EOSINOPHIL NFR BLD AUTO: 0.9 % — SIGNIFICANT CHANGE UP (ref 0–6)
EPI CELLS # UR: 1 /HPF — SIGNIFICANT CHANGE UP
EPI CELLS # UR: 2 /HPF — SIGNIFICANT CHANGE UP (ref 0–5)
ESTIMATED AVERAGE GLUCOSE: 148 MG/DL — HIGH (ref 68–114)
FACT II INHIB PPP-ACNC: 80 % — SIGNIFICANT CHANGE UP (ref 80–135)
FACT IX PPP CHRO-ACNC: 142.5 % — SIGNIFICANT CHANGE UP (ref 52–150)
FACT VII ACT/NOR PPP: 35 % — LOW (ref 50–165)
FACT VII ACT/NOR PPP: 42.2 % — LOW (ref 50–165)
FACT X ACT/NOR PPP: 70.8 % — SIGNIFICANT CHANGE UP (ref 70–150)
FERRITIN SERPL-MCNC: 208 NG/ML — SIGNIFICANT CHANGE UP (ref 30–400)
FIBRINOGEN PPP-MCNC: 746 MG/DL — HIGH (ref 200–465)
FLUAV AG NPH QL: SIGNIFICANT CHANGE UP
FLUAV AG NPH QL: SIGNIFICANT CHANGE UP
FLUBV AG NPH QL: SIGNIFICANT CHANGE UP
FLUBV AG NPH QL: SIGNIFICANT CHANGE UP
FLUID INTAKE SUBSTANCE CLASS: NORMAL
FLUID INTAKE SUBSTANCE CLASS: SIGNIFICANT CHANGE UP
FLUID INTAKE SUBSTANCE CLASS: SIGNIFICANT CHANGE UP
FOLATE SERPL-MCNC: 7 NG/ML — SIGNIFICANT CHANGE UP
FOLATE+VIT B12 SERBLD-IMP: 0 % — SIGNIFICANT CHANGE UP
FUNGUS FLD CULT: NORMAL
GAMMA GLOBULIN: 1.55 G/DL — SIGNIFICANT CHANGE UP (ref 0.7–1.7)
GAS PNL BLDV: 135 MMOL/L — LOW (ref 136–145)
GAS PNL BLDV: SIGNIFICANT CHANGE UP
GIANT PLATELETS BLD QL SMEAR: PRESENT — SIGNIFICANT CHANGE UP
GLUCOSE BLDV-MCNC: 154 MG/DL — HIGH (ref 70–99)
GLUCOSE FLD-MCNC: 158 MG/DL — SIGNIFICANT CHANGE UP
GLUCOSE FLD-MCNC: 45 MG/DL — SIGNIFICANT CHANGE UP
GLUCOSE SERPL-MCNC: 101 MG/DL — HIGH (ref 70–99)
GLUCOSE SERPL-MCNC: 102 MG/DL — HIGH (ref 70–99)
GLUCOSE SERPL-MCNC: 107 MG/DL — HIGH (ref 70–99)
GLUCOSE SERPL-MCNC: 110 MG/DL — HIGH (ref 70–99)
GLUCOSE SERPL-MCNC: 111 MG/DL — HIGH (ref 70–99)
GLUCOSE SERPL-MCNC: 112 MG/DL — HIGH (ref 70–99)
GLUCOSE SERPL-MCNC: 113 MG/DL — HIGH (ref 70–99)
GLUCOSE SERPL-MCNC: 116 MG/DL — HIGH (ref 70–99)
GLUCOSE SERPL-MCNC: 116 MG/DL — HIGH (ref 70–99)
GLUCOSE SERPL-MCNC: 117 MG/DL — HIGH (ref 70–99)
GLUCOSE SERPL-MCNC: 118 MG/DL — HIGH (ref 70–99)
GLUCOSE SERPL-MCNC: 119 MG/DL — HIGH (ref 70–99)
GLUCOSE SERPL-MCNC: 119 MG/DL — HIGH (ref 70–99)
GLUCOSE SERPL-MCNC: 124 MG/DL
GLUCOSE SERPL-MCNC: 124 MG/DL — HIGH (ref 70–99)
GLUCOSE SERPL-MCNC: 125 MG/DL — HIGH (ref 70–99)
GLUCOSE SERPL-MCNC: 125 MG/DL — HIGH (ref 70–99)
GLUCOSE SERPL-MCNC: 126 MG/DL — HIGH (ref 70–99)
GLUCOSE SERPL-MCNC: 126 MG/DL — HIGH (ref 70–99)
GLUCOSE SERPL-MCNC: 129 MG/DL — HIGH (ref 70–99)
GLUCOSE SERPL-MCNC: 131 MG/DL — HIGH (ref 70–99)
GLUCOSE SERPL-MCNC: 140 MG/DL — HIGH (ref 70–99)
GLUCOSE SERPL-MCNC: 147 MG/DL — HIGH (ref 70–99)
GLUCOSE SERPL-MCNC: 184 MG/DL — HIGH (ref 70–99)
GLUCOSE SERPL-MCNC: 74 MG/DL — SIGNIFICANT CHANGE UP (ref 70–99)
GLUCOSE SERPL-MCNC: 97 MG/DL — SIGNIFICANT CHANGE UP (ref 70–99)
GLUCOSE UR QL: NEGATIVE — SIGNIFICANT CHANGE UP
GRAM STN FLD: SIGNIFICANT CHANGE UP
HAV IGM SER-ACNC: SIGNIFICANT CHANGE UP
HBV CORE IGM SER-ACNC: SIGNIFICANT CHANGE UP
HBV SURFACE AG SER-ACNC: SIGNIFICANT CHANGE UP
HCO3 BLDV-SCNC: 28 MMOL/L — SIGNIFICANT CHANGE UP (ref 22–29)
HCT VFR BLD CALC: 29.4 % — LOW (ref 39–50)
HCT VFR BLD CALC: 29.7 % — LOW (ref 39–50)
HCT VFR BLD CALC: 30.1 % — LOW (ref 39–50)
HCT VFR BLD CALC: 30.2 % — LOW (ref 39–50)
HCT VFR BLD CALC: 30.3 % — LOW (ref 39–50)
HCT VFR BLD CALC: 30.7 % — LOW (ref 39–50)
HCT VFR BLD CALC: 30.7 % — LOW (ref 39–50)
HCT VFR BLD CALC: 31.3 % — LOW (ref 39–50)
HCT VFR BLD CALC: 31.5 % — LOW (ref 39–50)
HCT VFR BLD CALC: 31.6 % — LOW (ref 39–50)
HCT VFR BLD CALC: 31.7 % — LOW (ref 39–50)
HCT VFR BLD CALC: 33.3 % — LOW (ref 39–50)
HCT VFR BLD CALC: 33.4 % — LOW (ref 39–50)
HCT VFR BLD CALC: 33.7 % — LOW (ref 39–50)
HCT VFR BLD CALC: 33.8 % — LOW (ref 39–50)
HCT VFR BLD CALC: 34 % — LOW (ref 39–50)
HCT VFR BLD CALC: 34 % — LOW (ref 39–50)
HCT VFR BLD CALC: 34.1 % — LOW (ref 39–50)
HCT VFR BLD CALC: 34.7 % — LOW (ref 39–50)
HCT VFR BLD CALC: 34.8 % — LOW (ref 39–50)
HCT VFR BLD CALC: 35 % — LOW (ref 39–50)
HCT VFR BLD CALC: 35.4 % — LOW (ref 39–50)
HCT VFR BLD CALC: 35.8 % — LOW (ref 39–50)
HCT VFR BLD CALC: 36.4 % — LOW (ref 39–50)
HCT VFR BLD CALC: 36.5 % — LOW (ref 39–50)
HCT VFR BLD CALC: 38.7 % — LOW (ref 39–50)
HCT VFR BLD CALC: 44.6 %
HCT VFR BLDA CALC: 35 % — LOW (ref 39–51)
HCV AB S/CO SERPL IA: 0.12 S/CO — SIGNIFICANT CHANGE UP (ref 0–0.99)
HCV AB SERPL-IMP: SIGNIFICANT CHANGE UP
HDLC SERPL-MCNC: 29 MG/DL — LOW
HGB BLD CALC-MCNC: 11.8 G/DL — LOW (ref 13–17)
HGB BLD-MCNC: 10 G/DL — LOW (ref 13–17)
HGB BLD-MCNC: 10 G/DL — LOW (ref 13–17)
HGB BLD-MCNC: 10.1 G/DL — LOW (ref 13–17)
HGB BLD-MCNC: 10.3 G/DL — LOW (ref 13–17)
HGB BLD-MCNC: 10.6 G/DL — LOW (ref 13–17)
HGB BLD-MCNC: 10.6 G/DL — LOW (ref 13–17)
HGB BLD-MCNC: 10.7 G/DL — LOW (ref 13–17)
HGB BLD-MCNC: 10.7 G/DL — LOW (ref 13–17)
HGB BLD-MCNC: 11.1 G/DL — LOW (ref 13–17)
HGB BLD-MCNC: 11.2 G/DL — LOW (ref 13–17)
HGB BLD-MCNC: 13.8 G/DL
HGB BLD-MCNC: 8.5 G/DL — LOW (ref 13–17)
HGB BLD-MCNC: 8.7 G/DL — LOW (ref 13–17)
HGB BLD-MCNC: 8.9 G/DL — LOW (ref 13–17)
HGB BLD-MCNC: 9.1 G/DL — LOW (ref 13–17)
HGB BLD-MCNC: 9.2 G/DL — LOW (ref 13–17)
HGB BLD-MCNC: 9.2 G/DL — LOW (ref 13–17)
HGB BLD-MCNC: 9.6 G/DL — LOW (ref 13–17)
HGB BLD-MCNC: 9.7 G/DL — LOW (ref 13–17)
HYALINE CASTS # UR AUTO: 1 /LPF — SIGNIFICANT CHANGE UP (ref 0–2)
HYALINE CASTS # UR AUTO: 4 /LPF — SIGNIFICANT CHANGE UP (ref 0–7)
IANC: 20.3 K/UL — HIGH (ref 1.8–7.4)
IMM GRANULOCYTES NFR BLD AUTO: 0.3 %
INR BLD: 1.52 RATIO — HIGH (ref 0.88–1.16)
INR BLD: 1.54 RATIO — HIGH (ref 0.88–1.16)
INR BLD: 1.57 RATIO — HIGH (ref 0.88–1.16)
INR BLD: 1.58 RATIO — HIGH (ref 0.88–1.16)
INR BLD: 1.58 RATIO — HIGH (ref 0.88–1.16)
INR BLD: 1.61 RATIO — HIGH (ref 0.88–1.16)
INR BLD: 1.64 RATIO — HIGH (ref 0.88–1.16)
INR BLD: 1.66 RATIO — HIGH (ref 0.88–1.16)
INR BLD: 1.67 RATIO — HIGH (ref 0.88–1.16)
INR BLD: 1.69 RATIO — HIGH (ref 0.88–1.16)
INR BLD: 1.82 RATIO — HIGH (ref 0.88–1.16)
INR BLD: 1.83 RATIO — HIGH (ref 0.88–1.16)
INR BLD: 1.88 RATIO — HIGH (ref 0.88–1.16)
INR BLD: 2.1 RATIO — HIGH (ref 0.88–1.16)
INR BLD: 2.43 RATIO — HIGH (ref 0.88–1.16)
INR BLD: 2.83 RATIO — HIGH (ref 0.88–1.16)
INR BLD: 3.25 RATIO — HIGH (ref 0.88–1.16)
INR PPP: 2.21 RATIO
IRON SATN MFR SERPL: 20 % — SIGNIFICANT CHANGE UP (ref 16–55)
IRON SATN MFR SERPL: 34 UG/DL — LOW (ref 45–165)
KETONES UR-MCNC: NEGATIVE — SIGNIFICANT CHANGE UP
LACTATE BLDV-MCNC: 2.2 MMOL/L — HIGH (ref 0.5–2)
LDH FLD-CCNC: 79 U/L
LDH SERPL L TO P-CCNC: 113 U/L — SIGNIFICANT CHANGE UP
LDH SERPL L TO P-CCNC: 7437 U/L — SIGNIFICANT CHANGE UP
LEUKOCYTE ESTERASE UR-ACNC: ABNORMAL
LEUKOCYTE ESTERASE UR-ACNC: ABNORMAL
LEUKOCYTE ESTERASE UR-ACNC: NEGATIVE — SIGNIFICANT CHANGE UP
LIDOCAIN IGE QN: 17 U/L — SIGNIFICANT CHANGE UP (ref 7–60)
LIPID PNL WITH DIRECT LDL SERPL: 22 MG/DL — SIGNIFICANT CHANGE UP
LUPUS ANTICOAGULANT PROFILE RESULT: SIGNIFICANT CHANGE UP
LYMPHOCYTES # BLD AUTO: 0.2 K/UL — LOW (ref 1–3.3)
LYMPHOCYTES # BLD AUTO: 0.77 K/UL
LYMPHOCYTES # BLD AUTO: 0.77 K/UL — LOW (ref 1–3.3)
LYMPHOCYTES # BLD AUTO: 0.9 % — LOW (ref 13–44)
LYMPHOCYTES # BLD AUTO: 3.5 % — LOW (ref 13–44)
LYMPHOCYTES # FLD MANUAL: 77 %
LYMPHOCYTES # FLD: 38 % — SIGNIFICANT CHANGE UP
LYMPHOCYTES NFR BLD AUTO: 8.6 %
M-SPIKE: 0.6 G/DL — SIGNIFICANT CHANGE UP
MACROCYTES BLD QL: SLIGHT — SIGNIFICANT CHANGE UP
MAGNESIUM SERPL-MCNC: 2 MG/DL — SIGNIFICANT CHANGE UP (ref 1.6–2.6)
MAGNESIUM SERPL-MCNC: 2.1 MG/DL — SIGNIFICANT CHANGE UP (ref 1.6–2.6)
MAGNESIUM SERPL-MCNC: 2.2 MG/DL — SIGNIFICANT CHANGE UP (ref 1.6–2.6)
MAGNESIUM SERPL-MCNC: 2.2 MG/DL — SIGNIFICANT CHANGE UP (ref 1.6–2.6)
MAGNESIUM SERPL-MCNC: 2.3 MG/DL — SIGNIFICANT CHANGE UP (ref 1.6–2.6)
MAGNESIUM SERPL-MCNC: 2.4 MG/DL — SIGNIFICANT CHANGE UP (ref 1.6–2.6)
MAGNESIUM SERPL-MCNC: 2.7 MG/DL — HIGH (ref 1.6–2.6)
MAN DIFF?: NORMAL
MANUAL SMEAR VERIFICATION: SIGNIFICANT CHANGE UP
MCHC RBC-ENTMCNC: 28.3 PG — SIGNIFICANT CHANGE UP (ref 27–34)
MCHC RBC-ENTMCNC: 28.3 PG — SIGNIFICANT CHANGE UP (ref 27–34)
MCHC RBC-ENTMCNC: 28.4 PG — SIGNIFICANT CHANGE UP (ref 27–34)
MCHC RBC-ENTMCNC: 28.5 PG — SIGNIFICANT CHANGE UP (ref 27–34)
MCHC RBC-ENTMCNC: 28.6 GM/DL — LOW (ref 32–36)
MCHC RBC-ENTMCNC: 28.6 PG — SIGNIFICANT CHANGE UP (ref 27–34)
MCHC RBC-ENTMCNC: 28.7 GM/DL — LOW (ref 32–36)
MCHC RBC-ENTMCNC: 28.7 PG — SIGNIFICANT CHANGE UP (ref 27–34)
MCHC RBC-ENTMCNC: 28.7 PG — SIGNIFICANT CHANGE UP (ref 27–34)
MCHC RBC-ENTMCNC: 28.8 GM/DL — LOW (ref 32–36)
MCHC RBC-ENTMCNC: 28.8 GM/DL — LOW (ref 32–36)
MCHC RBC-ENTMCNC: 28.8 PG — SIGNIFICANT CHANGE UP (ref 27–34)
MCHC RBC-ENTMCNC: 28.9 GM/DL — LOW (ref 32–36)
MCHC RBC-ENTMCNC: 28.9 GM/DL — LOW (ref 32–36)
MCHC RBC-ENTMCNC: 28.9 PG — SIGNIFICANT CHANGE UP (ref 27–34)
MCHC RBC-ENTMCNC: 29 GM/DL — LOW (ref 32–36)
MCHC RBC-ENTMCNC: 29 GM/DL — LOW (ref 32–36)
MCHC RBC-ENTMCNC: 29 PG — SIGNIFICANT CHANGE UP (ref 27–34)
MCHC RBC-ENTMCNC: 29 PG — SIGNIFICANT CHANGE UP (ref 27–34)
MCHC RBC-ENTMCNC: 29.1 GM/DL — LOW (ref 32–36)
MCHC RBC-ENTMCNC: 29.2 GM/DL — LOW (ref 32–36)
MCHC RBC-ENTMCNC: 29.2 PG — SIGNIFICANT CHANGE UP (ref 27–34)
MCHC RBC-ENTMCNC: 29.3 GM/DL — LOW (ref 32–36)
MCHC RBC-ENTMCNC: 29.3 PG — SIGNIFICANT CHANGE UP (ref 27–34)
MCHC RBC-ENTMCNC: 29.4 GM/DL — LOW (ref 32–36)
MCHC RBC-ENTMCNC: 29.4 GM/DL — LOW (ref 32–36)
MCHC RBC-ENTMCNC: 29.4 PG — SIGNIFICANT CHANGE UP (ref 27–34)
MCHC RBC-ENTMCNC: 29.4 PG — SIGNIFICANT CHANGE UP (ref 27–34)
MCHC RBC-ENTMCNC: 29.6 GM/DL — LOW (ref 32–36)
MCHC RBC-ENTMCNC: 29.7 GM/DL — LOW (ref 32–36)
MCHC RBC-ENTMCNC: 29.9 GM/DL — LOW (ref 32–36)
MCHC RBC-ENTMCNC: 30.2 GM/DL — LOW (ref 32–36)
MCHC RBC-ENTMCNC: 30.3 GM/DL — LOW (ref 32–36)
MCHC RBC-ENTMCNC: 30.5 GM/DL — LOW (ref 32–36)
MCHC RBC-ENTMCNC: 30.5 GM/DL — LOW (ref 32–36)
MCHC RBC-ENTMCNC: 30.9 GM/DL
MCHC RBC-ENTMCNC: 31.5 PG
MCV RBC AUTO: 101.3 FL — HIGH (ref 80–100)
MCV RBC AUTO: 101.8 FL
MCV RBC AUTO: 95.4 FL — SIGNIFICANT CHANGE UP (ref 80–100)
MCV RBC AUTO: 95.7 FL — SIGNIFICANT CHANGE UP (ref 80–100)
MCV RBC AUTO: 95.9 FL — SIGNIFICANT CHANGE UP (ref 80–100)
MCV RBC AUTO: 95.9 FL — SIGNIFICANT CHANGE UP (ref 80–100)
MCV RBC AUTO: 96.3 FL — SIGNIFICANT CHANGE UP (ref 80–100)
MCV RBC AUTO: 96.4 FL — SIGNIFICANT CHANGE UP (ref 80–100)
MCV RBC AUTO: 96.6 FL — SIGNIFICANT CHANGE UP (ref 80–100)
MCV RBC AUTO: 96.9 FL — SIGNIFICANT CHANGE UP (ref 80–100)
MCV RBC AUTO: 97.2 FL — SIGNIFICANT CHANGE UP (ref 80–100)
MCV RBC AUTO: 97.2 FL — SIGNIFICANT CHANGE UP (ref 80–100)
MCV RBC AUTO: 97.4 FL — SIGNIFICANT CHANGE UP (ref 80–100)
MCV RBC AUTO: 97.4 FL — SIGNIFICANT CHANGE UP (ref 80–100)
MCV RBC AUTO: 97.5 FL — SIGNIFICANT CHANGE UP (ref 80–100)
MCV RBC AUTO: 97.7 FL — SIGNIFICANT CHANGE UP (ref 80–100)
MCV RBC AUTO: 97.8 FL — SIGNIFICANT CHANGE UP (ref 80–100)
MCV RBC AUTO: 98 FL — SIGNIFICANT CHANGE UP (ref 80–100)
MCV RBC AUTO: 98.4 FL — SIGNIFICANT CHANGE UP (ref 80–100)
MCV RBC AUTO: 98.7 FL — SIGNIFICANT CHANGE UP (ref 80–100)
MCV RBC AUTO: 98.9 FL — SIGNIFICANT CHANGE UP (ref 80–100)
MCV RBC AUTO: 99.1 FL — SIGNIFICANT CHANGE UP (ref 80–100)
MCV RBC AUTO: 99.4 FL — SIGNIFICANT CHANGE UP (ref 80–100)
MCV RBC AUTO: 99.7 FL — SIGNIFICANT CHANGE UP (ref 80–100)
MCV RBC AUTO: 99.7 FL — SIGNIFICANT CHANGE UP (ref 80–100)
MESOTHL CELL # FLD: 2 % — SIGNIFICANT CHANGE UP
MESOTHL CELL NFR FLD: 0 %
MONOCYTES # BLD AUTO: 0.56 K/UL
MONOCYTES # BLD AUTO: 0.57 K/UL — SIGNIFICANT CHANGE UP (ref 0–0.9)
MONOCYTES # BLD AUTO: 1.91 K/UL — HIGH (ref 0–0.9)
MONOCYTES NFR BLD AUTO: 2.6 % — SIGNIFICANT CHANGE UP (ref 2–14)
MONOCYTES NFR BLD AUTO: 6.3 %
MONOCYTES NFR BLD AUTO: 8.7 % — SIGNIFICANT CHANGE UP (ref 2–14)
MONOS+MACROS # FLD: 39 % — SIGNIFICANT CHANGE UP
MONOS+MACROS NFR FLD MANUAL: 21 %
MRSA PCR RESULT.: SIGNIFICANT CHANGE UP
NEUTROPHILS # BLD AUTO: 19.69 K/UL — HIGH (ref 1.8–7.4)
NEUTROPHILS # BLD AUTO: 20.38 K/UL — HIGH (ref 1.8–7.4)
NEUTROPHILS # BLD AUTO: 7.51 K/UL
NEUTROPHILS NFR BLD AUTO: 84 %
NEUTROPHILS NFR BLD AUTO: 89.5 % — HIGH (ref 43–77)
NEUTROPHILS NFR BLD AUTO: 93 % — HIGH (ref 43–77)
NEUTROPHILS-BODY FLUID: 21 % — SIGNIFICANT CHANGE UP
NEUTROPHILS-BODY FLUID: SIGNIFICANT CHANGE UP %
NEUTS SEG # FLD MANUAL: 2 %
NITRITE UR-MCNC: NEGATIVE — SIGNIFICANT CHANGE UP
NON HDL CHOLESTEROL: 35 MG/DL — SIGNIFICANT CHANGE UP
NON-GYNECOLOGICAL CYTOLOGY STUDY: SIGNIFICANT CHANGE UP
NRBC # BLD: 0 /100 WBCS — SIGNIFICANT CHANGE UP (ref 0–0)
NRBC # FLD: 0
NRBC # FLD: 0 K/UL — SIGNIFICANT CHANGE UP (ref 0–0)
NT-PROBNP SERPL-SCNC: HIGH PG/ML
OTHER CELLS FLD MANUAL: 0 % — SIGNIFICANT CHANGE UP
OTHER FLUID CYTOLOGY: NORMAL
PAT CTL 2H: SIGNIFICANT CHANGE UP SEC (ref 27.5–37.4)
PCO2 BLDV: 50 MMHG — SIGNIFICANT CHANGE UP (ref 42–55)
PH BLDV: 7.36 — SIGNIFICANT CHANGE UP (ref 7.32–7.43)
PH FLD: 6.8 — SIGNIFICANT CHANGE UP
PH FLD: 7.6 — SIGNIFICANT CHANGE UP
PH UR: 6 — SIGNIFICANT CHANGE UP (ref 5–8)
PH UR: 6 — SIGNIFICANT CHANGE UP (ref 5–8)
PH UR: 6.5 — SIGNIFICANT CHANGE UP (ref 5–8)
PHOSPHATE SERPL-MCNC: 2.1 MG/DL — LOW (ref 2.5–4.5)
PHOSPHATE SERPL-MCNC: 2.3 MG/DL — LOW (ref 2.5–4.5)
PHOSPHATE SERPL-MCNC: 2.4 MG/DL — LOW (ref 2.5–4.5)
PHOSPHATE SERPL-MCNC: 2.5 MG/DL — SIGNIFICANT CHANGE UP (ref 2.5–4.5)
PHOSPHATE SERPL-MCNC: 2.6 MG/DL — SIGNIFICANT CHANGE UP (ref 2.5–4.5)
PHOSPHATE SERPL-MCNC: 2.6 MG/DL — SIGNIFICANT CHANGE UP (ref 2.5–4.5)
PHOSPHATE SERPL-MCNC: 3 MG/DL — SIGNIFICANT CHANGE UP (ref 2.5–4.5)
PHOSPHATE SERPL-MCNC: 3.2 MG/DL — SIGNIFICANT CHANGE UP (ref 2.5–4.5)
PHOSPHATE SERPL-MCNC: 3.8 MG/DL — SIGNIFICANT CHANGE UP (ref 2.5–4.5)
PHOSPHATE SERPL-MCNC: 4.1 MG/DL — SIGNIFICANT CHANGE UP (ref 2.5–4.5)
PHOSPHATE SERPL-MCNC: 7.9 MG/DL — HIGH (ref 2.5–4.5)
PLAT MORPH BLD: NORMAL — SIGNIFICANT CHANGE UP
PLATELET # BLD AUTO: 223 K/UL
PLATELET # BLD AUTO: 234 K/UL — SIGNIFICANT CHANGE UP (ref 150–400)
PLATELET # BLD AUTO: 241 K/UL — SIGNIFICANT CHANGE UP (ref 150–400)
PLATELET # BLD AUTO: 248 K/UL — SIGNIFICANT CHANGE UP (ref 150–400)
PLATELET # BLD AUTO: 250 K/UL — SIGNIFICANT CHANGE UP (ref 150–400)
PLATELET # BLD AUTO: 257 K/UL — SIGNIFICANT CHANGE UP (ref 150–400)
PLATELET # BLD AUTO: 258 K/UL — SIGNIFICANT CHANGE UP (ref 150–400)
PLATELET # BLD AUTO: 261 K/UL — SIGNIFICANT CHANGE UP (ref 150–400)
PLATELET # BLD AUTO: 263 K/UL — SIGNIFICANT CHANGE UP (ref 150–400)
PLATELET # BLD AUTO: 263 K/UL — SIGNIFICANT CHANGE UP (ref 150–400)
PLATELET # BLD AUTO: 264 K/UL — SIGNIFICANT CHANGE UP (ref 150–400)
PLATELET # BLD AUTO: 265 K/UL — SIGNIFICANT CHANGE UP (ref 150–400)
PLATELET # BLD AUTO: 270 K/UL — SIGNIFICANT CHANGE UP (ref 150–400)
PLATELET # BLD AUTO: 271 K/UL — SIGNIFICANT CHANGE UP (ref 150–400)
PLATELET # BLD AUTO: 277 K/UL — SIGNIFICANT CHANGE UP (ref 150–400)
PLATELET # BLD AUTO: 286 K/UL — SIGNIFICANT CHANGE UP (ref 150–400)
PLATELET # BLD AUTO: 286 K/UL — SIGNIFICANT CHANGE UP (ref 150–400)
PLATELET # BLD AUTO: 287 K/UL — SIGNIFICANT CHANGE UP (ref 150–400)
PLATELET # BLD AUTO: 289 K/UL — SIGNIFICANT CHANGE UP (ref 150–400)
PLATELET # BLD AUTO: 291 K/UL — SIGNIFICANT CHANGE UP (ref 150–400)
PLATELET # BLD AUTO: 298 K/UL — SIGNIFICANT CHANGE UP (ref 150–400)
PLATELET # BLD AUTO: 317 K/UL — SIGNIFICANT CHANGE UP (ref 150–400)
PLATELET # BLD AUTO: 321 K/UL — SIGNIFICANT CHANGE UP (ref 150–400)
PLATELET # BLD AUTO: 346 K/UL — SIGNIFICANT CHANGE UP (ref 150–400)
PLATELET # BLD AUTO: 347 K/UL — SIGNIFICANT CHANGE UP (ref 150–400)
PO2 BLDV: 20 MMHG — SIGNIFICANT CHANGE UP
POIKILOCYTOSIS BLD QL AUTO: SLIGHT — SIGNIFICANT CHANGE UP
POLYCHROMASIA BLD QL SMEAR: SLIGHT — SIGNIFICANT CHANGE UP
POTASSIUM BLDV-SCNC: 3.2 MMOL/L — LOW (ref 3.5–5.1)
POTASSIUM SERPL-MCNC: 3.1 MMOL/L — LOW (ref 3.5–5.3)
POTASSIUM SERPL-MCNC: 3.2 MMOL/L — LOW (ref 3.5–5.3)
POTASSIUM SERPL-MCNC: 3.3 MMOL/L — LOW (ref 3.5–5.3)
POTASSIUM SERPL-MCNC: 3.4 MMOL/L — LOW (ref 3.5–5.3)
POTASSIUM SERPL-MCNC: 3.6 MMOL/L — SIGNIFICANT CHANGE UP (ref 3.5–5.3)
POTASSIUM SERPL-MCNC: 3.7 MMOL/L — SIGNIFICANT CHANGE UP (ref 3.5–5.3)
POTASSIUM SERPL-MCNC: 4 MMOL/L — SIGNIFICANT CHANGE UP (ref 3.5–5.3)
POTASSIUM SERPL-MCNC: 4.3 MMOL/L — SIGNIFICANT CHANGE UP (ref 3.5–5.3)
POTASSIUM SERPL-MCNC: 4.4 MMOL/L — SIGNIFICANT CHANGE UP (ref 3.5–5.3)
POTASSIUM SERPL-MCNC: 4.5 MMOL/L — SIGNIFICANT CHANGE UP (ref 3.5–5.3)
POTASSIUM SERPL-MCNC: 4.6 MMOL/L — SIGNIFICANT CHANGE UP (ref 3.5–5.3)
POTASSIUM SERPL-MCNC: 4.8 MMOL/L — SIGNIFICANT CHANGE UP (ref 3.5–5.3)
POTASSIUM SERPL-MCNC: 4.8 MMOL/L — SIGNIFICANT CHANGE UP (ref 3.5–5.3)
POTASSIUM SERPL-MCNC: 4.9 MMOL/L — SIGNIFICANT CHANGE UP (ref 3.5–5.3)
POTASSIUM SERPL-MCNC: 5 MMOL/L — SIGNIFICANT CHANGE UP (ref 3.5–5.3)
POTASSIUM SERPL-MCNC: 5.3 MMOL/L — SIGNIFICANT CHANGE UP (ref 3.5–5.3)
POTASSIUM SERPL-MCNC: 5.3 MMOL/L — SIGNIFICANT CHANGE UP (ref 3.5–5.3)
POTASSIUM SERPL-MCNC: 5.6 MMOL/L — HIGH (ref 3.5–5.3)
POTASSIUM SERPL-MCNC: 6.1 MMOL/L — HIGH (ref 3.5–5.3)
POTASSIUM SERPL-MCNC: 6.1 MMOL/L — HIGH (ref 3.5–5.3)
POTASSIUM SERPL-MCNC: 6.6 MMOL/L — CRITICAL HIGH (ref 3.5–5.3)
POTASSIUM SERPL-SCNC: 3.1 MMOL/L — LOW (ref 3.5–5.3)
POTASSIUM SERPL-SCNC: 3.2 MMOL/L — LOW (ref 3.5–5.3)
POTASSIUM SERPL-SCNC: 3.3 MMOL/L — LOW (ref 3.5–5.3)
POTASSIUM SERPL-SCNC: 3.4 MMOL/L — LOW (ref 3.5–5.3)
POTASSIUM SERPL-SCNC: 3.6 MMOL/L — SIGNIFICANT CHANGE UP (ref 3.5–5.3)
POTASSIUM SERPL-SCNC: 3.7 MMOL/L — SIGNIFICANT CHANGE UP (ref 3.5–5.3)
POTASSIUM SERPL-SCNC: 4 MMOL/L — SIGNIFICANT CHANGE UP (ref 3.5–5.3)
POTASSIUM SERPL-SCNC: 4.3 MMOL/L — SIGNIFICANT CHANGE UP (ref 3.5–5.3)
POTASSIUM SERPL-SCNC: 4.4 MMOL/L — SIGNIFICANT CHANGE UP (ref 3.5–5.3)
POTASSIUM SERPL-SCNC: 4.5 MMOL/L — SIGNIFICANT CHANGE UP (ref 3.5–5.3)
POTASSIUM SERPL-SCNC: 4.6 MMOL/L — SIGNIFICANT CHANGE UP (ref 3.5–5.3)
POTASSIUM SERPL-SCNC: 4.8 MMOL/L — SIGNIFICANT CHANGE UP (ref 3.5–5.3)
POTASSIUM SERPL-SCNC: 4.8 MMOL/L — SIGNIFICANT CHANGE UP (ref 3.5–5.3)
POTASSIUM SERPL-SCNC: 4.9 MMOL/L — SIGNIFICANT CHANGE UP (ref 3.5–5.3)
POTASSIUM SERPL-SCNC: 5 MMOL/L — SIGNIFICANT CHANGE UP (ref 3.5–5.3)
POTASSIUM SERPL-SCNC: 5.3 MMOL/L — SIGNIFICANT CHANGE UP (ref 3.5–5.3)
POTASSIUM SERPL-SCNC: 5.3 MMOL/L — SIGNIFICANT CHANGE UP (ref 3.5–5.3)
POTASSIUM SERPL-SCNC: 5.6 MMOL/L — HIGH (ref 3.5–5.3)
POTASSIUM SERPL-SCNC: 6.1 MMOL/L — HIGH (ref 3.5–5.3)
POTASSIUM SERPL-SCNC: 6.1 MMOL/L — HIGH (ref 3.5–5.3)
POTASSIUM SERPL-SCNC: 6.6 MMOL/L — CRITICAL HIGH (ref 3.5–5.3)
PROCALCITONIN SERPL-MCNC: 0.11 NG/ML — HIGH (ref 0.02–0.1)
PROCALCITONIN SERPL-MCNC: 0.89 NG/ML — HIGH (ref 0.02–0.1)
PROT ?TM UR-MCNC: 46 MG/DL — SIGNIFICANT CHANGE UP
PROT FLD-MCNC: 1.9 G/DL — SIGNIFICANT CHANGE UP
PROT FLD-MCNC: 3.3 G/DL
PROT FLD-MCNC: 3.8 G/DL — SIGNIFICANT CHANGE UP
PROT PATTERN SERPL ELPH-IMP: SIGNIFICANT CHANGE UP
PROT SERPL-MCNC: 6.1 G/DL — SIGNIFICANT CHANGE UP
PROT SERPL-MCNC: 6.1 G/DL — SIGNIFICANT CHANGE UP (ref 6–8.3)
PROT SERPL-MCNC: 6.1 G/DL — SIGNIFICANT CHANGE UP (ref 6–8.3)
PROT SERPL-MCNC: 6.3 G/DL — SIGNIFICANT CHANGE UP (ref 6–8.3)
PROT SERPL-MCNC: 6.4 G/DL — SIGNIFICANT CHANGE UP (ref 6–8.3)
PROT SERPL-MCNC: 6.5 G/DL — SIGNIFICANT CHANGE UP (ref 6–8.3)
PROT SERPL-MCNC: 6.6 G/DL — SIGNIFICANT CHANGE UP (ref 6–8.3)
PROT SERPL-MCNC: 6.8 G/DL — SIGNIFICANT CHANGE UP (ref 6–8.3)
PROT SERPL-MCNC: 6.9 G/DL — SIGNIFICANT CHANGE UP (ref 6–8.3)
PROT SERPL-MCNC: 7 G/DL — SIGNIFICANT CHANGE UP (ref 6–8.3)
PROT SERPL-MCNC: 7.5 G/DL — SIGNIFICANT CHANGE UP (ref 6–8.3)
PROT SERPL-MCNC: 7.8 G/DL — SIGNIFICANT CHANGE UP (ref 6–8.3)
PROT UR-MCNC: ABNORMAL
PROTHROM AB SERPL-ACNC: 17.7 SEC — HIGH (ref 10.5–13.4)
PROTHROM AB SERPL-ACNC: 18 SEC — HIGH (ref 10.5–13.4)
PROTHROM AB SERPL-ACNC: 18.3 SEC — HIGH (ref 10.5–13.4)
PROTHROM AB SERPL-ACNC: 18.4 SEC — HIGH (ref 10.5–13.4)
PROTHROM AB SERPL-ACNC: 18.4 SEC — HIGH (ref 10.5–13.4)
PROTHROM AB SERPL-ACNC: 18.8 SEC — HIGH (ref 10.5–13.4)
PROTHROM AB SERPL-ACNC: 19.1 SEC — HIGH (ref 10.5–13.4)
PROTHROM AB SERPL-ACNC: 19.3 SEC — HIGH (ref 10.5–13.4)
PROTHROM AB SERPL-ACNC: 19.5 SEC — HIGH (ref 10.5–13.4)
PROTHROM AB SERPL-ACNC: 19.7 SEC — HIGH (ref 10.5–13.4)
PROTHROM AB SERPL-ACNC: 21.2 SEC — HIGH (ref 10.5–13.4)
PROTHROM AB SERPL-ACNC: 21.3 SEC — HIGH (ref 10.5–13.4)
PROTHROM AB SERPL-ACNC: 21.9 SEC — HIGH (ref 10.5–13.4)
PROTHROM AB SERPL-ACNC: 24.5 SEC — HIGH (ref 10.5–13.4)
PROTHROM AB SERPL-ACNC: 28.2 SEC — HIGH (ref 10.5–13.4)
PROTHROM AB SERPL-ACNC: 32.9 SEC — HIGH (ref 10.5–13.4)
PROTHROM AB SERPL-ACNC: 38.2 SEC — HIGH (ref 10.5–13.4)
PT 100%: 21.2 SEC — HIGH (ref 10.5–13.4)
PT 50/50: 14.8 SEC — SIGNIFICANT CHANGE UP (ref 10.5–14.5)
PT BLD: 25.8 SEC
RBC # BLD: 2.98 M/UL — LOW (ref 4.2–5.8)
RBC # BLD: 3.04 M/UL — LOW (ref 4.2–5.8)
RBC # BLD: 3.05 M/UL — LOW (ref 4.2–5.8)
RBC # BLD: 3.06 M/UL — LOW (ref 4.2–5.8)
RBC # BLD: 3.09 M/UL — LOW (ref 4.2–5.8)
RBC # BLD: 3.12 M/UL — LOW (ref 4.2–5.8)
RBC # BLD: 3.14 M/UL — LOW (ref 4.2–5.8)
RBC # BLD: 3.19 M/UL — LOW (ref 4.2–5.8)
RBC # BLD: 3.23 M/UL — LOW (ref 4.2–5.8)
RBC # BLD: 3.24 M/UL — LOW (ref 4.2–5.8)
RBC # BLD: 3.26 M/UL — LOW (ref 4.2–5.8)
RBC # BLD: 3.42 M/UL — LOW (ref 4.2–5.8)
RBC # BLD: 3.48 M/UL — LOW (ref 4.2–5.8)
RBC # BLD: 3.49 M/UL — LOW (ref 4.2–5.8)
RBC # BLD: 3.5 M/UL — LOW (ref 4.2–5.8)
RBC # BLD: 3.51 M/UL — LOW (ref 4.2–5.8)
RBC # BLD: 3.52 M/UL — LOW (ref 4.2–5.8)
RBC # BLD: 3.52 M/UL — LOW (ref 4.2–5.8)
RBC # BLD: 3.57 M/UL — LOW (ref 4.2–5.8)
RBC # BLD: 3.58 M/UL — LOW (ref 4.2–5.8)
RBC # BLD: 3.63 M/UL — LOW (ref 4.2–5.8)
RBC # BLD: 3.63 M/UL — LOW (ref 4.2–5.8)
RBC # BLD: 3.66 M/UL — LOW (ref 4.2–5.8)
RBC # BLD: 3.69 M/UL — LOW (ref 4.2–5.8)
RBC # BLD: 3.77 M/UL — LOW (ref 4.2–5.8)
RBC # BLD: 3.82 M/UL — LOW (ref 4.2–5.8)
RBC # BLD: 4.38 M/UL
RBC # FLD MANUAL: ABNORMAL /UL
RBC # FLD: 15.5 %
RBC # FLD: 15.9 % — HIGH (ref 10.3–14.5)
RBC # FLD: 16.7 % — HIGH (ref 10.3–14.5)
RBC # FLD: 16.7 % — HIGH (ref 10.3–14.5)
RBC # FLD: 16.8 % — HIGH (ref 10.3–14.5)
RBC # FLD: 16.8 % — HIGH (ref 10.3–14.5)
RBC # FLD: 16.9 % — HIGH (ref 10.3–14.5)
RBC # FLD: 17 % — HIGH (ref 10.3–14.5)
RBC # FLD: 17 % — HIGH (ref 10.3–14.5)
RBC # FLD: 17.1 % — HIGH (ref 10.3–14.5)
RBC # FLD: 17.1 % — HIGH (ref 10.3–14.5)
RBC # FLD: 17.3 % — HIGH (ref 10.3–14.5)
RBC # FLD: 17.5 % — HIGH (ref 10.3–14.5)
RBC # FLD: 17.8 % — HIGH (ref 10.3–14.5)
RBC # FLD: 17.9 % — HIGH (ref 10.3–14.5)
RBC # FLD: 18.1 % — HIGH (ref 10.3–14.5)
RBC # FLD: 18.4 % — HIGH (ref 10.3–14.5)
RBC # FLD: 18.6 % — HIGH (ref 10.3–14.5)
RBC BLD AUTO: ABNORMAL
RBC CASTS # UR COMP ASSIST: 1 /HPF — SIGNIFICANT CHANGE UP (ref 0–4)
RBC CASTS # UR COMP ASSIST: >50 /HPF — HIGH (ref 0–4)
RCV VOL RI: 304 CELLS/UL — HIGH (ref 0–5)
RCV VOL RI: HIGH CELLS/UL (ref 0–5)
RH IG SCN BLD-IMP: NEGATIVE — SIGNIFICANT CHANGE UP
RSV RNA NPH QL NAA+NON-PROBE: SIGNIFICANT CHANGE UP
RSV RNA NPH QL NAA+NON-PROBE: SIGNIFICANT CHANGE UP
S AUREUS DNA NOSE QL NAA+PROBE: SIGNIFICANT CHANGE UP
SAO2 % BLDV: 21.2 % — SIGNIFICANT CHANGE UP
SARS-COV-2 N GENE NPH QL NAA+PROBE: NOT DETECTED
SARS-COV-2 RNA SPEC QL NAA+PROBE: SIGNIFICANT CHANGE UP
SCHISTOCYTES BLD QL AUTO: SLIGHT — SIGNIFICANT CHANGE UP
SODIUM SERPL-SCNC: 135 MMOL/L — SIGNIFICANT CHANGE UP (ref 135–145)
SODIUM SERPL-SCNC: 135 MMOL/L — SIGNIFICANT CHANGE UP (ref 135–145)
SODIUM SERPL-SCNC: 137 MMOL/L — SIGNIFICANT CHANGE UP (ref 135–145)
SODIUM SERPL-SCNC: 138 MMOL/L — SIGNIFICANT CHANGE UP (ref 135–145)
SODIUM SERPL-SCNC: 139 MMOL/L — SIGNIFICANT CHANGE UP (ref 135–145)
SODIUM SERPL-SCNC: 140 MMOL/L — SIGNIFICANT CHANGE UP (ref 135–145)
SODIUM SERPL-SCNC: 141 MMOL/L — SIGNIFICANT CHANGE UP (ref 135–145)
SODIUM SERPL-SCNC: 142 MMOL/L — SIGNIFICANT CHANGE UP (ref 135–145)
SODIUM SERPL-SCNC: 143 MMOL/L — SIGNIFICANT CHANGE UP (ref 135–145)
SODIUM SERPL-SCNC: 145 MMOL/L — SIGNIFICANT CHANGE UP (ref 135–145)
SP GR SPEC: 1.02 — SIGNIFICANT CHANGE UP (ref 1.01–1.02)
SP GR SPEC: 1.02 — SIGNIFICANT CHANGE UP (ref 1.01–1.05)
SP GR SPEC: 1.02 — SIGNIFICANT CHANGE UP (ref 1.01–1.05)
SPECIMEN SOURCE: SIGNIFICANT CHANGE UP
SPHEROCYTES BLD QL SMEAR: SLIGHT — SIGNIFICANT CHANGE UP
SURGICAL PATHOLOGY STUDY: SIGNIFICANT CHANGE UP
TIBC SERPL-MCNC: 173 UG/DL — LOW (ref 220–430)
TOTAL CELLS COUNTED FLD: 634 /UL
TOTAL NUCLEATED CELL COUNT, BODY FLUID: 1000 CELLS/UL — HIGH (ref 0–5)
TOTAL NUCLEATED CELL COUNT, BODY FLUID: HIGH CELLS/UL (ref 0–5)
TRIGL SERPL-MCNC: 67 MG/DL — SIGNIFICANT CHANGE UP
TRIGL SPEC-SCNC: <10 MG/DL
TROPONIN T, HIGH SENSITIVITY RESULT: 76 NG/L — CRITICAL HIGH
TROPONIN T, HIGH SENSITIVITY RESULT: 84 NG/L — CRITICAL HIGH
TSH SERPL-MCNC: 4.7 UIU/ML — HIGH (ref 0.27–4.2)
TUBE TYPE: NORMAL
TUBE TYPE: SIGNIFICANT CHANGE UP
TUBE TYPE: SIGNIFICANT CHANGE UP
UIBC SERPL-MCNC: 139 UG/DL — SIGNIFICANT CHANGE UP (ref 110–370)
UNIDENT CELLS NFR FLD MANUAL: 0 %
UROBILINOGEN FLD QL: ABNORMAL
UROBILINOGEN FLD QL: NEGATIVE — SIGNIFICANT CHANGE UP
UROBILINOGEN FLD QL: SIGNIFICANT CHANGE UP
VANCOMYCIN TROUGH SERPL-MCNC: 13.4 UG/ML — SIGNIFICANT CHANGE UP (ref 10–20)
VANCOMYCIN TROUGH SERPL-MCNC: 23 UG/ML — HIGH (ref 10–20)
VARIANT LYMPHS # FLD MANUAL: 0 %
VIT B12 SERPL-MCNC: 1117 PG/ML — SIGNIFICANT CHANGE UP (ref 232–1245)
WBC # BLD: 12.99 K/UL — HIGH (ref 3.8–10.5)
WBC # BLD: 13.16 K/UL — HIGH (ref 3.8–10.5)
WBC # BLD: 14.29 K/UL — HIGH (ref 3.8–10.5)
WBC # BLD: 14.43 K/UL — HIGH (ref 3.8–10.5)
WBC # BLD: 14.46 K/UL — HIGH (ref 3.8–10.5)
WBC # BLD: 14.84 K/UL — HIGH (ref 3.8–10.5)
WBC # BLD: 15.24 K/UL — HIGH (ref 3.8–10.5)
WBC # BLD: 16.12 K/UL — HIGH (ref 3.8–10.5)
WBC # BLD: 16.15 K/UL — HIGH (ref 3.8–10.5)
WBC # BLD: 16.24 K/UL — HIGH (ref 3.8–10.5)
WBC # BLD: 16.69 K/UL — HIGH (ref 3.8–10.5)
WBC # BLD: 17.07 K/UL — HIGH (ref 3.8–10.5)
WBC # BLD: 17.13 K/UL — HIGH (ref 3.8–10.5)
WBC # BLD: 17.97 K/UL — HIGH (ref 3.8–10.5)
WBC # BLD: 18.74 K/UL — HIGH (ref 3.8–10.5)
WBC # BLD: 19.21 K/UL — HIGH (ref 3.8–10.5)
WBC # BLD: 20 K/UL — HIGH (ref 3.8–10.5)
WBC # BLD: 21.11 K/UL — HIGH (ref 3.8–10.5)
WBC # BLD: 21.91 K/UL — HIGH (ref 3.8–10.5)
WBC # BLD: 22 K/UL — HIGH (ref 3.8–10.5)
WBC # BLD: 22.23 K/UL — HIGH (ref 3.8–10.5)
WBC # BLD: 22.26 K/UL — HIGH (ref 3.8–10.5)
WBC # BLD: 23.28 K/UL — HIGH (ref 3.8–10.5)
WBC # BLD: 26.39 K/UL — HIGH (ref 3.8–10.5)
WBC # BLD: 27.19 K/UL — HIGH (ref 3.8–10.5)
WBC # BLD: 8.82 K/UL — SIGNIFICANT CHANGE UP (ref 3.8–10.5)
WBC # FLD AUTO: 12.99 K/UL — HIGH (ref 3.8–10.5)
WBC # FLD AUTO: 13.16 K/UL — HIGH (ref 3.8–10.5)
WBC # FLD AUTO: 14.29 K/UL — HIGH (ref 3.8–10.5)
WBC # FLD AUTO: 14.43 K/UL — HIGH (ref 3.8–10.5)
WBC # FLD AUTO: 14.46 K/UL — HIGH (ref 3.8–10.5)
WBC # FLD AUTO: 14.84 K/UL — HIGH (ref 3.8–10.5)
WBC # FLD AUTO: 15.24 K/UL — HIGH (ref 3.8–10.5)
WBC # FLD AUTO: 16.12 K/UL — HIGH (ref 3.8–10.5)
WBC # FLD AUTO: 16.15 K/UL — HIGH (ref 3.8–10.5)
WBC # FLD AUTO: 16.24 K/UL — HIGH (ref 3.8–10.5)
WBC # FLD AUTO: 16.69 K/UL — HIGH (ref 3.8–10.5)
WBC # FLD AUTO: 17.07 K/UL — HIGH (ref 3.8–10.5)
WBC # FLD AUTO: 17.13 K/UL — HIGH (ref 3.8–10.5)
WBC # FLD AUTO: 17.97 K/UL — HIGH (ref 3.8–10.5)
WBC # FLD AUTO: 18.74 K/UL — HIGH (ref 3.8–10.5)
WBC # FLD AUTO: 19.21 K/UL — HIGH (ref 3.8–10.5)
WBC # FLD AUTO: 20 K/UL — HIGH (ref 3.8–10.5)
WBC # FLD AUTO: 21.11 K/UL — HIGH (ref 3.8–10.5)
WBC # FLD AUTO: 21.91 K/UL — HIGH (ref 3.8–10.5)
WBC # FLD AUTO: 22 K/UL — HIGH (ref 3.8–10.5)
WBC # FLD AUTO: 22.23 K/UL — HIGH (ref 3.8–10.5)
WBC # FLD AUTO: 22.26 K/UL — HIGH (ref 3.8–10.5)
WBC # FLD AUTO: 23.28 K/UL — HIGH (ref 3.8–10.5)
WBC # FLD AUTO: 26.39 K/UL — HIGH (ref 3.8–10.5)
WBC # FLD AUTO: 27.19 K/UL — HIGH (ref 3.8–10.5)
WBC # FLD AUTO: 8.82 K/UL — SIGNIFICANT CHANGE UP (ref 3.8–10.5)
WBC # FLD AUTO: 8.94 K/UL
WBC UR QL: 13 /HPF — HIGH (ref 0–5)
WBC UR QL: >50 /HPF — HIGH (ref 0–5)

## 2022-01-01 PROCEDURE — 88305 TISSUE EXAM BY PATHOLOGIST: CPT | Mod: 26

## 2022-01-01 PROCEDURE — 84165 PROTEIN E-PHORESIS SERUM: CPT | Mod: 26

## 2022-01-01 PROCEDURE — 76705 ECHO EXAM OF ABDOMEN: CPT | Mod: 26

## 2022-01-01 PROCEDURE — 93306 TTE W/DOPPLER COMPLETE: CPT | Mod: 26

## 2022-01-01 PROCEDURE — 88342 IMHCHEM/IMCYTCHM 1ST ANTB: CPT | Mod: 26

## 2022-01-01 PROCEDURE — 99232 SBSQ HOSP IP/OBS MODERATE 35: CPT

## 2022-01-01 PROCEDURE — 80061 LIPID PANEL: CPT

## 2022-01-01 PROCEDURE — 71045 X-RAY EXAM CHEST 1 VIEW: CPT | Mod: 26,76

## 2022-01-01 PROCEDURE — 71045 X-RAY EXAM CHEST 1 VIEW: CPT | Mod: 26

## 2022-01-01 PROCEDURE — 99232 SBSQ HOSP IP/OBS MODERATE 35: CPT | Mod: FS

## 2022-01-01 PROCEDURE — 82728 ASSAY OF FERRITIN: CPT

## 2022-01-01 PROCEDURE — 87640 STAPH A DNA AMP PROBE: CPT

## 2022-01-01 PROCEDURE — 93306 TTE W/DOPPLER COMPLETE: CPT

## 2022-01-01 PROCEDURE — 85027 COMPLETE CBC AUTOMATED: CPT

## 2022-01-01 PROCEDURE — 84145 PROCALCITONIN (PCT): CPT

## 2022-01-01 PROCEDURE — 88112 CYTOPATH CELL ENHANCE TECH: CPT | Mod: 26

## 2022-01-01 PROCEDURE — 99233 SBSQ HOSP IP/OBS HIGH 50: CPT | Mod: FS

## 2022-01-01 PROCEDURE — 99285 EMERGENCY DEPT VISIT HI MDM: CPT

## 2022-01-01 PROCEDURE — 86900 BLOOD TYPING SEROLOGIC ABO: CPT

## 2022-01-01 PROCEDURE — 99223 1ST HOSP IP/OBS HIGH 75: CPT

## 2022-01-01 PROCEDURE — 80053 COMPREHEN METABOLIC PANEL: CPT

## 2022-01-01 PROCEDURE — 99233 SBSQ HOSP IP/OBS HIGH 50: CPT

## 2022-01-01 PROCEDURE — 36415 COLL VENOUS BLD VENIPUNCTURE: CPT

## 2022-01-01 PROCEDURE — 76604 US EXAM CHEST: CPT

## 2022-01-01 PROCEDURE — 87641 MR-STAPH DNA AMP PROBE: CPT

## 2022-01-01 PROCEDURE — 99232 SBSQ HOSP IP/OBS MODERATE 35: CPT | Mod: 25

## 2022-01-01 PROCEDURE — 92610 EVALUATE SWALLOWING FUNCTION: CPT

## 2022-01-01 PROCEDURE — 83036 HEMOGLOBIN GLYCOSYLATED A1C: CPT

## 2022-01-01 PROCEDURE — 93010 ELECTROCARDIOGRAM REPORT: CPT

## 2022-01-01 PROCEDURE — 82746 ASSAY OF FOLIC ACID SERUM: CPT

## 2022-01-01 PROCEDURE — 83540 ASSAY OF IRON: CPT

## 2022-01-01 PROCEDURE — 96374 THER/PROPH/DIAG INJ IV PUSH: CPT

## 2022-01-01 PROCEDURE — 99231 SBSQ HOSP IP/OBS SF/LOW 25: CPT | Mod: 25

## 2022-01-01 PROCEDURE — 99497 ADVNCD CARE PLAN 30 MIN: CPT | Mod: 25

## 2022-01-01 PROCEDURE — 80048 BASIC METABOLIC PNL TOTAL CA: CPT

## 2022-01-01 PROCEDURE — 86850 RBC ANTIBODY SCREEN: CPT

## 2022-01-01 PROCEDURE — 99222 1ST HOSP IP/OBS MODERATE 55: CPT

## 2022-01-01 PROCEDURE — 93010 ELECTROCARDIOGRAM REPORT: CPT | Mod: GC

## 2022-01-01 PROCEDURE — 72170 X-RAY EXAM OF PELVIS: CPT | Mod: 26

## 2022-01-01 PROCEDURE — 99443: CPT | Mod: 95

## 2022-01-01 PROCEDURE — 71260 CT THORAX DX C+: CPT | Mod: MA

## 2022-01-01 PROCEDURE — 86901 BLOOD TYPING SEROLOGIC RH(D): CPT

## 2022-01-01 PROCEDURE — 82607 VITAMIN B-12: CPT

## 2022-01-01 PROCEDURE — 87040 BLOOD CULTURE FOR BACTERIA: CPT

## 2022-01-01 PROCEDURE — 99214 OFFICE O/P EST MOD 30 MIN: CPT

## 2022-01-01 PROCEDURE — 84100 ASSAY OF PHOSPHORUS: CPT

## 2022-01-01 PROCEDURE — 99204 OFFICE O/P NEW MOD 45 MIN: CPT | Mod: 25

## 2022-01-01 PROCEDURE — 32555Z: CUSTOM

## 2022-01-01 PROCEDURE — 99213 OFFICE O/P EST LOW 20 MIN: CPT

## 2022-01-01 PROCEDURE — 71046 X-RAY EXAM CHEST 2 VIEWS: CPT

## 2022-01-01 PROCEDURE — 87086 URINE CULTURE/COLONY COUNT: CPT

## 2022-01-01 PROCEDURE — 51700 IRRIGATION OF BLADDER: CPT

## 2022-01-01 PROCEDURE — 99285 EMERGENCY DEPT VISIT HI MDM: CPT | Mod: CS,GC

## 2022-01-01 PROCEDURE — 87637 SARSCOV2&INF A&B&RSV AMP PRB: CPT

## 2022-01-01 PROCEDURE — 87077 CULTURE AEROBIC IDENTIFY: CPT

## 2022-01-01 PROCEDURE — 81001 URINALYSIS AUTO W/SCOPE: CPT

## 2022-01-01 PROCEDURE — 51703 INSERT BLADDER CATH COMPLEX: CPT

## 2022-01-01 PROCEDURE — 76942 ECHO GUIDE FOR BIOPSY: CPT

## 2022-01-01 PROCEDURE — 94640 AIRWAY INHALATION TREATMENT: CPT

## 2022-01-01 PROCEDURE — 83550 IRON BINDING TEST: CPT

## 2022-01-01 PROCEDURE — 99285 EMERGENCY DEPT VISIT HI MDM: CPT | Mod: GC

## 2022-01-01 PROCEDURE — 88341 IMHCHEM/IMCYTCHM EA ADD ANTB: CPT | Mod: 26

## 2022-01-01 PROCEDURE — 99233 SBSQ HOSP IP/OBS HIGH 50: CPT | Mod: GC

## 2022-01-01 PROCEDURE — 71260 CT THORAX DX C+: CPT | Mod: 26,MA

## 2022-01-01 PROCEDURE — 32561 LYSE CHEST FIBRIN INIT DAY: CPT | Mod: AS

## 2022-01-01 PROCEDURE — 70450 CT HEAD/BRAIN W/O DYE: CPT | Mod: 26,MA

## 2022-01-01 PROCEDURE — 85025 COMPLETE CBC W/AUTO DIFF WBC: CPT

## 2022-01-01 PROCEDURE — 93000 ELECTROCARDIOGRAM COMPLETE: CPT

## 2022-01-01 PROCEDURE — 84443 ASSAY THYROID STIM HORMONE: CPT

## 2022-01-01 PROCEDURE — 99223 1ST HOSP IP/OBS HIGH 75: CPT | Mod: 25

## 2022-01-01 PROCEDURE — 74177 CT ABD & PELVIS W/CONTRAST: CPT | Mod: 26,MA

## 2022-01-01 PROCEDURE — 83735 ASSAY OF MAGNESIUM: CPT

## 2022-01-01 PROCEDURE — 71045 X-RAY EXAM CHEST 1 VIEW: CPT | Mod: 26,77

## 2022-01-01 PROCEDURE — 99223 1ST HOSP IP/OBS HIGH 75: CPT | Mod: FS,57

## 2022-01-01 PROCEDURE — 71046 X-RAY EXAM CHEST 2 VIEWS: CPT | Mod: 26

## 2022-01-01 PROCEDURE — 99205 OFFICE O/P NEW HI 60 MIN: CPT

## 2022-01-01 PROCEDURE — 85730 THROMBOPLASTIN TIME PARTIAL: CPT

## 2022-01-01 PROCEDURE — 85610 PROTHROMBIN TIME: CPT

## 2022-01-01 PROCEDURE — 99214 OFFICE O/P EST MOD 30 MIN: CPT | Mod: 25

## 2022-01-01 PROCEDURE — 74177 CT ABD & PELVIS W/CONTRAST: CPT | Mod: MA

## 2022-01-01 PROCEDURE — 99222 1ST HOSP IP/OBS MODERATE 55: CPT | Mod: GC

## 2022-01-01 PROCEDURE — 99239 HOSP IP/OBS DSCHRG MGMT >30: CPT

## 2022-01-01 PROCEDURE — 71046 X-RAY EXAM CHEST 2 VIEWS: CPT | Mod: 59

## 2022-01-01 PROCEDURE — 99223 1ST HOSP IP/OBS HIGH 75: CPT | Mod: FS

## 2022-01-01 PROCEDURE — 99223 1ST HOSP IP/OBS HIGH 75: CPT | Mod: GC

## 2022-01-01 DEVICE — PLEURX CATHETER KIT: Type: IMPLANTABLE DEVICE | Status: FUNCTIONAL

## 2022-01-01 RX ORDER — LIDOCAINE HCL 20 MG/ML
30 VIAL (ML) INJECTION ONCE
Refills: 0 | Status: DISCONTINUED | OUTPATIENT
Start: 2022-01-01 | End: 2022-01-01

## 2022-01-01 RX ORDER — METOPROLOL TARTRATE 50 MG
12.5 TABLET ORAL ONCE
Refills: 0 | Status: COMPLETED | OUTPATIENT
Start: 2022-01-01 | End: 2022-01-01

## 2022-01-01 RX ORDER — ROBINUL 0.2 MG/ML
0.4 INJECTION INTRAMUSCULAR; INTRAVENOUS EVERY 4 HOURS
Refills: 0 | Status: DISCONTINUED | OUTPATIENT
Start: 2022-01-01 | End: 2022-01-01

## 2022-01-01 RX ORDER — ASPIRIN/CALCIUM CARB/MAGNESIUM 324 MG
81 TABLET ORAL DAILY
Refills: 0 | Status: DISCONTINUED | OUTPATIENT
Start: 2022-01-01 | End: 2022-01-01

## 2022-01-01 RX ORDER — METOPROLOL TARTRATE 50 MG
25 TABLET ORAL
Refills: 0 | Status: DISCONTINUED | OUTPATIENT
Start: 2022-01-01 | End: 2022-01-01

## 2022-01-01 RX ORDER — LEVOTHYROXINE SODIUM 125 MCG
50 TABLET ORAL AT BEDTIME
Refills: 0 | Status: DISCONTINUED | OUTPATIENT
Start: 2022-01-01 | End: 2022-01-01

## 2022-01-01 RX ORDER — IPRATROPIUM/ALBUTEROL SULFATE 18-103MCG
3 AEROSOL WITH ADAPTER (GRAM) INHALATION ONCE
Refills: 0 | Status: COMPLETED | OUTPATIENT
Start: 2022-01-01 | End: 2022-01-01

## 2022-01-01 RX ORDER — OXYCODONE HYDROCHLORIDE 5 MG/1
5 TABLET ORAL EVERY 6 HOURS
Refills: 0 | Status: DISCONTINUED | OUTPATIENT
Start: 2022-01-01 | End: 2022-01-01

## 2022-01-01 RX ORDER — ALTEPLASE 100 MG
10 KIT INTRAVENOUS ONCE
Refills: 0 | Status: DISCONTINUED | OUTPATIENT
Start: 2022-01-01 | End: 2022-01-01

## 2022-01-01 RX ORDER — APIXABAN 2.5 MG/1
1 TABLET, FILM COATED ORAL
Qty: 0 | Refills: 0 | DISCHARGE
Start: 2022-01-01

## 2022-01-01 RX ORDER — MIDODRINE HYDROCHLORIDE 2.5 MG/1
20 TABLET ORAL THREE TIMES A DAY
Refills: 0 | Status: DISCONTINUED | OUTPATIENT
Start: 2022-01-01 | End: 2022-01-01

## 2022-01-01 RX ORDER — ALBUMIN HUMAN 25 %
250 VIAL (ML) INTRAVENOUS ONCE
Refills: 0 | Status: COMPLETED | OUTPATIENT
Start: 2022-01-01 | End: 2022-01-01

## 2022-01-01 RX ORDER — HYDROMORPHONE HYDROCHLORIDE 2 MG/ML
0.2 INJECTION INTRAMUSCULAR; INTRAVENOUS; SUBCUTANEOUS
Refills: 0 | Status: DISCONTINUED | OUTPATIENT
Start: 2022-01-01 | End: 2022-01-01

## 2022-01-01 RX ORDER — METOPROLOL TARTRATE 50 MG
50 TABLET ORAL
Refills: 0 | Status: DISCONTINUED | OUTPATIENT
Start: 2022-01-01 | End: 2022-01-01

## 2022-01-01 RX ORDER — APIXABAN 2.5 MG/1
1 TABLET, FILM COATED ORAL
Qty: 0 | Refills: 0 | DISCHARGE

## 2022-01-01 RX ORDER — PHYTONADIONE (VIT K1) 5 MG
10 TABLET ORAL ONCE
Refills: 0 | Status: COMPLETED | OUTPATIENT
Start: 2022-01-01 | End: 2022-01-01

## 2022-01-01 RX ORDER — VANCOMYCIN HCL 1 G
1250 VIAL (EA) INTRAVENOUS EVERY 24 HOURS
Refills: 0 | Status: DISCONTINUED | OUTPATIENT
Start: 2022-01-01 | End: 2022-01-01

## 2022-01-01 RX ORDER — POTASSIUM CHLORIDE 20 MEQ
20 PACKET (EA) ORAL ONCE
Refills: 0 | Status: COMPLETED | OUTPATIENT
Start: 2022-01-01 | End: 2022-01-01

## 2022-01-01 RX ORDER — SENNA PLUS 8.6 MG/1
2 TABLET ORAL AT BEDTIME
Refills: 0 | Status: DISCONTINUED | OUTPATIENT
Start: 2022-01-01 | End: 2022-01-01

## 2022-01-01 RX ORDER — VANCOMYCIN HCL 1 G
1000 VIAL (EA) INTRAVENOUS ONCE
Refills: 0 | Status: COMPLETED | OUTPATIENT
Start: 2022-01-01 | End: 2022-01-01

## 2022-01-01 RX ORDER — APIXABAN 2.5 MG/1
5 TABLET, FILM COATED ORAL
Refills: 0 | Status: DISCONTINUED | OUTPATIENT
Start: 2022-01-01 | End: 2022-01-01

## 2022-01-01 RX ORDER — ONDANSETRON 8 MG/1
4 TABLET, FILM COATED ORAL ONCE
Refills: 0 | Status: DISCONTINUED | OUTPATIENT
Start: 2022-01-01 | End: 2022-01-01

## 2022-01-01 RX ORDER — LACTULOSE 10 G/15ML
10 SOLUTION ORAL
Qty: 2700 | Refills: 1 | Status: DISCONTINUED | COMMUNITY
Start: 2018-11-07 | End: 2022-01-01

## 2022-01-01 RX ORDER — ACETAMINOPHEN 500 MG
1000 TABLET ORAL ONCE
Refills: 0 | Status: COMPLETED | OUTPATIENT
Start: 2022-01-01 | End: 2022-01-01

## 2022-01-01 RX ORDER — ASPIRIN/CALCIUM CARB/MAGNESIUM 324 MG
1 TABLET ORAL
Qty: 0 | Refills: 0 | DISCHARGE

## 2022-01-01 RX ORDER — PHYTONADIONE (VIT K1) 5 MG
10 TABLET ORAL DAILY
Refills: 0 | Status: COMPLETED | OUTPATIENT
Start: 2022-01-01 | End: 2022-01-01

## 2022-01-01 RX ORDER — LEVOTHYROXINE SODIUM 125 MCG
1 TABLET ORAL
Qty: 0 | Refills: 0 | DISCHARGE

## 2022-01-01 RX ORDER — VANCOMYCIN HCL 1 G
1000 VIAL (EA) INTRAVENOUS EVERY 24 HOURS
Refills: 0 | Status: DISCONTINUED | OUTPATIENT
Start: 2022-01-01 | End: 2022-01-01

## 2022-01-01 RX ORDER — LEVOTHYROXINE SODIUM 100 UG/1
100 TABLET ORAL
Refills: 0 | Status: ACTIVE | COMMUNITY

## 2022-01-01 RX ORDER — MELATONIN 3 MG
3 CAPSULE ORAL
Refills: 0 | Status: ACTIVE | COMMUNITY

## 2022-01-01 RX ORDER — LIDOCAINE 4 G/100G
1 CREAM TOPICAL EVERY 24 HOURS
Refills: 0 | Status: DISCONTINUED | OUTPATIENT
Start: 2022-01-01 | End: 2022-01-01

## 2022-01-01 RX ORDER — LACTULOSE 10 G/15ML
1 SOLUTION ORAL
Qty: 0 | Refills: 0 | DISCHARGE

## 2022-01-01 RX ORDER — LEVOTHYROXINE SODIUM 125 MCG
100 TABLET ORAL DAILY
Refills: 0 | Status: DISCONTINUED | OUTPATIENT
Start: 2022-01-01 | End: 2022-01-01

## 2022-01-01 RX ORDER — TAMSULOSIN HYDROCHLORIDE 0.4 MG/1
0.4 CAPSULE ORAL AT BEDTIME
Refills: 0 | Status: DISCONTINUED | OUTPATIENT
Start: 2022-01-01 | End: 2022-01-01

## 2022-01-01 RX ORDER — SODIUM CHLORIDE 9 MG/ML
500 INJECTION INTRAMUSCULAR; INTRAVENOUS; SUBCUTANEOUS ONCE
Refills: 0 | Status: COMPLETED | OUTPATIENT
Start: 2022-01-01 | End: 2022-01-01

## 2022-01-01 RX ORDER — ACETAMINOPHEN 500 MG
975 TABLET ORAL ONCE
Refills: 0 | Status: COMPLETED | OUTPATIENT
Start: 2022-01-01 | End: 2022-01-01

## 2022-01-01 RX ORDER — OXYCODONE HYDROCHLORIDE 5 MG/1
2.5 TABLET ORAL EVERY 4 HOURS
Refills: 0 | Status: DISCONTINUED | OUTPATIENT
Start: 2022-01-01 | End: 2022-01-01

## 2022-01-01 RX ORDER — LEVOFLOXACIN 5 MG/ML
1 INJECTION, SOLUTION INTRAVENOUS
Qty: 0 | Refills: 0 | DISCHARGE
Start: 2022-01-01 | End: 2022-12-19

## 2022-01-01 RX ORDER — POLYETHYLENE GLYCOL 3350 17 G/17G
17 POWDER, FOR SOLUTION ORAL DAILY
Refills: 0 | Status: DISCONTINUED | OUTPATIENT
Start: 2022-01-01 | End: 2022-01-01

## 2022-01-01 RX ORDER — ENOXAPARIN SODIUM 100 MG/ML
80 INJECTION SUBCUTANEOUS EVERY 12 HOURS
Refills: 0 | Status: DISCONTINUED | OUTPATIENT
Start: 2022-01-01 | End: 2022-01-01

## 2022-01-01 RX ORDER — ONDANSETRON 8 MG/1
8 TABLET, FILM COATED ORAL EVERY 8 HOURS
Refills: 0 | Status: DISCONTINUED | OUTPATIENT
Start: 2022-01-01 | End: 2022-01-01

## 2022-01-01 RX ORDER — ACETAMINOPHEN 500 MG
650 TABLET ORAL ONCE
Refills: 0 | Status: COMPLETED | OUTPATIENT
Start: 2022-01-01 | End: 2022-01-01

## 2022-01-01 RX ORDER — IPRATROPIUM BROMIDE 0.2 MG/ML
500 SOLUTION, NON-ORAL INHALATION ONCE
Refills: 0 | Status: COMPLETED | OUTPATIENT
Start: 2022-01-01 | End: 2022-01-01

## 2022-01-01 RX ORDER — AMLODIPINE BESYLATE 5 MG/1
TABLET ORAL
Refills: 0 | Status: DISCONTINUED | COMMUNITY
End: 2022-01-01

## 2022-01-01 RX ORDER — SODIUM ZIRCONIUM CYCLOSILICATE 10 G/10G
10 POWDER, FOR SUSPENSION ORAL ONCE
Refills: 0 | Status: COMPLETED | OUTPATIENT
Start: 2022-01-01 | End: 2022-01-01

## 2022-01-01 RX ORDER — POLYETHYLENE GLYCOL 3350 17 G/17G
17 POWDER, FOR SOLUTION ORAL
Refills: 0 | Status: DISCONTINUED | OUTPATIENT
Start: 2022-01-01 | End: 2022-01-01

## 2022-01-01 RX ORDER — PANTOPRAZOLE SODIUM 20 MG/1
40 TABLET, DELAYED RELEASE ORAL
Refills: 0 | Status: DISCONTINUED | OUTPATIENT
Start: 2022-01-01 | End: 2022-01-01

## 2022-01-01 RX ORDER — SODIUM CHLORIDE 9 MG/ML
4 INJECTION INTRAMUSCULAR; INTRAVENOUS; SUBCUTANEOUS EVERY 12 HOURS
Refills: 0 | Status: DISCONTINUED | OUTPATIENT
Start: 2022-01-01 | End: 2022-01-01

## 2022-01-01 RX ORDER — METOPROLOL TARTRATE 50 MG
100 TABLET ORAL
Refills: 0 | Status: DISCONTINUED | OUTPATIENT
Start: 2022-01-01 | End: 2022-01-01

## 2022-01-01 RX ORDER — METOPROLOL TARTRATE 50 MG
1 TABLET ORAL
Qty: 0 | Refills: 0 | DISCHARGE

## 2022-01-01 RX ORDER — DILTIAZEM HCL 120 MG
5 CAPSULE, EXT RELEASE 24 HR ORAL
Qty: 125 | Refills: 0 | Status: DISCONTINUED | OUTPATIENT
Start: 2022-01-01 | End: 2022-01-01

## 2022-01-01 RX ORDER — LEVOTHYROXINE SODIUM 0.09 MG/1
88 TABLET ORAL
Refills: 0 | Status: DISCONTINUED | COMMUNITY
Start: 2016-12-22 | End: 2022-01-01

## 2022-01-01 RX ORDER — PHYTONADIONE (VIT K1) 5 MG
10 TABLET ORAL DAILY
Refills: 0 | Status: DISCONTINUED | OUTPATIENT
Start: 2022-01-01 | End: 2022-01-01

## 2022-01-01 RX ORDER — FUROSEMIDE 40 MG
1 TABLET ORAL
Qty: 0 | Refills: 0 | DISCHARGE
Start: 2022-01-01

## 2022-01-01 RX ORDER — DORNASE ALFA 1 MG/ML
5 SOLUTION RESPIRATORY (INHALATION) ONCE
Refills: 0 | Status: DISCONTINUED | OUTPATIENT
Start: 2022-01-01 | End: 2022-01-01

## 2022-01-01 RX ORDER — METOPROLOL TARTRATE 50 MG
5 TABLET ORAL EVERY 6 HOURS
Refills: 0 | Status: DISCONTINUED | OUTPATIENT
Start: 2022-01-01 | End: 2022-01-01

## 2022-01-01 RX ORDER — ACETAMINOPHEN 500 MG
650 TABLET ORAL EVERY 6 HOURS
Refills: 0 | Status: DISCONTINUED | OUTPATIENT
Start: 2022-01-01 | End: 2022-01-01

## 2022-01-01 RX ORDER — SODIUM CHLORIDE 9 MG/ML
250 INJECTION INTRAMUSCULAR; INTRAVENOUS; SUBCUTANEOUS ONCE
Refills: 0 | Status: COMPLETED | OUTPATIENT
Start: 2022-01-01 | End: 2022-01-01

## 2022-01-01 RX ORDER — POTASSIUM CHLORIDE 20 MEQ
40 PACKET (EA) ORAL EVERY 4 HOURS
Refills: 0 | Status: COMPLETED | OUTPATIENT
Start: 2022-01-01 | End: 2022-01-01

## 2022-01-01 RX ORDER — OXYCODONE HYDROCHLORIDE 5 MG/1
1 TABLET ORAL
Qty: 0 | Refills: 0 | DISCHARGE
Start: 2022-01-01

## 2022-01-01 RX ORDER — SODIUM CHLORIDE 9 MG/ML
500 INJECTION INTRAMUSCULAR; INTRAVENOUS; SUBCUTANEOUS ONCE
Refills: 0 | Status: DISCONTINUED | OUTPATIENT
Start: 2022-01-01 | End: 2022-01-01

## 2022-01-01 RX ORDER — SODIUM ZIRCONIUM CYCLOSILICATE 10 G/10G
10 POWDER, FOR SUSPENSION ORAL EVERY 8 HOURS
Refills: 0 | Status: DISCONTINUED | OUTPATIENT
Start: 2022-01-01 | End: 2022-01-01

## 2022-01-01 RX ORDER — LEVALBUTEROL 1.25 MG/.5ML
0.63 SOLUTION, CONCENTRATE RESPIRATORY (INHALATION) ONCE
Refills: 0 | Status: COMPLETED | OUTPATIENT
Start: 2022-01-01 | End: 2022-01-01

## 2022-01-01 RX ORDER — FAMOTIDINE 40 MG/1
TABLET, FILM COATED ORAL
Refills: 0 | Status: DISCONTINUED | COMMUNITY
End: 2022-01-01

## 2022-01-01 RX ORDER — SODIUM CHLORIDE 9 MG/ML
1000 INJECTION INTRAMUSCULAR; INTRAVENOUS; SUBCUTANEOUS ONCE
Refills: 0 | Status: COMPLETED | OUTPATIENT
Start: 2022-01-01 | End: 2022-01-01

## 2022-01-01 RX ORDER — FENTANYL CITRATE 50 UG/ML
25 INJECTION INTRAVENOUS
Refills: 0 | Status: DISCONTINUED | OUTPATIENT
Start: 2022-01-01 | End: 2022-01-01

## 2022-01-01 RX ORDER — TRAMADOL HYDROCHLORIDE 50 MG/1
50 TABLET ORAL EVERY 6 HOURS
Refills: 0 | Status: DISCONTINUED | OUTPATIENT
Start: 2022-01-01 | End: 2022-01-01

## 2022-01-01 RX ORDER — POTASSIUM CHLORIDE 20 MEQ
40 PACKET (EA) ORAL ONCE
Refills: 0 | Status: COMPLETED | OUTPATIENT
Start: 2022-01-01 | End: 2022-01-01

## 2022-01-01 RX ORDER — CEFTRIAXONE 500 MG/1
1000 INJECTION, POWDER, FOR SOLUTION INTRAMUSCULAR; INTRAVENOUS ONCE
Refills: 0 | Status: COMPLETED | OUTPATIENT
Start: 2022-01-01 | End: 2022-01-01

## 2022-01-01 RX ORDER — HYDROCHLOROTHIAZIDE 25 MG
1 TABLET ORAL
Qty: 0 | Refills: 0 | DISCHARGE

## 2022-01-01 RX ORDER — METOPROLOL TARTRATE 50 MG
1 TABLET ORAL
Qty: 0 | Refills: 0 | DISCHARGE
Start: 2022-01-01

## 2022-01-01 RX ORDER — METOPROLOL TARTRATE 50 MG
5 TABLET ORAL ONCE
Refills: 0 | Status: DISCONTINUED | OUTPATIENT
Start: 2022-01-01 | End: 2022-01-01

## 2022-01-01 RX ORDER — VANCOMYCIN HCL 1 G
1000 VIAL (EA) INTRAVENOUS EVERY 12 HOURS
Refills: 0 | Status: DISCONTINUED | OUTPATIENT
Start: 2022-01-01 | End: 2022-01-01

## 2022-01-01 RX ORDER — OXYCODONE HYDROCHLORIDE 5 MG/1
5 TABLET ORAL ONCE
Refills: 0 | Status: DISCONTINUED | OUTPATIENT
Start: 2022-01-01 | End: 2022-01-01

## 2022-01-01 RX ORDER — METOPROLOL TARTRATE 50 MG
25 TABLET ORAL EVERY 12 HOURS
Refills: 0 | Status: DISCONTINUED | OUTPATIENT
Start: 2022-01-01 | End: 2022-01-01

## 2022-01-01 RX ORDER — CHLORHEXIDINE GLUCONATE 213 G/1000ML
1 SOLUTION TOPICAL
Refills: 0 | Status: DISCONTINUED | OUTPATIENT
Start: 2022-01-01 | End: 2022-01-01

## 2022-01-01 RX ORDER — ALBUMIN HUMAN 25 %
250 VIAL (ML) INTRAVENOUS ONCE
Refills: 0 | Status: DISCONTINUED | OUTPATIENT
Start: 2022-01-01 | End: 2022-01-01

## 2022-01-01 RX ORDER — ACETAMINOPHEN 500 MG
2 TABLET ORAL
Qty: 0 | Refills: 0 | DISCHARGE
Start: 2022-01-01

## 2022-01-01 RX ORDER — SODIUM CHLORIDE 9 MG/ML
1000 INJECTION, SOLUTION INTRAVENOUS
Refills: 0 | Status: DISCONTINUED | OUTPATIENT
Start: 2022-01-01 | End: 2022-01-01

## 2022-01-01 RX ORDER — SENNOSIDES 8.6 MG/1
CAPSULE, GELATIN COATED ORAL
Refills: 0 | Status: ACTIVE | COMMUNITY

## 2022-01-01 RX ORDER — FUROSEMIDE 40 MG
20 TABLET ORAL DAILY
Refills: 0 | Status: DISCONTINUED | OUTPATIENT
Start: 2022-01-01 | End: 2022-01-01

## 2022-01-01 RX ORDER — METOPROLOL TARTRATE 50 MG
50 TABLET ORAL EVERY 12 HOURS
Refills: 0 | Status: DISCONTINUED | OUTPATIENT
Start: 2022-01-01 | End: 2022-01-01

## 2022-01-01 RX ORDER — METOPROLOL TARTRATE 50 MG
12.5 TABLET ORAL EVERY 12 HOURS
Refills: 0 | Status: DISCONTINUED | OUTPATIENT
Start: 2022-01-01 | End: 2022-01-01

## 2022-01-01 RX ORDER — LIDOCAINE HCL 20 MG/ML
15 VIAL (ML) INJECTION ONCE
Refills: 0 | Status: COMPLETED | OUTPATIENT
Start: 2022-01-01 | End: 2022-01-01

## 2022-01-01 RX ORDER — SODIUM CHLORIDE 9 MG/ML
1000 INJECTION INTRAMUSCULAR; INTRAVENOUS; SUBCUTANEOUS
Refills: 0 | Status: DISCONTINUED | OUTPATIENT
Start: 2022-01-01 | End: 2022-01-01

## 2022-01-01 RX ORDER — TRAMADOL HYDROCHLORIDE 50 MG/1
1 TABLET ORAL
Qty: 0 | Refills: 0 | DISCHARGE

## 2022-01-01 RX ORDER — CEFEPIME 1 G/1
1000 INJECTION, POWDER, FOR SOLUTION INTRAMUSCULAR; INTRAVENOUS ONCE
Refills: 0 | Status: COMPLETED | OUTPATIENT
Start: 2022-01-01 | End: 2022-01-01

## 2022-01-01 RX ORDER — ROSUVASTATIN CALCIUM 5 MG/1
1 TABLET ORAL
Qty: 0 | Refills: 0 | DISCHARGE

## 2022-01-01 RX ORDER — ATORVASTATIN CALCIUM 80 MG/1
40 TABLET, FILM COATED ORAL AT BEDTIME
Refills: 0 | Status: DISCONTINUED | OUTPATIENT
Start: 2022-01-01 | End: 2022-01-01

## 2022-01-01 RX ORDER — HEPARIN SODIUM 5000 [USP'U]/ML
5000 INJECTION INTRAVENOUS; SUBCUTANEOUS ONCE
Refills: 0 | Status: COMPLETED | OUTPATIENT
Start: 2022-01-01 | End: 2022-01-01

## 2022-01-01 RX ORDER — NYSTATIN CREAM 100000 [USP'U]/G
1 CREAM TOPICAL
Refills: 0 | Status: DISCONTINUED | OUTPATIENT
Start: 2022-01-01 | End: 2022-01-01

## 2022-01-01 RX ORDER — ONDANSETRON 8 MG/1
4 TABLET, FILM COATED ORAL ONCE
Refills: 0 | Status: COMPLETED | OUTPATIENT
Start: 2022-01-01 | End: 2022-01-01

## 2022-01-01 RX ORDER — FENTANYL CITRATE 50 UG/ML
50 INJECTION INTRAVENOUS
Refills: 0 | Status: DISCONTINUED | OUTPATIENT
Start: 2022-01-01 | End: 2022-01-01

## 2022-01-01 RX ORDER — LEVOFLOXACIN 5 MG/ML
500 INJECTION, SOLUTION INTRAVENOUS ONCE
Refills: 0 | Status: DISCONTINUED | OUTPATIENT
Start: 2022-01-01 | End: 2022-01-01

## 2022-01-01 RX ORDER — POLYETHYLENE GLYCOL 3350 17 G/17G
17 POWDER, FOR SOLUTION ORAL
Qty: 0 | Refills: 0 | DISCHARGE
Start: 2022-01-01

## 2022-01-01 RX ORDER — FLUCONAZOLE 150 MG/1
200 TABLET ORAL DAILY
Refills: 0 | Status: DISCONTINUED | OUTPATIENT
Start: 2022-01-01 | End: 2022-01-01

## 2022-01-01 RX ORDER — METOPROLOL TARTRATE 50 MG
37.5 TABLET ORAL
Refills: 0 | Status: DISCONTINUED | OUTPATIENT
Start: 2022-01-01 | End: 2022-01-01

## 2022-01-01 RX ORDER — SODIUM ZIRCONIUM CYCLOSILICATE 10 G/10G
10 POWDER, FOR SUSPENSION ORAL DAILY
Refills: 0 | Status: DISCONTINUED | OUTPATIENT
Start: 2022-01-01 | End: 2022-01-01

## 2022-01-01 RX ORDER — MIDODRINE HYDROCHLORIDE 2.5 MG/1
10 TABLET ORAL THREE TIMES A DAY
Refills: 0 | Status: DISCONTINUED | OUTPATIENT
Start: 2022-01-01 | End: 2022-01-01

## 2022-01-01 RX ORDER — CEFEPIME 1 G/1
2000 INJECTION, POWDER, FOR SOLUTION INTRAMUSCULAR; INTRAVENOUS EVERY 12 HOURS
Refills: 0 | Status: DISCONTINUED | OUTPATIENT
Start: 2022-01-01 | End: 2022-01-01

## 2022-01-01 RX ORDER — CEFEPIME 1 G/1
1000 INJECTION, POWDER, FOR SOLUTION INTRAMUSCULAR; INTRAVENOUS EVERY 12 HOURS
Refills: 0 | Status: DISCONTINUED | OUTPATIENT
Start: 2022-01-01 | End: 2022-01-01

## 2022-01-01 RX ORDER — ASCORBIC ACID 60 MG
500 TABLET,CHEWABLE ORAL DAILY
Refills: 0 | Status: DISCONTINUED | OUTPATIENT
Start: 2022-01-01 | End: 2022-01-01

## 2022-01-01 RX ORDER — CALCIUM GLUCONATE 100 MG/ML
1 VIAL (ML) INTRAVENOUS ONCE
Refills: 0 | Status: COMPLETED | OUTPATIENT
Start: 2022-01-01 | End: 2022-01-01

## 2022-01-01 RX ORDER — FUROSEMIDE 40 MG
1 TABLET ORAL
Qty: 0 | Refills: 0 | DISCHARGE

## 2022-01-01 RX ORDER — TAMSULOSIN HYDROCHLORIDE 0.4 MG/1
1 CAPSULE ORAL
Qty: 0 | Refills: 0 | DISCHARGE
Start: 2022-01-01

## 2022-01-01 RX ORDER — LORAZEPAM 1 MG/1
1 TABLET ORAL
Qty: 90 | Refills: 0 | Status: DISCONTINUED | COMMUNITY
Start: 2017-03-22 | End: 2022-01-01

## 2022-01-01 RX ORDER — DORNASE ALFA 1 MG/ML
2.5 SOLUTION RESPIRATORY (INHALATION) DAILY
Refills: 0 | Status: DISCONTINUED | OUTPATIENT
Start: 2022-01-01 | End: 2022-01-01

## 2022-01-01 RX ORDER — MAGNESIUM SULFATE 500 MG/ML
1 VIAL (ML) INJECTION ONCE
Refills: 0 | Status: COMPLETED | OUTPATIENT
Start: 2022-01-01 | End: 2022-01-01

## 2022-01-01 RX ORDER — FUROSEMIDE 40 MG
40 TABLET ORAL ONCE
Refills: 0 | Status: COMPLETED | OUTPATIENT
Start: 2022-01-01 | End: 2022-01-01

## 2022-01-01 RX ADMIN — OXYCODONE HYDROCHLORIDE 2.5 MILLIGRAM(S): 5 TABLET ORAL at 15:40

## 2022-01-01 RX ADMIN — Medication 25 MILLIGRAM(S): at 17:53

## 2022-01-01 RX ADMIN — Medication 650 MILLIGRAM(S): at 06:00

## 2022-01-01 RX ADMIN — SODIUM CHLORIDE 4 MILLILITER(S): 9 INJECTION INTRAMUSCULAR; INTRAVENOUS; SUBCUTANEOUS at 20:20

## 2022-01-01 RX ADMIN — TRAMADOL HYDROCHLORIDE 50 MILLIGRAM(S): 50 TABLET ORAL at 11:55

## 2022-01-01 RX ADMIN — LIDOCAINE 1 PATCH: 4 CREAM TOPICAL at 20:04

## 2022-01-01 RX ADMIN — Medication 40 MILLIEQUIVALENT(S): at 16:21

## 2022-01-01 RX ADMIN — SODIUM CHLORIDE 4 MILLILITER(S): 9 INJECTION INTRAMUSCULAR; INTRAVENOUS; SUBCUTANEOUS at 09:54

## 2022-01-01 RX ADMIN — Medication 500 MILLIGRAM(S): at 17:57

## 2022-01-01 RX ADMIN — OXYCODONE HYDROCHLORIDE 5 MILLIGRAM(S): 5 TABLET ORAL at 18:08

## 2022-01-01 RX ADMIN — Medication 40 MILLIGRAM(S): at 14:51

## 2022-01-01 RX ADMIN — NYSTATIN CREAM 1 APPLICATION(S): 100000 CREAM TOPICAL at 20:59

## 2022-01-01 RX ADMIN — LIDOCAINE 1 PATCH: 4 CREAM TOPICAL at 19:15

## 2022-01-01 RX ADMIN — POLYETHYLENE GLYCOL 3350 17 GRAM(S): 17 POWDER, FOR SOLUTION ORAL at 05:14

## 2022-01-01 RX ADMIN — SODIUM CHLORIDE 4 MILLILITER(S): 9 INJECTION INTRAMUSCULAR; INTRAVENOUS; SUBCUTANEOUS at 08:40

## 2022-01-01 RX ADMIN — Medication 25 MILLIGRAM(S): at 10:37

## 2022-01-01 RX ADMIN — Medication 650 MILLIGRAM(S): at 05:10

## 2022-01-01 RX ADMIN — LIDOCAINE 1 PATCH: 4 CREAM TOPICAL at 22:38

## 2022-01-01 RX ADMIN — Medication 1 ENEMA: at 09:18

## 2022-01-01 RX ADMIN — Medication 400 MILLIGRAM(S): at 22:32

## 2022-01-01 RX ADMIN — OXYCODONE HYDROCHLORIDE 2.5 MILLIGRAM(S): 5 TABLET ORAL at 11:25

## 2022-01-01 RX ADMIN — Medication 102 MILLIGRAM(S): at 20:44

## 2022-01-01 RX ADMIN — OXYCODONE HYDROCHLORIDE 5 MILLIGRAM(S): 5 TABLET ORAL at 05:52

## 2022-01-01 RX ADMIN — Medication 25 MILLIGRAM(S): at 05:26

## 2022-01-01 RX ADMIN — Medication 100 MICROGRAM(S): at 13:15

## 2022-01-01 RX ADMIN — OXYCODONE HYDROCHLORIDE 5 MILLIGRAM(S): 5 TABLET ORAL at 15:26

## 2022-01-01 RX ADMIN — LIDOCAINE 1 PATCH: 4 CREAM TOPICAL at 13:43

## 2022-01-01 RX ADMIN — CEFEPIME 100 MILLIGRAM(S): 1 INJECTION, POWDER, FOR SOLUTION INTRAMUSCULAR; INTRAVENOUS at 05:14

## 2022-01-01 RX ADMIN — OXYCODONE HYDROCHLORIDE 5 MILLIGRAM(S): 5 TABLET ORAL at 19:48

## 2022-01-01 RX ADMIN — OXYCODONE HYDROCHLORIDE 5 MILLIGRAM(S): 5 TABLET ORAL at 21:00

## 2022-01-01 RX ADMIN — Medication 50 MILLIGRAM(S): at 05:53

## 2022-01-01 RX ADMIN — POLYETHYLENE GLYCOL 3350 17 GRAM(S): 17 POWDER, FOR SOLUTION ORAL at 05:53

## 2022-01-01 RX ADMIN — CEFEPIME 100 MILLIGRAM(S): 1 INJECTION, POWDER, FOR SOLUTION INTRAMUSCULAR; INTRAVENOUS at 05:35

## 2022-01-01 RX ADMIN — SODIUM CHLORIDE 500 MILLILITER(S): 9 INJECTION INTRAMUSCULAR; INTRAVENOUS; SUBCUTANEOUS at 13:30

## 2022-01-01 RX ADMIN — Medication 100 MICROGRAM(S): at 08:41

## 2022-01-01 RX ADMIN — Medication 81 MILLIGRAM(S): at 11:56

## 2022-01-01 RX ADMIN — ATORVASTATIN CALCIUM 40 MILLIGRAM(S): 80 TABLET, FILM COATED ORAL at 22:22

## 2022-01-01 RX ADMIN — SODIUM CHLORIDE 4 MILLILITER(S): 9 INJECTION INTRAMUSCULAR; INTRAVENOUS; SUBCUTANEOUS at 08:30

## 2022-01-01 RX ADMIN — Medication 1000 MILLIGRAM(S): at 23:30

## 2022-01-01 RX ADMIN — OXYCODONE HYDROCHLORIDE 5 MILLIGRAM(S): 5 TABLET ORAL at 02:36

## 2022-01-01 RX ADMIN — OXYCODONE HYDROCHLORIDE 5 MILLIGRAM(S): 5 TABLET ORAL at 16:02

## 2022-01-01 RX ADMIN — SODIUM CHLORIDE 4 MILLILITER(S): 9 INJECTION INTRAMUSCULAR; INTRAVENOUS; SUBCUTANEOUS at 22:41

## 2022-01-01 RX ADMIN — PANTOPRAZOLE SODIUM 40 MILLIGRAM(S): 20 TABLET, DELAYED RELEASE ORAL at 06:31

## 2022-01-01 RX ADMIN — SODIUM CHLORIDE 50 MILLILITER(S): 9 INJECTION INTRAMUSCULAR; INTRAVENOUS; SUBCUTANEOUS at 15:21

## 2022-01-01 RX ADMIN — Medication 81 MILLIGRAM(S): at 13:15

## 2022-01-01 RX ADMIN — DORNASE ALFA 2.5 MILLIGRAM(S): 1 SOLUTION RESPIRATORY (INHALATION) at 09:50

## 2022-01-01 RX ADMIN — Medication 20 MILLIGRAM(S): at 06:31

## 2022-01-01 RX ADMIN — Medication 250 MILLIGRAM(S): at 05:25

## 2022-01-01 RX ADMIN — ATORVASTATIN CALCIUM 40 MILLIGRAM(S): 80 TABLET, FILM COATED ORAL at 22:17

## 2022-01-01 RX ADMIN — Medication 1000 MILLIGRAM(S): at 00:24

## 2022-01-01 RX ADMIN — Medication 10 MILLIGRAM(S): at 12:03

## 2022-01-01 RX ADMIN — NYSTATIN CREAM 1 APPLICATION(S): 100000 CREAM TOPICAL at 09:27

## 2022-01-01 RX ADMIN — ATORVASTATIN CALCIUM 40 MILLIGRAM(S): 80 TABLET, FILM COATED ORAL at 21:35

## 2022-01-01 RX ADMIN — Medication 50 MILLIGRAM(S): at 05:14

## 2022-01-01 RX ADMIN — OXYCODONE HYDROCHLORIDE 5 MILLIGRAM(S): 5 TABLET ORAL at 22:00

## 2022-01-01 RX ADMIN — OXYCODONE HYDROCHLORIDE 5 MILLIGRAM(S): 5 TABLET ORAL at 23:00

## 2022-01-01 RX ADMIN — Medication 63.75 MILLIMOLE(S): at 14:59

## 2022-01-01 RX ADMIN — ATORVASTATIN CALCIUM 40 MILLIGRAM(S): 80 TABLET, FILM COATED ORAL at 21:42

## 2022-01-01 RX ADMIN — NYSTATIN CREAM 1 APPLICATION(S): 100000 CREAM TOPICAL at 11:31

## 2022-01-01 RX ADMIN — NYSTATIN CREAM 1 APPLICATION(S): 100000 CREAM TOPICAL at 10:04

## 2022-01-01 RX ADMIN — ATORVASTATIN CALCIUM 40 MILLIGRAM(S): 80 TABLET, FILM COATED ORAL at 22:19

## 2022-01-01 RX ADMIN — NYSTATIN CREAM 1 APPLICATION(S): 100000 CREAM TOPICAL at 09:44

## 2022-01-01 RX ADMIN — OXYCODONE HYDROCHLORIDE 5 MILLIGRAM(S): 5 TABLET ORAL at 08:48

## 2022-01-01 RX ADMIN — Medication 25 MILLIGRAM(S): at 06:35

## 2022-01-01 RX ADMIN — Medication 50 MILLIGRAM(S): at 18:41

## 2022-01-01 RX ADMIN — OXYCODONE HYDROCHLORIDE 5 MILLIGRAM(S): 5 TABLET ORAL at 17:46

## 2022-01-01 RX ADMIN — LIDOCAINE 1 PATCH: 4 CREAM TOPICAL at 01:00

## 2022-01-01 RX ADMIN — SODIUM CHLORIDE 4 MILLILITER(S): 9 INJECTION INTRAMUSCULAR; INTRAVENOUS; SUBCUTANEOUS at 09:19

## 2022-01-01 RX ADMIN — LIDOCAINE 1 PATCH: 4 CREAM TOPICAL at 17:24

## 2022-01-01 RX ADMIN — Medication 125 MILLILITER(S): at 05:16

## 2022-01-01 RX ADMIN — OXYCODONE HYDROCHLORIDE 5 MILLIGRAM(S): 5 TABLET ORAL at 17:30

## 2022-01-01 RX ADMIN — Medication 50 MILLIGRAM(S): at 17:25

## 2022-01-01 RX ADMIN — ROBINUL 0.4 MILLIGRAM(S): 0.2 INJECTION INTRAMUSCULAR; INTRAVENOUS at 22:20

## 2022-01-01 RX ADMIN — Medication 166.67 MILLIGRAM(S): at 22:47

## 2022-01-01 RX ADMIN — POLYETHYLENE GLYCOL 3350 17 GRAM(S): 17 POWDER, FOR SOLUTION ORAL at 11:20

## 2022-01-01 RX ADMIN — OXYCODONE HYDROCHLORIDE 5 MILLIGRAM(S): 5 TABLET ORAL at 06:59

## 2022-01-01 RX ADMIN — Medication 40 MILLIEQUIVALENT(S): at 13:15

## 2022-01-01 RX ADMIN — POLYETHYLENE GLYCOL 3350 17 GRAM(S): 17 POWDER, FOR SOLUTION ORAL at 11:48

## 2022-01-01 RX ADMIN — OXYCODONE HYDROCHLORIDE 5 MILLIGRAM(S): 5 TABLET ORAL at 13:11

## 2022-01-01 RX ADMIN — SODIUM CHLORIDE 4 MILLILITER(S): 9 INJECTION INTRAMUSCULAR; INTRAVENOUS; SUBCUTANEOUS at 10:25

## 2022-01-01 RX ADMIN — LIDOCAINE 1 PATCH: 4 CREAM TOPICAL at 02:00

## 2022-01-01 RX ADMIN — CHLORHEXIDINE GLUCONATE 1 APPLICATION(S): 213 SOLUTION TOPICAL at 08:25

## 2022-01-01 RX ADMIN — SENNA PLUS 2 TABLET(S): 8.6 TABLET ORAL at 21:39

## 2022-01-01 RX ADMIN — CHLORHEXIDINE GLUCONATE 1 APPLICATION(S): 213 SOLUTION TOPICAL at 05:40

## 2022-01-01 RX ADMIN — ATORVASTATIN CALCIUM 40 MILLIGRAM(S): 80 TABLET, FILM COATED ORAL at 22:29

## 2022-01-01 RX ADMIN — OXYCODONE HYDROCHLORIDE 5 MILLIGRAM(S): 5 TABLET ORAL at 19:00

## 2022-01-01 RX ADMIN — Medication 5 MILLIGRAM(S): at 18:39

## 2022-01-01 RX ADMIN — OXYCODONE HYDROCHLORIDE 2.5 MILLIGRAM(S): 5 TABLET ORAL at 10:50

## 2022-01-01 RX ADMIN — SODIUM ZIRCONIUM CYCLOSILICATE 10 GRAM(S): 10 POWDER, FOR SUSPENSION ORAL at 13:04

## 2022-01-01 RX ADMIN — OXYCODONE HYDROCHLORIDE 5 MILLIGRAM(S): 5 TABLET ORAL at 09:07

## 2022-01-01 RX ADMIN — Medication 50 MILLIGRAM(S): at 06:29

## 2022-01-01 RX ADMIN — Medication 50 MILLIGRAM(S): at 17:44

## 2022-01-01 RX ADMIN — POLYETHYLENE GLYCOL 3350 17 GRAM(S): 17 POWDER, FOR SOLUTION ORAL at 14:45

## 2022-01-01 RX ADMIN — Medication 166.67 MILLIGRAM(S): at 21:43

## 2022-01-01 RX ADMIN — OXYCODONE HYDROCHLORIDE 5 MILLIGRAM(S): 5 TABLET ORAL at 06:22

## 2022-01-01 RX ADMIN — OXYCODONE HYDROCHLORIDE 5 MILLIGRAM(S): 5 TABLET ORAL at 21:07

## 2022-01-01 RX ADMIN — SODIUM CHLORIDE 60 MILLILITER(S): 9 INJECTION INTRAMUSCULAR; INTRAVENOUS; SUBCUTANEOUS at 23:52

## 2022-01-01 RX ADMIN — Medication 40 MILLIEQUIVALENT(S): at 09:17

## 2022-01-01 RX ADMIN — OXYCODONE HYDROCHLORIDE 5 MILLIGRAM(S): 5 TABLET ORAL at 12:19

## 2022-01-01 RX ADMIN — OXYCODONE HYDROCHLORIDE 5 MILLIGRAM(S): 5 TABLET ORAL at 20:30

## 2022-01-01 RX ADMIN — Medication 50 MICROGRAM(S): at 22:19

## 2022-01-01 RX ADMIN — Medication 100 GRAM(S): at 09:24

## 2022-01-01 RX ADMIN — Medication 100 MICROGRAM(S): at 05:34

## 2022-01-01 RX ADMIN — Medication 10 MILLIGRAM(S): at 11:49

## 2022-01-01 RX ADMIN — LIDOCAINE 1 PATCH: 4 CREAM TOPICAL at 17:42

## 2022-01-01 RX ADMIN — ATORVASTATIN CALCIUM 40 MILLIGRAM(S): 80 TABLET, FILM COATED ORAL at 21:29

## 2022-01-01 RX ADMIN — OXYCODONE HYDROCHLORIDE 5 MILLIGRAM(S): 5 TABLET ORAL at 16:19

## 2022-01-01 RX ADMIN — SENNA PLUS 2 TABLET(S): 8.6 TABLET ORAL at 21:07

## 2022-01-01 RX ADMIN — DORNASE ALFA 2.5 MILLIGRAM(S): 1 SOLUTION RESPIRATORY (INHALATION) at 10:26

## 2022-01-01 RX ADMIN — SENNA PLUS 2 TABLET(S): 8.6 TABLET ORAL at 21:10

## 2022-01-01 RX ADMIN — ATORVASTATIN CALCIUM 40 MILLIGRAM(S): 80 TABLET, FILM COATED ORAL at 21:33

## 2022-01-01 RX ADMIN — OXYCODONE HYDROCHLORIDE 5 MILLIGRAM(S): 5 TABLET ORAL at 09:41

## 2022-01-01 RX ADMIN — CEFEPIME 100 MILLIGRAM(S): 1 INJECTION, POWDER, FOR SOLUTION INTRAMUSCULAR; INTRAVENOUS at 17:23

## 2022-01-01 RX ADMIN — ATORVASTATIN CALCIUM 40 MILLIGRAM(S): 80 TABLET, FILM COATED ORAL at 22:25

## 2022-01-01 RX ADMIN — Medication 50 MILLIGRAM(S): at 17:24

## 2022-01-01 RX ADMIN — POLYETHYLENE GLYCOL 3350 17 GRAM(S): 17 POWDER, FOR SOLUTION ORAL at 17:37

## 2022-01-01 RX ADMIN — SODIUM CHLORIDE 125 MILLILITER(S): 9 INJECTION INTRAMUSCULAR; INTRAVENOUS; SUBCUTANEOUS at 07:30

## 2022-01-01 RX ADMIN — ATORVASTATIN CALCIUM 40 MILLIGRAM(S): 80 TABLET, FILM COATED ORAL at 21:10

## 2022-01-01 RX ADMIN — OXYCODONE HYDROCHLORIDE 5 MILLIGRAM(S): 5 TABLET ORAL at 22:29

## 2022-01-01 RX ADMIN — SODIUM CHLORIDE 60 MILLILITER(S): 9 INJECTION INTRAMUSCULAR; INTRAVENOUS; SUBCUTANEOUS at 21:35

## 2022-01-01 RX ADMIN — CEFEPIME 100 MILLIGRAM(S): 1 INJECTION, POWDER, FOR SOLUTION INTRAMUSCULAR; INTRAVENOUS at 17:37

## 2022-01-01 RX ADMIN — ATORVASTATIN CALCIUM 40 MILLIGRAM(S): 80 TABLET, FILM COATED ORAL at 22:48

## 2022-01-01 RX ADMIN — Medication 400 MILLIGRAM(S): at 15:29

## 2022-01-01 RX ADMIN — OXYCODONE HYDROCHLORIDE 5 MILLIGRAM(S): 5 TABLET ORAL at 16:01

## 2022-01-01 RX ADMIN — SODIUM CHLORIDE 1000 MILLILITER(S): 9 INJECTION INTRAMUSCULAR; INTRAVENOUS; SUBCUTANEOUS at 18:37

## 2022-01-01 RX ADMIN — POLYETHYLENE GLYCOL 3350 17 GRAM(S): 17 POWDER, FOR SOLUTION ORAL at 05:10

## 2022-01-01 RX ADMIN — SENNA PLUS 2 TABLET(S): 8.6 TABLET ORAL at 21:08

## 2022-01-01 RX ADMIN — Medication 50 MILLIGRAM(S): at 05:29

## 2022-01-01 RX ADMIN — Medication 50 MILLIGRAM(S): at 16:46

## 2022-01-01 RX ADMIN — SENNA PLUS 2 TABLET(S): 8.6 TABLET ORAL at 21:33

## 2022-01-01 RX ADMIN — Medication 37.5 MILLIGRAM(S): at 05:53

## 2022-01-01 RX ADMIN — NYSTATIN CREAM 1 APPLICATION(S): 100000 CREAM TOPICAL at 21:29

## 2022-01-01 RX ADMIN — Medication 50 MILLIGRAM(S): at 17:35

## 2022-01-01 RX ADMIN — POLYETHYLENE GLYCOL 3350 17 GRAM(S): 17 POWDER, FOR SOLUTION ORAL at 06:17

## 2022-01-01 RX ADMIN — MIDODRINE HYDROCHLORIDE 10 MILLIGRAM(S): 2.5 TABLET ORAL at 05:41

## 2022-01-01 RX ADMIN — SODIUM CHLORIDE 60 MILLILITER(S): 9 INJECTION INTRAMUSCULAR; INTRAVENOUS; SUBCUTANEOUS at 17:28

## 2022-01-01 RX ADMIN — CEFEPIME 100 MILLIGRAM(S): 1 INJECTION, POWDER, FOR SOLUTION INTRAMUSCULAR; INTRAVENOUS at 06:41

## 2022-01-01 RX ADMIN — OXYCODONE HYDROCHLORIDE 5 MILLIGRAM(S): 5 TABLET ORAL at 14:06

## 2022-01-01 RX ADMIN — Medication 250 MILLIGRAM(S): at 23:32

## 2022-01-01 RX ADMIN — Medication 40 MILLIEQUIVALENT(S): at 09:24

## 2022-01-01 RX ADMIN — MIDODRINE HYDROCHLORIDE 10 MILLIGRAM(S): 2.5 TABLET ORAL at 17:56

## 2022-01-01 RX ADMIN — Medication 50 MILLIGRAM(S): at 18:58

## 2022-01-01 RX ADMIN — ATORVASTATIN CALCIUM 40 MILLIGRAM(S): 80 TABLET, FILM COATED ORAL at 21:40

## 2022-01-01 RX ADMIN — Medication 50 MILLIGRAM(S): at 16:22

## 2022-01-01 RX ADMIN — NYSTATIN CREAM 1 APPLICATION(S): 100000 CREAM TOPICAL at 11:49

## 2022-01-01 RX ADMIN — OXYCODONE HYDROCHLORIDE 5 MILLIGRAM(S): 5 TABLET ORAL at 13:16

## 2022-01-01 RX ADMIN — Medication 10 MILLIGRAM(S): at 11:27

## 2022-01-01 RX ADMIN — POLYETHYLENE GLYCOL 3350 17 GRAM(S): 17 POWDER, FOR SOLUTION ORAL at 05:27

## 2022-01-01 RX ADMIN — ATORVASTATIN CALCIUM 40 MILLIGRAM(S): 80 TABLET, FILM COATED ORAL at 22:07

## 2022-01-01 RX ADMIN — HEPARIN SODIUM 5000 UNIT(S): 5000 INJECTION INTRAVENOUS; SUBCUTANEOUS at 09:47

## 2022-01-01 RX ADMIN — Medication 100 MICROGRAM(S): at 05:24

## 2022-01-01 RX ADMIN — POLYETHYLENE GLYCOL 3350 17 GRAM(S): 17 POWDER, FOR SOLUTION ORAL at 17:44

## 2022-01-01 RX ADMIN — POLYETHYLENE GLYCOL 3350 17 GRAM(S): 17 POWDER, FOR SOLUTION ORAL at 17:53

## 2022-01-01 RX ADMIN — Medication 100 MICROGRAM(S): at 06:30

## 2022-01-01 RX ADMIN — ATORVASTATIN CALCIUM 40 MILLIGRAM(S): 80 TABLET, FILM COATED ORAL at 21:07

## 2022-01-01 RX ADMIN — DORNASE ALFA 2.5 MILLIGRAM(S): 1 SOLUTION RESPIRATORY (INHALATION) at 09:18

## 2022-01-01 RX ADMIN — SODIUM CHLORIDE 4 MILLILITER(S): 9 INJECTION INTRAMUSCULAR; INTRAVENOUS; SUBCUTANEOUS at 09:51

## 2022-01-01 RX ADMIN — OXYCODONE HYDROCHLORIDE 5 MILLIGRAM(S): 5 TABLET ORAL at 16:49

## 2022-01-01 RX ADMIN — POLYETHYLENE GLYCOL 3350 17 GRAM(S): 17 POWDER, FOR SOLUTION ORAL at 06:16

## 2022-01-01 RX ADMIN — Medication 1 TABLET(S): at 11:31

## 2022-01-01 RX ADMIN — Medication 10 MILLIGRAM(S): at 18:59

## 2022-01-01 RX ADMIN — TAMSULOSIN HYDROCHLORIDE 0.4 MILLIGRAM(S): 0.4 CAPSULE ORAL at 21:21

## 2022-01-01 RX ADMIN — LIDOCAINE 1 PATCH: 4 CREAM TOPICAL at 11:44

## 2022-01-01 RX ADMIN — Medication 15 MILLILITER(S): at 16:06

## 2022-01-01 RX ADMIN — LIDOCAINE 1 PATCH: 4 CREAM TOPICAL at 15:16

## 2022-01-01 RX ADMIN — ONDANSETRON 4 MILLIGRAM(S): 8 TABLET, FILM COATED ORAL at 06:13

## 2022-01-01 RX ADMIN — Medication 25 MILLIGRAM(S): at 21:33

## 2022-01-01 RX ADMIN — LIDOCAINE 1 PATCH: 4 CREAM TOPICAL at 14:58

## 2022-01-01 RX ADMIN — TAMSULOSIN HYDROCHLORIDE 0.4 MILLIGRAM(S): 0.4 CAPSULE ORAL at 22:05

## 2022-01-01 RX ADMIN — SENNA PLUS 2 TABLET(S): 8.6 TABLET ORAL at 22:47

## 2022-01-01 RX ADMIN — Medication 100 GRAM(S): at 17:57

## 2022-01-01 RX ADMIN — TAMSULOSIN HYDROCHLORIDE 0.4 MILLIGRAM(S): 0.4 CAPSULE ORAL at 22:29

## 2022-01-01 RX ADMIN — Medication 50 MILLIGRAM(S): at 17:37

## 2022-01-01 RX ADMIN — Medication 20 MILLIGRAM(S): at 11:30

## 2022-01-01 RX ADMIN — TRAMADOL HYDROCHLORIDE 50 MILLIGRAM(S): 50 TABLET ORAL at 20:07

## 2022-01-01 RX ADMIN — Medication 1 TABLET(S): at 17:56

## 2022-01-01 RX ADMIN — Medication 10 MILLIGRAM(S): at 13:20

## 2022-01-01 RX ADMIN — Medication 100 MICROGRAM(S): at 06:31

## 2022-01-01 RX ADMIN — TRAMADOL HYDROCHLORIDE 50 MILLIGRAM(S): 50 TABLET ORAL at 12:55

## 2022-01-01 RX ADMIN — Medication 20 MILLIGRAM(S): at 05:27

## 2022-01-01 RX ADMIN — Medication 400 MILLIGRAM(S): at 23:03

## 2022-01-01 RX ADMIN — Medication 10 MILLIGRAM(S): at 13:16

## 2022-01-01 RX ADMIN — MIDODRINE HYDROCHLORIDE 20 MILLIGRAM(S): 2.5 TABLET ORAL at 11:31

## 2022-01-01 RX ADMIN — Medication 50 MILLIGRAM(S): at 05:24

## 2022-01-01 RX ADMIN — DORNASE ALFA 2.5 MILLIGRAM(S): 1 SOLUTION RESPIRATORY (INHALATION) at 09:54

## 2022-01-01 RX ADMIN — OXYCODONE HYDROCHLORIDE 5 MILLIGRAM(S): 5 TABLET ORAL at 22:43

## 2022-01-01 RX ADMIN — OXYCODONE HYDROCHLORIDE 5 MILLIGRAM(S): 5 TABLET ORAL at 11:49

## 2022-01-01 RX ADMIN — SODIUM CHLORIDE 4 MILLILITER(S): 9 INJECTION INTRAMUSCULAR; INTRAVENOUS; SUBCUTANEOUS at 09:56

## 2022-01-01 RX ADMIN — Medication 975 MILLIGRAM(S): at 09:47

## 2022-01-01 RX ADMIN — Medication 1000 MILLIGRAM(S): at 23:33

## 2022-01-01 RX ADMIN — Medication 1 ENEMA: at 14:26

## 2022-01-01 RX ADMIN — SENNA PLUS 2 TABLET(S): 8.6 TABLET ORAL at 20:50

## 2022-01-01 RX ADMIN — Medication 100 MICROGRAM(S): at 05:28

## 2022-01-01 RX ADMIN — Medication 100 MICROGRAM(S): at 06:29

## 2022-01-01 RX ADMIN — Medication 100 MICROGRAM(S): at 05:14

## 2022-01-01 RX ADMIN — LIDOCAINE 1 PATCH: 4 CREAM TOPICAL at 20:10

## 2022-01-01 RX ADMIN — LIDOCAINE 1 PATCH: 4 CREAM TOPICAL at 03:02

## 2022-01-01 RX ADMIN — LIDOCAINE 1 PATCH: 4 CREAM TOPICAL at 14:34

## 2022-01-01 RX ADMIN — DORNASE ALFA 2.5 MILLIGRAM(S): 1 SOLUTION RESPIRATORY (INHALATION) at 08:35

## 2022-01-01 RX ADMIN — Medication 5 MG/HR: at 13:14

## 2022-01-01 RX ADMIN — Medication 20 MILLIEQUIVALENT(S): at 17:36

## 2022-01-01 RX ADMIN — LIDOCAINE 1 PATCH: 4 CREAM TOPICAL at 14:46

## 2022-01-01 RX ADMIN — Medication 100 MICROGRAM(S): at 06:16

## 2022-01-01 RX ADMIN — SODIUM CHLORIDE 4 MILLILITER(S): 9 INJECTION INTRAMUSCULAR; INTRAVENOUS; SUBCUTANEOUS at 20:15

## 2022-01-01 RX ADMIN — SODIUM CHLORIDE 60 MILLILITER(S): 9 INJECTION INTRAMUSCULAR; INTRAVENOUS; SUBCUTANEOUS at 20:44

## 2022-01-01 RX ADMIN — CHLORHEXIDINE GLUCONATE 1 APPLICATION(S): 213 SOLUTION TOPICAL at 14:39

## 2022-01-01 RX ADMIN — Medication 650 MILLIGRAM(S): at 17:16

## 2022-01-01 RX ADMIN — HYDROMORPHONE HYDROCHLORIDE 0.2 MILLIGRAM(S): 2 INJECTION INTRAMUSCULAR; INTRAVENOUS; SUBCUTANEOUS at 22:40

## 2022-01-01 RX ADMIN — OXYCODONE HYDROCHLORIDE 5 MILLIGRAM(S): 5 TABLET ORAL at 21:31

## 2022-01-01 RX ADMIN — OXYCODONE HYDROCHLORIDE 5 MILLIGRAM(S): 5 TABLET ORAL at 14:18

## 2022-01-01 RX ADMIN — LIDOCAINE 1 PATCH: 4 CREAM TOPICAL at 19:40

## 2022-01-01 RX ADMIN — SENNA PLUS 2 TABLET(S): 8.6 TABLET ORAL at 22:06

## 2022-01-01 RX ADMIN — Medication 500 MILLIGRAM(S): at 11:32

## 2022-01-01 RX ADMIN — Medication 81 MILLIGRAM(S): at 11:31

## 2022-01-01 RX ADMIN — Medication 50 MILLIGRAM(S): at 06:16

## 2022-01-01 RX ADMIN — CEFEPIME 100 MILLIGRAM(S): 1 INJECTION, POWDER, FOR SOLUTION INTRAMUSCULAR; INTRAVENOUS at 12:29

## 2022-01-01 RX ADMIN — Medication 20 MILLIGRAM(S): at 13:15

## 2022-01-01 RX ADMIN — FLUCONAZOLE 200 MILLIGRAM(S): 150 TABLET ORAL at 19:48

## 2022-01-01 RX ADMIN — LIDOCAINE 1 PATCH: 4 CREAM TOPICAL at 13:20

## 2022-01-01 RX ADMIN — SENNA PLUS 2 TABLET(S): 8.6 TABLET ORAL at 21:35

## 2022-01-01 RX ADMIN — LIDOCAINE 1 PATCH: 4 CREAM TOPICAL at 18:08

## 2022-01-01 RX ADMIN — Medication 650 MILLIGRAM(S): at 17:40

## 2022-01-01 RX ADMIN — OXYCODONE HYDROCHLORIDE 5 MILLIGRAM(S): 5 TABLET ORAL at 19:52

## 2022-01-01 RX ADMIN — Medication 0.25 MILLIGRAM(S): at 00:44

## 2022-01-01 RX ADMIN — POLYETHYLENE GLYCOL 3350 17 GRAM(S): 17 POWDER, FOR SOLUTION ORAL at 17:25

## 2022-01-01 RX ADMIN — OXYCODONE HYDROCHLORIDE 5 MILLIGRAM(S): 5 TABLET ORAL at 09:50

## 2022-01-01 RX ADMIN — OXYCODONE HYDROCHLORIDE 5 MILLIGRAM(S): 5 TABLET ORAL at 07:35

## 2022-01-01 RX ADMIN — DORNASE ALFA 2.5 MILLIGRAM(S): 1 SOLUTION RESPIRATORY (INHALATION) at 10:50

## 2022-01-01 RX ADMIN — CEFTRIAXONE 100 MILLIGRAM(S): 500 INJECTION, POWDER, FOR SOLUTION INTRAMUSCULAR; INTRAVENOUS at 22:08

## 2022-01-01 RX ADMIN — Medication 10 MILLIGRAM(S): at 17:16

## 2022-01-01 RX ADMIN — Medication 100 MICROGRAM(S): at 05:53

## 2022-01-01 RX ADMIN — SODIUM CHLORIDE 50 MILLILITER(S): 9 INJECTION INTRAMUSCULAR; INTRAVENOUS; SUBCUTANEOUS at 08:34

## 2022-01-01 RX ADMIN — SODIUM ZIRCONIUM CYCLOSILICATE 10 GRAM(S): 10 POWDER, FOR SUSPENSION ORAL at 14:50

## 2022-01-01 RX ADMIN — Medication 650 MILLIGRAM(S): at 05:24

## 2022-01-01 RX ADMIN — CEFEPIME 100 MILLIGRAM(S): 1 INJECTION, POWDER, FOR SOLUTION INTRAMUSCULAR; INTRAVENOUS at 17:17

## 2022-01-01 RX ADMIN — OXYCODONE HYDROCHLORIDE 5 MILLIGRAM(S): 5 TABLET ORAL at 17:23

## 2022-01-01 RX ADMIN — NYSTATIN CREAM 1 APPLICATION(S): 100000 CREAM TOPICAL at 21:09

## 2022-01-01 RX ADMIN — Medication 100 MILLIGRAM(S): at 17:56

## 2022-01-01 RX ADMIN — Medication 100 MICROGRAM(S): at 19:25

## 2022-01-01 RX ADMIN — TAMSULOSIN HYDROCHLORIDE 0.4 MILLIGRAM(S): 0.4 CAPSULE ORAL at 23:43

## 2022-01-01 RX ADMIN — Medication 12.5 MILLIGRAM(S): at 10:20

## 2022-01-01 RX ADMIN — LIDOCAINE 1 PATCH: 4 CREAM TOPICAL at 22:40

## 2022-01-01 RX ADMIN — Medication 125 MILLILITER(S): at 01:17

## 2022-01-01 RX ADMIN — NYSTATIN CREAM 1 APPLICATION(S): 100000 CREAM TOPICAL at 22:24

## 2022-01-01 RX ADMIN — Medication 100 MICROGRAM(S): at 05:23

## 2022-01-01 RX ADMIN — Medication 50 MILLIGRAM(S): at 05:15

## 2022-01-01 RX ADMIN — Medication 25 MILLIGRAM(S): at 17:45

## 2022-01-01 RX ADMIN — ATORVASTATIN CALCIUM 40 MILLIGRAM(S): 80 TABLET, FILM COATED ORAL at 22:05

## 2022-01-01 RX ADMIN — SODIUM CHLORIDE 50 MILLILITER(S): 9 INJECTION INTRAMUSCULAR; INTRAVENOUS; SUBCUTANEOUS at 20:31

## 2022-01-01 RX ADMIN — LIDOCAINE 1 PATCH: 4 CREAM TOPICAL at 23:00

## 2022-01-01 RX ADMIN — NYSTATIN CREAM 1 APPLICATION(S): 100000 CREAM TOPICAL at 08:42

## 2022-01-01 RX ADMIN — Medication 100 MICROGRAM(S): at 05:15

## 2022-01-01 RX ADMIN — Medication 100 MICROGRAM(S): at 05:52

## 2022-01-01 RX ADMIN — POLYETHYLENE GLYCOL 3350 17 GRAM(S): 17 POWDER, FOR SOLUTION ORAL at 17:21

## 2022-01-01 RX ADMIN — Medication 166.67 MILLIGRAM(S): at 22:22

## 2022-01-01 RX ADMIN — ATORVASTATIN CALCIUM 40 MILLIGRAM(S): 80 TABLET, FILM COATED ORAL at 20:50

## 2022-01-01 RX ADMIN — OXYCODONE HYDROCHLORIDE 5 MILLIGRAM(S): 5 TABLET ORAL at 09:27

## 2022-01-01 RX ADMIN — SENNA PLUS 2 TABLET(S): 8.6 TABLET ORAL at 23:07

## 2022-01-01 RX ADMIN — POLYETHYLENE GLYCOL 3350 17 GRAM(S): 17 POWDER, FOR SOLUTION ORAL at 13:15

## 2022-01-01 RX ADMIN — Medication 50 MILLIGRAM(S): at 05:34

## 2022-01-01 RX ADMIN — Medication 100 MICROGRAM(S): at 05:29

## 2022-01-01 RX ADMIN — DORNASE ALFA 2.5 MILLIGRAM(S): 1 SOLUTION RESPIRATORY (INHALATION) at 09:03

## 2022-01-01 RX ADMIN — Medication 10 MILLIGRAM(S): at 11:50

## 2022-01-01 RX ADMIN — APIXABAN 5 MILLIGRAM(S): 2.5 TABLET, FILM COATED ORAL at 17:34

## 2022-01-01 RX ADMIN — PANTOPRAZOLE SODIUM 40 MILLIGRAM(S): 20 TABLET, DELAYED RELEASE ORAL at 05:41

## 2022-01-01 RX ADMIN — Medication 100 MICROGRAM(S): at 05:00

## 2022-01-01 RX ADMIN — SODIUM ZIRCONIUM CYCLOSILICATE 10 GRAM(S): 10 POWDER, FOR SUSPENSION ORAL at 08:45

## 2022-01-01 RX ADMIN — MIDODRINE HYDROCHLORIDE 10 MILLIGRAM(S): 2.5 TABLET ORAL at 14:44

## 2022-01-01 RX ADMIN — OXYCODONE HYDROCHLORIDE 2.5 MILLIGRAM(S): 5 TABLET ORAL at 22:10

## 2022-01-01 RX ADMIN — Medication 81 MILLIGRAM(S): at 14:45

## 2022-01-01 RX ADMIN — OXYCODONE HYDROCHLORIDE 5 MILLIGRAM(S): 5 TABLET ORAL at 10:35

## 2022-01-01 RX ADMIN — ROBINUL 0.4 MILLIGRAM(S): 0.2 INJECTION INTRAMUSCULAR; INTRAVENOUS at 17:59

## 2022-01-01 RX ADMIN — CEFEPIME 100 MILLIGRAM(S): 1 INJECTION, POWDER, FOR SOLUTION INTRAMUSCULAR; INTRAVENOUS at 05:42

## 2022-01-01 RX ADMIN — Medication 50 MILLIGRAM(S): at 17:26

## 2022-01-01 RX ADMIN — ATORVASTATIN CALCIUM 40 MILLIGRAM(S): 80 TABLET, FILM COATED ORAL at 21:08

## 2022-01-01 RX ADMIN — SODIUM CHLORIDE 4 MILLILITER(S): 9 INJECTION INTRAMUSCULAR; INTRAVENOUS; SUBCUTANEOUS at 21:07

## 2022-01-01 RX ADMIN — LIDOCAINE 1 PATCH: 4 CREAM TOPICAL at 00:10

## 2022-01-01 RX ADMIN — LIDOCAINE 1 PATCH: 4 CREAM TOPICAL at 01:55

## 2022-01-01 RX ADMIN — Medication 250 MILLIGRAM(S): at 13:51

## 2022-01-01 RX ADMIN — SODIUM CHLORIDE 4 MILLILITER(S): 9 INJECTION INTRAMUSCULAR; INTRAVENOUS; SUBCUTANEOUS at 11:08

## 2022-01-01 RX ADMIN — SODIUM CHLORIDE 4 MILLILITER(S): 9 INJECTION INTRAMUSCULAR; INTRAVENOUS; SUBCUTANEOUS at 10:50

## 2022-01-01 RX ADMIN — Medication 250 MILLIGRAM(S): at 22:19

## 2022-01-01 RX ADMIN — Medication 100 MICROGRAM(S): at 05:41

## 2022-01-01 RX ADMIN — DORNASE ALFA 2.5 MILLIGRAM(S): 1 SOLUTION RESPIRATORY (INHALATION) at 11:19

## 2022-01-01 RX ADMIN — SODIUM ZIRCONIUM CYCLOSILICATE 10 GRAM(S): 10 POWDER, FOR SUSPENSION ORAL at 23:49

## 2022-01-01 RX ADMIN — NYSTATIN CREAM 1 APPLICATION(S): 100000 CREAM TOPICAL at 22:17

## 2022-01-01 RX ADMIN — PANTOPRAZOLE SODIUM 40 MILLIGRAM(S): 20 TABLET, DELAYED RELEASE ORAL at 05:30

## 2022-01-01 RX ADMIN — NYSTATIN CREAM 1 APPLICATION(S): 100000 CREAM TOPICAL at 23:44

## 2022-01-01 RX ADMIN — Medication 100 MICROGRAM(S): at 05:47

## 2022-01-01 RX ADMIN — POLYETHYLENE GLYCOL 3350 17 GRAM(S): 17 POWDER, FOR SOLUTION ORAL at 05:01

## 2022-01-01 RX ADMIN — SODIUM CHLORIDE 60 MILLILITER(S): 9 INJECTION INTRAMUSCULAR; INTRAVENOUS; SUBCUTANEOUS at 08:55

## 2022-01-01 RX ADMIN — Medication 25 MILLIGRAM(S): at 05:44

## 2022-01-01 RX ADMIN — SENNA PLUS 2 TABLET(S): 8.6 TABLET ORAL at 22:19

## 2022-01-01 RX ADMIN — SODIUM CHLORIDE 4 MILLILITER(S): 9 INJECTION INTRAMUSCULAR; INTRAVENOUS; SUBCUTANEOUS at 08:50

## 2022-01-01 RX ADMIN — ATORVASTATIN CALCIUM 40 MILLIGRAM(S): 80 TABLET, FILM COATED ORAL at 23:11

## 2022-01-01 RX ADMIN — Medication 100 MICROGRAM(S): at 05:44

## 2022-01-01 RX ADMIN — OXYCODONE HYDROCHLORIDE 2.5 MILLIGRAM(S): 5 TABLET ORAL at 14:58

## 2022-01-01 RX ADMIN — Medication 50 MILLIGRAM(S): at 05:47

## 2022-01-01 RX ADMIN — Medication 25 MILLIGRAM(S): at 17:32

## 2022-01-01 RX ADMIN — OXYCODONE HYDROCHLORIDE 5 MILLIGRAM(S): 5 TABLET ORAL at 09:01

## 2022-01-01 RX ADMIN — NYSTATIN CREAM 1 APPLICATION(S): 100000 CREAM TOPICAL at 10:37

## 2022-01-01 RX ADMIN — POLYETHYLENE GLYCOL 3350 17 GRAM(S): 17 POWDER, FOR SOLUTION ORAL at 05:52

## 2022-01-01 RX ADMIN — MIDODRINE HYDROCHLORIDE 20 MILLIGRAM(S): 2.5 TABLET ORAL at 17:22

## 2022-01-01 RX ADMIN — SENNA PLUS 2 TABLET(S): 8.6 TABLET ORAL at 22:22

## 2022-01-01 RX ADMIN — DORNASE ALFA 2.5 MILLIGRAM(S): 1 SOLUTION RESPIRATORY (INHALATION) at 11:08

## 2022-01-01 RX ADMIN — CEFEPIME 100 MILLIGRAM(S): 1 INJECTION, POWDER, FOR SOLUTION INTRAMUSCULAR; INTRAVENOUS at 05:27

## 2022-01-01 RX ADMIN — Medication 81 MILLIGRAM(S): at 12:04

## 2022-01-01 RX ADMIN — LIDOCAINE 1 PATCH: 4 CREAM TOPICAL at 12:05

## 2022-01-01 RX ADMIN — APIXABAN 5 MILLIGRAM(S): 2.5 TABLET, FILM COATED ORAL at 06:29

## 2022-01-01 RX ADMIN — Medication 50 MILLIGRAM(S): at 05:35

## 2022-01-01 RX ADMIN — OXYCODONE HYDROCHLORIDE 5 MILLIGRAM(S): 5 TABLET ORAL at 14:45

## 2022-01-01 RX ADMIN — Medication 650 MILLIGRAM(S): at 04:39

## 2022-01-01 RX ADMIN — Medication 1000 MILLIGRAM(S): at 16:13

## 2022-01-01 RX ADMIN — Medication 400 MILLIGRAM(S): at 21:21

## 2022-01-01 RX ADMIN — Medication 50 MILLIGRAM(S): at 05:01

## 2022-01-01 RX ADMIN — OXYCODONE HYDROCHLORIDE 5 MILLIGRAM(S): 5 TABLET ORAL at 16:40

## 2022-01-01 RX ADMIN — Medication 12.5 MILLIGRAM(S): at 09:43

## 2022-01-01 RX ADMIN — HYDROMORPHONE HYDROCHLORIDE 0.2 MILLIGRAM(S): 2 INJECTION INTRAMUSCULAR; INTRAVENOUS; SUBCUTANEOUS at 23:38

## 2022-01-01 RX ADMIN — NYSTATIN CREAM 1 APPLICATION(S): 100000 CREAM TOPICAL at 21:18

## 2022-01-01 RX ADMIN — Medication 10 MILLIGRAM(S): at 12:46

## 2022-01-01 RX ADMIN — POLYETHYLENE GLYCOL 3350 17 GRAM(S): 17 POWDER, FOR SOLUTION ORAL at 05:44

## 2022-01-01 RX ADMIN — DORNASE ALFA 2.5 MILLIGRAM(S): 1 SOLUTION RESPIRATORY (INHALATION) at 08:18

## 2022-01-01 RX ADMIN — LIDOCAINE 1 PATCH: 4 CREAM TOPICAL at 13:16

## 2022-01-01 RX ADMIN — CEFEPIME 100 MILLIGRAM(S): 1 INJECTION, POWDER, FOR SOLUTION INTRAMUSCULAR; INTRAVENOUS at 05:53

## 2022-01-01 RX ADMIN — ATORVASTATIN CALCIUM 40 MILLIGRAM(S): 80 TABLET, FILM COATED ORAL at 23:07

## 2022-01-01 RX ADMIN — Medication 3 MILLILITER(S): at 06:09

## 2022-01-01 RX ADMIN — TAMSULOSIN HYDROCHLORIDE 0.4 MILLIGRAM(S): 0.4 CAPSULE ORAL at 21:22

## 2022-01-01 RX ADMIN — OXYCODONE HYDROCHLORIDE 2.5 MILLIGRAM(S): 5 TABLET ORAL at 21:37

## 2022-01-01 RX ADMIN — TRAMADOL HYDROCHLORIDE 50 MILLIGRAM(S): 50 TABLET ORAL at 19:37

## 2022-01-01 RX ADMIN — Medication 50 MILLIGRAM(S): at 05:41

## 2022-01-01 RX ADMIN — FLUCONAZOLE 200 MILLIGRAM(S): 150 TABLET ORAL at 11:32

## 2022-01-01 RX ADMIN — Medication 100 MICROGRAM(S): at 06:17

## 2022-01-01 RX ADMIN — LIDOCAINE 1 PATCH: 4 CREAM TOPICAL at 05:23

## 2022-01-01 RX ADMIN — OXYCODONE HYDROCHLORIDE 5 MILLIGRAM(S): 5 TABLET ORAL at 20:18

## 2022-01-01 RX ADMIN — SODIUM CHLORIDE 4 MILLILITER(S): 9 INJECTION INTRAMUSCULAR; INTRAVENOUS; SUBCUTANEOUS at 08:15

## 2022-01-01 RX ADMIN — SODIUM CHLORIDE 500 MILLILITER(S): 9 INJECTION INTRAMUSCULAR; INTRAVENOUS; SUBCUTANEOUS at 12:39

## 2022-01-01 RX ADMIN — Medication 1000 MILLIGRAM(S): at 14:00

## 2022-01-01 RX ADMIN — POLYETHYLENE GLYCOL 3350 17 GRAM(S): 17 POWDER, FOR SOLUTION ORAL at 18:10

## 2022-01-01 RX ADMIN — ATORVASTATIN CALCIUM 40 MILLIGRAM(S): 80 TABLET, FILM COATED ORAL at 21:24

## 2022-01-01 RX ADMIN — OXYCODONE HYDROCHLORIDE 5 MILLIGRAM(S): 5 TABLET ORAL at 17:15

## 2022-01-01 RX ADMIN — SODIUM CHLORIDE 4 MILLILITER(S): 9 INJECTION INTRAMUSCULAR; INTRAVENOUS; SUBCUTANEOUS at 11:19

## 2022-01-01 RX ADMIN — Medication 250 MILLIGRAM(S): at 23:10

## 2022-01-01 RX ADMIN — LEVALBUTEROL 0.63 MILLIGRAM(S): 1.25 SOLUTION, CONCENTRATE RESPIRATORY (INHALATION) at 10:30

## 2022-01-01 RX ADMIN — Medication 400 MILLIGRAM(S): at 14:58

## 2022-01-01 RX ADMIN — TAMSULOSIN HYDROCHLORIDE 0.4 MILLIGRAM(S): 0.4 CAPSULE ORAL at 21:29

## 2022-01-01 RX ADMIN — Medication 25 MILLIGRAM(S): at 23:11

## 2022-01-01 RX ADMIN — SODIUM CHLORIDE 4 MILLILITER(S): 9 INJECTION INTRAMUSCULAR; INTRAVENOUS; SUBCUTANEOUS at 20:31

## 2022-01-01 RX ADMIN — Medication 50 MILLIGRAM(S): at 05:22

## 2022-01-01 RX ADMIN — POLYETHYLENE GLYCOL 3350 17 GRAM(S): 17 POWDER, FOR SOLUTION ORAL at 18:59

## 2022-01-01 RX ADMIN — Medication 1 ENEMA: at 16:39

## 2022-01-01 RX ADMIN — Medication 37.5 MILLIGRAM(S): at 18:10

## 2022-01-01 RX ADMIN — OXYCODONE HYDROCHLORIDE 5 MILLIGRAM(S): 5 TABLET ORAL at 13:41

## 2022-01-01 RX ADMIN — SODIUM CHLORIDE 4 MILLILITER(S): 9 INJECTION INTRAMUSCULAR; INTRAVENOUS; SUBCUTANEOUS at 09:03

## 2022-01-01 RX ADMIN — Medication 50 MILLIGRAM(S): at 17:18

## 2022-01-01 RX ADMIN — CEFEPIME 100 MILLIGRAM(S): 1 INJECTION, POWDER, FOR SOLUTION INTRAMUSCULAR; INTRAVENOUS at 05:23

## 2022-01-01 RX ADMIN — Medication 50 MILLIGRAM(S): at 17:39

## 2022-01-01 RX ADMIN — CEFEPIME 100 MILLIGRAM(S): 1 INJECTION, POWDER, FOR SOLUTION INTRAMUSCULAR; INTRAVENOUS at 16:21

## 2022-01-01 RX ADMIN — CEFEPIME 100 MILLIGRAM(S): 1 INJECTION, POWDER, FOR SOLUTION INTRAMUSCULAR; INTRAVENOUS at 17:35

## 2022-01-01 RX ADMIN — Medication 25 MILLIGRAM(S): at 21:59

## 2022-01-01 RX ADMIN — APIXABAN 5 MILLIGRAM(S): 2.5 TABLET, FILM COATED ORAL at 05:31

## 2022-01-01 RX ADMIN — CEFEPIME 100 MILLIGRAM(S): 1 INJECTION, POWDER, FOR SOLUTION INTRAMUSCULAR; INTRAVENOUS at 18:39

## 2022-01-01 RX ADMIN — ATORVASTATIN CALCIUM 40 MILLIGRAM(S): 80 TABLET, FILM COATED ORAL at 21:19

## 2022-01-01 RX ADMIN — OXYCODONE HYDROCHLORIDE 5 MILLIGRAM(S): 5 TABLET ORAL at 16:53

## 2022-01-01 RX ADMIN — OXYCODONE HYDROCHLORIDE 5 MILLIGRAM(S): 5 TABLET ORAL at 02:04

## 2022-01-01 RX ADMIN — Medication 10 MILLIGRAM(S): at 13:23

## 2022-01-01 RX ADMIN — ATORVASTATIN CALCIUM 40 MILLIGRAM(S): 80 TABLET, FILM COATED ORAL at 23:43

## 2022-01-01 RX ADMIN — CHLORHEXIDINE GLUCONATE 1 APPLICATION(S): 213 SOLUTION TOPICAL at 05:30

## 2022-01-01 RX ADMIN — OXYCODONE HYDROCHLORIDE 5 MILLIGRAM(S): 5 TABLET ORAL at 07:50

## 2022-03-23 PROBLEM — M48.00 SPINAL STENOSIS: Status: ACTIVE | Noted: 2017-03-02

## 2022-03-23 NOTE — ASSESSMENT
[FreeTextEntry1] : Keyshawn appears to be cardiac stable but of concern is the echo findings suggesting a large left pleural effusion.  His LV function appears to be preserved.  He is only mildly short of breath and his atrial fibrillation rate is well controlled\par \par 1.  Chronic atrial fibrillation ventricular rate controlled on Eliquis which she is tolerating\par \par 2.  Status post coronary bypass surgery no active angina or CHF.  Echo preliminarily reveals preserved LV function\par \par 3.  EKG atrial fibrillation ventricular rate controlled right bundle branch block\par \par 4.  Severe osteoarthritis spinal stenosis with chronic back pain which is his major complaint and problem\par \par 5.  Of concern is the echo suggesting a large left pleural effusion and physical exam compatible with decreased breath sounds over one third up.  This is a new finding.  He has had some weight loss and decreased appetite

## 2022-03-23 NOTE — HISTORY OF PRESENT ILLNESS
[FreeTextEntry1] : Keyshawn is 89 years old with a long history of chronic atrial fibrillation on long-term anticoagulation with Eliquis.  He has a history of preserved LV function on previous echo and is status post coronary bypass surgery many years ago at [facility] for multivessel coronary disease.\par \par He has mostly been plagued by severe back pain which has been chronic and fairly unrelieved over many years.  He is status post hip replacement.\par \par Presently from a cardiac point of view he offers no complaints of shortness of breath palpitations edema orthopnea or PND.  He has no chest pain\par \par He remains on stable medications which include Eliquis 5 twice daily I believe aspirin 81 Synthroid 88 mcg Norvasc 5 mg rosuvastatin 10 mg Toprol-XL 50 mg twice a day and chronic tramadol as well as Trulance 3 mg\par \par EKG december 2021 atrial fibrillation ventricular rate controlled right bundle branch block\par \par Recent blood tests december 2021\par Creatinine 0.97 GFR 62 potassium 3.9\par \par He still does not have much of an appetite and may have lost some weight.  Overall he has some mild shortness of breath but it is not progressed.  He is no associated chest pain\par \par 2D echo today March 23, 2022 preliminarily overall revealed preserved LV function, some calcification of the aortic valve with decreased opening but no significant gradients but there was evidence of a large left pleural effusion no pericardial effusion\par \par \par \par

## 2022-03-23 NOTE — REASON FOR VISIT
[FreeTextEntry1] : Keyshawn Arroyocaryl 89 years old here for evaluation of his cardiac status.  I have not seen this patient for over 2 years but has been a long-term patient of mine.  His records from [facility] have been requested

## 2022-03-23 NOTE — PHYSICAL EXAM
[Normal Conjunctiva] : normal conjunctiva [Soft] : abdomen soft [Non Tender] : non-tender [No Edema] : no edema [Moves all extremities] : moves all extremities [No Focal Deficits] : no focal deficits [de-identified] : Thin no acute distress appears chronically depressed complaining of back pain some temporal wasting [de-identified] : Mild neck vein distention [de-identified] : S1-S2 irregularly regular no murmur no S3 no diastolic murmur heard [de-identified] : Right lung clear with good breath sounds left lung decreased breath sounds one third up no clear egophony this was not present on last visit

## 2022-03-23 NOTE — DISCUSSION/SUMMARY
[FreeTextEntry1] : I made an urgent referral to pulmonary Dr. Jeffers group for chest x-ray, possible CT and possible diagnostic and therapeutic tapping of the effusion\par \par He is on Eliquis 5 twice daily which he will continue by the obviously this will need to be stopped if any invasive procedures are performed\par \par Patient will follow with me after his visit with pulmonary which hopefully will be soon

## 2022-03-29 NOTE — CONSULT LETTER
[FreeTextEntry1] : Dear ,\par \par I had the pleasure of evaluating your patient, BENJAMIN ARREDONDO today in pulmonary consultation.  Please refer to my attached note for my findings and recommendations.\par \par \par Thank you for allowing me to participate in the care of your patient, please feel free to call with any questions or concerns.\par \par \par Sincerely,\par \par Eunice Byrd MD\par [facility] \par [facility]\par \par

## 2022-03-29 NOTE — PROCEDURE
[FreeTextEntry1] : CXR: moderate left sided effusion, no focal consolidation, cardiac silhouette appears normal.  No bony abnormality.\par \par \par Bedside ultrasound examination demonstrated small to moderate left sided free flowing pleural effusion.  Right lung with A-lines, no right effusion.\par \par prior exams reviewed:\par \par \par \par \par EXAM: XR CHEST PORTABLE URGENT 1V \par \par \par PROCEDURE DATE: Sep 15 2019 \par \par \par \par INTERPRETATION: TIME OF EXAM: September 15, 2019 at 4:18 PM. \par \par CLINICAL INFORMATION: Hematuria. Desaturation. \par \par COMPARISON: September 8, 2019. \par \par TECHNIQUE: AP Portable chest x-ray. Inferior lateral left hemithorax \par excluded from image. \par \par INTERPRETATION: \par \par Heart size and the mediastinum cannot be accurately evaluated on this \par projection. Median sternotomy sutures and surgical clips again seen. \par There are bibasilar and retrocardiac opacities with obscuration of the \par diaphragm, new on the right and worse on the left. The upper and mid lungs \par are clear. No pneumothorax. \par \par \par \par \par \par \par IMPRESSION: Bibasilar and retrocardiac opacities,, new on the right and \par worse on the left, likely small pleural effusions with passive atelectasis. \par Atelectasis of other cause and/or pneumonia in the appropriate clinical \par context is not excluded. \par \par

## 2022-03-29 NOTE — ASSESSMENT
[FreeTextEntry1] : will plan for diagnostic thora next wednesday at 1:30pm in the office\par check labs today\par to stop eliquis after sunday mornings dose\par pt and wife understand and agree.

## 2022-03-29 NOTE — REASON FOR VISIT
[Consultation] : a consultation [Shortness of Breath] : shortness of breath [TextBox_44] : pleural effusion

## 2022-03-29 NOTE — HISTORY OF PRESENT ILLNESS
[TextBox_4] : 89M afib on ac, cad s/p CABG, htn, hypothyroid referred by  after large pleural effusion found during echo.\par reports chronic olea\par some coughing recently\par some weight loss\par \par Per cardiology note:\par 2D echo today March 23, 2022 preliminarily overall revealed preserved LV function, some calcification of the aortic valve with decreased opening but no significant gradients but there was evidence of a large left pleural effusion no pericardial effusion

## 2022-04-06 NOTE — HISTORY OF PRESENT ILLNESS
[TextBox_4] : here for thoracentesis\par some dyspnea still\par stopped anticoagulation on sunday am\par repeat labs improved\par

## 2022-04-06 NOTE — ASSESSMENT
[FreeTextEntry1] : s/p thora\par 600cc serosanguineous fluid removed\par fu pleural fluid studies\par resume anticoagulation tomorrow am\par fu with \par

## 2022-04-06 NOTE — PROCEDURE
[FreeTextEntry1] : bedside ultrasound today demonstrates moderate sized free flowing left sided pleural effusion with underlying atelectatic lung.\par \par post procedure cxr: no pneumothorax, small left effusion remains, otherwise unchanged.\par \par Prior exams reviewed:\par \par CXR: moderate left sided effusion, no focal consolidation, cardiac silhouette appears normal.  No bony abnormality.\par \par \par Bedside ultrasound examination demonstrated small to moderate left sided free flowing pleural effusion.  Right lung with A-lines, no right effusion.\par \par prior exams reviewed:\par \par \par \par \par EXAM: XR CHEST PORTABLE URGENT 1V \par \par \par PROCEDURE DATE: Sep 15 2019 \par \par \par \par INTERPRETATION: TIME OF EXAM: September 15, 2019 at 4:18 PM. \par \par CLINICAL INFORMATION: Hematuria. Desaturation. \par \par COMPARISON: September 8, 2019. \par \par TECHNIQUE: AP Portable chest x-ray. Inferior lateral left hemithorax \par excluded from image. \par \par INTERPRETATION: \par \par Heart size and the mediastinum cannot be accurately evaluated on this \par projection. Median sternotomy sutures and surgical clips again seen. \par There are bibasilar and retrocardiac opacities with obscuration of the \par diaphragm, new on the right and worse on the left. The upper and mid lungs \par are clear. No pneumothorax. \par \par \par \par \par \par \par IMPRESSION: Bibasilar and retrocardiac opacities,, new on the right and \par worse on the left, likely small pleural effusions with passive atelectasis. \par Atelectasis of other cause and/or pneumonia in the appropriate clinical \par context is not excluded. \par \par

## 2022-04-06 NOTE — REASON FOR VISIT
[Follow-Up] : a follow-up visit [Shortness of Breath] : shortness of breath [Procedure: _________] : a [unfilled] procedure visit

## 2022-05-24 PROBLEM — R93.5 ABNORMAL CT OF THE ABDOMEN: Status: ACTIVE | Noted: 2017-06-05

## 2022-05-24 PROBLEM — Z96.641 S/P HIP REPLACEMENT, RIGHT: Status: ACTIVE | Noted: 2019-07-18

## 2022-05-24 NOTE — HISTORY OF PRESENT ILLNESS
[FreeTextEntry1] : Keyshawn is 89 years old with a long history of chronic atrial fibrillation on long-term anticoagulation with Eliquis.  He has a history of preserved LV function on previous echo and is status post coronary bypass surgery many years ago at [facility] for multivessel coronary disease.\par \par He has mostly been plagued by severe back pain which has been chronic and fairly unrelieved over many years.  He is status post hip replacement.\par \par Presently from a cardiac point of view he offers no complaints of shortness of breath palpitations edema orthopnea or PND.  He has no chest pain\par \par He remains on stable medications which include Eliquis 5 twice daily I believe aspirin 81 Synthroid 88 mcg Norvasc 5 mg rosuvastatin 10 mg Toprol-XL 50 mg twice a day and chronic tramadol as well as Trulance 3 mg\par \par EKG december 2021 atrial fibrillation ventricular rate controlled right bundle branch block\par \par Recent blood tests december 2021\par Creatinine 0.97 GFR 62 potassium 3.9\par \par He still does not have much of an appetite and may have lost some weight.  Overall he has some mild shortness of breath but it is not progressed.  He is no associated chest pain\par \par On last visit, he felt somewhat more short of breath and I noted decreased breath sounds on the left side compatible with a large left pleural effusion.  He was subsequently referred to pulmonary and 600 cc of fluid was removed apparently benign.\par \par He still gets short of breath in the shower but with walking he has no shortness of breath but is mainly limited by his arthritis.  He has no chest pain or palpitations.  He is concerned about the cost of Eliquis which now is $500 a month but he has been on Eliquis for many years\par \par \par 2D echo today March 23, 2022 preliminarily overall revealed preserved LV function, some calcification of the aortic valve with decreased opening but no significant gradients but there was evidence of a large left pleural effusion no pericardial effusion\par \par \par \par

## 2022-05-24 NOTE — PHYSICAL EXAM
[Normal Conjunctiva] : normal conjunctiva [Soft] : abdomen soft [Non Tender] : non-tender [No Edema] : no edema [Moves all extremities] : moves all extremities [No Focal Deficits] : no focal deficits [de-identified] : Thin no acute distress appears chronically depressed complaining of back pain some temporal wasting [de-identified] : Mild neck vein distention [de-identified] : S1-S2 irregularly regular no murmur no S3 no diastolic murmur heard [de-identified] : Right lung clear with good breath sounds left lung decreased breath sounds one quarter of less than before.

## 2022-05-24 NOTE — REASON FOR VISIT
[FreeTextEntry1] : Keyshawn Alvarado 89 years old here for evaluation of his cardiac status.  He had a large left pleural effusion recently underwent thoracentesis with removal of 600 cc of fluid

## 2022-05-24 NOTE — ASSESSMENT
[FreeTextEntry1] : Keyshawn appears to be cardiac stable but of concern is the echo findings suggesting a large left pleural effusion.  His LV function appears to be preserved.  He is only mildly short of breath and his atrial fibrillation rate is well controlled.  He is status post removal of 600 cc of the left pleural effusion which apparently is benign.  Recent echo compatible with moderate aortic stenosis preserved LV function mild pulmonary hypertension.\par \par 1.  Chronic atrial fibrillation ventricular rate controlled on Eliquis which she is tolerating\par \par 2.  Status post coronary bypass surgery no active angina or CHF.  Echo preliminarily reveals preserved LV function\par \par 3.  EKG atrial fibrillation ventricular rate controlled right bundle branch block\par \par 4.  Severe osteoarthritis spinal stenosis with chronic back pain which is his major complaint and problem\par \par 5.  Status post thoracentesis of the left pleural effusion.  He still has decreased breath sounds at the left base\par \par 6.  Normal LV function heavily calcified aortic valve with mild to moderate aortic stenosis

## 2022-05-24 NOTE — DISCUSSION/SUMMARY
[FreeTextEntry1] : Overall the patient is clinically stable from a cardiac point of view.  He still has decreased breath sounds at the left side.  I am not certain as to the etiology of the left pleural effusion but this could be related to diastolic dysfunction and moderate aortic stenosis\par \par For now would continue on his present regimen of medications, routine follow-up with pulmonary and follow-up with me again in 3 to 4 months.\par \par Overall he is clinically stable

## 2022-08-24 NOTE — ASSESSMENT
[FreeTextEntry1] : gave him 2 options\par \par repeat thora although fluid likely to return again or consider pleurex catheter\par \par he will think about it\par \par gave him info for thoracic surgery for pleurex

## 2022-08-24 NOTE — PROCEDURE
[FreeTextEntry1] : CXR: reaccumulation of left pleural fluid as before.\par \par Prior exams reviewed:\par \par bedside ultrasound today demonstrates moderate sized free flowing left sided pleural effusion with underlying atelectatic lung.\par \par post procedure cxr: no pneumothorax, small left effusion remains, otherwise unchanged.\par \par \par \par CXR: moderate left sided effusion, no focal consolidation, cardiac silhouette appears normal.  No bony abnormality.\par \par \par Bedside ultrasound examination demonstrated small to moderate left sided free flowing pleural effusion.  Right lung with A-lines, no right effusion.\par \par prior exams reviewed:\par \par \par \par \par EXAM: XR CHEST PORTABLE URGENT 1V \par \par \par PROCEDURE DATE: Sep 15 2019 \par \par \par \par INTERPRETATION: TIME OF EXAM: September 15, 2019 at 4:18 PM. \par \par CLINICAL INFORMATION: Hematuria. Desaturation. \par \par COMPARISON: September 8, 2019. \par \par TECHNIQUE: AP Portable chest x-ray. Inferior lateral left hemithorax \par excluded from image. \par \par INTERPRETATION: \par \par Heart size and the mediastinum cannot be accurately evaluated on this \par projection. Median sternotomy sutures and surgical clips again seen. \par There are bibasilar and retrocardiac opacities with obscuration of the \par diaphragm, new on the right and worse on the left. The upper and mid lungs \par are clear. No pneumothorax. \par \par \par \par \par \par \par IMPRESSION: Bibasilar and retrocardiac opacities,, new on the right and \par worse on the left, likely small pleural effusions with passive atelectasis. \par Atelectasis of other cause and/or pneumonia in the appropriate clinical \par context is not excluded. \par \par

## 2022-08-29 NOTE — DIETITIAN INITIAL EVALUATION ADULT. - % CHANGE
"Patient: Al Holliday    Procedure Summary     Date: 08/29/22 Room / Location:  [facility]  /  [facility]    Anesthesia Start: 0721 Anesthesia Stop: 0748    Procedures:       myringotomy tube insertion (Right Ear)      adenoidectomy (N/A Throat)      myringoplasty (Left Ear) Diagnosis:       Dysfunction of both eustachian tubes      Perforation of left tympanic membrane      S/p bilateral myringotomy with tube placement      Adenoiditis, chronic      (Dysfunction of both eustachian tubes [H69.83])      (Perforation of left tympanic membrane [H72.92])      (S/p bilateral myringotomy with tube placement [Z96.22])      (Adenoiditis, chronic [J35.02])    Surgeons: Joao Wood MD Provider: Luke Villarreal CRNA    Anesthesia Type: general ASA Status: 1          Anesthesia Type: general    Vitals  Vitals Value Taken Time   BP 91/42 08/29/22 0746   Temp 97.6 °F (36.4 °C) 08/29/22 0805   Pulse 150 08/29/22 0805   Resp 22 08/29/22 0805   SpO2 98 % 08/29/22 0805           Post Anesthesia Care and Evaluation    Patient location during evaluation: PACU  Patient participation: complete - patient participated  Level of consciousness: awake and alert  Pain management: adequate    Airway patency: patent  Anesthetic complications: No anesthetic complications    Cardiovascular status: acceptable  Respiratory status: acceptable  Hydration status: acceptable    Comments: Blood pressure (!) 104/63, pulse (!) 189, temperature 97.6 °F (36.4 °C), resp. rate 22, height 87 cm (34.25\"), weight 13.5 kg (29 lb 12.2 oz), SpO2 97 %.    Pt discharged from PACU based on tawny score >8      "
3.87

## 2022-09-06 PROBLEM — I45.10 RIGHT BUNDLE BRANCH BLOCK: Status: ACTIVE | Noted: 2021-01-01

## 2022-09-06 PROBLEM — I35.9 AORTIC VALVE CALCIFICATION: Status: ACTIVE | Noted: 2022-01-01

## 2022-09-06 PROBLEM — R06.02 SOB (SHORTNESS OF BREATH): Status: ACTIVE | Noted: 2022-01-01

## 2022-09-15 NOTE — CONSULT LETTER
[Dear  ___] : Dear  [unfilled], [Consult Letter:] : I had the pleasure of evaluating your patient, [unfilled]. [( Thank you for referring [unfilled] for consultation for _____ )] : Thank you for referring [unfilled] for consultation for [unfilled] [Please see my note below.] : Please see my note below. [Consult Closing:] : Thank you very much for allowing me to participate in the care of this patient.  If you have any questions, please do not hesitate to contact me. [Sincerely,] : Sincerely, [FreeTextEntry2] : Dr. Eunice Byrd (Pulm/Ref) [FreeTextEntry3] : Miguel Angel Rodas MD \par Attending Surgeon \par Division of Thoracic Surgery \par , Cardiovascular and Thoracic Surgery \par [facility]\par \par

## 2022-09-15 NOTE — HISTORY OF PRESENT ILLNESS
[FreeTextEntry1] : Mr. BENJAMIN ARREDONDO, 89 year old male, former smoker, w/ hx of Stroke, A Fib on Eliquis, CAD s/p CABG in 2012, HTN, Hypothyroidism, prostate Ca s/p RT, who found to have large left pleural effusion during ECHO in 03/2022, s/p thoracentesis on 04/06/2022, 600 ml serosanguineous fluid was removed, path showed negative for malignant cells, found to have recurrent left pleural effusion, referred by Dr. Eunice Byrd (Pulm) for PleurX catheter placement. \par \par Patient is here today for CT surgery consultation. Patient c/o SOB on exertion, denies cough, chest pain, fever, chills, loss of appetite, weight loss, or hemoptysis.

## 2022-09-15 NOTE — ASSESSMENT
[FreeTextEntry1] : Mr. BENJAMIN ARREDONDO, 89 year old male, former smoker, w/ hx of Stroke, A Fib on Eliquis, CAD s/p CABG in 2012, HTN, Hypothyroidism, prostate Ca s/p RT, who found to have large left pleural effusion during ECHO in 03/2022, s/p thoracentesis on 04/06/2022, 600 ml serosanguineous fluid was removed, path showed negative for malignant cells, found to have recurrent left pleural effusion, referred by Dr. Eunice Byrd (Pulm) for PleurX catheter placement. \par \par I have reviewed the patient's medical records and diagnostic images at time of this office consultation and have made the following recommendation:\par 1. Recurrent pleural effusion, I recommended a CT chest w/o contrast and RTC to discuss plan of care. \par \par I personally performed the services described in the documentation, reviewed the documentation recorded by the scribe in my presence and it accurately and completely records my words and actions.\par \par GUS, Carlota Isaac NP, am scribing for and the presence of ANSELMO Marti, the following sections HISTORY OF PRESENT ILLNESS, PAST MEDICAL/FAMILY/SOCIAL HISTORY; REVIEW OF SYSTEMS; VITAL SIGNS; PHYSICAL EXAM; DISPOSITION.

## 2022-09-29 PROBLEM — Z01.818 PRE-OP TESTING: Status: ACTIVE | Noted: 2022-01-01

## 2022-09-30 NOTE — ASSESSMENT
[FreeTextEntry1] : Mr. BENJAMIN ARREDONDO, 89 year old male, former smoker, w/ hx of Stroke, A Fib on Eliquis, CAD s/p CABG in 2012, HTN, Hypothyroidism, prostate Ca s/p RT, who found to have large left pleural effusion during ECHO in 03/2022, s/p thoracentesis on 04/06/2022, 600 ml serosanguineous fluid was removed, path showed negative for malignant cells, found to have recurrent left pleural effusion, referred by Dr. Eunice Byrd (Pulm) for PleurX catheter placement. \par \par I have reviewed the patient's medical records and diagnostic images at time of this office consultation and have made the following recommendation:\par 1. CT chest reviewed and explained to patient, recurrent left pleural effusion, I recommended a Left VATS, drainage of effusion, PleurX catheter placement under local sedation. Risks and benefits and alternatives explained to patient, all questions answered, patient agreed to proceed with surgery.\par 2. Medical clearance \par 3. PST\par 4. Hold Eliquis for 3 days prior to surgery. \par \par Recurrent pleural effusion, I recommended a CT chest w/o contrast and RTC to discuss plan of care. \par \par I personally performed the services described in the documentation, reviewed the documentation recorded by the scribe in my presence and it accurately and completely records my words and actions.\par \par GUS, Carlota Isaac NP, am scribing for and the presence of ANSELMO Marti, the following sections HISTORY OF PRESENT ILLNESS, PAST MEDICAL/FAMILY/SOCIAL HISTORY; REVIEW OF SYSTEMS; VITAL SIGNS; PHYSICAL EXAM; DISPOSITION.

## 2022-09-30 NOTE — CONSULT LETTER
[Dear  ___] : Dear  [unfilled], [Consult Letter:] : I had the pleasure of evaluating your patient, [unfilled]. [( Thank you for referring [unfilled] for consultation for _____ )] : Thank you for referring [unfilled] for consultation for [unfilled] [Please see my note below.] : Please see my note below. [Consult Closing:] : Thank you very much for allowing me to participate in the care of this patient.  If you have any questions, please do not hesitate to contact me. [Sincerely,] : Sincerely, [FreeTextEntry2] : Dr. Eunice Byrd (Pulm/Ref) [FreeTextEntry3] : Miguel Angel Rodas MD \par Attending Surgeon \par Division of Thoracic Surgery \par , Cardiovascular and Thoracic Surgery \par [facility]\par \par

## 2022-09-30 NOTE — HISTORY OF PRESENT ILLNESS
[Home] : at home, [unfilled] , at the time of the visit. [Medical Office: ([facility])___] : at the medical office located in  [Verbal consent obtained from patient] : the patient, [unfilled] [FreeTextEntry1] : \par Mr. BENJAMIN ARREDONDO, 89 year old male, former smoker, w/ hx of Stroke, A Fib on Eliquis, CAD s/p CABG in 2012, HTN, Hypothyroidism, prostate Ca s/p RT, who found to have large left pleural effusion during ECHO in 03/2022, s/p thoracentesis on 04/06/2022, 600 ml serosanguineous fluid was removed, path showed negative for malignant cells, found to have recurrent left pleural effusion, referred by Dr. Eunice Byrd (Pulm) for PleurX catheter placement. \par \par CT chest on 09/16/2022: ([facility])\par - Emphysematous changes are seen in the upper lung zones. There are small nodules seen within a tree-in-bud configuration in the right upper lobe, image 5/89. There is a moderate left and small right pleural effusions. Passive atelectasis is present at the left lung base\par - Status post median sternotomy significant coronary artery calcifications. Mild cardiomegaly and aortic valvular calcifications\par - Calcified gallstones\par - Gynecomastia \par \par Patient is followed today via Telephonic visit.

## 2022-10-03 NOTE — REASON FOR VISIT
[FreeTextEntry1] : Mahajan Jenny  returns for reevaluation of left pleural effusion which is a recurrent left pleural effusion followed by pulmonary Dr. Byrd

## 2022-10-03 NOTE — PHYSICAL EXAM
[Normal Conjunctiva] : normal conjunctiva [Soft] : abdomen soft [Non Tender] : non-tender [No Edema] : no edema [Moves all extremities] : moves all extremities [No Focal Deficits] : no focal deficits [de-identified] : Thin no acute distress  some temporal wasting but overall in good spirits [de-identified] : S1-S2 irregularly regular no murmur no S3 no diastolic murmur heard [de-identified] : Right lung clear with good breath sounds left lung decreased breath sounds one quarter

## 2022-10-03 NOTE — ASSESSMENT
[FreeTextEntry1] : Keyshawn appears to be cardiac stable but of concern is the echo findings suggesting a large left pleural effusion.  His LV function appears to be preserved.  He is only mildly short of breath and his atrial fibrillation rate is well controlled.  He is status post removal of 600 cc of the left pleural effusion which apparently is benign.  Recent echo compatible with  mild tomoderate aortic stenosis preserved LV function mild pulmonary hypertension. he has a recurrent left pleural effusion though he claims that shortness of breath is mild\par \par 1.  Chronic atrial fibrillation ventricular rate controlled on Eliquis which she is tolerating\par \par 2.  Status post coronary bypass surgery no active angina or CHF.  Echo preliminarily reveals preserved LV function\par \par 3.  EKG atrial fibrillation ventricular rate controlled right bundle branch block\par \par 4.  Severe osteoarthritis spinal stenosis with chronic back pain which is his major complaint and problem\par \par 5.  Status post thoracentesis of the left pleural effusion.  He still has decreased breath sounds at the left base and has recurrent left pleural effusion\par \par 6.  Normal LV function heavily calcified aortic valve with mild to moderate aortic stenosis

## 2022-10-03 NOTE — H&P PST ADULT - MUSCULOSKELETAL
decreased ROM due to pain/strength 5/5 bilateral upper extremities/strength 5/5 bilateral lower extremities/back exam details…

## 2022-10-03 NOTE — H&P PST ADULT - PROBLEM SELECTOR PLAN 2
Patient instructed to take metoprolol with a sip of water on the morning of procedure.     Patient with chronic Afib on Eliquis, HTN, HLD, with CALLES and SOB due to left pleural Effusion, Aortic valve calcification - Pre op cardiology evaluation in chart with recommendations of repeat ECHO to  evaluate Aortic stenosis.  Instructed pt to have repeat ECHO before surgery- case discussed with Dr Cuevas.    As per cardiologist instructions- holding Eliquis 3 days prior to surgery, LD on 10/06/2022.  Continue aspirin.  Last EKG, ECHO from03//2022 in chart Patient instructed to take metoprolol with a sip of water on the morning of procedure.     Patient with chronic Afib on Eliquis, HTN, HLD, with CALLES and SOB due to left pleural Effusion, Aortic valve calcification - Pre op cardiology evaluation in chart with recommendations to repeat ECHO - evaluate Aortic stenosis.( previous echo 03/2022)  Instructed pt to have repeat ECHO before surgery- case discussed with Dr Cuevas.    As per cardiologist instructions- holding Eliquis 3 days prior to surgery, LD on 10/06/2022.  Continue aspirin.  Last EKG, ECHO from 03//2022 in chart

## 2022-10-03 NOTE — H&P PST ADULT - NSICDXPASTSURGICALHX_GEN_ALL_CORE_FT
PAST SURGICAL HISTORY:  H/O cataract extraction     History of herniorrhaphy right groin    S/P CABG x 3 10/2011    S/P hip replacement, right

## 2022-10-03 NOTE — H&P PST ADULT - RESPIRATORY AND THORAX COMMENTS
Patient reports of recurrent Pleural effusion - s/p thoracentesis at Pulmonologist office in past, c/o SOB

## 2022-10-03 NOTE — DISCUSSION/SUMMARY
[EKG obtained to assist in diagnosis and management of assessed problem(s)] : EKG obtained to assist in diagnosis and management of assessed problem(s) [FreeTextEntry1] :  1.  I suggested that he follow-up with Dr. Rodas for possible pleurax  catheter and pleural biopsy.  The etiology of the recurrent left pleural effusion is still unclear but I do not feel it is cardiac related\par \par  2.  Repeat echo concerning aortic valve stenosis and pulmonary hypertension echo ordered\par \par  3.  Continue Eliquis and his other medications\par \par  cardiac wise he appears to be stable\par \par Addendum: October 3, 2022\par  patient's cardiac status remained stable.  He is in need of a procedure for his pleural space.  I see no cardiac contraindication to the planned procedure.  He is hemodynamically stable.  He will need to stop his anticoagulation for least 2 days prior to the procedure and restart as soon as the surgeon feels it is safe\par \par  drawn which Mimi RUSSO [facility] October 3, 2022

## 2022-10-03 NOTE — HISTORY OF PRESENT ILLNESS
[FreeTextEntry1] : Keyshawn is 89 years old with a long history of chronic atrial fibrillation on long-term anticoagulation with Eliquis.  He has a history of preserved LV function on previous echo and is status post coronary bypass surgery many years ago at [facility] for multivessel coronary disease.\par \par He has mostly been plagued by severe back pain which has been chronic and fairly unrelieved over many years.  He is status post hip replacement.\par \par Presently from a cardiac point of view he offers no complaints of shortness of breath palpitations edema orthopnea or PND.  He has no chest pain\par \par He remains on stable medications which include Eliquis 5 twice daily I believe aspirin 81 Synthroid 88 mcg Norvasc 5 mg rosuvastatin 10 mg Toprol-XL 50 mg twice a day and chronic tramadol as well as Trulance 3 mg\par \par EKG december 2021 atrial fibrillation ventricular rate controlled right bundle branch block\par \par Recent blood tests december 2021\par Creatinine 0.97 GFR 62 potassium 3.9\par \par He still does not have much of an appetite and may have lost some weight.  Overall he has some mild shortness of breath but it is not progressed.  He is no associated chest pain\par \par On last visit, he felt somewhat more short of breath and I noted decreased breath sounds on the left side compatible with a large left pleural effusion.  He was subsequently referred to pulmonary and 600 cc of fluid was removed apparently benign. the total protein was slightly elevated but there was no evidence of cancer cells\par \par He still gets short of breath in the shower but with walking he has no shortness of breath but is mainly limited by his arthritis.  He has no chest pain or palpitations.  \par \par 2D echo March 23, 2022 preliminarily overall revealed preserved LV function, some calcification of the aortic valve with decreased opening but no significant gradients but there was evidence of a large left pleural effusion no pericardial effusion.  There was evidence of mild pulmonary hypertension\par \par  the patient continue to complain of shortness of breath and follow-up with pulmonary revealed a recurrent left pleural effusion.  He was referred to Dr. Rodas for possible Pleurx catheter and biopsy of the pleura.\par \par   I spoke to him this morning and he claims that his shortness of breath is mild at most and actually improved despite the recurrence of the left pleural effusion.\par \par  EKG September 6, 2022 atrial fibrillation ventricular rate controlled right bundle branch block no change\par \par   He has a decreased appetite but he is not lost more weight and his weight has been stable\par \par \par \par

## 2022-10-03 NOTE — H&P PST ADULT - HISTORY OF PRESENT ILLNESS
89 year old male with PMH of Chronic Afib- Eliquis Aortic valve calcification, prostate cancer- s/p radiation, CABG, presents to PST, with pre op diagnosis of left pleural effusion, for pre op evaluation prior to scheduled surgery- Left vedio assisted Thoracoscopy, Drainage of effusion, Pleurx catheter Placement with Dr Rodas.

## 2022-10-03 NOTE — H&P PST ADULT - CARDIOVASCULAR
details… S1 S2 present/irregular rate and rhythm/pedal edema/vascular S1 S2 present/irregular rate and rhythm/murmur/pedal edema/vascular

## 2022-10-03 NOTE — H&P PST ADULT - PROBLEM SELECTOR PLAN 1
Patient is tentatively scheduled for  Left vedio assisted Thoracoscopy, Drainage of effusion, Pleurx catheter Placement with Dr Rodas on 10/10/2022.    Pre-op instructions provided. Pt given verbal and written instructions with teach back on chlorhexidine wash and pepcid. Pt verbalized understanding with return demonstration.    Routine Covid PCR test ordered .Instructions regarding covid PCR test and locations for covid testing site provided. Pt verbalized understanding. Patient would like to go to PCP for covid PCR test on  10/06/2022, [facility] FAXnumber given to fax results. Informed pt to fax results to surgeon also.

## 2022-10-12 NOTE — ASU PATIENT PROFILE, ADULT - FALL HARM RISK - HARM RISK INTERVENTIONS

## 2022-10-12 NOTE — ASU PATIENT PROFILE, ADULT - CENTRAL VENOUS CATHETER
Clinic Care Coordination Contact  [facility]       Clinical Data: Care Coordinator Outreach  Outreach attempted x 1.  Unable to leave [facility].  Plan: SW will outreach in one week    
no

## 2022-10-13 NOTE — CHART NOTE - NSCHARTNOTEFT_GEN_A_CORE
ZAINA alerted by ASU clinic that pt will require ambulance transportation home after surgery due to being post anesthesia. ZAINA contacted [facility] (p:[phone]) and arranged ambulance at requested time of 3pm (trip#[id number]). No other SW needs identified at this time.

## 2022-10-13 NOTE — PROVIDER CONTACT NOTE (OTHER) - ASSESSMENT
Pt s/p pluerx cath placement in ASU. CM Met and spoke with pt today at bedside regarding d/c plan. Pt given list of HC agency and chose [facility]. Referral sent and accepted for SOC 10/14/22. Drainage kits given to pt to take home. CM explained HC process to pt with understanding. SW set up transportation for d/c this afternoon. CM remains available.

## 2022-10-13 NOTE — BRIEF OPERATIVE NOTE - NSICDXBRIEFPROCEDURE_GEN_ALL_CORE_FT
PROCEDURES:  Implanted pleural catheter 13-Oct-2022 12:17:46 L VATS, pleural biopsy, pleurex catheter placement Angle Carrasco

## 2022-10-13 NOTE — ASU DISCHARGE PLAN (ADULT/PEDIATRIC) - CARE PROVIDER_API CALL
Miguel Angel Rodas)  Surgery; Thoracic Surgery  [phone] [place] [address], [place] [address]  Phone: [phone]  Fax: [phone]  Follow Up Time:

## 2022-10-13 NOTE — ASU DISCHARGE PLAN (ADULT/PEDIATRIC) - ASU DC SPECIAL INSTRUCTIONSFT
Please, call Thoracic Surgery office at [phone] and schedule an appointment with your doctor in 2 weeks.  Please, obtain a CXR 1-2 days prior to your appointment and bring a copy with you. Please, call Thoracic Surgery office at [phone] and schedule an appointment with your doctor in 2 weeks.  Please, obtain a Chest X-ray 1-2 days prior to your appointment and bring a copy with you.

## 2022-10-13 NOTE — ASU DISCHARGE PLAN (ADULT/PEDIATRIC) - NURSING INSTRUCTIONS
YOU HAVE RECEIVED TYLENOL DURING YOUR HOSPITAL STAY. THE MAX AMOUNT OF TYLENOL DAILY IS 4000MG. PLEASE KEEP THAT IN MIND IF YOU ARE TAKING OTHER PRESCRIPTION OR OVER THE COUNTER PAIN MEDICATIONS OR COLD MEDICINE. THE NEXT TIME THAT YOU CAN TAKE TYLENOL IS AT 3:30pm. YOU CAN THEN START TAKING IT EVERY 6 HOURS AS NEEDED FOR PAIN

## 2022-10-19 NOTE — HISTORY OF PRESENT ILLNESS
[FreeTextEntry1] : Mr. BENJAMIN ARREDONDO, 89 year old male, former smoker, w/ hx of Stroke, A Fib on Eliquis, CAD s/p CABG in 2012, HTN, Hypothyroidism, prostate Ca s/p RT, who found to have large left pleural effusion during ECHO in 03/2022, s/p thoracentesis on 04/06/2022, 600 ml serosanguineous fluid was removed, path showed negative for malignant cells, found to have recurrent left pleural effusion, referred by Dr. Eunice Byrd (Pulm) for PleurX catheter placement. \par \par CT chest on 09/16/2022: ([facility])\par - Emphysematous changes are seen in the upper lung zones. There are small nodules seen within a tree-in-bud configuration in the right upper lobe, image 5/89. There is a moderate left and small right pleural effusions. Passive atelectasis is present at the left lung base\par - Status post median sternotomy significant coronary artery calcifications. Mild cardiomegaly and aortic valvular calcifications\par - Calcified gallstones\par - Gynecomastia \par \par Patient is s/p Left VATS, pleural biopsy, Pleurx catheter placement on 10/13/2022, 1.1L of pleural fluid in the L chest drained. \par \par CXR today stable\par \par Patient is here today for a follow up. Of note, VNS called on 10/14/2022 to report they did not complete drainage secondary to anthony blood coming out from the PleurX catheter and saturated dressing. Patient also restarted Eliquis 10/14 5mg daily which is half of his usual dose (2x/daily). Today the pleurx dressing revealed mild bloody drainage. In office pleurx drained 550cc of sanguineous dark cherry red fluid, patient tolerated.  \par

## 2022-10-19 NOTE — ASSESSMENT
[FreeTextEntry1] : Mr. BENJAMIN ARREDONDO, 89 year old male, former smoker, w/ hx of Stroke, A Fib on Eliquis, CAD s/p CABG in 2012, HTN, Hypothyroidism, prostate Ca s/p RT, who found to have large left pleural effusion during ECHO in 03/2022, s/p thoracentesis on 04/06/2022, 600 ml serosanguineous fluid was removed, path showed negative for malignant cells, found to have recurrent left pleural effusion, referred by Dr. Eunice Byrd (Pulm) for PleurX catheter placement. \par \par Patient is s/p Left VATS, pleural biopsy, Pleurx catheter placement on 10/13/2022, 1.1L of pleural fluid in the L chest drained. \par \par I have independently reviewed the patient's medical records and diagnostic images at time of this office consultation and have made the following recommendation:\par 1. Drained 550 cc sanguinous fluid out of Pleurx catheter- patient instructed to hold Eliquis for 7 days, continue drainage by VNS Wed and Thursday and return to office next week Monday to discuss restarting Eliquis and to have Pleurx drained in office.\par \par I personally performed the services described in the documentation, reviewed the documentation recorded by the scribe in my presence and it accurately and completely records my words and actions.\par \par I, RAYNE Beatty, am scribing for and in the presence of ANSELMO Marti, the following sections HISTORY OF PRESENT ILLNESS, PAST MEDICAL/FAMILY/SOCIAL HISTORY; REVIEW OF SYSTEMS; VITAL SIGNS; PHYSICAL EXAM; DISPOSITION.\par   \par

## 2022-10-19 NOTE — CONSULT LETTER
[Dear  ___] : Dear  [unfilled], [Consult Letter:] : I had the pleasure of evaluating your patient, [unfilled]. [( Thank you for referring [unfilled] for consultation for _____ )] : Thank you for referring [unfilled] for consultation for [unfilled] [Please see my note below.] : Please see my note below. [Consult Closing:] : Thank you very much for allowing me to participate in the care of this patient.  If you have any questions, please do not hesitate to contact me. [Sincerely,] : Sincerely, [FreeTextEntry2] : Dr. Eunice Byrd (Pulm/Ref) [FreeTextEntry3] : Miguel Angel Rodas MD \par Attending Surgeon \par Division of Thoracic Surgery \par , Cardiovascular and Thoracic Surgery \par [facility]\par \par

## 2022-10-26 NOTE — ASSESSMENT
[FreeTextEntry1] : Mr. BENJAMIN ARREDONDO, 89 year old male, former smoker, w/ hx of Stroke, A Fib on Eliquis, CAD s/p CABG in 2012, HTN, Hypothyroidism, prostate Ca s/p RT, who found to have large left pleural effusion during ECHO in 03/2022, s/p thoracentesis on 04/06/2022, 600 ml serosanguineous fluid was removed, path showed negative for malignant cells, found to have recurrent left pleural effusion, referred by Dr. Eunice Byrd (Pulm) for PleurX catheter placement. \par \par Patient is s/p Left VATS, pleural biopsy, Pleurx catheter placement on 10/13/2022, 1.1L of pleural fluid in the L chest drained. \par \par I have reviewed the patient's medical records and diagnostic images at time of this office consultation and have made the following recommendation:\par 1. Patient was drained in office with ~ 25 ml of dark bloody drainage, will decrease the drainage to 2x/week (M/Thur), continue hold Eliquis for a week, RTC via TTM on 10/31/2022 regarding the amount of drainage and resuming Eliquis\par 2. RTC in 8 weeks with CXR. \par \par I personally performed the services described in the documentation, reviewed the documentation recorded by the scribe in my presence and it accurately and completely records my words and actions.\par \par I, Carlota Isaac NP, am scribing for and the presence of ANSELMO Marti, the following sections HISTORY OF PRESENT ILLNESS, PAST MEDICAL/FAMILY/SOCIAL HISTORY; REVIEW OF SYSTEMS; VITAL SIGNS; PHYSICAL EXAM; DISPOSITION.

## 2022-10-26 NOTE — CONSULT LETTER
[Dear  ___] : Dear  [unfilled], [Consult Letter:] : I had the pleasure of evaluating your patient, [unfilled]. [( Thank you for referring [unfilled] for consultation for _____ )] : Thank you for referring [unfilled] for consultation for [unfilled] [Please see my note below.] : Please see my note below. [Consult Closing:] : Thank you very much for allowing me to participate in the care of this patient.  If you have any questions, please do not hesitate to contact me. [Sincerely,] : Sincerely, [FreeTextEntry2] : Dr. Eunice Byrd (Pulm/Ref) [FreeTextEntry3] : Miguel Angel Rodas MD \par Attending Surgeon \par Division of Thoracic Surgery \par , Cardiovascular and Thoracic Surgery \par [facility] at [facility]\par \par

## 2022-10-26 NOTE — HISTORY OF PRESENT ILLNESS
[FreeTextEntry1] : Mr. BENJAMIN ARREDONDO, 89 year old male, former smoker, w/ hx of Stroke, A Fib on Eliquis, CAD s/p CABG in 2012, HTN, Hypothyroidism, prostate Ca s/p RT, who found to have large left pleural effusion during ECHO in 03/2022, s/p thoracentesis on 04/06/2022, 600 ml serosanguineous fluid was removed, path showed negative for malignant cells, found to have recurrent left pleural effusion, referred by Dr. Eunice Byrd (Pulm) for PleurX catheter placement. \par \par CT chest on 09/16/2022: ([facility])\par - Emphysematous changes are seen in the upper lung zones. There are small nodules seen within a tree-in-bud configuration in the right upper lobe, image 5/89. There is a moderate left and small right pleural effusions. Passive atelectasis is present at the left lung base\par - Status post median sternotomy significant coronary artery calcifications. Mild cardiomegaly and aortic valvular calcifications\par - Calcified gallstones\par - Gynecomastia \par \par Patient is s/p Left VATS, pleural biopsy, Pleurx catheter placement on 10/13/2022, 1.1L of pleural fluid in the L chest drained. \par \par Patient is currently drained three times a week, 10/19 drained more than a half bottle, 10/21 drained about a half bottle. \par \par CXR today: reviewed. \par \par Patient is here today for a follow up. Patient denies shortness of breath, cough, chest pain, fever, chills. \par

## 2022-10-28 PROBLEM — Z95.1 STATUS POST CORONARY ARTERY BYPASS GRAFT: Status: ACTIVE | Noted: 2021-01-01

## 2022-11-02 NOTE — HISTORY OF PRESENT ILLNESS
[Home] : at home, [unfilled] , at the time of the visit. [Medical Office: ([facility])___] : at the medical office located in  [Verbal consent obtained from patient] : the patient, [unfilled] [FreeTextEntry1] : \par Mr. BENJAMIN ARREDONDO, 89 year old male, former smoker, w/ hx of Stroke, A Fib on Eliquis, CAD s/p CABG in 2012, HTN, Hypothyroidism, prostate Ca s/p RT, who found to have large left pleural effusion during ECHO in 03/2022, s/p thoracentesis on 04/06/2022, 600 ml serosanguineous fluid was removed, path showed negative for malignant cells, found to have recurrent left pleural effusion, referred by Dr. Eunice Byrd (Pulm) for PleurX catheter placement. \par \par CT chest on 09/16/2022: ([facility])\par - Emphysematous changes are seen in the upper lung zones. There are small nodules seen within a tree-in-bud configuration in the right upper lobe, image 5/89. There is a moderate left and small right pleural effusions. Passive atelectasis is present at the left lung base\par - Status post median sternotomy significant coronary artery calcifications. Mild cardiomegaly and aortic valvular calcifications\par - Calcified gallstones\par - Gynecomastia \par \par Patient is s/p Left VATS, pleural biopsy, Pleurx catheter placement on 10/13/2022, 1.1L of pleural fluid in the L chest drained. \par \par Patient is currently drained three times a week, 10/19 drained more than a half bottle, 10/21 drained about a half bottle. \par \par Patient was seen on 10/24/2022, was drained in office with ~ 25 ml of dark bloody drainage, will decrease the drainage to 2x/week (M/Thur), continue hold Eliquis for a week, RTC via TTM on 10/31/2022 regarding the amount of drainage and resuming Eliquis\par \par Patient was drained on 10/27/2022 with < 5 ml blood-tinged fluid (orange color) and thin consistency. Today drained 50 ml. \par \par Patient is followed today via Telephonic visit. Patient denies shortness of breath, cough, chest pain, fever, chills. Patient resumed his Eliquis on 10/28/2022. \par

## 2022-11-02 NOTE — CONSULT LETTER
[Dear  ___] : Dear  [unfilled], [Consult Letter:] : I had the pleasure of evaluating your patient, [unfilled]. [( Thank you for referring [unfilled] for consultation for _____ )] : Thank you for referring [unfilled] for consultation for [unfilled] [Please see my note below.] : Please see my note below. [Consult Closing:] : Thank you very much for allowing me to participate in the care of this patient.  If you have any questions, please do not hesitate to contact me. [Sincerely,] : Sincerely, [FreeTextEntry2] : Dr. Eunice Byrd (Pulm/Ref) [FreeTextEntry3] : Miguel Angel Rodas MD \par Attending Surgeon \par Division of Thoracic Surgery \par , Cardiovascular and Thoracic Surgery \par [facility] at [facility]\par \par

## 2022-11-11 PROBLEM — I27.20 MILD PULMONARY HYPERTENSION: Status: ACTIVE | Noted: 2022-01-01

## 2022-11-11 PROBLEM — Z86.39 HISTORY OF MIXED HYPERLIPIDEMIA: Status: ACTIVE | Noted: 2021-01-01

## 2022-11-11 PROBLEM — I10 HIGH BLOOD PRESSURE: Status: ACTIVE | Noted: 2017-03-02

## 2022-11-11 PROBLEM — I48.20 CHRONIC ATRIAL FIBRILLATION: Status: ACTIVE | Noted: 2021-01-01

## 2022-11-11 PROBLEM — J90 PLEURAL EFFUSION, LEFT: Status: ACTIVE | Noted: 2022-01-01

## 2022-11-17 NOTE — HISTORY OF PRESENT ILLNESS
[Home] : at home, [unfilled] , at the time of the visit. [Verbal consent obtained from patient] : the patient, [unfilled] [FreeTextEntry1] : Mr. BENJAMIN ARREDONDO, 89 year old male, former smoker, w/ hx of Stroke, A Fib on Eliquis, CAD s/p CABG in 2012, HTN, Hypothyroidism, prostate Ca s/p RT, who found to have large left pleural effusion during ECHO in 03/2022, s/p thoracentesis on 04/06/2022, 600 ml serosanguineous fluid was removed, path showed negative for malignant cells, found to have recurrent left pleural effusion, referred by Dr. Eunice Byrd (Pulm) for PleurX catheter placement. \par \par CT chest on 09/16/2022: ([facility])\par - Emphysematous changes are seen in the upper lung zones. There are small nodules seen within a tree-in-bud configuration in the right upper lobe, image 5/89. There is a moderate left and small right pleural effusions. Passive atelectasis is present at the left lung base\par - Status post median sternotomy significant coronary artery calcifications. Mild cardiomegaly and aortic valvular calcifications\par - Calcified gallstones\par - Gynecomastia \par \par Patient is s/p Left VATS, pleural biopsy, Pleurx catheter placement on 10/13/2022, 1.1L of pleural fluid in the L chest drained. \par \par Patient was seen on 10/24/2022, was drained in office with ~ 25 ml of dark bloody drainage, will decrease the drainage to 2x/week (M/Thur), continue hold Eliquis for a week, RTC via TTM on 10/31/2022 regarding the amount of drainage and resuming Eliquis\par \par Patient was drained on 10/27/2022 with < 5 ml blood-tinged fluid (orange color) and thin consistency. Drained 50 ml. (11/3) it was 100 ml, on (11/7) it was 30ml, on (11/10) it was 20 ml, and reports 20 ml today (11/14).\par \par Chest Xray on 11/09/2022:\par - small right pleural effusion and moderate left pleural effuion with a thoracotomy drainage tube at the left base. \par \par Patient is followed today via Telephonic visit. Overall, he reports to be feeling well. Denies any chest pain, shortness of breath, cough, or hemoptysis. \par

## 2022-11-17 NOTE — ASSESSMENT
[FreeTextEntry1] : Mr. BENJAMIN ARREDONDO, 89 year old male, former smoker, w/ hx of Stroke, A Fib on Eliquis, CAD s/p CABG in 2012, HTN, Hypothyroidism, prostate Ca s/p RT, who found to have large left pleural effusion during ECHO in 03/2022, s/p thoracentesis on 04/06/2022, 600 ml serosanguineous fluid was removed, path showed negative for malignant cells, found to have recurrent left pleural effusion, referred by Dr. Eunice Byrd (Pulm) for PleurX catheter placement. \par \par Patient is s/p Left VATS, pleural biopsy, Pleurx catheter placement on 10/13/2022, 1.1L of pleural fluid in the L chest drained. \par \par Patient was seen on 10/24/2022, was drained in office with ~ 25 ml of dark bloody drainage, will decrease the drainage to 2x/week (M/Thur), continue hold Eliquis for a week, RTC via TTM on 10/31/2022 regarding the amount of drainage and resuming Eliquis\par \par Patient was drained on 10/27/2022 with < 5 ml blood-tinged fluid (orange color) and thin consistency. Drained 50 ml. (11/3) it was 100 ml, on (11/7) it was 30ml, on (11/10) it was 20 ml, and reports 20 ml today (11/14).\par \par I have reviewed the patient's medical records and diagnostic images at time of this office consultation and have made the following recommendation:\par 1. Chest xray reviewed with patient. He is currently draining about 20 ml twice a week, we discussed decreasing to once a week starting next week for 4 weeks and RTC in 4 weeks with CXR via TTM. 	\par \par I, ANSELMO Marti, personally performed the evaluation and management (E/M) services for this established patient who follow up today with an existing condition.  That E/M includes assessing all new/exacerbated/existing conditions, and establishing a plan of care.  Today, my ACP, Giovani Durand/Carlota Isaac, were here to observe my evaluation and management services for this existing condition to be followed going forward.\par

## 2022-11-17 NOTE — CONSULT LETTER
[Dear  ___] : Dear  [unfilled], [Consult Letter:] : I had the pleasure of evaluating your patient, [unfilled]. [( Thank you for referring [unfilled] for consultation for _____ )] : Thank you for referring [unfilled] for consultation for [unfilled] [Please see my note below.] : Please see my note below. [Consult Closing:] : Thank you very much for allowing me to participate in the care of this patient.  If you have any questions, please do not hesitate to contact me. [Sincerely,] : Sincerely, [FreeTextEntry2] : Dr. Eunice Byrd (Pulm/Ref) [FreeTextEntry3] : Migeul Angel Rodas MD \par Attending Surgeon \par Division of Thoracic Surgery \par , Cardiovascular and Thoracic Surgery \par [facility]\par \par

## 2022-11-18 NOTE — ED PROVIDER NOTE - PHYSICAL EXAMINATION
Vital signs reviewed  GENERAL: tachypnic to 20-25  HEAD: NCAT  EYES: Anicteric  ENT: MMM  NECK: Supple, non tender  RESPIRATORY: coarse breath sounds b/l on 2l nc   CARDIOVASCULAR: tachycardic s1s2  ABDOMEN: Soft. mildly distended. Nontender. No guarding or rebound.   MUSCULOSKELETAL/EXTREMITIES: Brisk cap refill. 2+ radial pulses. No leg edema.  SKIN:  Warm and dry  NEURO: AAOx3. No gross FND.  PSYCHIATRIC: Cooperative. Affect appropriate.

## 2022-11-18 NOTE — ED PROCEDURE NOTE - ATTENDING CONTRIBUTION TO CARE
I Nikolai Neal MD discussed the procedure with the resident and or ACP and went over risks and benefits as well as indications for the procedure. I was present for key portions of the procedure itself and assisted as needed.

## 2022-11-18 NOTE — CONSULT NOTE ADULT - ASSESSMENT
89M with multiple comorbidities, history of recent L VATS, pleurex catheter placement 10/13/22 for pleural effusion who presents with bilateral pleural effusions (L>R) with increasing SOB over the last three weeks with decreased drainage from the Pleurex catheter. Additionally he is febrile to 101.2F and tachycardic in the Emergency Department requiring a fever workup.     - Please have patient admitted to the medicine service  - Continue fever workup  - Recommend initiation of broad spectrum antibiotics (Vancomycin and Zosyn)  - Pleurex catheter drained at bedside with removal of 50cc of pleural fluid sent for fluid studies  - CT Chest pending    Thoracic Surgery will continue to follow patient.      Angle Carrasco  Cardiothoracic Surgery Fellow   89M with multiple comorbidities, history of recent L VATS, pleurex catheter placement 10/13/22 for pleural effusion who presents with bilateral pleural effusions (L>R) with increasing SOB over the last three weeks with decreased drainage from the Pleurex catheter. Additionally he is febrile to 101.2F and tachycardic in the Emergency Department requiring a fever workup.     - Patient can be admitted to the medicine service  - Continue fever workup  - Recommend initiation of broad spectrum antibiotics (Vancomycin and Zosyn)  - Pleurex catheter drained at bedside with removal of 50cc of pleural fluid sent for fluid studies  - CT Chest pending    Thoracic Surgery will continue to follow patient.      Angle Carrasco DO  Cardiothoracic Surgery Fellow   89M with multiple comorbidities, history of recent L VATS, pleurex catheter placement 10/13/22 for pleural effusion who presents with bilateral pleural effusions (L>R) with increasing SOB over the last three weeks with decreased drainage from the Pleurex catheter. Additionally he is febrile to 101.2F and tachycardic in the Emergency Department requiring a fever workup.     - Patient can be admitted to the medicine service  - Continue fever workup  - Please hold home Eliquis as patient may require MIST to the pleurex catheter and possible drainage of R pleural effusion  - Recommend initiation of broad spectrum antibiotics (Vancomycin and Zosyn)  - Pleurex catheter drained at bedside with removal of 50cc of pleural fluid sent for fluid studies  - CT Chest pending    Thoracic Surgery will continue to follow patient.      Angle Carrasco DO  Cardiothoracic Surgery Fellow

## 2022-11-18 NOTE — ED PROVIDER NOTE - PROGRESS NOTE DETAILS
Ellyn Block PGY-3 spoke with ct surg, will come see patient. given patient febrile, will start abx, bcx, and persue ct imaging to eval empyema.

## 2022-11-18 NOTE — H&P ADULT - NS ATTEND AMEND GEN_ALL_CORE FT
Patient seen and examined agree with above note as modified, where appropriate, by me. doubt empyema, abx for now. will hold anticoagulation. May need lytics

## 2022-11-18 NOTE — H&P ADULT - ASSESSMENT
89M with multiple comorbidities, history of recent L VATS, pleurex catheter placement 10/13/22 for pleural effusion who presents with bilateral pleural effusions (L>R) with increasing SOB over the last three weeks with decreased drainage from the Pleurex catheter. Additionally he is febrile to 101.2F and tachycardic in the Emergency Department requiring a fever workup.     - Admit to Thoracic Surgery   - Continue fever workup  - Please hold home Eliquis as patient may require MIST to the pleurex catheter and possible drainage of R pleural effusion  - Continue broad spectrum antibiotics (Vancomycin and cefipime, allergy to pcn)  - Pleurex catheter drained at bedside with removal of 50cc of pleural fluid sent for fluid studies  - CT Chest done

## 2022-11-18 NOTE — ED PROCEDURE NOTE - PROCEDURE ADDITIONAL DETAILS
Emergency Department Focused Ultrasound performed at patient's bedside for educational purposes. The study will have a follow up study performed or was performed in the direct supervision of an ultrasound trained attending.     left lower lung consolidation?  R lower lung pleural effusion

## 2022-11-18 NOTE — ED PROVIDER NOTE - ATTENDING CONTRIBUTION TO CARE
I performed a history and physical exam of the patient and discussed their management with the resident and /or advanced care provider. I reviewed the resident and /or ACP's note and agree with the documented findings and plan of care. My medical decision making and observations are found above.  increased RR, no fever, abd soft, non tender. but distended.

## 2022-11-18 NOTE — H&P ADULT - NSHPPHYSICALEXAM_GEN_ALL_CORE
Vital Signs Last 24 Hrs  T(C): 37.7 (18 Nov 2022 18:00), Max: 38.4 (18 Nov 2022 14:02)  T(F): 99.9 (18 Nov 2022 18:00), Max: 101.2 (18 Nov 2022 14:02)  HR: 95 (18 Nov 2022 17:34) (95 - 140)  BP: 100/59 (18 Nov 2022 17:34) (100/59 - 134/73)  BP(mean): --  RR: 18 (18 Nov 2022 17:34) (18 - 20)  SpO2: 98% (18 Nov 2022 17:34) (98% - 98%)    Parameters below as of 18 Nov 2022 17:34  Patient On (Oxygen Delivery Method): nasal cannula  O2 Flow (L/min): 4    PHYSICAL EXAM:  Constitutional: NAD, Awake, Alert, Interactive    HEENT: No gross abnormalities    Respiratory: Nonlabored respirations, requiring 2L O2 via NC, diminished breath sounds bilaterally, L pleurex catheter in place with catheter site clean/dry/intact    Cardiovascular: Tachycardic, regular rhythm     Gastrointestinal: Nondistended     Extremities: No gross deformities, patient moving all extremities spontaneously     Neurological: No gross deficits

## 2022-11-18 NOTE — H&P ADULT - NSHPREVIEWOFSYSTEMS_GEN_ALL_CORE
REVIEW OF SYSTEMS      General:No Weight change/ Fatigue/ HA/Dizzy	    Skin/Breast: No Rashes/ Lesions/ Masses  	  Ophthalmologic: No Blurry vision/ Glaucoma/ Blindness  	  ENMT: No Hearing loss/ Drainage/ Lesions	    Respiratory and Thorax: +SOB  	  Cardiovascular: No Chest pain/ Palpitations/ Diaphoresis	    Gastrointestinal: No Nausea/ Vomiting/ Constipation/ Appetite Change	    Genitourinary: No Heamturia/ Dysuria/ Frequency change/ Impotence	    Musculoskeletal: No Pain/ Weakness/ Claudication	    Neurological: No Seizures/ TIA/CVA/ Parastesias	    Psychiatric: No Dementia/ Depression/ SI/HI	    Hematology/Lymphatics: No hx of bleeding/ Edema	    Endocrine:	No Hyperglycemia/ Hypoglycemia    Allergic/Immunologic:	 No Anaphylaxis/ Intolerance/ Recent illnesses REVIEW OF SYSTEMS      General: +weight loss, Denies Fatigue/ HA/Dizzy	    Skin/Breast: No Rashes/ Lesions/ Masses  	  Ophthalmologic: No Blurry vision/ Glaucoma/ Blindness  	  ENMT: No Hearing loss/ Drainage/ Lesions	    Respiratory and Thorax: +SOB  	  Cardiovascular: No Chest pain/ Palpitations/ Diaphoresis	    Gastrointestinal: No Nausea/ Vomiting/ Constipation/  +loss of Appetite	    Genitourinary: No Heamturia/ Dysuria/ Frequency change/ Impotence	    Musculoskeletal: Bilateral leg pain Pain/ Weakness    Neurological: No Seizures/ TIA/CVA/ Parastesias	    Psychiatric: No Dementia/ Depression/ SI/HI	    Hematology/Lymphatics: No hx of bleeding/ Edema	    Endocrine:	No Hyperglycemia/ Hypoglycemia    Allergic/Immunologic:	 No Anaphylaxis/ Intolerance/ Recent illnesses

## 2022-11-18 NOTE — ED ADULT TRIAGE NOTE - CHIEF COMPLAINT QUOTE
pt brittney, states "I have 2 tubes on my left side that they drain" called EMS today for increased SOB. as per wife pt has been lethargic and fell yesterday  #22 s/l placed to R a/c, placed on 3L NC by EMS

## 2022-11-18 NOTE — H&P ADULT - HISTORY OF PRESENT ILLNESS
89 year old male with PMH of Chronic Afib- Eliquis Aortic valve calcification, HTN, HLD, CVA, CABG in 2012, prostate cancer s/p radiation, left pleural effusion s/p L VATS, drainage of effusion, Pleurx catheter placement on 10/13/22. He presents to the ED today for increasing shortness of breath and mechanical fall yesterday. Patient states that he has noticed increasing shortness of breath over the last couple of weeks which worsens when laying down or with exertion. He gets his Pleurex drained twice a week with VNS on Mondays and Fridays and states that over the last three weeks, the fluid evacuated has decreased dramatically to less than 100cc per encounter. He additionally complains of chills and night sweats. He denies fevers, nausea, diarrhea. He complains of constipation.    Patient also states that he sustained a mechanical fall yesterday. He denies head injury, LOC, nausea, or vomiting. He denies any pain.

## 2022-11-18 NOTE — H&P ADULT - NSHPLABSRESULTS_GEN_ALL_CORE
( @ 10:40)                      11.1  22.00 )-----------( 277                 36.4    Neutrophils = 19.69 (89.5%)  Lymphocytes = 0.20 (0.9%)  Eosinophils = 0.00 (0.0%)  Basophils = 0.20 (0.9%)  Monocytes = 1.91 (8.7%)  Bands = --%        135  |  97<L>  |  46<H>  ----------------------------<  124<H>  5.6<H>   |  24  |  1.39<H>    Ca    9.8      2022 10:40  Mg     2.10         TPro  7.8  /  Alb  3.5  /  TBili  1.4<H>  /  DBili  x   /  AST  78<H>  /  ALT  22  /  AlkPhos  102      ( 2022 10:40 )   PT: 38.2 sec;   INR: 3.25 ratio;       PTT:36.6 sec    Venous Blood Gas:   @ 13:50  7.36/50/20/28/21.2  VBG Lactate: 2.2  Venous Blood Gas:   @ 10:40  7.37/50/21/29/20.3  VBG Lactate: 2.7    Urinalysis Basic - ( 2022 13:50 )    Color: Yellow / Appearance: Clear / S.023 / pH: x  Gluc: x / Ketone: Negative  / Bili: Negative / Urobili: <2 mg/dL   Blood: x / Protein: 30 mg/dL / Nitrite: Negative   Leuk Esterase: Negative / RBC: 4 /HPF / WBC 5 /HPF   Sq Epi: x / Non Sq Epi: 2 /HPF / Bacteria: Few    CT Chest: left upper and lower lobe patchy opacities consistent with edema vs, pneumonia , moderate right and loculated left pleural effusions with adjacent compressive atelectasis, mild left pleural enhancement possibly related to left pleurx cath, no definitive evidence of empyema.

## 2022-11-18 NOTE — ED PROVIDER NOTE - OBJECTIVE STATEMENT
89  year old male with a medical history significant for Afib (on Eliquis, currently held), HTN, HLD, CVA (no residual), CABG 2012, and prostate cancer s/p radiation, left pleurx 1 month ago p/w worsening sob and fall. Endorsed a fall yesterday from standing due to weakness. States he felt weak and could not stand up from the ground.  Unsure if head strike.  Unable to ambulate since fall. states his left PleurX is not draining any fluid.   otherwise pt denies any fever, cp, palpitations, abdominal pain, v/d, dysuria, numbness, headache, neck pain

## 2022-11-18 NOTE — ED PROVIDER NOTE - CLINICAL SUMMARY MEDICAL DECISION MAKING FREE TEXT BOX
Val: presents with 3 days of SOB and fall today. No head trauma. Patient with pleural drain which has not been draining much. Patient with no fever. Will check for infectious causes and pleural effusion accumulation. Will eval for trauma. Patient will need to stay for SOB.

## 2022-11-18 NOTE — CONSULT NOTE ADULT - CONSULT REASON
Recent history of Pleurex catheter placement with bilateral pleural effusions Recent history of Pleurx catheter placement with bilateral pleural effusions

## 2022-11-18 NOTE — ED PROVIDER NOTE - NS ED ROS FT
Constitutional: No fever, chills.  Eyes:  No visual changes  ENMT:  No neck pain  Cardiac:  No chest pain  Respiratory:  No cough, +SOB  GI:  No nausea, vomiting, diarrhea, abdominal pain.  :  No dysuria, hematuria  MS:  +fall   Neuro:  No headache or lightheadedness  Skin:  No skin rash  Except as documented in the HPI,  all other systems are negative.

## 2022-11-18 NOTE — ED ADULT NURSE NOTE - OBJECTIVE STATEMENT
Pt is a 89 y/o Male, A&Ox4, ambulatory at baseline with a hx of afib, CAD, CABG x3, prostate CA, CVA, and HLD. Pt BIBEMS with c/o increased Pt is a 89 y/o Male, A&Ox4, ambulatory at baseline with a hx of afib, CAD, CABG x3, prostate CA, CVA, and HLD. Pt BIBEMS with c/o increased SOB x 3days and fall yesterday. Pt has pleural drain in place on the left side and states it hasn't been draining much fluid. Pt is on 2L NC for comfort, SpO2 at 98%. Respirations even and unlabored. Pt states he lost his balance and fell onto his hands and back. Pt reports being unable to walk after the fall. Pt denies hitting head, LOC, but is taking Eliquis. No ecchymosis or lacerations noted on skin. Chest rise equal b/l, afib noted on cardiac monitor. Abdomen soft, distended, nontender. Pt states he hasn't had a bowel movement in a few days. Skin is warm, dry and intact. Pt denies chest pain, dizziness, N/V/D, h/a, fever, cough, chills, abdominal pain or any urinary symptoms. 20g IVL placed in Right forearm. Labs collected and sent. Safety maintained, will continue to monitor. Pt is a 89 y/o Male, A&Ox4, ambulatory at baseline with a hx of afib, CAD, CABG x3, prostate CA, CVA, and HLD. Pt BIBEMS with c/o increased SOB x 3days and fall yesterday. Pt has pleural drain in place on the left side and states it hasn't been draining much fluid. Pt is on 2L NC for comfort, SpO2 at 98%. Respirations even and unlabored. Pt states he lost his balance and fell onto his hands and back. Pt reports being unable to walk after the fall. Pt denies hitting head, LOC, but is taking Eliquis. No ecchymosis or lacerations noted on skin. Chest rise equal b/l, afib noted on cardiac monitor. Abdomen soft, distended, nontender. Pt states he hasn't had a bowel movement in a few days. Skin is warm, dry and intact. Pt denies chest pain, dizziness, N/V/D, h/a, fever, cough, chills, abdominal pain or any urinary symptoms. 20g IVL placed in Right forearm. Labs collected and sent. Wife at bedside (Laney Bello #[id number]). Safety maintained, will continue to monitor.

## 2022-11-18 NOTE — ED ADULT NURSE REASSESSMENT NOTE - NS ED NURSE REASSESS COMMENT FT1
PT A&OX4.  No distress noted.  Left pleurex cath site dressing intact.  Breathing non-labored.  Will continue to monitor.
Pt turned and repositioned. MD made aware of rectal temp of 101.2. Pt has a nonblanchable wound in the sacral area, pt states he is aware of this wound. Respirations even and unlabored, SpO2 at 98% on 2L NC. Chest rise equal b/l, sinus tachycardia on the cardiac monitor. Pt has no complaints at this time. Safety maintained, will continue to monitor.
Pt a&ox4, sinus rhythm on cardiac monitor, denies CP, SOB, or dyspnea at this time. Respirations are even and unlabored, no signs of respiratory distress.
Pt is resting in stretcher, complaining of "breathing heavy". Respirations are even and unlabored, SpO2 at 98% on 4L NC. Chest rise equal b/l, afib noted on cardiac monitor. Pt denies chest pain or SOB. Safety maintained, will continue to monitor.

## 2022-11-18 NOTE — H&P ADULT - NSHPSOCIALHISTORY_GEN_ALL_CORE
Tobacco Usage:  · Tobacco Usage: Former smoker  · Tobacco Type: cigarettes  · Last Tobacco Use (dd-mmm-yy): 01-Jan-2004  · Average Number of Packs per Day: 1.5  · Number of yrs: 30  · Pack yrs: 45     Social History:  · Marital Status	Single  · Occupation	retired  · Lives With	significant other

## 2022-11-19 NOTE — PHYSICAL THERAPY INITIAL EVALUATION ADULT - PERTINENT HX OF CURRENT PROBLEM, REHAB EVAL
Pt is 89 year old male presenting with increasing SOB and s/p mechanical fall. Pt recently underwent left VATS for pleural effusion 10/13/2022. CXR revealed unchanged left-sided pleural effusion with Pleurx catheter, increasing layering pleural effusion on the right side with interstitial edema. Pt admitted for bilateral pleural effusions (L>R) with increasing SOB and decreased drainage from the Pleurex catheter.

## 2022-11-19 NOTE — PHYSICAL THERAPY INITIAL EVALUATION ADULT - GAIT DISTANCE, PT EVAL
10ft; deferred further ambulation due to onset of rapid afib in 140's and desaturation to 85% on 2L O2 via nasal cannula. Notified by HUE Arreola; aware.

## 2022-11-19 NOTE — PHYSICAL THERAPY INITIAL EVALUATION ADULT - PATIENT PROFILE REVIEW, REHAB EVAL
PT initial evaluation received and chart review completed. Pt agreeable to participate in PT evaluation. Pt cleared by HUE Arreola./yes

## 2022-11-19 NOTE — PHYSICAL THERAPY INITIAL EVALUATION ADULT - HEALTH SCREEN CRITERIA
Date of admission: 4/17/2019  Date: 4/18 Transfer orders from SICU  Date: NA Rehab/SNF consult   Cleared for discharge: No  Discharge delayed: No    Discharge date: NA   yes

## 2022-11-19 NOTE — PROGRESS NOTE ADULT - SUBJECTIVE AND OBJECTIVE BOX
Subjective:  Patient seen and examined by thoracic team and Dr. Rodas this AM.  No worsening shortness of breath, pain well-controlled, planning to ambulate later today.  Denies chest pain, headache, nausea, vomiting.    Vital Signs:  Vital Signs Last 24 Hrs  T(C): 37 (11-19-22 @ 02:19), Max: 38.4 (11-18-22 @ 14:02)  T(F): 98.6 (11-19-22 @ 02:19), Max: 101.2 (11-18-22 @ 14:02)  HR: 115 (11-19-22 @ 05:30) (95 - 140)  BP: 107/69 (11-19-22 @ 05:30) (100/59 - 134/73)  RR: 18 (11-19-22 @ 02:19) (18 - 22)  SpO2: 99% (11-19-22 @ 02:19) (98% - 100%) on (O2)    Physical exam  General: WN/WD NAD  Neurology: Awake, nonfocal, BAXTER x 4  Eyes: Gross vision intact  ENT: Gross hearing intact  Neck: Neck supple  Respiratory: CTA B/L  CV: RRR, S1S2  Abdominal: Soft, NT, ND  Extremities: No edema, + peripheral pulses  Skin: No Rashes, Hematoma, Ecchymosis  Lymphatic: No Neck, axilla, groin LAD  Psych: Oriented x 3, normal affect  Incisions:   Tubes:  Relevant labs, radiology and Medications reviewed                        10.6   22.23 )-----------( 258      ( 19 Nov 2022 05:41 )             34.8     11-19    138  |  101  |  42<H>  ----------------------------<  126<H>  3.2<L>   |  24  |  1.29    Ca    9.3      19 Nov 2022 05:41  Phos  2.6     11-19  Mg     2.00     11-19    TPro  7.8  /  Alb  3.5  /  TBili  1.4<H>  /  DBili  x   /  AST  78<H>  /  ALT  22  /  AlkPhos  102  11-18    PT/INR - ( 18 Nov 2022 10:40 )   PT: 38.2 sec;   INR: 3.25 ratio         PTT - ( 18 Nov 2022 10:40 )  PTT:36.6 sec  MEDICATIONS  (STANDING):  atorvastatin 40 milliGRAM(s) Oral at bedtime  cefepime   IVPB 2000 milliGRAM(s) IV Intermittent every 12 hours  hydrochlorothiazide 12.5 milliGRAM(s) Oral daily  levothyroxine 100 MICROGram(s) Oral daily  metoprolol succinate ER 25 milliGRAM(s) Oral <User Schedule>  metoprolol succinate ER 50 milliGRAM(s) Oral <User Schedule>  polyethylene glycol 3350 17 Gram(s) Oral daily  senna 2 Tablet(s) Oral at bedtime  sodium chloride 0.9%. 1000 milliLiter(s) (50 mL/Hr) IV Continuous <Continuous>  vancomycin  IVPB 1000 milliGRAM(s) IV Intermittent every 24 hours    MEDICATIONS  (PRN):  bisacodyl 5 milliGRAM(s) Oral every 12 hours PRN Constipation  bisacodyl Suppository 10 milliGRAM(s) Rectal daily PRN Constipation  traMADol 50 milliGRAM(s) Oral every 6 hours PRN Moderate Pain (4 - 6)    Pertinent Physical Exam  I&O's Summary    18 Nov 2022 07:01  -  19 Nov 2022 07:00  --------------------------------------------------------  IN: 580 mL / OUT: 0 mL / NET: 580 mL      Marital Status:  (   )    (   ) Single    (   )    (  )   Lives with: (  ) alone  (  ) children   (  ) spouse   (  ) parents  (  ) other  Recent Travel: No recent travel  Occupation:    Substance Use (street drugs): ( x ) never used  (  ) other:  Tobacco Usage:  ( x  ) never smoked   (   ) former smoker   (   ) current smoker  (     ) pack year  Alcohol Usage: None     Assessment  89y Male  w/ PAST MEDICAL & SURGICAL HISTORY:  HTN (hypertension)      HLD (hyperlipidemia)      Hyperthyroidism  treated with radioactive iodine      Hypothyroid      Atrial fibrillation  diagnosed many years ago   on anticoagulant      CVA (cerebral vascular accident)  2004  no residual      Spinal stenosis  h/o epidural injection      Prostate cancer  s/p radiation ? 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  On (Date), patient underwent . Postoperative course/issues:    PLAN  Neuro: Pain management  Pulm: Encourage coughing, deep breathing and use of incentive spirometry. Wean off supplemental oxygen as able. Daily CXR.   Cardio: Monitor telemetry/alarms  GI: Tolerating diet. Continue stool softeners.  Renal: monitor urine output, supplement electrolytes as needed  Vasc: Heparin SC/SCDs for DVT prophylaxis  Heme: Stable H/H. .   ID: Off antibiotics. Stable.  Therapy: OOB/ambulate  Tubes: Monitor Chest tube output  Disposition: Aim to D/C to home on  Discussed with Cardiothoracic Team at AM rounds.      Subjective:  Patient seen and examined by thoracic team and Dr. Rodas this AM.  No worsening shortness of breath, pain well-controlled, planning to ambulate later today.  Denies chest pain, headache, nausea, vomiting.    Vital Signs:  Vital Signs Last 24 Hrs  T(C): 37 (11-19-22 @ 02:19), Max: 38.4 (11-18-22 @ 14:02)  T(F): 98.6 (11-19-22 @ 02:19), Max: 101.2 (11-18-22 @ 14:02)  HR: 115 (11-19-22 @ 05:30) (95 - 140)  BP: 107/69 (11-19-22 @ 05:30) (100/59 - 134/73)  RR: 18 (11-19-22 @ 02:19) (18 - 22)  SpO2: 99% (11-19-22 @ 02:19) (98% - 100%) on (O2)    Physical exam  General: WN/WD NAD  Neurology: Awake, nonfocal, BAXTER x 4  Eyes: Gross vision intact  ENT: Gross hearing intact  Neck: Neck supple  Respiratory: Diminished breath sounds bilaterally  CV: RRR, S1S2  Abdominal: Soft, NT, ND  Extremities: No edema, + peripheral pulses  Skin: No Rashes, Hematoma, Ecchymosis  Psych: Oriented x 3, normal affect  Tubes: L pleurx catheter site c/d/i  Relevant labs, radiology and Medications reviewed                        10.6   22.23 )-----------( 258      ( 19 Nov 2022 05:41 )             34.8     11-19    138  |  101  |  42<H>  ----------------------------<  126<H>  3.2<L>   |  24  |  1.29    Ca    9.3      19 Nov 2022 05:41  Phos  2.6     11-19  Mg     2.00     11-19    TPro  7.8  /  Alb  3.5  /  TBili  1.4<H>  /  DBili  x   /  AST  78<H>  /  ALT  22  /  AlkPhos  102  11-18    PT/INR - ( 18 Nov 2022 10:40 )   PT: 38.2 sec;   INR: 3.25 ratio         PTT - ( 18 Nov 2022 10:40 )  PTT:36.6 sec  MEDICATIONS  (STANDING):  atorvastatin 40 milliGRAM(s) Oral at bedtime  cefepime   IVPB 2000 milliGRAM(s) IV Intermittent every 12 hours  hydrochlorothiazide 12.5 milliGRAM(s) Oral daily  levothyroxine 100 MICROGram(s) Oral daily  metoprolol succinate ER 25 milliGRAM(s) Oral <User Schedule>  metoprolol succinate ER 50 milliGRAM(s) Oral <User Schedule>  polyethylene glycol 3350 17 Gram(s) Oral daily  senna 2 Tablet(s) Oral at bedtime  sodium chloride 0.9%. 1000 milliLiter(s) (50 mL/Hr) IV Continuous <Continuous>  vancomycin  IVPB 1000 milliGRAM(s) IV Intermittent every 24 hours    MEDICATIONS  (PRN):  bisacodyl 5 milliGRAM(s) Oral every 12 hours PRN Constipation  bisacodyl Suppository 10 milliGRAM(s) Rectal daily PRN Constipation  traMADol 50 milliGRAM(s) Oral every 6 hours PRN Moderate Pain (4 - 6)    Pertinent Physical Exam  I&O's Summary    18 Nov 2022 07:01  -  19 Nov 2022 07:00  --------------------------------------------------------  IN: 580 mL / OUT: 0 mL / NET: 580 mL      Marital Status:  (   )    (   ) Single    (   )    (  )   Lives with: (  ) alone  (  ) children   (  ) spouse   (  ) parents  (  ) other  Recent Travel: No recent travel  Occupation:    Substance Use (street drugs): ( x ) never used  (  ) other:  Tobacco Usage:  ( x  ) never smoked   (   ) former smoker   (   ) current smoker  (     ) pack year  Alcohol Usage: None     Assessment  89M with multiple comorbidities, history of recent L VATS, pleurex catheter placement 10/13/22 for pleural effusion who presents with bilateral pleural effusions (L>R) with increasing SOB over the last three weeks with decreased drainage from the Pleurex catheter.     PLAN  Neuro: Pain management  Pulm: Encourage coughing, deep breathing and use of incentive spirometry. Wean off supplemental oxygen as able. Daily CXR.   Cardio: Monitor telemetry/alarms, metoprolol  GI: Tolerating diet. Continue stool softeners. Glucerna shakes.  Renal: monitor urine output, supplement electrolytes as needed  Vasc: hold anticoagulation, f/u INR  Heme: Stable H/H.   ID: Vancomycin, cefepime.  Therapy: OOB/ambulate. PT consulted.  Tubes: Monitor L PleurX  Disposition: Aim to D/C to home when able    Discussed with Cardiothoracic Team and Dr. Rodas at AM rounds.

## 2022-11-19 NOTE — PHYSICAL THERAPY INITIAL EVALUATION ADULT - GENERAL OBSERVATIONS, REHAB EVAL
Upon entry, pt seated in bedside chair in NAD; + chest tube, + nasal cannula, + telemetry, + condom catheter. Pt left semi-supine in bed with all tubes/lines intact, bed alarm on, call bell in reach and in NAD.

## 2022-11-19 NOTE — PATIENT PROFILE ADULT - FUNCTIONAL ASSESSMENT - BASIC MOBILITY 6.
2-calculated by average/Not able to assess (calculate score using [facility] averaging method)

## 2022-11-19 NOTE — PHYSICAL THERAPY INITIAL EVALUATION ADULT - GAIT DEVIATIONS NOTED, PT EVAL
poor bilateral foot clearance; lack of bilateral heel strike; forward flexed posture with downward gaze/decreased lizz/decreased velocity of limb motion/decreased step length/decreased stride length/decreased weight-shifting ability

## 2022-11-20 NOTE — PROGRESS NOTE ADULT - SUBJECTIVE AND OBJECTIVE BOX
Subjective: "My breathing is feeling a little better but I'm so constipated" Pt states no BM in almost a week. Willing to have enema today. States difficulty   walking to chair, needs assist. No CP or SOb. Currently on O2 NC. No apparent distress.     Vital Signs:  Vital Signs Last 24 Hrs  T(C): 36.7 (11-20-22 @ 09:32), Max: 37.2 (11-20-22 @ 05:03)  T(F): 98 (11-20-22 @ 09:32), Max: 99 (11-20-22 @ 05:03)  HR: 115 (11-20-22 @ 09:32) (109 - 115)  BP: 123/73 (11-20-22 @ 09:32) (114/58 - 131/77)  RR: 20 (11-20-22 @ 09:32) (18 - 22)  SpO2: 95% (11-20-22 @ 09:32) (87% - 97%) on (O2)    Telemetry/Alarms: ST to 120s.   General: WN/WD NAD  Neurology: Awake, nonfocal, BAXTER x 4  Eyes: Scleras clear, PERRLA/ EOMI, Gross vision intact  ENT:Gross hearing intact, grossly patent pharynx, no stridor  Neck: Neck supple, trachea midline, No JVD,   Respiratory: dec BS to bilat bases  CV: RRR, S1S2, no murmurs, rubs or gallops  Abdominal: Soft, NT, ND +BS, no BM, + flatus. Abd soft w +BS  Extremities: No edema, + peripheral pulses  Skin: No Rashes, Hematoma, Ecchymosis  Lymphatic: No Neck, axilla, groin LAD  Psych: Oriented x 3, normal affect  Incisions: Left Pleurx site c/d/i.   Tubes: left Plkeurx 200cc/24hrs on WS, no AL. Clear serous fluid  Relevant labs, radiology and Medications reviewed           CXR- continued small/mod left pleural effusion. Improving Rt pleural effusion.              10.6   17.97 )-----------( 271      ( 20 Nov 2022 06:04 )             35.4     11-20    140  |  104  |  45<H>  ----------------------------<  110<H>  3.6   |  23  |  1.26    Ca    9.3      20 Nov 2022 06:04  Phos  2.1     11-20  Mg     2.10     11-20    TPro  7.8  /  Alb  3.5  /  TBili  1.4<H>  /  DBili  x   /  AST  78<H>  /  ALT  22  /  AlkPhos  102  11-18    PT/INR - ( 20 Nov 2022 06:04 )   PT: 21.3 sec;   INR: 1.83 ratio         PTT - ( 20 Nov 2022 06:04 )  PTT:34.6 sec  MEDICATIONS  (STANDING):  atorvastatin 40 milliGRAM(s) Oral at bedtime  cefepime   IVPB 2000 milliGRAM(s) IV Intermittent every 12 hours  hydrochlorothiazide 12.5 milliGRAM(s) Oral daily  levothyroxine 100 MICROGram(s) Oral daily  metoprolol succinate ER 25 milliGRAM(s) Oral <User Schedule>  metoprolol succinate ER 50 milliGRAM(s) Oral <User Schedule>  polyethylene glycol 3350 17 Gram(s) Oral daily  senna 2 Tablet(s) Oral at bedtime  sodium chloride 0.9% Bolus 500 milliLiter(s) IV Bolus once  sodium chloride 0.9%. 1000 milliLiter(s) (50 mL/Hr) IV Continuous <Continuous>  vancomycin  IVPB 1000 milliGRAM(s) IV Intermittent every 24 hours    MEDICATIONS  (PRN):  bisacodyl 5 milliGRAM(s) Oral every 12 hours PRN Constipation  bisacodyl Suppository 10 milliGRAM(s) Rectal daily PRN Constipation  traMADol 50 milliGRAM(s) Oral every 6 hours PRN Moderate Pain (4 - 6)    Pertinent Physical Exam  I&O's Summary    19 Nov 2022 07:01  -  20 Nov 2022 07:00  --------------------------------------------------------  IN: 2370 mL / OUT: 850 mL / NET: 1520 mL    Social History:  · Substance use	No  · Social History (marital status, living situation, occupation, and sexual history)	Tobacco Usage:  · Tobacco Usage: Former smoker  · Tobacco Type: cigarettes  · Last Tobacco Use ([facility]): 01-Jan-2004  · Average Number of Packs per Day: 1.5  · Number of yrs: 30  · Pack yrs: 45     Social History:  · Marital Status	Single  · Occupation	retired  · Lives With	significant other    Tobacco Screening:    Assessment  89y Male  w/ PAST MEDICAL & SURGICAL HISTORY:  HTN (hypertension)      HLD (hyperlipidemia)      Hyperthyroidism  treated with radioactive iodine      Hypothyroid      Atrial fibrillation  diagnosed many years ago   on anticoagulant      CVA (cerebral vascular accident)  2004  no residual      Spinal stenosis  h/o epidural injection      Prostate cancer  s/p radiation ? 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   Pleurx to waterseal, draining serous fluid. Pt INR elevated, awaiting down trend to do possible intervention on Rt effusion and possible MIST protocol via  left Pleurx.       PLAN  Neuro: Pain management  Pulm: Encourage coughing, deep breathing and use of incentive spirometry. Wean off supplemental oxygen as able. Daily CXR.   Cardio: Monitor telemetry/alarms. Will increase Toprol dose for tachycardia.   GI: Tolerating diet. Continue stool softeners. Will give enema today. Will cont bowel regiment.   Renal: monitor urine output, supplement electrolytes as needed. trending creatinine. Cont gentle IVF hydration   Vasc: Heparin SC/SCDs for DVT prophylaxis  Heme: Stable H/H. .   ID: Cont VANCo and Cefepime. Leukocytosis improving. Vanco trough tonight  Therapy: OOB/ambulate  Tubes: Monitor Chest tube output, keep to waterseal. Possible MIST tomorrow.   Disposition: Aim to D/C to home once optimized.   Discussed with Cardiothoracic Team at AM rounds.

## 2022-11-21 NOTE — PROGRESS NOTE ADULT - SUBJECTIVE AND OBJECTIVE BOX
Subjective: 89yMale seen at bedside this morning by team. Patient with no complaints at this time. Left PleurX remains in place. Overnight patient with episode of IMANI to 130s in setting of known hx of chromic rate control afib. No medication interventions required and patient stable and return to rate control afib.     Vital Signs:  Vital Signs Last 24 Hrs  T(C): 36.7 (11-21-22 @ 08:38), Max: 37.4 (11-21-22 @ 05:20)  T(F): 98.1 (11-21-22 @ 08:38), Max: 99.4 (11-21-22 @ 05:20)  HR: 102 (11-21-22 @ 08:38) (102 - 119)  BP: 119/67 (11-21-22 @ 08:38) (110/- - 120/72)  RR: 19 (11-21-22 @ 08:38) (18 - 20)  SpO2: 95% (11-21-22 @ 08:38) (95% - 98%) on (O2)    Pertinent Physical Exam:  Telemetry/Alarms: Afib, 100s to 130s  General: WN/WD NAD  Neurology: Awake, nonfocal  Respiratory:  Left pleurX, diminished breath sounds b/l  CV: Irregular rate and rhythm  Psych: Oriented x 3, normal affect    Chest Tube: Left PleurX to francis Chase   I&O's Summary    20 Nov 2022 07:01  -  21 Nov 2022 07:00  --------------------------------------------------------  IN: 2030 mL / OUT: 1200 mL / NET: 830 mL    21 Nov 2022 07:01  -  21 Nov 2022 10:37  --------------------------------------------------------  IN: 390 mL / OUT: 100 mL / NET: 290 mL        Relevant labs, radiology and Medications reviewed                        10.1   12.99 )-----------( 261      ( 21 Nov 2022 04:50 )             34.1     11-21    141  |  106  |  43<H>  ----------------------------<  119<H>  3.4<L>   |  25  |  1.25    Ca    9.0      21 Nov 2022 04:50  Phos  2.1     11-20  Mg     2.10     11-20      PT/INR - ( 21 Nov 2022 04:50 )   PT: 21.9 sec;   INR: 1.88 ratio         PTT - ( 20 Nov 2022 06:04 )  PTT:34.6 sec      MEDICATIONS  (STANDING):  atorvastatin 40 milliGRAM(s) Oral at bedtime  cefepime   IVPB 2000 milliGRAM(s) IV Intermittent every 12 hours  hydrochlorothiazide 12.5 milliGRAM(s) Oral daily  levothyroxine 100 MICROGram(s) Oral daily  metoprolol succinate ER 50 milliGRAM(s) Oral every 12 hours  polyethylene glycol 3350 17 Gram(s) Oral daily  senna 2 Tablet(s) Oral at bedtime  sodium chloride 0.9% Bolus 500 milliLiter(s) IV Bolus once  sodium chloride 0.9%. 1000 milliLiter(s) (50 mL/Hr) IV Continuous <Continuous>  vancomycin  IVPB 1000 milliGRAM(s) IV Intermittent every 24 hours    MEDICATIONS  (PRN):  bisacodyl 5 milliGRAM(s) Oral every 12 hours PRN Constipation  bisacodyl Suppository 10 milliGRAM(s) Rectal daily PRN Constipation  traMADol 50 milliGRAM(s) Oral every 6 hours PRN Moderate Pain (4 - 6)        Assessment  89M with multiple comorbidities, history of recent L VATS, pleurex catheter placement 10/13/22 for pleural effusion who presents with bilateral pleural effusions (L>R) with increasing SOB over the last three weeks with decreased drainage from the Pleurex catheter.   Pleurx to waterseal, draining serous fluid. Pt INR elevated, awaiting down trend to do possible intervention on Rt effusion and possible MIST protocol via left Pleurx.     11/21/22: Right pleural effusion appears improved on repeat AM CXR from yesterday and today. No ptc required at this time. Left PleurX is draining, 200cc over the last 24hrs. Will hold off on MIST. INR continues to be elevated, eliquis continues to be on hold. IMANI overnight, on home meds. Consult pts cardiologist Dr Le, TTE pending. Cont with IV Abx for elevated WBC, no cs sent. Cr improving, making urine.     PLAN  Neuro: Pain management  Pulm: Encourage coughing, deep breathing and use of incentive spirometry. Wean off supplemental oxygen as able. Daily CXR.   Cardio: Monitor telemetry/alarms. Resume blood pressure medications as able. Contiue with statin, ASA, BB.  GI: Tolerating diet. Continue stool softeners.  Renal: monitor urine output, supplement electrolytes as needed  Vasc: Heparin SC/SCDs for DVT prophylaxis  Heme: Stable H/H.   Endocrine: Blood glucose checks per routine. ISS   ID: Off antibiotics. Stable.  Therapy: OOB/ambulate  Tubes: Monitor Chest tube output  Disposition: Aim to D/C to home on  Discussed with Cardiothoracic Team at AM rounds.   Subjective: 89yMale seen at bedside this morning by team. Patient with no complaints at this time. Left PleurX remains in place. Overnight patient with episode of IMANI to 130s in setting of known hx of chromic rate control afib. No medication interventions required and patient stable and return to rate control afib.     Vital Signs:  Vital Signs Last 24 Hrs  T(C): 36.7 (11-21-22 @ 08:38), Max: 37.4 (11-21-22 @ 05:20)  T(F): 98.1 (11-21-22 @ 08:38), Max: 99.4 (11-21-22 @ 05:20)  HR: 102 (11-21-22 @ 08:38) (102 - 119)  BP: 119/67 (11-21-22 @ 08:38) (110/- - 120/72)  RR: 19 (11-21-22 @ 08:38) (18 - 20)  SpO2: 95% (11-21-22 @ 08:38) (95% - 98%) on (O2)    Pertinent Physical Exam:  Telemetry/Alarms: Afib, 100s to 130s  General: WN/WD NAD  Neurology: Awake, nonfocal  Respiratory:  Left pleurX, diminished breath sounds b/l  CV: Irregular rate and rhythm  Psych: Oriented x 3, normal affect    Chest Tube: Left PleurX to francis Chase   I&O's Summary    20 Nov 2022 07:01  -  21 Nov 2022 07:00  --------------------------------------------------------  IN: 2030 mL / OUT: 1200 mL / NET: 830 mL    21 Nov 2022 07:01  -  21 Nov 2022 10:37  --------------------------------------------------------  IN: 390 mL / OUT: 100 mL / NET: 290 mL        Relevant labs, radiology and Medications reviewed                        10.1   12.99 )-----------( 261      ( 21 Nov 2022 04:50 )             34.1     11-21    141  |  106  |  43<H>  ----------------------------<  119<H>  3.4<L>   |  25  |  1.25    Ca    9.0      21 Nov 2022 04:50  Phos  2.1     11-20  Mg     2.10     11-20      PT/INR - ( 21 Nov 2022 04:50 )   PT: 21.9 sec;   INR: 1.88 ratio         PTT - ( 20 Nov 2022 06:04 )  PTT:34.6 sec      MEDICATIONS  (STANDING):  atorvastatin 40 milliGRAM(s) Oral at bedtime  cefepime   IVPB 2000 milliGRAM(s) IV Intermittent every 12 hours  hydrochlorothiazide 12.5 milliGRAM(s) Oral daily  levothyroxine 100 MICROGram(s) Oral daily  metoprolol succinate ER 50 milliGRAM(s) Oral every 12 hours  polyethylene glycol 3350 17 Gram(s) Oral daily  senna 2 Tablet(s) Oral at bedtime  sodium chloride 0.9% Bolus 500 milliLiter(s) IV Bolus once  sodium chloride 0.9%. 1000 milliLiter(s) (50 mL/Hr) IV Continuous <Continuous>  vancomycin  IVPB 1000 milliGRAM(s) IV Intermittent every 24 hours    MEDICATIONS  (PRN):  bisacodyl 5 milliGRAM(s) Oral every 12 hours PRN Constipation  bisacodyl Suppository 10 milliGRAM(s) Rectal daily PRN Constipation  traMADol 50 milliGRAM(s) Oral every 6 hours PRN Moderate Pain (4 - 6)        Assessment  89M with multiple comorbidities, history of recent L VATS, pleurex catheter placement 10/13/22 for pleural effusion who presents with bilateral pleural effusions (L>R) with increasing SOB over the last three weeks with decreased drainage from the Pleurex catheter.   Pleurx to waterseal, draining serous fluid. Pt INR elevated, awaiting down trend to do possible intervention on Rt effusion and possible MIST protocol via left Pleurx.     11/21/22: Right pleural effusion appears improved on repeat AM CXR from yesterday and today. No ptc required at this time. Left PleurX is draining, 200cc over the last 24hrs. Will hold off on MIST. INR continues to be elevated, eliquis continues to be on hold. IMANI overnight, on home meds. Consult pts cardiologist Dr Le, TTE pending. Cont with IV Abx for elevated WBC, no cs sent. Cr improving, making urine.     PLAN  Neuro: Pain management PRN tramadol  Pulm: Encourage coughing, deep breathing and use of incentive spirometry. Wean off supplemental oxygen as able. Daily CXR. Cont PleurX to PleurEvac  Cardio: Monitor telemetry/alarms. Resume blood pressure medications as able. Cont holding Eliquis for Afib given elevated INR and possibility of MIST procedure via PleurX. Afib cont Toprol XL, Card consult Dr Mimi Dominique. Pending TTE, eval worsening valve dz.   GI: Tolerating diet. Continue stool softeners.  Renal: monitor urine output, supplement electrolytes as needed. Creatine improved, making urine. Stop IVF  Vasc: Heparin SC/SCDs for DVT prophylaxis. Elevate INR, monitor for improvement. Holding Eliquis.   Heme: Stable H/H.   Endocrine: Blood glucose checks per routine. ISS   ID: Started on Vanco and Cefepime for elevated WBC, questionable empyema. Afebrile. cont for now, monitor and dose accordingly.  Therapy: OOB/ambulate. Rec for LEXIE  Tubes: Monitor PleurX output  Disposition: F/U on cards consult. Pending TTE. Monitor for RAFIB with possible intervention if unstable. Pleural effusion care.   Discussed with Cardiothoracic Team at AM rounds.

## 2022-11-22 NOTE — PROGRESS NOTE ADULT - SUBJECTIVE AND OBJECTIVE BOX
Patient seen and examined at bedside by Thoracic.  Resting comfortably in bed, in NAD.  Patient denies any acute complaints or acute overnight events.    Vital Signs:  Vital Signs Last 24 Hrs  T(C): 36.6 (11-22-22 @ 04:30), Max: 37.5 (11-21-22 @ 20:10)  T(F): 97.8 (11-22-22 @ 04:30), Max: 99.5 (11-21-22 @ 20:10)  HR: 108 (11-22-22 @ 04:30) (104 - 110)  BP: 107/71 (11-22-22 @ 04:30) (107/71 - 136/83)  RR: 18 (11-22-22 @ 04:30) (18 - 20)  SpO2: 99% (11-22-22 @ 04:30) (95% - 99%)     Pertinent Physical Exam:  Telemetry/Alarms: Afib, 100s to 130s  General: WN/WD NAD  Neurology: Awake, nonfocal  Respiratory:  Left pleurX, diminished breath sounds b/l  CV: Irregular rate and rhythm  Abd: Mild distention, soft, NT, +BS  Psych: Oriented x 3, normal affect  Tubes: L PleurX to PleurEvac - 70cc output.      Relevant labs, radiology and Medications reviewed  < from: Transthoracic Echocardiogram (11.21.22 @ 17:25) >  Patient name: Keyshawn Harry  YOB: 1932   Age: 89 (M)   MR#: [id number]  Study Date: 11/21/2022  Location: [id number]: RENETTA Harley  Study quality: Technically Fair  Referring Physician: Miguel Angel Rodas MD  Blood Pressure: 119/67 mmHg  Height: 170 cm  Weight: 64 kg  BSA: 1.8 m2  ------------------------------------------------------------------------  PROCEDURE: Transthoracic echocardiogram with 2-D, M-Mode  and complete spectral and color flow Doppler.  INDICATION: Dyspnea, unspecified (R06.00)  ------------------------------------------------------------------------  DIMENSIONS:  Dimensions:     Normal Values:  LA:     4.3 cm    2.0 - 4.0 cm  Ao:     2.9 cm    2.0 - 3.8 cm  SEPTUM: 1.2 cm    0.6 - 1.2 cm  PWT:    1.2 cm    0.6 - 1.1 cm  LVIDd:  3.5 cm    3.0 - 5.6 cm  LVIDs:    ---     1.8 - 4.0 cm  Derived Variables:  LVMI: 78 g/m2  RWT: 0.68  Ejection Fraction (Modified Garcia Rule): 68 %  ------------------------------------------------------------------------  OBSERVATIONS:  Mitral Valve: Tethered mitral valve leaflets with normal  opening. Mitral annular calcification. Mild mitral  regurgitation.  Aortic Root: Normal aortic root.  Aortic Valve: Calcified trileaflet aortic valve with  decreased opening. The valve grossly appears significantly  stenotic. Unable to assess gradients, due to limited apical  windows (clinical status)  Left Atrium: Mild left atrial enlargement. LA volume index  = 34 cc/m2.  Left Ventricle: Normal left ventricular systolic function.  No segmental wall motion abnormalities. Normal left  ventricular internal dimensions and wall thicknesses.  Increased E/e'  is consistent with elevated left  ventricular filling pressure.  Right Heart: Normal right atrium. Normalright ventricular  size and function. Normal tricuspid valve. Normal pulmonic  valve.  Pericardium/PleuraNormal pericardium with no pericardial  effusion. Bilateral pleural effusions.  ------------------------------------------------------------------------  CONCLUSIONS:  1. Tethered mitral valve leaflets with normal opening.  Mitral annular calcification.  2. Calcified trileaflet aortic valve with decreased  opening. The valve grossly appears significantly stenotic.  Unable to assess gradients, due to limited apical windows  (clinical status)  3. Normal left ventricular systolic function. No segmental  wall motion abnormalities.  4. Increased E/e'  is consistent with elevated left  ventricular filling pressure.  5. Normal right ventricular size and function.  6. Bilateral pleural effusions.  ------------------------------------------------------------------------  Confirmed on  11/21/2022 - 18:22:29 by Bert Barnes M.D. RPVI  ------------------------------------------------------------------------    < end of copied text >                          10.7   14.29 )-----------( 248      ( 22 Nov 2022 04:46 )             36.5     11-22    141  |  104  |  43<H>  ----------------------------<  111<H>  4.0   |  22  |  1.22    Ca    9.1      22 Nov 2022 04:46  Phos  2.5     11-22  Mg     2.10     11-22    TPro  6.8  /  Alb  2.8<L>  /  TBili  1.0  /  DBili  x   /  AST  367<H>  /  ALT  198<H>  /  AlkPhos  195<H>  11-22    PT/INR - ( 22 Nov 2022 04:46 )   PT: 18.4 sec;   INR: 1.58 ratio         PTT - ( 22 Nov 2022 04:46 )  PTT:25.7 sec  MEDICATIONS  (STANDING):  atorvastatin 40 milliGRAM(s) Oral at bedtime  cefepime   IVPB 2000 milliGRAM(s) IV Intermittent every 12 hours  hydrochlorothiazide 12.5 milliGRAM(s) Oral daily  levothyroxine 100 MICROGram(s) Oral daily  metoprolol succinate ER 50 milliGRAM(s) Oral every 12 hours  polyethylene glycol 3350 17 Gram(s) Oral daily  senna 2 Tablet(s) Oral at bedtime  vancomycin  IVPB 1250 milliGRAM(s) IV Intermittent every 24 hours    MEDICATIONS  (PRN):  bisacodyl 5 milliGRAM(s) Oral every 12 hours PRN Constipation  bisacodyl Suppository 10 milliGRAM(s) Rectal daily PRN Constipation  traMADol 50 milliGRAM(s) Oral every 6 hours PRN Moderate Pain (4 - 6)    Pertinent Physical Exam  I&O's Summary    21 Nov 2022 07:01  -  22 Nov 2022 07:00  --------------------------------------------------------  IN: 390 mL / OUT: 1210 mL / NET: -820 mL        Assessment  89M with multiple comorbidities, history of recent L VATS, pleurex catheter placement 10/13/22 for pleural effusion who presents with bilateral pleural effusions (L>R) with increasing SOB over the last three weeks with decreased drainage from the Pleurex catheter.   Pleurx to waterseal, draining serous fluid. Pt INR elevated, awaiting down trend to do possible intervention on Rt effusion and possible MIST protocol via left Pleurx.     11/21/22: Right pleural effusion appears improved on repeat AM CXR from yesterday and today. No ptc required at this time. Left PleurX is draining, 200cc over the last 24hrs. Will hold off on MIST. INR continues to be elevated, eliquis continues to be on hold. IMANI overnight, on home meds. Consult pts cardiologist Dr Le, TTE pending. Cont with IV Abx for elevated WBC, no cs sent. Cr improving, making urine.  11/22: INR 1.58, hold MIST.    PLAN  Neuro: Pain management PRN tramadol  Pulm: Encourage coughing, deep breathing and use of incentive spirometry. Wean off supplemental oxygen as able. Daily CXR. Cont PleurX to PleurEvac  Cardio: Monitor telemetry/alarms. Resume blood pressure medications as able. Cont holding Eliquis for Afib given elevated INR. Holding MIST today, possibility of MIST procedure via PleurX in coming days. Afib cont Toprol XL, Card consult Dr Mimi Dominique. TTE done - aortic stenosis - per Dr. Le cont. course.  GI: Tolerating diet. Continue stool softeners.  Renal: monitor urine output, supplement electrolytes as needed. Creatine improved, making urine. Stop IVF  Vasc: Heparin SC/SCDs for DVT prophylaxis. Elevate INR, monitor for improvement. Holding Eliquis.   Heme: Stable H/H.   Endocrine: Blood glucose checks per routine. ISS   ID: Started on Vanco and Cefepime for elevated WBC, questionable empyema. Afebrile. cont for now, monitor and dose accordingly.  Therapy: OOB/ambulate. Rec for [facility]  Tubes: Monitor PleurX output  Disposition: Plan to d/c to [facility] this week.   Discussed with Cardiothoracic Team at AM rounds.   Patient seen and examined at bedside by Thoracic.  Resting comfortably in bed, in NAD.  Patient denies any acute complaints or acute overnight events.    Vital Signs:  Vital Signs Last 24 Hrs  T(C): 36.6 (11-22-22 @ 04:30), Max: 37.5 (11-21-22 @ 20:10)  T(F): 97.8 (11-22-22 @ 04:30), Max: 99.5 (11-21-22 @ 20:10)  HR: 108 (11-22-22 @ 04:30) (104 - 110)  BP: 107/71 (11-22-22 @ 04:30) (107/71 - 136/83)  RR: 18 (11-22-22 @ 04:30) (18 - 20)  SpO2: 99% (11-22-22 @ 04:30) (95% - 99%)     Pertinent Physical Exam:  Telemetry/Alarms: Afib, 100s to 130s  General: WN/WD NAD  Neurology: Awake, nonfocal  Respiratory:  Left pleurX, diminished breath sounds b/l  CV: Irregular rate and rhythm  Abd: Mild distention, soft, NT, +BS  Psych: Oriented x 3, normal affect  Tubes: L PleurX to PleurEvac - 70cc output.      Relevant labs, radiology and Medications reviewed  < from: Transthoracic Echocardiogram (11.21.22 @ 17:25) >  Patient name: Keyshawn Harry  YOB: 1932   Age: 89 (M)   MR#: [id number]  Study Date: 11/21/2022  Location: [id number]: RENETTA Harley  Study quality: Technically Fair  Referring Physician: Miguel Angel Rodas MD  Blood Pressure: 119/67 mmHg  Height: 170 cm  Weight: 64 kg  BSA: 1.8 m2  ------------------------------------------------------------------------  PROCEDURE: Transthoracic echocardiogram with 2-D, M-Mode  and complete spectral and color flow Doppler.  INDICATION: Dyspnea, unspecified (R06.00)  ------------------------------------------------------------------------  DIMENSIONS:  Dimensions:     Normal Values:  LA:     4.3 cm    2.0 - 4.0 cm  Ao:     2.9 cm    2.0 - 3.8 cm  SEPTUM: 1.2 cm    0.6 - 1.2 cm  PWT:    1.2 cm    0.6 - 1.1 cm  LVIDd:  3.5 cm    3.0 - 5.6 cm  LVIDs:    ---     1.8 - 4.0 cm  Derived Variables:  LVMI: 78 g/m2  RWT: 0.68  Ejection Fraction (Modified Garcia Rule): 68 %  ------------------------------------------------------------------------  OBSERVATIONS:  Mitral Valve: Tethered mitral valve leaflets with normal  opening. Mitral annular calcification. Mild mitral  regurgitation.  Aortic Root: Normal aortic root.  Aortic Valve: Calcified trileaflet aortic valve with  decreased opening. The valve grossly appears significantly  stenotic. Unable to assess gradients, due to limited apical  windows (clinical status)  Left Atrium: Mild left atrial enlargement. LA volume index  = 34 cc/m2.  Left Ventricle: Normal left ventricular systolic function.  No segmental wall motion abnormalities. Normal left  ventricular internal dimensions and wall thicknesses.  Increased E/e'  is consistent with elevated left  ventricular filling pressure.  Right Heart: Normal right atrium. Normalright ventricular  size and function. Normal tricuspid valve. Normal pulmonic  valve.  Pericardium/PleuraNormal pericardium with no pericardial  effusion. Bilateral pleural effusions.  ------------------------------------------------------------------------  CONCLUSIONS:  1. Tethered mitral valve leaflets with normal opening.  Mitral annular calcification.  2. Calcified trileaflet aortic valve with decreased  opening. The valve grossly appears significantly stenotic.  Unable to assess gradients, due to limited apical windows  (clinical status)  3. Normal left ventricular systolic function. No segmental  wall motion abnormalities.  4. Increased E/e'  is consistent with elevated left  ventricular filling pressure.  5. Normal right ventricular size and function.  6. Bilateral pleural effusions.  ------------------------------------------------------------------------  Confirmed on  11/21/2022 - 18:22:29 by Bert Barnes M.D. RPVI  ------------------------------------------------------------------------    < end of copied text >                          10.7   14.29 )-----------( 248      ( 22 Nov 2022 04:46 )             36.5     11-22    141  |  104  |  43<H>  ----------------------------<  111<H>  4.0   |  22  |  1.22    Ca    9.1      22 Nov 2022 04:46  Phos  2.5     11-22  Mg     2.10     11-22    TPro  6.8  /  Alb  2.8<L>  /  TBili  1.0  /  DBili  x   /  AST  367<H>  /  ALT  198<H>  /  AlkPhos  195<H>  11-22    PT/INR - ( 22 Nov 2022 04:46 )   PT: 18.4 sec;   INR: 1.58 ratio         PTT - ( 22 Nov 2022 04:46 )  PTT:25.7 sec  MEDICATIONS  (STANDING):  atorvastatin 40 milliGRAM(s) Oral at bedtime  cefepime   IVPB 2000 milliGRAM(s) IV Intermittent every 12 hours  hydrochlorothiazide 12.5 milliGRAM(s) Oral daily  levothyroxine 100 MICROGram(s) Oral daily  metoprolol succinate ER 50 milliGRAM(s) Oral every 12 hours  polyethylene glycol 3350 17 Gram(s) Oral daily  senna 2 Tablet(s) Oral at bedtime  vancomycin  IVPB 1250 milliGRAM(s) IV Intermittent every 24 hours    MEDICATIONS  (PRN):  bisacodyl 5 milliGRAM(s) Oral every 12 hours PRN Constipation  bisacodyl Suppository 10 milliGRAM(s) Rectal daily PRN Constipation  traMADol 50 milliGRAM(s) Oral every 6 hours PRN Moderate Pain (4 - 6)    Pertinent Physical Exam  I&O's Summary    21 Nov 2022 07:01  -  22 Nov 2022 07:00  --------------------------------------------------------  IN: 390 mL / OUT: 1210 mL / NET: -820 mL        Assessment  89M with multiple comorbidities, history of recent L VATS, pleurex catheter placement 10/13/22 for pleural effusion who presents with bilateral pleural effusions (L>R) with increasing SOB over the last three weeks with decreased drainage from the Pleurex catheter.   Pleurx to waterseal, draining serous fluid. Pt INR elevated, awaiting down trend to do possible intervention on Rt effusion and possible MIST protocol via left Pleurx.     11/21/22: Right pleural effusion appears improved on repeat AM CXR from yesterday and today. No ptc required at this time. Left PleurX is draining, 200cc over the last 24hrs. Will hold off on MIST. INR continues to be elevated, eliquis continues to be on hold. IMANI overnight, on home meds. Consult pts cardiologist Dr Le, TTE pending. Cont with IV Abx for elevated WBC, no cs sent. Cr improving, making urine.  11/22: INR 1.58, hold MIST.    PLAN  Neuro: Pain management PRN tramadol  Pulm: Encourage coughing, deep breathing and use of incentive spirometry. Wean off supplemental oxygen as able. Daily CXR. Cont PleurX to PleurEvac  Cardio: Monitor telemetry/alarms. Resume blood pressure medications as able. Cont holding Eliquis for Afib given elevated INR. Holding MIST today, possibility of MIST procedure via PleurX in coming days. Afib cont Toprol XL, Card consult Dr Mimi Dominique. TTE done - aortic stenosis - per Dr. Le cont. current meds.  GI: Tolerating diet. Continue stool softeners.  Renal: monitor urine output, supplement electrolytes as needed. Creatine improved, making urine. Stop IVF  Vasc: Heparin SC/SCDs for DVT prophylaxis. Elevate INR, monitor for improvement. Holding Eliquis.   Heme: Stable H/H.   Endocrine: Blood glucose checks per routine. ISS   ID: Started on Vanco and Cefepime for elevated WBC, questionable empyema. Afebrile. cont for now, monitor and dose accordingly.  Therapy: OOB/ambulate. Rec for [facility]  Tubes: Monitor PleurX output  Disposition: Plan to d/c to [facility] this week.   Discussed with Cardiothoracic Team at AM rounds.

## 2022-11-22 NOTE — CONSULT NOTE ADULT - SUBJECTIVE AND OBJECTIVE BOX
Patient well known to me called by wife to evaluate cardiac status    HPI:  89 year old male with PMH of Chronic Afib- Eliquis Aortic valve calcification, HTN, HLD, CVA, CABG in 2012, prostate cancer s/p radiation, left pleural effusion s/p L VATS, drainage of effusion, Pleurx catheter placement on 10/13/22. He presents to the ED today for increasing shortness of breath and mechanical fall yesterday. Patient states that he has noticed increasing shortness of breath over the last couple of weeks which worsens when laying down or with exertion. He gets his Pleurex drained twice a week with VNS on Mondays and Fridays and states that over the last three weeks, the fluid evacuated has decreased dramatically to less than 100cc per encounter. He additionally complains of chills and night sweats. He denies fevers, nausea, diarrhea. He complains of constipation.    Patient also states that he sustained a mechanical fall on admision He denies head injury, LOC, nausea, or vomiting. He denies any pain.    Now with some sob no cp feels weak and tired afib at times not ideally controlled  s/p left pleurovax    echo several month ago in my office moderate Aortic stenosis preserved LV function  repeat echo performed in hospital    MEDICATIONS:  MEDICATIONS  (STANDING):  atorvastatin 40 milliGRAM(s) Oral at bedtime  cefepime   IVPB 2000 milliGRAM(s) IV Intermittent every 12 hours  hydrochlorothiazide 12.5 milliGRAM(s) Oral daily  levothyroxine 100 MICROGram(s) Oral daily  metoprolol succinate ER 50 milliGRAM(s) Oral every 12 hours  polyethylene glycol 3350 17 Gram(s) Oral daily  senna 2 Tablet(s) Oral at bedtime  vancomycin  IVPB 1250 milliGRAM(s) IV Intermittent every 24 hours      PHYSICAL EXAM:  T(C): 36.6 (11-22-22 @ 04:30), Max: 37.5 (11-21-22 @ 20:10)  HR: 108 (11-22-22 @ 04:30) (104 - 110)  BP: 107/71 (11-22-22 @ 04:30) (107/71 - 136/83)  RR: 18 (11-22-22 @ 04:30) (18 - 20)  SpO2: 99% (11-22-22 @ 04:30) (95% - 99%)  Wt(kg): --  I&O's Summary    21 Nov 2022 07:01  -  22 Nov 2022 07:00  --------------------------------------------------------  IN: 390 mL / OUT: 1210 mL / NET: -820 mL          Appearance: Normal thin temproal wasting feels weak and tired	  HEENT:   Normal oral mucosa, PERRL, EOMI	  Cardiovascular: Normal S1 S2,no s3 irregular pulse No JVD, 2/6 CAROLINE right base mid peaking  Lungs clear  right lung with some decreased BS at bases	left decreased BS 1/4 up with coarse rales  Gastrointestinal:  Soft, Non-tender, + BS	  Psychiatry:  Mood & affect appropriate  Ext: No edema  Peripheral pulses palpable 2+ bilaterally      LABS:    CARDIAC MARKERS:                                10.7   14.29 )-----------( 248      ( 22 Nov 2022 04:46 )             36.5     11-22    141  |  104  |  43<H>  ----------------------------<  111<H>  4.0   |  22  |  1.22    Ca    9.1      22 Nov 2022 04:46  Phos  2.5     11-22  Mg     2.10     11-22    TPro  6.8  /  Alb  2.8<L>  /  TBili  1.0  /  DBili  x   /  AST  367<H>  /  ALT  198<H>  /  AlkPhos  195<H>  11-22    proBNP:   Lipid Profile:   HgA1c:   TSH:           TELEMETRY: 	    ECG: < from: 12 Lead ECG (11.18.22 @ 09:59) >  Diagnosis Line Undetermined rhythm  Right bundle branch block  Inferior infarct , age undetermined  Anterolateral infarct , age undetermined  Abnormal ECG    < from: Xray Chest 1 View- PORTABLE-Routine (Xray Chest 1 View- PORTABLE-Routine in AM.) (11.20.22 @ 08:57) >  FINDINGS:  Bilateral effusions decreased on the right side, loculated and moderate   on the left.    Lungs show no focal consolidations. Mild interstitial edema. Heart size   is stable.        COMPARISON: November 19        IMPRESSION: Follow-up pleural effusions decreased on the right.    < from: Transthoracic Echocardiogram (11.21.22 @ 17:25) >  DIMENSIONS:  Dimensions:     Normal Values:  LA:     4.3 cm    2.0 - 4.0 cm  Ao:     2.9 cm    2.0 - 3.8 cm  SEPTUM: 1.2 cm    0.6 - 1.2 cm  PWT:    1.2 cm    0.6 - 1.1 cm  LVIDd:  3.5 cm    3.0 - 5.6 cm  LVIDs:    ---     1.8 - 4.0 cm  Derived Variables:  LVMI: 78 g/m2  RWT: 0.68  Ejection Fraction (Modified Garcia Rule): 68 %  ------------------------------------------------------------------------  OBSERVATIONS:  Mitral Valve: Tethered mitral valve leaflets with normal  opening. Mitral annular calcification. Mild mitral  regurgitation.  Aortic Root: Normal aortic root.  Aortic Valve: Calcified trileaflet aortic valve with  decreased opening. The valve grossly appears significantly  stenotic. Unable to assess gradients, due to limited apical  windows (clinical status)  Left Atrium: Mild left atrial enlargement. LA volume index  = 34 cc/m2.  Left Ventricle: Normal left ventricular systolic function.  No segmental wall motion abnormalities. Normal left  ventricular internal dimensions and wall thicknesses.  Increased E/e'  is consistent with elevated left  ventricular filling pressure.  Right Heart: Normal right atrium. Normalright ventricular  size and function. Normal tricuspid valve. Normal pulmonic  valve.  Pericardium/PleuraNormal pericardium with no pericardial  effusion. Bilateral pleural effusions.  ------------------------------------------------------------------------  CONCLUSIONS:  1. Tethered mitral valve leaflets with normal opening.  Mitral annular calcification.  2. Calcified trileaflet aortic valve with decreased  opening. The valve grossly appears significantly stenotic.  Unable to assess gradients, due to limited apical windows  (clinical status)  3. Normal left ventricular systolic function. No segmental  wall motion abnormalities.  4. Increased E/e'  is consistent with elevated left  ventricular filling pressure.  5. Normal right ventricular size and function.  6. Bilateral pleural effusions.  ------------------------------------------------------------------------  Confirmed on  11/21/2022 - 18:22:29 by Bert Barnes M.D. RPVI  ------------------------------------------------------------------------

## 2022-11-22 NOTE — CONSULT NOTE ADULT - ASSESSMENT
patient with recurrent left pleural effusion (?exudate) s/p pleurax right sided effusion improved sob afib with varying VR  Overall clinically and hemodynamically stable. LV function is normal and there is clinically modeate aortic stenosi gradients cannot be assesed however    1. left pleural effusion  2. s/p pleurx catheter  3. afib VR not always controlled VR  4. aortic stenosis probably moderate   5. normal LV function  6. ?COPD    Recommend  continue present meds  continue anticaogulation  etiology of effusions unclear

## 2022-11-23 NOTE — CONSULT NOTE ADULT - SUBJECTIVE AND OBJECTIVE BOX
HPI:  89 year old male with PMH of Chronic Afib- Eliquis Aortic valve calcification, HTN, HLD, CVA, CABG in 2012, prostate cancer s/p radiation, left pleural effusion s/p L VATS, drainage of effusion, Pleurx catheter placement on 10/13/22. He presents to the ED today for increasing shortness of breath and mechanical fall yesterday. Patient states that he has noticed increasing shortness of breath over the last couple of weeks which worsens when laying down or with exertion. He gets his Pleurex drained twice a week with VNS on Mondays and Fridays and states that over the last three weeks, the fluid evacuated has decreased dramatically to less than 100cc per encounter. He additionally complains of chills and night sweats. He denies fevers, nausea, diarrhea. He complains of constipation.    Patient also states that he sustained a mechanical fall yesterday. He denies head injury, LOC, nausea, or vomiting. He denies any pain.    (18 Nov 2022 18:58)    Presented with sepsis, fever and leukocytosis with pleural effusion s/p drainage with significant WBC in fluid. Also with an TIM and elevated INR from 3 now down to 1.69. Was on Eliquis- held for elevated INR. Course c/b afib RVR and elevated LFTs.     PAST MEDICAL & SURGICAL HISTORY:  HTN (hypertension)      HLD (hyperlipidemia)      Hyperthyroidism  treated with radioactive iodine      Hypothyroid      Atrial fibrillation  diagnosed many years ago   on anticoagulant      CVA (cerebral vascular accident)  2004  no residual      Spinal stenosis  h/o epidural injection      Prostate cancer  s/p radiation ? 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      H/O cataract extraction      History of herniorrhaphy  right groin      S/P hip replacement, right          Allergies    penicillin (Rash)    Intolerances        MEDICATIONS  (STANDING):  atorvastatin 40 milliGRAM(s) Oral at bedtime  cefepime   IVPB 2000 milliGRAM(s) IV Intermittent every 12 hours  hydrochlorothiazide 12.5 milliGRAM(s) Oral daily  levothyroxine 100 MICROGram(s) Oral daily  metoprolol succinate ER 50 milliGRAM(s) Oral every 12 hours  polyethylene glycol 3350 17 Gram(s) Oral daily  saline laxative (FLEET) Rectal Enema 1 Enema Rectal once  senna 2 Tablet(s) Oral at bedtime  vancomycin  IVPB 1250 milliGRAM(s) IV Intermittent every 24 hours    MEDICATIONS  (PRN):  bisacodyl 5 milliGRAM(s) Oral every 12 hours PRN Constipation  bisacodyl Suppository 10 milliGRAM(s) Rectal daily PRN Constipation  traMADol 50 milliGRAM(s) Oral every 6 hours PRN Moderate Pain (4 - 6)      FAMILY HISTORY:      SOCIAL HISTORY: No EtOH, no tobacco    REVIEW OF SYSTEMS:    CONSTITUTIONAL: No weakness, fevers or chills  EYES/ENT: No visual changes;  No vertigo or throat pain   NECK: No pain or stiffness  RESPIRATORY: No cough, wheezing, hemoptysis; No shortness of breath  CARDIOVASCULAR: No chest pain or palpitations  GASTROINTESTINAL: No abdominal or epigastric pain. No nausea, vomiting, or hematemesis; No diarrhea or constipation. No melena or hematochezia.  GENITOURINARY: No dysuria, frequency or hematuria  NEUROLOGICAL: No numbness or weakness  SKIN: No itching, burning, rashes, or lesions   All other review of systems is negative unless indicated above.        T(F): 98.2 (11-23-22 @ 09:00), Max: 99.1 (11-23-22 @ 05:54)  HR: 110 (11-23-22 @ 09:00)  BP: 108/62 (11-23-22 @ 09:00)  RR: 18 (11-23-22 @ 09:00)  SpO2: 95% (11-23-22 @ 09:00)  Wt(kg): --    GENERAL: NAD, well-developed  HEAD:  Atraumatic, Normocephalic  EYES: EOMI, PERRLA, conjunctiva and sclera clear  NECK: Supple, No JVD  CHEST/LUNG: Clear to auscultation bilaterally; No wheeze  HEART: Regular rate and rhythm; No murmurs, rubs, or gallops  ABDOMEN: Soft, Nontender, Nondistended; Bowel sounds present  EXTREMITIES:  2+ Peripheral Pulses, No clubbing, cyanosis, or edema  NEUROLOGY: non-focal  SKIN: No rashes or lesions                          10.1   15.24 )-----------( 265      ( 23 Nov 2022 05:35 )             33.4       11-23    141  |  105  |  38<H>  ----------------------------<  118<H>  3.7   |  27  |  1.15    Ca    9.0      23 Nov 2022 05:35  Phos  2.3     11-23  Mg     2.00     11-23    TPro  6.6  /  Alb  2.9<L>  /  TBili  1.0  /  DBili  0.5<H>  /  AST  211<H>  /  ALT  161<H>  /  AlkPhos  178<H>  11-23      Phosphorus Level, Serum: 2.3 mg/dL (11-23 @ 05:35)  Magnesium, Serum: 2.00 mg/dL (11-23 @ 05:35)      PT/INR - ( 23 Nov 2022 05:35 )   PT: 19.7 sec;   INR: 1.69 ratio         PTT - ( 23 Nov 2022 05:35 )  PTT:31.0 sec    Clean Catch Clean Catch (Midstream)  11-18 @ 13:50   <10,000 CFU/mL Normal Urogenital Darlin  --  --      .Blood Blood-Peripheral  11-18 @ 12:20   No growth to date.  --  --      .Blood Blood-Peripheral  11-18 @ 12:00   No growth to date.  --  --

## 2022-11-23 NOTE — CONSULT NOTE ADULT - SUBJECTIVE AND OBJECTIVE BOX
HPI:  89 yo M w/ PMhx afib (on apixaban), HTN, HLD, CVA, CABG, prostate Ca, L pleural effusion s/p VATS and pleurex placement (10/13/22) w/ home drainage p/w SOB and decreased pleurex output in setting of mechanical fall. On presentation patient febrile w/ leukocytosis. Infectious workup notable for pleural fluid w/ >103K WBC, on cefepime/vanc. Patient also w/ TIM (improving 1.3->1.1) and elevated INR (was on apixaban, now being held and improving 3->1.69). Course c/b afib w/ RVR and elevated liver enzymes (AST/ALT 78/22->367/198, t bili 1.4 (stable).   Patient states he has mild abd pain, no bowel movement in multiple days, received fleet enema x 1.   Patient reports no known history of liver disease, no prior episodes of jaundice, max weight 175 lbs, no h/o EtOH , no IVDU, no FHx of liver disease.     Allergies:  penicillin (Rash)      Home Medications:    Hospital Medications:  atorvastatin 40 milliGRAM(s) Oral at bedtime  bisacodyl 5 milliGRAM(s) Oral every 12 hours PRN  bisacodyl Suppository 10 milliGRAM(s) Rectal daily PRN  cefepime   IVPB 2000 milliGRAM(s) IV Intermittent every 12 hours  hydrochlorothiazide 12.5 milliGRAM(s) Oral daily  levothyroxine 100 MICROGram(s) Oral daily  metoprolol succinate ER 50 milliGRAM(s) Oral every 12 hours  polyethylene glycol 3350 17 Gram(s) Oral daily  saline laxative (FLEET) Rectal Enema 1 Enema Rectal once  senna 2 Tablet(s) Oral at bedtime  traMADol 50 milliGRAM(s) Oral every 6 hours PRN  vancomycin  IVPB 1250 milliGRAM(s) IV Intermittent every 24 hours      PMHX/PSHX:  HTN (hypertension)    HLD (hyperlipidemia)    Hyperthyroidism    Hypothyroid    Atrial fibrillation    CVA (cerebral vascular accident)    Spinal stenosis    Prostate cancer    CAD (coronary artery disease)    CRI (chronic renal insufficiency)    History of hemorrhoids    Chronic dryness of both eyes    PAD (peripheral artery disease)    History of colitis    Lumbar spinal stenosis    OA (osteoarthritis)    S/P CABG x 3    H/O cataract extraction    History of herniorrhaphy    S/P hip replacement, right        Family history:      Denies family history of colon cancer/polyps, stomach cancer/polyps, pancreatic cancer/masses, liver cancer/disease, ovarian cancer and endometrial cancer.    Social History:   Tob: Denies  EtOH: Denies  Illicit Drugs: Denies    ROS:     General:  No wt loss, fevers, chills, night sweats, fatigue  Eyes:  Good vision, no reported pain  ENT:  No sore throat, pain, runny nose, dysphagia  CV:  No pain, palpitations, hypo/hypertension  Pulm:  No dyspnea, cough, tachypnea, wheezing  GI:  see HPI  :  No pain, bleeding, incontinence, nocturia  Muscle:  No pain, weakness  Neuro:  No weakness, tingling, memory problems  Psych:  No fatigue, insomnia, mood problems, depression  Endocrine:  No polyuria, polydipsia, cold/heat intolerance  Heme:  No petechiae, ecchymosis, easy bruisability  Skin:  No rash, tattoos, scars, edema    PHYSICAL EXAM:     GENERAL:  No acute distress  HEENT:  NCAT, no scleral icterus   CHEST:  no respiratory distress  HEART:  Regular rate and rhythm  ABDOMEN:  Soft, non-tender, +distended, normoactive bowel sounds,  no masses  EXTREMITIES: No edema  SKIN:  No rash/erythema/ecchymoses/petechiae/wounds/abscess/warm/dry  NEURO:  Alert and oriented x 3, no asterixis    Vital Signs:  Vital Signs Last 24 Hrs  T(C): 36.8 (23 Nov 2022 09:00), Max: 37.3 (23 Nov 2022 05:54)  T(F): 98.2 (23 Nov 2022 09:00), Max: 99.1 (23 Nov 2022 05:54)  HR: 110 (23 Nov 2022 09:00) (100 - 115)  BP: 108/62 (23 Nov 2022 09:00) (108/62 - 133/75)  BP(mean): --  RR: 18 (23 Nov 2022 09:00) (18 - 18)  SpO2: 95% (23 Nov 2022 09:00) (95% - 100%)    Parameters below as of 23 Nov 2022 09:00  Patient On (Oxygen Delivery Method): nasal cannula  O2 Flow (L/min): 2    Daily     Daily     LABS:                        10.1   15.24 )-----------( 265      ( 23 Nov 2022 05:35 )             33.4     Mean Cell Volume: 95.4 fL (11-23-22 @ 05:35)    11-23    141  |  105  |  38<H>  ----------------------------<  118<H>  3.7   |  27  |  1.15    Ca    9.0      23 Nov 2022 05:35  Phos  2.3     11-23  Mg     2.00     11-23    TPro  6.6  /  Alb  2.9<L>  /  TBili  1.0  /  DBili  0.5<H>  /  AST  211<H>  /  ALT  161<H>  /  AlkPhos  178<H>  11-23    LIVER FUNCTIONS - ( 23 Nov 2022 05:35 )  Alb: 2.9 g/dL / Pro: 6.6 g/dL / ALK PHOS: 178 U/L / ALT: 161 U/L / AST: 211 U/L / GGT: x           PT/INR - ( 23 Nov 2022 05:35 )   PT: 19.7 sec;   INR: 1.69 ratio         PTT - ( 23 Nov 2022 05:35 )  PTT:31.0 sec                            10.1   15.24 )-----------( 265      ( 23 Nov 2022 05:35 )             33.4                         10.7   14.29 )-----------( 248      ( 22 Nov 2022 04:46 )             36.5                         10.1   12.99 )-----------( 261      ( 21 Nov 2022 04:50 )             34.1       Imaging:  CT chest 11/18/22  FINDINGS:    LUNGS AND AIRWAYS: Upper lobe predominant centrilobular emphysema. Left   upper and lower lobe patchy opacities with moderate right and loculated   moderate left pleural effusions overall increased when compared to prior   exam. Mild left-sided pleural enhancement, which may be related to the   presence of the catheter. Small bubble of air in the left pleural space.   No definitive evidence of empyema. Adjacent compressive atelectasis.   Left-sided Pleurx catheter in place.  MEDIASTINUM AND KRYSTLE: Stable borderline enlarged paraesophageal and AP   window lymph nodes measuring up to 1.6 x 1.2 cm (2:27) and 1.8 x 1.2 cm   (2:50)  VESSELS: Coronary artery and aortic calcifications. Status post previous   bypass surgery with mediastinal surgical clips in place.  HEART: Heart is enlarged. No pericardial effusion.  CHEST WALL AND LOWER NECK: Within normal limits.  VISUALIZED UPPER ABDOMEN: Cholelithiasis. Left-sided renal cysts.  BONES: Degenerative changes. Sternotomy.    IMPRESSION:  Left upper and lower lobe patchy opacities consistent with edema versus   pneumonia.    Moderate right and loculated moderate left pleural effusions with   adjacent compressive atelectasis overall increased when compared to prior   exam from 9/16/2022.    Mild left pleural enhancement may be related to left Pleurx catheter. No   definitive evidence of empyema.

## 2022-11-23 NOTE — CONSULT NOTE ADULT - ATTENDING COMMENTS
89 yo M w/ PMhx afib (on apixaban), HTN, HLD, CVA, CABG, prostate Ca, L pleural effusion s/p VATS and pleurex placement (10/13/22) w/ home drainage p/w SOB and decreased pleurex output in setting of mechanical fall. On presentation patient febrile w/ leukocytosis. Infectious workup notable for pleural fluid w/ >103K WBC, on cefepime/vanc. Patient also w/ TIM (improving 1.3->1.1) and elevated INR (was on apixaban, now being held and improving 3->1.69). Course c/b afib w/ RVR and elevated liver enzymes (AST/ALT 78/22->367/198, t bili 1.4 (stable). Suspect ischemic hepatitis, now improving, education and counseling are done, follow up labs, conservative management with hemodynamic support
Patient on prior Eliquis 5 mg q12  Has been held during admission.  PT/INR remain elevated.  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  F7 is 35% which is low.  Will need to supplement Vitamin K for a few days to reverse these findings.  Also, I suggest that given his age > 80 and low body weight, that Eliquis is restarted to 2.5 mg q12 (as opposed to the 5 mg dosing).    Pete Temple MD  Hematology Attending  cell [phone]

## 2022-11-23 NOTE — CONSULT NOTE ADULT - ASSESSMENT
89 year old male with PMH of Chronic Afib- Eliquis Aortic valve calcification, HTN, HLD, CVA, CABG in 2012, prostate cancer s/p radiation, left pleural effusion s/p L VATS, drainage of effusion, Pleurx catheter placement on 10/13/22. Presented with SOB and a fall. Presented with sepsis, fever and leukocytosis with pleural effusion s/p drainage with significant WBC in fluid. Also with an TIM and elevated INR from 3 now down to 1.69. Was on Eliquis- held for elevated INR. Course c/b afib RVR and elevated LFTs.     #Elevated INR  - Multifactorial: eliquis, synthetic liver dysfunction, malnurition with Vitamin K deficiency   - send mixing study and can check Vit K dependent factors II, VII, IX, X   - start vitamin K 10mg daily   - improving, expect as clinically improves will normalize   - anticoagulation for afib per cardiology     Usman Wilson MD, PGY6  Hematology/Oncology Fellow   pager [phone]  After 5pm and on weekends page on call fellow  89 year old male with PMH of Chronic Afib- Eliquis Aortic valve calcification, HTN, HLD, CVA, CABG in 2012, prostate cancer s/p radiation, left pleural effusion s/p L VATS, drainage of effusion, Pleurx catheter placement on 10/13/22. Presented with SOB and a fall. Presented with sepsis, fever and leukocytosis with pleural effusion s/p drainage with significant WBC in fluid. Also with an TIM and elevated INR from 3 now down to 1.69. Was on Eliquis- held for elevated INR. Course c/b afib RVR and elevated LFTs.     #Elevated INR  - likely related initially to higher dose of Eliquis 5mg BID along with malnutrition/Vit K deficiency     - mixing study corrected. Factors II, IX, and X normal. Factor VII low however over 30%   - start vitamin K 10mg daily   - improving, expect as clinically improves along with Vit K will normalize   - anticoagulation for afib per cardiology however if placed back on Eliquis would dose reduce given age and weight to 2.5mg BID     Usman Wilson MD, PGY6  Hematology/Oncology Fellow   pager [phone]  After 5pm and on weekends page on call fellow  89 year old male with PMH of Chronic Afib- Eliquis Aortic valve calcification, HTN, HLD, CVA, CABG in 2012, prostate cancer s/p radiation, left pleural effusion s/p L VATS, drainage of effusion, Pleurx catheter placement on 10/13/22. Presented with SOB and a fall. Presented with sepsis, fever and leukocytosis with pleural effusion s/p drainage with significant WBC in fluid. Also with an TIM and elevated INR from 3 now down to 1.69. Was on Eliquis- held for elevated INR. Course c/b afib RVR and elevated LFTs.     #Elevated PT  - likely related initially to higher dose of Eliquis 5mg BID along with malnutrition/Vit K deficiency     - mixing study corrected. Factors II, IX, and X normal. Factor VII low however over 30%   - start vitamin K 10mg daily   - improving, expect as clinically improves along with Vit K will normalize   - anticoagulation for afib per cardiology however if placed back on Eliquis would dose reduce given age and weight to 2.5mg BID     Usman Wilson MD, PGY6  Hematology/Oncology Fellow   pager [phone]  After 5pm and on weekends page on call fellow

## 2022-11-23 NOTE — CONSULT NOTE ADULT - ASSESSMENT
89 yo M w/ PMhx afib (on apixaban), HTN, HLD, CVA, CABG, prostate Ca, L pleural effusion s/p VATS and pleurex placement (10/13/22) w/ home drainage p/w SOB and decreased pleurex output in setting of mechanical fall. On presentation patient febrile w/ leukocytosis. Infectious workup notable for pleural fluid w/ >103K WBC, on cefepime/vanc. Patient also w/ TIM (improving 1.3->1.1) and elevated INR (was on apixaban, now being held and improving 3->1.69). Course c/b afib w/ RVR and elevated liver enzymes (AST/ALT 78/22->367/198, t bili 1.4 (stable). Suspect ischemic hepatitis, but will rule out alternate diagnosis    # elevated liver enzymes - suspect 2/2 ischemic hepatitis in setting of afib w/ RVR. Liver enzymes wnl on presentation, so elevation likely due to something since admission. DDx includes DILI (cefepime?) though less likely.   # elevated INR - 2/2 apixaban, now improving  # SOB - 2/2 pleural effusion, being followed by CT surgery  # constipation    Recommendations:  - trend liver enzymes qd  - send acute hepatitis panel  - RUQ US w/ doppler   - maintain normotensive, avoid tachyarrythmia  - 1 SMOG enema now  - increase miralax to BID standing  - management of pleural effusion per CT surgery  - rest of care per primary team    All recommendations are tentative until note is attested by attending.     Vel Smith, PGY6  Gastroenterology/Hepatology Fellow  During weekdays: Available on [employer] Teams or pager:[phone] ([employer] Short Range Pager); [phone] (Long Range Pager)  Overnight/Weekend: Please page the on-call GI fellow     89 yo M w/ PMhx afib (on apixaban), HTN, HLD, CVA, CABG, prostate Ca, L pleural effusion s/p VATS and pleurex placement (10/13/22) w/ home drainage p/w SOB and decreased pleurex output in setting of mechanical fall. On presentation patient febrile w/ leukocytosis. Infectious workup notable for pleural fluid w/ >103K WBC, on cefepime/vanc. Patient also w/ TIM (improving 1.3->1.1) and elevated INR (was on apixaban, now being held and improving 3->1.69). Course c/b afib w/ RVR and elevated liver enzymes (AST/ALT 78/22->367/198, t bili 1.4 (stable). Suspect ischemic hepatitis, now improving, but will rule out alternate diagnosis    # elevated liver enzymes - suspect 2/2 ischemic hepatitis in setting of afib w/ RVR. Liver enzymes wnl on presentation, so elevation likely due to something since admission. DDx includes DILI (cefepime?) though less likely.   # elevated INR - 2/2 apixaban, now improving  # SOB - 2/2 pleural effusion, being followed by CT surgery  # constipation    Recommendations:  - trend liver enzymes qd  - send acute hepatitis panel  - RUQ US w/ doppler   - maintain normotensive, avoid tachyarrythmia  - 1 SMOG enema now  - increase miralax to BID standing  - management of pleural effusion per CT surgery  - rest of care per primary team    Hepatology will sign off. Please reconsult as necessary    D/W Dr. Hernandez    All recommendations are tentative until note is attested by attending.     Vel Smith, PGY6  Gastroenterology/Hepatology Fellow  During weekdays: Available on [employer] Teams or pager:[phone] ([employer] Short Range Pager); [phone] (Long Range Pager)  Overnight/Weekend: Please page the on-call GI fellow

## 2022-11-23 NOTE — PROGRESS NOTE ADULT - SUBJECTIVE AND OBJECTIVE BOX
Patient seen and examined at bedside by Thoracic. Resting comfortably in bed on O2 via NC, in NAD.  Patient states his breathing is about the same as it has been. Also mentions not having BM in 4 days.  Denies any acute overnight events, no active complaints.    Bedside US with small pleural effusion on R.    Vital Signs:  Vital Signs Last 24 Hrs  T(C): 37.3 (11-23-22 @ 05:54), Max: 37.3 (11-23-22 @ 05:54)  T(F): 99.1 (11-23-22 @ 05:54), Max: 99.1 (11-23-22 @ 05:54)  HR: 114 (11-23-22 @ 05:54) (100 - 115)  BP: 121/71 (11-23-22 @ 05:54) (108/67 - 133/75)  RR: 18 (11-23-22 @ 05:54) (18 - 18)  SpO2: 96% (11-23-22 @ 05:54) (95% - 100%)     Pertinent Physical Exam:  Telemetry/Alarms: Afib, 100s to 130s  General: WN/WD NAD  Neurology: Awake, nonfocal  Respiratory:  Left pleurX, diminished breath sounds b/l at bases  CV: Irregular rate and rhythm  Abd: Mild distention, soft, NT, +BS  Psych: Oriented x 3, normal affect  Tubes: L PleurX to PleurEvac - 140cc output.      Relevant labs, radiology and Medications reviewed                        10.1   15.24 )-----------( 265      ( 23 Nov 2022 05:35 )             33.4     11-23    141  |  105  |  38<H>  ----------------------------<  118<H>  3.7   |  27  |  1.15    Ca    9.0      23 Nov 2022 05:35  Phos  2.3     11-23  Mg     2.00     11-23    TPro  6.8  /  Alb  2.8<L>  /  TBili  1.0  /  DBili  x   /  AST  367<H>  /  ALT  198<H>  /  AlkPhos  195<H>  11-22    PT/INR - ( 23 Nov 2022 05:35 )   PT: 19.7 sec;   INR: 1.69 ratio         PTT - ( 23 Nov 2022 05:35 )  PTT:31.0 sec  MEDICATIONS  (STANDING):  atorvastatin 40 milliGRAM(s) Oral at bedtime  cefepime   IVPB 2000 milliGRAM(s) IV Intermittent every 12 hours  hydrochlorothiazide 12.5 milliGRAM(s) Oral daily  levothyroxine 100 MICROGram(s) Oral daily  metoprolol succinate ER 50 milliGRAM(s) Oral every 12 hours  polyethylene glycol 3350 17 Gram(s) Oral daily  saline laxative (FLEET) Rectal Enema 1 Enema Rectal once  senna 2 Tablet(s) Oral at bedtime  vancomycin  IVPB 1250 milliGRAM(s) IV Intermittent every 24 hours    MEDICATIONS  (PRN):  bisacodyl 5 milliGRAM(s) Oral every 12 hours PRN Constipation  bisacodyl Suppository 10 milliGRAM(s) Rectal daily PRN Constipation  traMADol 50 milliGRAM(s) Oral every 6 hours PRN Moderate Pain (4 - 6)    Pertinent Physical Exam  I&O's Summary    22 Nov 2022 07:01  -  23 Nov 2022 07:00  --------------------------------------------------------  IN: 520 mL / OUT: 990 mL / NET: -470 mL        Assessment  89M with multiple comorbidities, history of recent L VATS, pleurex catheter placement 10/13/22 for pleural effusion who presents with bilateral pleural effusions (L>R) with increasing SOB over the last three weeks with decreased drainage from the Pleurex catheter.   Pleurx to waterseal, draining serous fluid. Pt INR elevated, awaiting down trend to do possible intervention on Rt effusion and possible MIST protocol via left Pleurx.     11/21/22: Right pleural effusion appears improved on repeat AM CXR from yesterday and today. No ptc required at this time. Left PleurX is draining, 200cc over the last 24hrs. Will hold off on MIST. INR continues to be elevated, eliquis continues to be on hold. IMANI overnight, on home meds. Consult pts cardiologist Dr Le, TTE pending. Cont with IV Abx for elevated WBC, no cs sent. Cr improving, making urine.  11/22: INR 1.58, hold MIST.  11/23: INR 1.7, No Thoracentesis today however bedside US showed small effusion on right. Elevated LFTS. GI and Heme consulted.    PLAN  Neuro: Pain management PRN tramadol  Pulm: Encourage coughing, deep breathing and use of incentive spirometry. Wean off supplemental oxygen as able. Daily CXR. Cont PleurX to PleurEvac  Cardio: Monitor telemetry/alarms. Resume blood pressure medications as able. Cont holding Eliquis for Afib given elevated INR. Holding R thoracentesis and L MIST, may reassess in a few days. Afib cont Toprol XL, Cards following - Dr. Trip eL. KH to speak with Dr. Le today.  GI: Elevated LFTs since admission. Consult GI. Tolerating diet. Continue stool softeners.  Renal: monitor urine output, supplement electrolytes as needed. Creatine improved, making urine. Stop IVF  Vasc: Heparin SC/SCDs for DVT prophylaxis. Elevate INR, monitor for improvement. Holding Eliquis.   Heme: INR 1.7 - consult Heme today, will appreciate recs. Stable H/H.   Endocrine: Blood glucose checks per routine. ISS   ID: on Vanco and Cefepime for elevated WBC, questionable empyema. Afebrile. cont for now, monitor and dose accordingly.  Therapy: OOB/ambulate. Rec for [facility]  Tubes: Monitor PleurX output  Disposition: Plan to d/c to [facility] once medically optimized.  Discussed with Cardiothoracic Team at AM rounds.

## 2022-11-24 NOTE — PROGRESS NOTE ADULT - ASSESSMENT
Assessment  89M with multiple comorbidities, history of recent L VATS, pleurex catheter placement 10/13/22 for pleural effusion who presents with bilateral pleural effusions (L>R) with increasing SOB over the last three weeks with decreased drainage from the Pleurex catheter.   Pleurx to waterseal, draining serous fluid. Pt INR elevated, awaiting down trend to do possible intervention on Rt effusion and possible MIST protocol via left Pleurx.     11/21/22: Right pleural effusion appears improved on repeat AM CXR from yesterday and today. No ptc required at this time. Left PleurX is draining, 200cc over the last 24hrs. Will hold off on MIST. INR continues to be elevated, eliquis continues to be on hold. IMANI overnight, on home meds. Consult pts cardiologist Dr Le, TTE pending. Cont with IV Abx for elevated WBC, no cs sent. Cr improving, making urine.  11/22: INR 1.58, hold MIST.  11/23: INR 1.7, No Thoracentesis today however bedside US showed small effusion on right. Elevated LFTS. GI and Heme consulted.  11/24: INR 1.6, No thoracentesis today.    PLAN  Neuro: Pain management PRN tramadol  Pulm: Encourage coughing, deep breathing and use of incentive spirometry. Wean off supplemental oxygen as able. Daily CXR. Cont PleurX to PleurEvac. Mild improvement of b/L effusions on CXR  Cardio: Monitor telemetry/alarms. Resume blood pressure medications as able. Cont holding Eliquis for Afib given elevated INR. Holding R thoracentesis and L MIST, may reassess in a few days. Afib cont Toprol XL, Cards following - Dr. Trip Le  GI: Elevated LFTs since admission. Consult GI. Tolerating diet. Continue stool softeners.  Renal: monitor urine output, supplement electrolytes as needed. Creatine improved, making urine. Stop IVF  Vasc: Heparin SC/SCDs for DVT prophylaxis. Elevate INR, monitor for improvement. Holding Eliquis.   Heme: INR 1.66 Stable H/H.   Endocrine: Blood glucose checks per routine. ISS   ID: on Vanco and Cefepime for elevated WBC, questionable empyema. Afebrile. cont for now, monitor and dose accordingly.  Therapy: OOB/ambulate. Rec for [facility]  Tubes: Monitor PleurX output  Disposition: Plan to d/c to [facility] once medically optimized.  Discussed with Cardiothoracic Team at AM rounds.

## 2022-11-24 NOTE — DIETITIAN INITIAL EVALUATION ADULT - ADD RECOMMEND
1) Recommend change diet to Regular; d/c DASH/TLC restriction to maximize menu item availability.  2) Recommend Ensure Plus High Protein 1 PO 3x daily (provides 350 kcal, 20 gm protein per 8oz serving); d/c Glucerna.   3) Reviewed menu ordering system, discussed items high in protein/kcals, obtained food preferences and will provide as able. Encouraged PO intake as tolerated, with prioritization of protein dense tray items. Recommended consumption of small/frequent meals.   4) Obtain weekly weights.  5) Monitor BMs, adjust bowel regimen as appropriate.  6) Monitor electrolytes (K+, Mg, P) and replete to within desired limits as clinically indicated.

## 2022-11-24 NOTE — PROGRESS NOTE ADULT - SUBJECTIVE AND OBJECTIVE BOX
Morning Surgical Progress Note  Patient is a 89y old  Male who presents with a chief complaint of Shortness of breath     (24 Nov 2022 09:05)      SUBJECTIVE: Patient seen and examined at bedside with surgical team, patient without complaints.           Vital Signs Last 24 Hrs  T(C): 36.8 (24 Nov 2022 04:40), Max: 36.8 (23 Nov 2022 12:44)  T(F): 98.3 (24 Nov 2022 04:40), Max: 98.3 (24 Nov 2022 04:40)  HR: 100 (24 Nov 2022 04:40) (95 - 117)  BP: 109/63 (24 Nov 2022 04:40) (90/57 - 116/75)  BP(mean): --  RR: 19 (24 Nov 2022 04:40) (18 - 20)  SpO2: 100% (24 Nov 2022 04:40) (95% - 100%)    Parameters below as of 24 Nov 2022 04:40  Patient On (Oxygen Delivery Method): nasal cannula  O2 Flow (L/min): 2  I&O's Detail    23 Nov 2022 07:01  -  24 Nov 2022 07:00  --------------------------------------------------------  IN:    Oral Fluid: 1060 mL  Total IN: 1060 mL    OUT:    Chest Tube (mL): 130 mL    Incontinent per Collection Bag (mL): 910 mL  Total OUT: 1040 mL    Total NET: 20 mL        Medications  MEDICATIONS  (STANDING):  atorvastatin 40 milliGRAM(s) Oral at bedtime  cefepime   IVPB 2000 milliGRAM(s) IV Intermittent every 12 hours  dornase tamika Solution 2.5 milliGRAM(s) Inhalation daily  ipratropium    for Nebulization 500 MICROGram(s) Nebulizer once  levalbuterol Inhalation 0.63 milliGRAM(s) Inhalation once  levothyroxine 100 MICROGram(s) Oral daily  metoprolol succinate ER 50 milliGRAM(s) Oral every 12 hours  phytonadione   Solution 10 milliGRAM(s) Oral daily  polyethylene glycol 3350 17 Gram(s) Oral two times a day  potassium chloride    Tablet ER 40 milliEquivalent(s) Oral every 4 hours  saline laxative (FLEET) Rectal Enema 1 Enema Rectal once  senna 2 Tablet(s) Oral at bedtime  sodium chloride 3%  Inhalation 4 milliLiter(s) Inhalation every 12 hours  vancomycin  IVPB 1250 milliGRAM(s) IV Intermittent every 24 hours    MEDICATIONS  (PRN):  bisacodyl 5 milliGRAM(s) Oral every 12 hours PRN Constipation  bisacodyl Suppository 10 milliGRAM(s) Rectal daily PRN Constipation  traMADol 50 milliGRAM(s) Oral every 6 hours PRN Moderate Pain (4 - 6)      Physical Exam  Constitutional: A&Ox3, NAD  Eyes: Scleras clear, PERRLA/ EOMI, Gross vision intact  Respiratory:  Gastrointestinal: Soft nontender, nondistended  Genitourinary:   Extremities: Moving all extremities, no edema  Vascular:  Tubes:  Skin: No Rashes, Hematoma, Ecchymosis    LABS:                        10.1   16.15 )-----------( 264      ( 24 Nov 2022 05:30 )             34.0     11-24    139  |  102  |  36<H>  ----------------------------<  107<H>  3.3<L>   |  25  |  1.25    Ca    9.2      24 Nov 2022 05:30  Phos  2.3     11-23  Mg     2.00     11-23    TPro  6.5  /  Alb  2.6<L>  /  TBili  0.9  /  DBili  x   /  AST  122<H>  /  ALT  124<H>  /  AlkPhos  176<H>  11-24    PT/INR - ( 24 Nov 2022 05:30 )   PT: 19.3 sec;   INR: 1.66 ratio         PTT - ( 24 Nov 2022 05:30 )  PTT:31.6 sec  LIVER FUNCTIONS - ( 24 Nov 2022 05:30 )  Alb: 2.6 g/dL / Pro: 6.5 g/dL / ALK PHOS: 176 U/L / ALT: 124 U/L / AST: 122 U/L / GGT: x                  Thoracic Surgery Progress Note    SUBJECTIVE: Patient seen and examined at bedside with surgical team, patient states that breathing feels much better compared to overnight due to "lots of phlegm". Pt did not endorse any pleuritic chest pain, SOB, fevers/chills.    Vital Signs Last 24 Hrs  T(C): 36.8 (24 Nov 2022 04:40), Max: 36.8 (23 Nov 2022 12:44)  T(F): 98.3 (24 Nov 2022 04:40), Max: 98.3 (24 Nov 2022 04:40)  HR: 100 (24 Nov 2022 04:40) (95 - 117)  BP: 109/63 (24 Nov 2022 04:40) (90/57 - 116/75)  BP(mean): --  RR: 19 (24 Nov 2022 04:40) (18 - 20)  SpO2: 100% (24 Nov 2022 04:40) (95% - 100%)    Parameters below as of 24 Nov 2022 04:40  Patient On (Oxygen Delivery Method): nasal cannula  O2 Flow (L/min): 2  I&O's Detail    23 Nov 2022 07:01  -  24 Nov 2022 07:00  --------------------------------------------------------  IN:    Oral Fluid: 1060 mL  Total IN: 1060 mL    OUT:    Chest Tube (mL): 130 mL    Incontinent per Collection Bag (mL): 910 mL  Total OUT: 1040 mL    Total NET: 20 mL        Medications  MEDICATIONS  (STANDING):  atorvastatin 40 milliGRAM(s) Oral at bedtime  cefepime   IVPB 2000 milliGRAM(s) IV Intermittent every 12 hours  dornase tamika Solution 2.5 milliGRAM(s) Inhalation daily  ipratropium    for Nebulization 500 MICROGram(s) Nebulizer once  levalbuterol Inhalation 0.63 milliGRAM(s) Inhalation once  levothyroxine 100 MICROGram(s) Oral daily  metoprolol succinate ER 50 milliGRAM(s) Oral every 12 hours  phytonadione   Solution 10 milliGRAM(s) Oral daily  polyethylene glycol 3350 17 Gram(s) Oral two times a day  potassium chloride    Tablet ER 40 milliEquivalent(s) Oral every 4 hours  saline laxative (FLEET) Rectal Enema 1 Enema Rectal once  senna 2 Tablet(s) Oral at bedtime  sodium chloride 3%  Inhalation 4 milliLiter(s) Inhalation every 12 hours  vancomycin  IVPB 1250 milliGRAM(s) IV Intermittent every 24 hours    MEDICATIONS  (PRN):  bisacodyl 5 milliGRAM(s) Oral every 12 hours PRN Constipation  bisacodyl Suppository 10 milliGRAM(s) Rectal daily PRN Constipation  traMADol 50 milliGRAM(s) Oral every 6 hours PRN Moderate Pain (4 - 6)      Physical Exam  Constitutional: A&Ox3, NAD  Respiratory: Breathing comfortably on 2L NC, symmetrical chest rise, no sign of resp distress or accessory muscle use.   Cardiac: S1. S2  Extremities: Moving all extremities, no edema    LABS:                        10.1   16.15 )-----------( 264      ( 24 Nov 2022 05:30 )             34.0     11-24    139  |  102  |  36<H>  ----------------------------<  107<H>  3.3<L>   |  25  |  1.25    Ca    9.2      24 Nov 2022 05:30  Phos  2.3     11-23  Mg     2.00     11-23    TPro  6.5  /  Alb  2.6<L>  /  TBili  0.9  /  DBili  x   /  AST  122<H>  /  ALT  124<H>  /  AlkPhos  176<H>  11-24    PT/INR - ( 24 Nov 2022 05:30 )   PT: 19.3 sec;   INR: 1.66 ratio         PTT - ( 24 Nov 2022 05:30 )  PTT:31.6 sec  LIVER FUNCTIONS - ( 24 Nov 2022 05:30 )  Alb: 2.6 g/dL / Pro: 6.5 g/dL / ALK PHOS: 176 U/L / ALT: 124 U/L / AST: 122 U/L / GGT: x

## 2022-11-24 NOTE — DIETITIAN INITIAL EVALUATION ADULT - OTHER CALCULATIONS
Weight history, per HIE: (11/19 dosing) 142 lbs, (10/4) 140 lbs, (3/29) 145 lbs.  Pt endorses unintentional weight loss, unable to quantify.   Ideal Body Weight: 148 lbs / 67.3 kg +/-10%

## 2022-11-24 NOTE — DIETITIAN INITIAL EVALUATION ADULT - PERTINENT MEDS FT
atorvastatin, cefepime IV, levothyroxine, magnesium sulfate IV, polyethylene glycol, potassium chloride Tablet, saline laxative Rectal Enema, senna, vancomycin IV, bisacodyl PRN, bisacodyl Suppository PRN

## 2022-11-24 NOTE — DIETITIAN INITIAL EVALUATION ADULT - OTHER INFO
Per chart, pt is 89 year old male PMH chronic Afib (Eliquis Aortic valve calcification), HTN, HLD, CVA, CABG (2012), prostate cancer s/p radiation, left pleural effusion s/p L VATS, drainage of effusion, Pleurx catheter placement (10/13/22) presenting with SOB, fall found to have pleural effusion s/p drainage with significant WBC in fluid, TIM, elevated INR. Course complicated by Afib RVR, elevated LFTs. Heme/Onc, Hepatology and Thoracic on board.    Pt confirms NKFA, denies difficulties chewing/swallowing. Pt lives at home with his girlfriend, denies issues with access to food as they are able to cook and receive Meals on Wheels. Pt supplements with vanilla Glucerna. Pt reports "lousy" appetite/PO intake PTA.    Pt with declining appetite since admission, tolerating diet but only consuming small portions. Pt endorses constipation, continues on bowel regimen (senna, Miralax, fleet enema, bisacodyl). Labs notable for low P/K+, repleted. Pt amenable to provision of nutrition supplement as long as it is vanilla flavored. Additionally, vocalizes preference for tuna sandwiches, hard boiled eggs, soft roll with jelly, vanilla pudding, peanut butter and vanilla ice cream.

## 2022-11-24 NOTE — DIETITIAN NUTRITION RISK NOTIFICATION - ADDITIONAL COMMENTS/DIETITIAN RECOMMENDATIONS
Please see Dietitian Initial Assessment for complete recommendations.  Karolina Heard, JUNG, CDN #[id number]  Also available on [employer] Teams

## 2022-11-24 NOTE — DIETITIAN INITIAL EVALUATION ADULT - PERTINENT LABORATORY DATA
(11/24) Na 139, BUN 36<H>, Cr 1.25, <H>, K+ 3.3<L>, Alk Phos 176<H>, ALT/SGPT 124<H>, AST/SGOT 122<H>

## 2022-11-25 NOTE — PROGRESS NOTE ADULT - SUBJECTIVE AND OBJECTIVE BOX
Subjective: "I'm doing ok but I'm still very short of breath when I do anything" Pt visibly SOB with ambulation from bed to chair. Working w PT. On NC. C/o thick cough. No CP. Encouraged pt to drink Supplements.     Vital Signs:  Vital Signs Last 24 Hrs  T(C): 36.5 (11-25-22 @ 13:23), Max: 37 (11-24-22 @ 16:36)  T(F): 97.7 (11-25-22 @ 13:23), Max: 98.6 (11-24-22 @ 16:36)  HR: 100 (11-25-22 @ 13:23) (86 - 113)  BP: 109/69 (11-25-22 @ 13:23) (102/65 - 127/56)  RR: 18 (11-25-22 @ 13:23) (18 - 19)  SpO2: 100% (11-25-22 @ 13:23) (95% - 100%) on (O2)    Telemetry/Alarms: tele afib 100-115 at times  General: WN/WD NAD  Neurology: Awake, nonfocal, BAXTER x 4  Eyes: Scleras clear, PERRLA/ EOMI, Gross vision intact  ENT:Gross hearing intact, grossly patent pharynx, no stridor  Neck: Neck supple, trachea midline, No JVD,   Respiratory:dec BS to bilat bases w coarse bilat UL rhonchi, prod cough  CV: Irreg, S1S2, no murmurs, rubs or gallops  Abdominal: Soft, NT, ND +BS, +BM on Wednesday. Voiding with condom cath in place.   Extremities: Trace ankle/foot edema, + peripheral pulses  Skin: No Rashes, Hematoma, Ecchymosis  Lymphatic: No Neck, axilla, groin LAD  Psych: Oriented x 3, normal affect  Incisions: old Pleurx site c/d/i.   Tubes: left Pleurx to pleuravac 200cc serosang fluid, on WS, no AL  Relevant labs, radiology and Medications reviewed           CXR with continued bilat pleural effusions.              10.3   17.07 )-----------( 286      ( 25 Nov 2022 06:18 )             34.7     11-25    138  |  104  |  37<H>  ----------------------------<  125<H>  4.6   |  27  |  1.27    Ca    9.2      25 Nov 2022 06:18  Phos  2.5     11-25  Mg     2.20     11-25    TPro  6.9  /  Alb  2.9<L>  /  TBili  0.9  /  DBili  x   /  AST  83<H>  /  ALT  103<H>  /  AlkPhos  177<H>  11-25    PT/INR - ( 25 Nov 2022 09:01 )   PT: 18.3 sec;   INR: 1.57 ratio         PTT - ( 25 Nov 2022 09:01 )  PTT:33.2 sec  MEDICATIONS  (STANDING):  atorvastatin 40 milliGRAM(s) Oral at bedtime  dornase tamika Solution 2.5 milliGRAM(s) Inhalation daily  ipratropium    for Nebulization 500 MICROGram(s) Nebulizer once  levalbuterol Inhalation 0.63 milliGRAM(s) Inhalation once  levoFLOXacin  Tablet 750 milliGRAM(s) Oral every 48 hours  levothyroxine 100 MICROGram(s) Oral daily  lidocaine 1% Injectable 30 milliLiter(s) Local Injection once  metoprolol succinate ER 50 milliGRAM(s) Oral every 12 hours  phytonadione   Solution 10 milliGRAM(s) Oral daily  polyethylene glycol 3350 17 Gram(s) Oral two times a day  senna 2 Tablet(s) Oral at bedtime  sodium chloride 3%  Inhalation 4 milliLiter(s) Inhalation every 12 hours    MEDICATIONS  (PRN):  bisacodyl 5 milliGRAM(s) Oral every 12 hours PRN Constipation  bisacodyl Suppository 10 milliGRAM(s) Rectal daily PRN Constipation  traMADol 50 milliGRAM(s) Oral every 6 hours PRN Moderate Pain (4 - 6)    Pertinent Physical Exam  I&O's Summary    24 Nov 2022 07:01  -  25 Nov 2022 07:00  --------------------------------------------------------  IN: 360 mL / OUT: 805 mL / NET: -445 mL    25 Nov 2022 07:01  -  25 Nov 2022 14:45  --------------------------------------------------------  IN: 200 mL / OUT: 1325 mL / NET: -1125 mL    Social: No smoking, no ETOh  Lives with S.O.     Assessment  89y Male  w/ PAST MEDICAL & SURGICAL HISTORY:  HTN (hypertension)      HLD (hyperlipidemia)      Hyperthyroidism  treated with radioactive iodine      Hypothyroid      Atrial fibrillation  diagnosed many years ago   on anticoagulant      CVA (cerebral vascular accident)  2004  no residual      Spinal stenosis  h/o epidural injection      Prostate cancer  s/p radiation ? 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   Pleurx to waterseal, draining serous fluid. Pt INR elevated, awaiting down trend to do possible intervention on Rt effusion and possible MIST protocol via left Pleurx.     11/21/22: Right pleural effusion appears improved on repeat AM CXR from yesterday and today. No ptc required at this time. Left PleurX is draining, 200cc over the last 24hrs. Will hold off on MIST. INR continues to be elevated, eliquis continues to be on hold. IMANI overnight, on home meds. Consult pts cardiologist Dr Le, TTE pending. Cont with IV Abx for elevated WBC, no cs sent. Cr improving, making urine.  11/22: INR 1.58, hold MIST.  11/23: INR 1.7, No Thoracentesis today however bedside US showed small effusion on right. Elevated LFTS. GI and Heme consulted.  11/24: INR 1.6, No thoracentesis today.   11/25: INR 1.5. Will place Right sided PTC today per DR. Rodas. Will send fluid for cytology, culture, lytes. Will switch antibiotics to po. Plan MIST via left Pleurx tomorrow.     PLAN  Neuro: Pain management  Pulm: Encourage coughing, deep breathing and use of incentive spirometry. Wean off supplemental oxygen as able. Daily CXR.   Cardio: Monitor telemetry/alarms. Hold a/c and ASA for elevated INR. Cont other cardiac meds.   GI: Tolerating diet. Continue stool softeners. Will cont Miralax BID. Will change diet to regular with Ensure supps per nutrition. LFTs improving. FU any further GI reccs.   Renal: monitor urine output, supplement electrolytes as needed  Vasc: DVT prophylaxis on hold due to elevated INR.   Heme: Stable H/H. .   ID: Worsening Leukocytosis, no fever. Will d/c Vanco/cefepime and change to Levaquin. Trend labs. Will plan MIST tomorrow to help improve leukocytosis via left pleurx.   Therapy: OOB/ambulate. Rehab planning.   Tubes: Monitor Pleurx output, keep to WS. Will place Rt PTC today.   Disposition: Aim to D/C to Rehab when optimized.   Discussed with Cardiothoracic Team at AM rounds.

## 2022-11-25 NOTE — PROGRESS NOTE ADULT - SUBJECTIVE AND OBJECTIVE BOX
Subjective: Patient seen and examined. No new events except as noted.   Patient continues to feel weak and tired and sob with mild exertion  elevated LFTs elevated INR  s/p pleurex  afib VR sometimes over 100  MEDICATIONS:  MEDICATIONS  (STANDING):  atorvastatin 40 milliGRAM(s) Oral at bedtime  dornase tamika Solution 2.5 milliGRAM(s) Inhalation daily  ipratropium    for Nebulization 500 MICROGram(s) Nebulizer once  levalbuterol Inhalation 0.63 milliGRAM(s) Inhalation once  levoFLOXacin  Tablet 750 milliGRAM(s) Oral every 48 hours  levothyroxine 100 MICROGram(s) Oral daily  lidocaine 1% Injectable 30 milliLiter(s) Local Injection once  metoprolol succinate ER 50 milliGRAM(s) Oral every 12 hours  phytonadione   Solution 10 milliGRAM(s) Oral daily  polyethylene glycol 3350 17 Gram(s) Oral two times a day  senna 2 Tablet(s) Oral at bedtime  sodium chloride 3%  Inhalation 4 milliLiter(s) Inhalation every 12 hours      PHYSICAL EXAM:  T(C): 36.5 (11-25-22 @ 13:23), Max: 37 (11-24-22 @ 16:36)  HR: 100 (11-25-22 @ 13:23) (86 - 113)  BP: 109/69 (11-25-22 @ 13:23) (102/65 - 127/56)  RR: 18 (11-25-22 @ 13:23) (18 - 19)  SpO2: 100% (11-25-22 @ 13:23) (95% - 100%)  Wt(kg): --  I&O's Summary    24 Nov 2022 07:01  -  25 Nov 2022 07:00  --------------------------------------------------------  IN: 360 mL / OUT: 805 mL / NET: -445 mL    25 Nov 2022 07:01  -  25 Nov 2022 14:00  --------------------------------------------------------  IN: 200 mL / OUT: 1325 mL / NET: -1125 mL          Appearance: Normal weak appears chronically ill  HEENT:   Normal oral muco right clearsa, PERRL, EOMI	  Cardiovascular: Normal S1 S2, irregularly irregular No JVD, N1/6SEM right base,  Respiratory: Lungs decreased BS left i/3 up	  Gastrointestinal:  Soft, Non-tender, + BS	  Ext: No edema        LABS:    CARDIAC MARKERS:                                10.3   17.07 )-----------( 286      ( 25 Nov 2022 06:18 )             34.7     11-25    138  |  104  |  37<H>  ----------------------------<  125<H>  4.6   |  27  |  1.27    Ca    9.2      25 Nov 2022 06:18  Phos  2.5     11-25  Mg     2.20     11-25    TPro  6.9  /  Alb  2.9<L>  /  TBili  0.9  /  DBili  x   /  AST  83<H>  /  ALT  103<H>  /  AlkPhos  177<H>  11-25    proBNP:   Lipid Profile:   HgA1c:   TSH:           TELEMETRY: 	    ECG:  afib	  RADIOLOGY:   < from: Xray Chest 1 View- PORTABLE-Routine (Xray Chest 1 View- PORTABLE-Routine in AM.) (11.25.22 @ 07:12) >  Frontal expiratory view of the chest shows the heart to be similar in   size. Left Pleurx catheter remains present.    The lungs show slight increase in pulmonary congestion with reduction of   left effusion. Small right effusion is similar and there is no evidence   of pneumothorax.    Chest one view 11/25/2022 6:43 AM  Compared to the prior study, the lungs show less pulmonary congestion.   Pleural effusions are similar. Small left base pneumothorax is present.    IMPRESSION:  Small pleural effusions. Small left base pneumothorax.

## 2022-11-25 NOTE — PROCEDURE NOTE - NSPERFORMEDBY_GEN_A_CORE
Jana Jaimes is a 3year old male who was brought in for this visit. History was provided by the parent(s). HPI:   Patient presents with:   Well Child: saw eye dr in December   seen by [employer] and activities:  Developmental: no parental concerns abnormalities noted  Musculoskeletal: Full ROM of extremities; no deformities  Extremities: No edema, cyanosis, or clubbing  Neurological: Strength is normal; no asymmetry  Psychiatric: Behavior is appropriate for age; communicates appropriately for age Myself

## 2022-11-25 NOTE — PROGRESS NOTE ADULT - ASSESSMENT
patient with recurrent left pleural effusion (?exudate) s/p pleurax right sided effusion improved sob afib with varying VR  Overall clinically and hemodynamically stable. LV function is normal and there is clinically modeate aortic stenosis gradients cannot be assessed however. Patient IS weak tired sob abnormal LFTs     1. left pleural effusion  2. s/p pleurx catheter  3. afib VR not always controlled VR  4. aortic stenosis probably moderate   5. normal LV function  6. ?COPD  7. abnormal LFTs  8. weakness chronic sob-debiliatated and deconditioned  Recommend  continue present meds  etiology of effusions unclear  cardiac stable

## 2022-11-26 NOTE — PROGRESS NOTE ADULT - SUBJECTIVE AND OBJECTIVE BOX
Thoracic Surgery Progress Note    SUBJECTIVE: Patient seen and examined at bedside with surgical team. Pt noted that feet are dark in appearance but unsure of how long they've been like that. Pt did not endorse any chest pain, SOB, fevers/chills, N/V     Vital Signs Last 24 Hrs  T(C): 36.4 (26 Nov 2022 08:00), Max: 37 (26 Nov 2022 01:50)  T(F): 97.5 (26 Nov 2022 08:00), Max: 98.6 (26 Nov 2022 01:50)  HR: 111 (26 Nov 2022 08:00) (82 - 111)  BP: 108/62 (26 Nov 2022 08:00) (106/62 - 117/63)  BP(mean): --  RR: 18 (26 Nov 2022 08:00) (18 - 18)  SpO2: 99% (26 Nov 2022 08:00) (95% - 100%)    Parameters below as of 26 Nov 2022 08:00  Patient On (Oxygen Delivery Method): nasal cannula  O2 Flow (L/min): 2  I&O's Detail    25 Nov 2022 07:01  -  26 Nov 2022 07:00  --------------------------------------------------------  IN:    IV PiggyBack: 200 mL    Oral Fluid: 740 mL  Total IN: 940 mL    OUT:    Chest Tube (mL): 91 mL    Chest Tube (mL): 890 mL    Incontinent per Collection Bag (mL): 1200 mL  Total OUT: 2181 mL    Total NET: -1241 mL      26 Nov 2022 07:01  -  26 Nov 2022 13:14  --------------------------------------------------------  IN:  Total IN: 0 mL    OUT:    Chest Tube (mL): 20 mL    Chest Tube (mL): 50 mL  Total OUT: 70 mL    Total NET: -70 mL        Medications  MEDICATIONS  (STANDING):  atorvastatin 40 milliGRAM(s) Oral at bedtime  dornase tamika Solution 2.5 milliGRAM(s) Inhalation daily  ipratropium    for Nebulization 500 MICROGram(s) Nebulizer once  levalbuterol Inhalation 0.63 milliGRAM(s) Inhalation once  levoFLOXacin  Tablet 750 milliGRAM(s) Oral every 48 hours  levothyroxine 100 MICROGram(s) Oral daily  lidocaine 1% Injectable 30 milliLiter(s) Local Injection once  metoprolol succinate ER 50 milliGRAM(s) Oral every 12 hours  phytonadione   Solution 10 milliGRAM(s) Oral daily  polyethylene glycol 3350 17 Gram(s) Oral two times a day  senna 2 Tablet(s) Oral at bedtime  sodium chloride 3%  Inhalation 4 milliLiter(s) Inhalation every 12 hours  sodium phosphate 15 milliMole(s)/250 mL IVPB 15 milliMole(s) IV Intermittent once    MEDICATIONS  (PRN):  bisacodyl 5 milliGRAM(s) Oral every 12 hours PRN Constipation  bisacodyl Suppository 10 milliGRAM(s) Rectal daily PRN Constipation      Physical Exam  Constitutional: A&Ox3, NAD  Eyes: Scleras clear, PERRLA/ EOMI, Gross vision intact  Respiratory: Breathing comfortably on 2L NC, symmetrical chest rise. No sign of resp accessory muscle use or resp distress.  Cardiac: S1, S2  Extremities: Moving all extremities, no edema, b/l dark colored toes and upper soles of feet    LABS:                        10.0   16.24 )-----------( 286      ( 26 Nov 2022 07:22 )             34.0     11-26    141  |  106  |  40<H>  ----------------------------<  117<H>  4.5   |  26  |  1.33<H>    Ca    9.0      26 Nov 2022 07:22  Phos  2.6     11-26  Mg     2.20     11-26    TPro  6.5  /  Alb  2.7<L>  /  TBili  0.7  /  DBili  0.3  /  AST  55<H>  /  ALT  75<H>  /  AlkPhos  172<H>  11-26    PT/INR - ( 26 Nov 2022 07:22 )   PT: 19.5 sec;   INR: 1.67 ratio         PTT - ( 26 Nov 2022 07:22 )  PTT:32.1 sec  LIVER FUNCTIONS - ( 26 Nov 2022 07:22 )  Alb: 2.7 g/dL / Pro: 6.5 g/dL / ALK PHOS: 172 U/L / ALT: 75 U/L / AST: 55 U/L / GGT: x

## 2022-11-26 NOTE — PROGRESS NOTE ADULT - ASSESSMENT
Assessment  89M with multiple comorbidities, history of recent L VATS, pleurex catheter placement 10/13/22 for pleural effusion who presents with bilateral pleural effusions (L>R) with increasing SOB over the last three weeks with decreased drainage from the Pleurex catheter.   Pleurx to waterseal, draining serous fluid. Pt INR elevated, awaiting down trend to do possible intervention on Rt effusion and possible MIST protocol via left Pleurx.     11/21/22: Right pleural effusion appears improved on repeat AM CXR from yesterday and today. No ptc required at this time. Left PleurX is draining, 200cc over the last 24hrs. Will hold off on MIST. INR continues to be elevated, eliquis continues to be on hold. IMANI overnight, on home meds. Consult pts cardiologist Dr Le, TTE pending. Cont with IV Abx for elevated WBC, no cs sent. Cr improving, making urine.  11/22: INR 1.58, hold MIST.  11/23: INR 1.7, No Thoracentesis today however bedside US showed small effusion on right. Elevated LFTS. GI and Heme consulted.  11/24: INR 1.6, No thoracentesis today.  11/25: INR 1.5. Will place Right sided PTC today per DR. Rodas. Will send fluid for cytology, culture, lytes. Will switch antibiotics to po. Plan MIST via left Pleurx tomorrow.   11/26: INR 1.6, No MIST today    PLAN  Neuro: Pain management  Pulm: Encourage coughing, deep breathing and use of incentive spirometry. Wean off supplemental oxygen as able. Daily CXR.   Cardio: Monitor telemetry/alarms. Hold a/c and ASA for elevated INR. Cont other cardiac meds.   GI: Tolerating diet. Continue stool softeners. Will cont Miralax BID. Cont reg diet W/ Ensure supps per nutrition. FU any further GI reccs.   Renal: monitor urine output, supplement electrolytes as needed  Vasc: DVT prophylaxis on hold due to elevated INR.   Heme: Stable H/H. .   ID: Worsening Leukocytosis, no fever. Will d/c Vanco/cefepime and change to Levaquin. Trend labs. Tentative plan MIST tomorrow, pending INR, to help improve leukocytosis via left pleurx.   Therapy: OOB/ambulate. Rehab planning.   Tubes: Monitor Pleurx output, keep to WS. Cont R PTC to WS, monitor output.  Disposition: Aim to D/C to Rehab when optimized.   Discussed with Cardiothoracic Team at AM rounds.    Thoracic Surgery  [id number]

## 2022-11-27 NOTE — DISCHARGE NOTE PROVIDER - NSDCCPCAREPLAN_GEN_ALL_CORE_FT
PRINCIPAL DISCHARGE DIAGNOSIS  Diagnosis: Shortness of breath  Assessment and Plan of Treatment:

## 2022-11-27 NOTE — DISCHARGE NOTE PROVIDER - NSDCFUADDINST_GEN_ALL_CORE_FT
Keep the chest tube dressings in place for two days and then remove them so that you can shower.  Wash with soap and water and leave the chest tube site open to air to dry.  Some drainage is normal but watch for pus, increased redness, fevers, or difficulty breathing and if noted, call Dr Rodas.    The PleurX can be drained as usual by NE Keep the chest tube dressings in place for two days and then remove them so that you can shower.  Wash with soap and water and leave the chest tube site open to air to dry.  Some drainage is normal but watch for pus, increased redness, fevers, or difficulty breathing and if noted, call Dr Rodas.    The PleurX can be drained as usual by VNS  Keep the Lott to a leg bag and drain as needed.  Record the output.   Please keep PleurX covered to shower.  Some drainage is normal but watch for pus, increased redness, fevers, or difficulty breathing and if noted, call Dr Rodas.    The PleurX can be drained by VNS. Will need to be drained M-W-F upon discharge, no >1L at a time.  Keep the Lott to a leg bag and drain as needed.  Record the output.

## 2022-11-27 NOTE — DISCHARGE NOTE PROVIDER - NSDCMRMEDTOKEN_GEN_ALL_CORE_FT
aspirin 81 mg oral tablet: 1 tab(s) orally once a day  Eliquis 5 mg oral tablet: 1 tab(s) orally 2 times a day/ LD on 10/06/2022as per pts cardiologist- hold 3 days prior to surgery.    Please resume medication on the evening on 10/14/22  hydroCHLOROthiazide 12.5 mg oral tablet: 1 tab(s) orally once a day  levothyroxine 100 mcg (0.1 mg) oral tablet: 1 tab(s) orally once a day AM  metoprolol succinate 25 mg oral capsule, extended release: 1 cap(s) orally once a day PM  metoprolol succinate 50 mg oral tablet, extended release: 1 tab(s) orally once a day AM  rosuvastatin 10 mg oral tablet: 1 tab(s) orally once a day  traMADol 50 mg oral tablet: 1 tab(s) orally every 6 hours, As Needed   acetaminophen 325 mg oral tablet: 2 tab(s) orally every 6 hours, As needed, mild pain  aspirin 81 mg oral tablet: 1 tab(s) orally once a day  Eliquis 5 mg oral tablet: 1 tab(s) orally 2 times a day/ LD on 10/06/2022as per pts cardiologist- hold 3 days prior to surgery.    Please resume medication on the evening on 10/14/22  hydroCHLOROthiazide 12.5 mg oral tablet: 1 tab(s) orally once a day  levothyroxine 100 mcg (0.1 mg) oral tablet: 1 tab(s) orally once a day AM  metoprolol succinate 50 mg oral tablet, extended release: 1 tab(s) orally once a day AM  oxyCODONE 5 mg oral tablet: 1 tab(s) orally every 6 hours, As needed, Moderate Pain (4 - 6)  polyethylene glycol 3350 oral powder for reconstitution: 17 gram(s) orally 2 times a day  rosuvastatin 10 mg oral tablet: 1 tab(s) orally once a day  tamsulosin 0.4 mg oral capsule: 1 cap(s) orally once a day (at bedtime)   acetaminophen 325 mg oral tablet: 2 tab(s) orally every 6 hours, As needed, mild pain  aspirin 81 mg oral tablet: 1 tab(s) orally once a day  Eliquis 5 mg oral tablet: 1 tab(s) orally 2 times a day  hydroCHLOROthiazide 12.5 mg oral tablet: 1 tab(s) orally once a day  levothyroxine 100 mcg (0.1 mg) oral tablet: 1 tab(s) orally once a day AM  oxyCODONE 5 mg oral tablet: 1 tab(s) orally every 6 hours, As needed, Moderate Pain (4 - 6)  polyethylene glycol 3350 oral powder for reconstitution: 17 gram(s) orally 2 times a day  rosuvastatin 10 mg oral tablet: 1 tab(s) orally once a day  tamsulosin 0.4 mg oral capsule: 1 cap(s) orally once a day (at bedtime)   acetaminophen 325 mg oral tablet: 2 tab(s) orally every 6 hours, As needed, mild pain  aspirin 81 mg oral tablet: 1 tab(s) orally once a day  Eliquis 5 mg oral tablet: 1 tab(s) orally 2 times a day  hydroCHLOROthiazide 12.5 mg oral tablet: 1 tab(s) orally once a day  levoFLOXacin 750 mg oral tablet: 1 tab(s) orally every 48 hours  levothyroxine 100 mcg (0.1 mg) oral tablet: 1 tab(s) orally once a day AM  oxyCODONE 5 mg oral tablet: 1 tab(s) orally every 6 hours, As needed, Moderate Pain (4 - 6)  polyethylene glycol 3350 oral powder for reconstitution: 17 gram(s) orally 2 times a day  rosuvastatin 10 mg oral tablet: 1 tab(s) orally once a day  tamsulosin 0.4 mg oral capsule: 1 cap(s) orally once a day (at bedtime)   acetaminophen 325 mg oral tablet: 2 tab(s) orally every 6 hours, As needed, mild pain  aspirin 81 mg oral tablet: 1 tab(s) orally once a day  Eliquis 5 mg oral tablet: 1 tab(s) orally 2 times a day  hydroCHLOROthiazide 12.5 mg oral tablet: 1 tab(s) orally once a day  levoFLOXacin 750 mg oral tablet: 1 tab(s) orally every 48 hours  levothyroxine 100 mcg (0.1 mg) oral tablet: 1 tab(s) orally once a day AM  Metoprolol Tartrate 37.5 mg oral tablet: 1 tab(s) orally 2 times a day, titrate as tolerated back to home dose: Toprol XL 50mg AM and 25mg in PM  oxyCODONE 5 mg oral tablet: 1 tab(s) orally every 6 hours, As needed, Moderate Pain (4 - 6)  polyethylene glycol 3350 oral powder for reconstitution: 17 gram(s) orally 2 times a day  rosuvastatin 10 mg oral tablet: 1 tab(s) orally once a day  tamsulosin 0.4 mg oral capsule: 1 cap(s) orally once a day (at bedtime)   acetaminophen 325 mg oral tablet: 2 tab(s) orally every 6 hours, As needed, mild pain  aspirin 81 mg oral tablet: 1 tab(s) orally once a day  Eliquis 5 mg oral tablet: 1 tab(s) orally 2 times a day  furosemide 20 mg oral tablet: 1 tab(s) orally once a day  levoFLOXacin 750 mg oral tablet: 1 tab(s) orally every 48 hours  levothyroxine 100 mcg (0.1 mg) oral tablet: 1 tab(s) orally once a day AM  metoprolol tartrate 50 mg oral tablet: 1 tab(s) orally 2 times a day  oxyCODONE 5 mg oral tablet: 1 tab(s) orally every 6 hours, As needed, Moderate Pain (4 - 6)  polyethylene glycol 3350 oral powder for reconstitution: 17 gram(s) orally 2 times a day  rosuvastatin 10 mg oral tablet: 1 tab(s) orally once a day  tamsulosin 0.4 mg oral capsule: 1 cap(s) orally once a day (at bedtime)

## 2022-11-27 NOTE — DISCHARGE NOTE PROVIDER - PROVIDER TOKENS
PROVIDER:[TOKEN:[[id number]]],PROVIDER:[TOKEN:[[id number]]] PROVIDER:[TOKEN:[[id number]]],PROVIDER:[TOKEN:[[id number]]],PROVIDER:[TOKEN:[[id number]]]

## 2022-11-27 NOTE — DISCHARGE NOTE PROVIDER - CARE PROVIDERS DIRECT ADDRESSES
,DirectAddress_Unknown,[email or web address] ,DirectAddress_Unknown,[email or web address]

## 2022-11-27 NOTE — DISCHARGE NOTE PROVIDER - CARE PROVIDER_API CALL
Trip Le)  Cardiovascular Disease; Internal Medicine; Interventional Cardiology  [facility] [place] [address]  Phone: [phone]  Fax: [phone]  Follow Up Time:     Miguel Angel Rodas)  Surgery; Thoracic Surgery  [address] [place] [address], [place] [address]  Phone: [phone]  Fax: [phone]  Follow Up Time:    Trip Le)  Cardiovascular Disease; Internal Medicine; Interventional Cardiology  [address] [place] [address]  Phone: [phone]  Fax: [phone]  Follow Up Time:     Miguel Angel Rodas)  Surgery; Thoracic Surgery  [address] [place] [address], Oncology [facility] [place] [address]  Phone: [phone]  Fax: [phone]  Follow Up Time:     Denilson Smith  UROLOGY  [address] [facility], [facility] [address] [place] [address]  Phone: [phone]  Fax: [phone]  Follow Up Time:

## 2022-11-27 NOTE — DISCHARGE NOTE PROVIDER - NSDCFUADDAPPT_GEN_ALL_CORE_FT
See Dr Rodas in about a week- call for an appointment and bring a new chest X-ray with you when you come.    Follow up with Dr Le (cardiology) in a week See Dr Rodas in about a week- call for an appointment and bring a new chest X-ray with you when you come.    Follow up with Dr Le (cardiology) in a week  See your urologist as soon as possible See Dr Rodas in about a week- call for an appointment and bring a new chest X-ray with you when you come.      Follow up with Dr Le (cardiology) in a week    See your urologist as soon as possible to evaluate francis removal See thoracic surgeon Dr Rodas in about a week- call for an appointment     Follow up with cardiologist Dr Le (cardiology) in 1-2 weeks    See your urologist, Dr Smith, in 1-2 weeks   to evaluate francis removal See thoracic surgeon Dr Rodas in about a week- call for an appointment     Follow up with cardiologist Dr Le (cardiology) in 1-2 weeks    See your urologist, Dr Smith, in 1-2 weeks   to evaluate francis removal    Follow up with Dr. Lam (Nephrology) in 1-2 weeks.

## 2022-11-27 NOTE — DISCHARGE NOTE PROVIDER - NSDCCPTREATMENT_GEN_ALL_CORE_FT
PRINCIPAL PROCEDURE  Procedure: US guided chest tube insertion and drainage  Findings and Treatment: Right

## 2022-11-27 NOTE — DISCHARGE NOTE PROVIDER - HOSPITAL COURSE
89 year old male with PMH of Chronic Afib on Eliquis, HTN, HLD, CVA, CABG, prostate cancer s/p radiation, left pleural effusion s/p L VATS, drainage of effusion, Pleurx catheter placement on 10/13/22. He presented to the ED with increasing shortness of breath x 2 weeks, especially when lying down & with exertion. He gets his PleurX drained twice a week by the VNS and he noted the output has decreased dramatically x 3 weeks to less than 100 mL per encounter. He also c/o chills, night sweats, and constipation. Additionally, he fell down the day prior to admission but denies any pain associated with that.  He was found to have B/L pleural effusions on ER imaging.  The PleurX was drained 50 mL and was placed to a Pleurevac.  Eliquis was held for a possible MIST via the L PleurX and a possible R thoracentesis or PTC.  He was started on Vancomycin.  An enema helped with is constipation.  IMANI was treated until rate-controlled.  The INR did not decrease significantly for MIST, but a R PTC was placed on 11/25.  He was ready for rehab placement when his PTC was removed. 89 year old male with PMH of Chronic Afib on Eliquis, HTN, HLD, CVA, CABG, prostate cancer s/p radiation, left pleural effusion s/p L VATS, drainage of effusion, Pleurx catheter placement on 10/13/22. He presented to the ED with increasing shortness of breath x 2 weeks, especially when lying down & with exertion. He gets his PleurX drained twice a week by the VNS and he noted the output has decreased dramatically x 3 weeks to less than 100 mL per encounter. He also c/o chills, night sweats, and constipation. Additionally, he fell down the day prior to admission but denies any pain associated with that.  He was found to have B/L pleural effusions on ER imaging.  The PleurX was drained 50 mL and was placed to a Pleurevac.  Eliquis was held for a possible MIST via the L PleurX and a possible R thoracentesis or PTC.  He was started on Vancomycin.  An enema helped with is constipation.  IMANI was treated until rate-controlled.  The INR did not decrease significantly for MIST, but a R PTC was placed on 11/25 and removed on 11/30.  The pt's creatinine was noted to be high.  A bladder scan showed a full bladder as a PVR. At first, the pt refused a Lott but he finally agreed and then felt better afterwards.  His creatinine improved and he was for rehab . 89 year old male with PMH of Chronic Afib on Eliquis, HTN, HLD, CVA, CABG, prostate cancer s/p radiation, left pleural effusion s/p L VATS, drainage of effusion, Pleurx catheter placement on 10/13/22. He presented to the ED with increasing shortness of breath x 2 weeks, especially when lying down & with exertion. He gets his PleurX drained twice a week by the VNS and he noted the output has decreased dramatically x 3 weeks to less than 100 mL per encounter. He also c/o chills, night sweats, and constipation. Additionally, he fell down the day prior to admission but denies any pain associated with that.  He was found to have B/L pleural effusions on ER imaging.  The PleurX was drained 50 mL and was placed to a Pleurevac.  Eliquis was held for a possible MIST via the L PleurX and a possible R thoracentesis or PTC.  He was started on Vancomycin.  An enema helped with is constipation.  IMANI was treated until rate-controlled.  The INR did not decrease significantly for MIST, but a R PTC was placed on 11/25 and removed on 11/30.  The pt's creatinine was noted to be high.  A bladder scan showed a full bladder as a PVR. At first, the pt refused a Lott but he finally agreed and then felt better afterwards.  His creatinine improved and he was for rehab .    Vital Signs Last 24 Hrs  T(C): 37 (05 Dec 2022 09:46), Max: 37.2 (05 Dec 2022 00:15)  T(F): 98.6 (05 Dec 2022 09:46), Max: 98.9 (05 Dec 2022 00:15)  HR: 120 (05 Dec 2022 09:46) (105 - 120)  BP: 93/67 (05 Dec 2022 09:46) (90/58 - 117/60)  BP(mean): --  RR: 18 (05 Dec 2022 09:46) (18 - 19)  SpO2: 100% (05 Dec 2022 09:46) (91% - 100%)    Parameters below as of 05 Dec 2022 09:46  Patient On (Oxygen Delivery Method): nasal cannula  O2 Flow (L/min): 2    General: WN/WD NAD  Neurology: A&Ox3, nonfocal, BAXTER x 4  Respiratory: CTA B/L  CV: RRR, S1S2, no murmurs, rubs or gallops  Abdominal: Soft, NT, ND +BS, Last BM  Extremities: No edema, + peripheral pulses  Incisions: c,d,i   89 year old male with PMH of Chronic Afib on Eliquis, HTN, HLD, CVA, CABG, prostate cancer s/p radiation, left pleural effusion s/p L VATS, drainage of effusion, Pleurx catheter placement on 10/13/22. He presented to the ED with increasing shortness of breath x 2 weeks, especially when lying down & with exertion. He gets his PleurX drained twice a week by the VNS and he noted the output has decreased dramatically x 3 weeks to less than 100 mL per encounter. He also c/o chills, night sweats, and constipation. Additionally, he fell down the day prior to admission but denies any pain associated with that.  He was found to have B/L pleural effusions on ER imaging.  The PleurX was drained 50 mL and was placed to a Pleurevac.  Eliquis was held for a possible MIST via the L PleurX and a possible R thoracentesis or PTC.  He was started on Vancomycin.  An enema helped with is constipation.  IMANI was treated until rate-controlled.  The INR did not decrease significantly for MIST, but a R PTC was placed on 11/25 and removed on 11/30.  The pt's creatinine was noted to be high.  A bladder scan showed a full bladder as a PVR. At first, the pt refused a Lott but he finally agreed and then felt better afterwards.  His creatinine improved and he was cleared for rehab.    Vital Signs Last 24 Hrs  T(C): 37 (05 Dec 2022 09:46), Max: 37.2 (05 Dec 2022 00:15)  T(F): 98.6 (05 Dec 2022 09:46), Max: 98.9 (05 Dec 2022 00:15)  HR: 120 (05 Dec 2022 09:46) (105 - 120)  BP: 93/67 (05 Dec 2022 09:46) (90/58 - 117/60)  BP(mean): --  RR: 18 (05 Dec 2022 09:46) (18 - 19)  SpO2: 100% (05 Dec 2022 09:46) (91% - 100%)    Parameters below as of 05 Dec 2022 09:46  Patient On (Oxygen Delivery Method): nasal cannula  O2 Flow (L/min): 2    General: WN/WD NAD  Neurology: A&Ox3, nonfocal, BAXTER x 4  Respiratory: CTA B/L  CV: RRR, no murmurs, rubs or gallops  Abdominal: Soft, NT, ND +BS, Last BM  Extremities: No edema, + peripheral pulses  Incisions: c,d,i   89 year old male with PMH of Chronic Afib on Eliquis, HTN, HLD, CVA, CABG, prostate cancer s/p radiation, left pleural effusion s/p L VATS, drainage of effusion, Pleurx catheter placement on 10/13/22. He presented to the ED with increasing shortness of breath x 2 weeks, especially when lying down & with exertion. He gets his PleurX drained twice a week by the VNS and he noted the output has decreased dramatically x 3 weeks to less than 100 mL per encounter. He also c/o chills, night sweats, and constipation. Additionally, he fell down the day prior to admission but denies any pain associated with that.  He was found to have B/L pleural effusions on ER imaging.  The PleurX was drained 50 mL and was placed to a Pleurevac.  Eliquis was held for a possible MIST via the L PleurX and a possible R thoracentesis or PTC.  He was started on Vancomycin.  An enema helped with is constipation.  IMANI was treated until rate-controlled.  The INR did not decrease significantly for MIST, but a R PTC was placed on 11/25 and removed on 11/30.  The pt's creatinine was noted to be high.  A bladder scan showed a full bladder as a PVR. At first, the pt refused a Lott but he finally agreed and then felt better afterwards.  His creatinine improved and he was cleared for rehab.    ICU Vital Signs Last 24 Hrs  T(C): 36.4 (07 Dec 2022 06:00), Max: 36.6 (06 Dec 2022 20:00)  T(F): 97.6 (07 Dec 2022 06:00), Max: 97.8 (06 Dec 2022 20:00)  HR: 106 (07 Dec 2022 06:00) (70 - 114)  BP: 104/59 (07 Dec 2022 06:00) (98/59 - 106/66)  BP(mean): --  ABP: --  ABP(mean): --  RR: 18 (07 Dec 2022 06:00) (18 - 18)  SpO2: 96% (07 Dec 2022 06:00) (96% - 99%)    O2 Parameters below as of 07 Dec 2022 08:40  Patient On (Oxygen Delivery Method): nasal cannula      General: WN/WD NAD  Neurology: A&Ox3, nonfocal, BAXTER x 4  Respiratory: On O2, no resp distress, no accessory muscle use  CV: RRR, no murmurs, rubs or gallops  Abdominal: Soft, NT, ND +BS, Last BM  Extremities: No edema, + peripheral pulses  Incisions: c,d,i   89 year old male with PMH of Chronic Afib on Eliquis, HTN, HLD, CVA, CABG, prostate cancer s/p radiation, left pleural effusion s/p L VATS, drainage of effusion, Pleurx catheter placement on 10/13/22. He presented to the ED with increasing shortness of breath x 2 weeks, especially when lying down & with exertion. He gets his PleurX drained twice a week by the VNS and he noted the output has decreased dramatically x 3 weeks to less than 100 mL per encounter. He also c/o chills, night sweats, and constipation. Additionally, he fell down the day prior to admission but denies any pain associated with that.  He was found to have B/L pleural effusions on ER imaging.  The PleurX was drained 50 mL and was placed to a Pleurevac.  Eliquis was held for a possible MIST via the L PleurX and a possible R thoracentesis or PTC.  He was started on Vancomycin.  An enema helped with is constipation.  IMANI was treated until rate-controlled.  The INR did not decrease significantly for MIST, but a R PTC was placed on 11/25 and removed on 11/30. The pt's creatinine was noted to be high.  A bladder scan showed a full bladder as a PVR. At first, the pt refused a Lott but he finally agreed and then felt better afterwards. His creatinine improved. On 12/7 he was accepted and got a bed at [facility] and he was cleared for rehab per Dr. Rodas.    ICU Vital Signs Last 24 Hrs  T(C): 36.4 (07 Dec 2022 06:00), Max: 36.6 (06 Dec 2022 20:00)  T(F): 97.6 (07 Dec 2022 06:00), Max: 97.8 (06 Dec 2022 20:00)  HR: 106 (07 Dec 2022 06:00) (70 - 114)  BP: 104/59 (07 Dec 2022 06:00) (98/59 - 106/66)  BP(mean): --  ABP: --  ABP(mean): --  RR: 18 (07 Dec 2022 06:00) (18 - 18)  SpO2: 96% (07 Dec 2022 06:00) (96% - 99%)    O2 Parameters below as of 07 Dec 2022 08:40  Patient On (Oxygen Delivery Method): nasal cannula      General: WN/WD NAD  Neurology: A&Ox3, nonfocal, BAXTER x 4  Respiratory: On O2, no resp distress, no accessory muscle use  CV: RRR, no murmurs, rubs or gallops  Abdominal: Soft, NT, ND +BS, Last BM  Extremities: No edema, + peripheral pulses  Incisions: c,d,i

## 2022-11-27 NOTE — DISCHARGE NOTE PROVIDER - NSDCACTIVITY_GEN_ALL_CORE
Return to Work/School allowed/Showering allowed/Do not make important decisions/Stairs allowed/Walking - Indoors allowed/No heavy lifting/straining/Walking - Outdoors allowed

## 2022-11-27 NOTE — PROGRESS NOTE ADULT - SUBJECTIVE AND OBJECTIVE BOX
Subjective: No complaints    Vital Signs:  Vital Signs Last 24 Hrs  T(C): 36.7 (11-27-22 @ 09:58), Max: 37.1 (11-26-22 @ 16:15)  T(F): 98.1 (11-27-22 @ 09:58), Max: 98.7 (11-26-22 @ 16:15)  HR: 104 (11-27-22 @ 09:58) (16 - 104)  BP: 101/58 (11-27-22 @ 09:58) (101/58 - 122/85)  RR: 18 (11-27-22 @ 09:58) (17 - 18)  SpO2: 98% (11-27-22 @ 09:58) (98% - 100%) on (O2)    Telemetry/Alarms:  General: WN/WD NAD  Neurology: Awake, nonfocal, BAXTER x 4  Eyes: Scleras clear, PERRLA/ EOMI, Gross vision intact  ENT: Gross hearing intact, grossly patent pharynx, no stridor  Neck: Neck supple, trachea midline, No JVD,   Respiratory: CTA B/L, Decr L basei  CV: RRR, S1S2, no murmurs, rubs or gallops  Abdominal: Soft, NT, ND +BS,   Extremities: + peripheral pulses  Skin: No Rashes, Hematoma, Ecchymosis  Lymphatic: No Neck, axilla, groin LAD  Psych: Oriented x 3, normal affect  Incisions: C/D/I  Tubes: L PleurX-190; R pigtail 420  Relevant labs, radiology and Medications reviewed                        10.0   17.13 )-----------( 287      ( 27 Nov 2022 05:50 )             33.7     11-27    137  |  102  |  41<H>  ----------------------------<  126<H>  4.4   |  28  |  1.28    Ca    8.9      27 Nov 2022 05:50  Phos  3.0     11-27  Mg     2.00     11-27    TPro  6.5  /  Alb  2.8<L>  /  TBili  0.6  /  DBili  x   /  AST  41<H>  /  ALT  58<H>  /  AlkPhos  176<H>  11-27    PT/INR - ( 27 Nov 2022 05:50 )   PT: 18.8 sec;   INR: 1.61 ratio         PTT - ( 27 Nov 2022 05:50 )  PTT:31.8 sec  ABG:  CXR:  MEDICATIONS  (STANDING):  atorvastatin 40 milliGRAM(s) Oral at bedtime  dornase tamika Solution 2.5 milliGRAM(s) Inhalation daily  ipratropium    for Nebulization 500 MICROGram(s) Nebulizer once  levalbuterol Inhalation 0.63 milliGRAM(s) Inhalation once  levoFLOXacin  Tablet 750 milliGRAM(s) Oral every 48 hours  levothyroxine 100 MICROGram(s) Oral daily  lidocaine   4% Patch 1 Patch Transdermal every 24 hours  lidocaine 1% Injectable 30 milliLiter(s) Local Injection once  metoprolol succinate ER 50 milliGRAM(s) Oral every 12 hours  phytonadione   Solution 10 milliGRAM(s) Oral daily  polyethylene glycol 3350 17 Gram(s) Oral two times a day  senna 2 Tablet(s) Oral at bedtime  sodium chloride 3%  Inhalation 4 milliLiter(s) Inhalation every 12 hours    MEDICATIONS  (PRN):  bisacodyl 5 milliGRAM(s) Oral every 12 hours PRN Constipation  bisacodyl Suppository 10 milliGRAM(s) Rectal daily PRN Constipation  oxyCODONE    IR 2.5 milliGRAM(s) Oral every 4 hours PRN Moderate to severe pain (4 - 10)    Pertinent Physical Exam  I&O's Summary    26 Nov 2022 07:01  -  27 Nov 2022 07:00  --------------------------------------------------------  IN: 430 mL / OUT: 635 mL / NET: -205 mL    27 Nov 2022 07:01  -  27 Nov 2022 13:27  --------------------------------------------------------  IN: 200 mL / OUT: 240 mL / NET: -40 mL        Assessment  89y Male  w/ PAST MEDICAL & SURGICAL HISTORY:  HTN (hypertension)  HLD (hyperlipidemia)  Hyperthyroidism  treated with radioactive iodine  Hypothyroid  Atrial fibrillation  diagnosed many years ago on anticoagulant  CVA (cerebral vascular accident)  2004 no residual  ic dryness of both eyes  PAD (peripheral artery disease)  History of colitis  H/O cataract extraction  History of herniorrhaphy  right groin  S/P hip replacement, right      Patient is a 89y old  Male who presents with a chief complaint of sob afib plural effusion (25 Nov 2022 13:59)    On (10/13), patient underwent US guided chest tube insertion and drainage.    Postoperative course/issues:    PLAN  Neuro: Pain management  Pulm: Encourage coughing, deep breathing and use of incentive spirometry. Wean off supplemental oxygen as able. Daily CXR.   Cardio: Monitor telemetry/alarms  GI: Tolerating diet. Continue stool softeners.  Renal: monitor urine output, supplement electrolytes as needed  Vasc: Heparin SC/SCDs for DVT prophylaxis  Heme: Stable H/H. .   ID: Off antibiotics. Stable.  Therapy: OOB/ambulate  Tubes: Monitor Chest tube output and airleak    Discussed with Cardiothoracic Team at AM rounds.

## 2022-11-27 NOTE — DISCHARGE NOTE PROVIDER - NSDCFUSCHEDAPPT_GEN_ALL_CORE_FT
Miguel Angel Rodas  [facility] [phone]   Scheduled Appointment: 12/19/2022

## 2022-11-28 NOTE — PROVIDER CONTACT NOTE (CHANGE IN STATUS NOTIFICATION) - ASSESSMENT
afib 120s on monitor; denies chest pain palpitations, dizziness, lightheadedness, nausea, vomiting, headache, shortness of breath, etc. at this time

## 2022-11-28 NOTE — PROVIDER CONTACT NOTE (CHANGE IN STATUS NOTIFICATION) - SITUATION
afib 120s on monitor, patient asymptomatic lying in bed HOB elevated; patient eating dinner but nasal cannula was not on patient, pt stated it fell off; RN placed nasal cannula back on patient, no respiratory distress noted at this time

## 2022-11-28 NOTE — PROVIDER CONTACT NOTE (CHANGE IN STATUS NOTIFICATION) - RECOMMENDATIONS
2 L NC placed on patient, continue to monitor HR for now since oxygen placed back on patient & PO metoprolol given as per emar recently

## 2022-11-28 NOTE — PROGRESS NOTE ADULT - ASSESSMENT
89 year old male with PMH of Chronic Afib- Eliquis Aortic valve calcification, HTN, HLD, CVA, CABG in 2012, prostate cancer s/p radiation, left pleural effusion s/p L VATS, drainage of effusion, Pleurx catheter placement on 10/13/22. Presented with SOB and a fall. Presented with sepsis, fever and leukocytosis with pleural effusion s/p drainage with significant WBC in fluid. Also with an TIM and elevated INR from 3 now down to 1.69. Was on Eliquis- held for elevated INR. Course c/b afib RVR and elevated LFTs.     #Elevated PT  - likely related initially to higher dose of Eliquis 5mg BID along with malnutrition/Vit K deficiency     - mixing study corrected. Factors II, IX, and X normal. Factor VII low however over 30%   - continue with vitamin K 10mg daily- if patient continues to eat poorly along with age and on abx likely poor nutritional absorption of Vitamin K. Can consider IV formulation    - overall has improved since admission however leveled out which is consistent with initially multifactorial causes of prolonged PT    - if needs procedure and goal PT/INR not obtained with Vitamin K can consider FFP however would be cautious in the setting of his age in terms of volume   - anticoagulation for afib per cardiology however if placed back on Eliquis would dose reduce given age and weight to 2.5mg BID     Usman Wilson MD, PGY6  Hematology/Oncology Fellow   pager [phone]  After 5pm and on weekends page on call fellow  89 year old male with PMH of Chronic Afib- Eliquis Aortic valve calcification, HTN, HLD, CVA, CABG in 2012, prostate cancer s/p radiation, left pleural effusion s/p L VATS, drainage of effusion, Pleurx catheter placement on 10/13/22. Presented with SOB and a fall. Presented with sepsis, fever and leukocytosis with pleural effusion s/p drainage with significant WBC in fluid. Also with an TIM and elevated INR from 3 now down to 1.69. Was on Eliquis- held for elevated INR. Course c/b afib RVR and elevated LFTs.     #Elevated PT  - likely related initially to higher dose of Eliquis 5mg BID along with malnutrition/Vit K deficiency     - mixing study corrected. Factors II, IX, and X normal. Factor VII low however over 30%   - patient continues to eat poorly along with age and on abx likely poor nutritional absorption of Vitamin K.   - not responding to oral likely due to malabsorption- please give Vitamin K 10mg IV x1 dose   - overall has improved since admission however leveled out which is consistent with initially multifactorial causes of prolonged PT    - if needs procedure and goal PT/INR not obtained with Vitamin K can consider FFP however would be cautious in the setting of his age in terms of volume   - anticoagulation for afib per cardiology however if placed back on Eliquis would dose reduce given age and weight to 2.5mg BID     Usman Wilson MD, PGY6  Hematology/Oncology Fellow   pager [phone]  After 5pm and on weekends page on call fellow

## 2022-11-28 NOTE — PROVIDER CONTACT NOTE (CHANGE IN STATUS NOTIFICATION) - ACTION/TREATMENT ORDERED:
CT NP Carisa vaca made aware, advised to continue to monitor for now since PO metoprolol given recently, if no improvement, notify team

## 2022-11-28 NOTE — PROGRESS NOTE ADULT - SUBJECTIVE AND OBJECTIVE BOX
INTERVAL HPI/OVERNIGHT EVENTS:  Patient S&E at bedside. No o/n events,     VITAL SIGNS:  T(F): 97.3 (11-28-22 @ 11:44)  HR: 113 (11-28-22 @ 11:44)  BP: 98/67 (11-28-22 @ 11:44)  RR: 18 (11-28-22 @ 11:44)  SpO2: 95% (11-28-22 @ 11:44)  Wt(kg): --    PHYSICAL EXAM:    Constitutional: NAD  Eyes: EOMI, sclera non-icteric  Neck: supple  Respiratory: CTAB, no wheezes or crackles   Cardiovascular: RRR  Gastrointestinal: soft, NTND, + BS  Extremities: no cyanosis, clubbing or edema   Neurological: awake and alert      MEDICATIONS  (STANDING):  alteplase  Injectable for Pleural Effusion 10 milliGRAM(s) IntraPleural. once  atorvastatin 40 milliGRAM(s) Oral at bedtime  dornase tamika Solution 2.5 milliGRAM(s) Inhalation daily  dornase tamika Solution for Pleural Effusion 5 milliGRAM(s) IntraPleural. once  ipratropium    for Nebulization 500 MICROGram(s) Nebulizer once  levalbuterol Inhalation 0.63 milliGRAM(s) Inhalation once  levoFLOXacin  Tablet 750 milliGRAM(s) Oral every 48 hours  levothyroxine 100 MICROGram(s) Oral daily  lidocaine   4% Patch 1 Patch Transdermal every 24 hours  lidocaine 1% Injectable 30 milliLiter(s) Local Injection once  metoprolol succinate ER 50 milliGRAM(s) Oral every 12 hours  phytonadione   Solution 10 milliGRAM(s) Oral daily  polyethylene glycol 3350 17 Gram(s) Oral two times a day  senna 2 Tablet(s) Oral at bedtime  sodium chloride 0.9%. 1000 milliLiter(s) (60 mL/Hr) IV Continuous <Continuous>  sodium chloride 3%  Inhalation 4 milliLiter(s) Inhalation every 12 hours    MEDICATIONS  (PRN):  bisacodyl 5 milliGRAM(s) Oral every 12 hours PRN Constipation  bisacodyl Suppository 10 milliGRAM(s) Rectal daily PRN Constipation  oxyCODONE    IR 5 milliGRAM(s) Oral every 6 hours PRN Moderate to severe pain (4 - 10)      Allergies    penicillin (Rash)    Intolerances        LABS:                        10.1   16.69 )-----------( 317      ( 28 Nov 2022 05:10 )             33.8     11-28    135  |  101  |  41<H>  ----------------------------<  119<H>  4.9   |  26  |  1.39<H>    Ca    8.9      28 Nov 2022 05:10  Phos  2.5     11-28  Mg     2.00     11-28    TPro  6.5  /  Alb  2.8<L>  /  TBili  0.6  /  DBili  x   /  AST  41<H>  /  ALT  58<H>  /  AlkPhos  176<H>  11-27    PT/INR - ( 28 Nov 2022 05:10 )   PT: 19.1 sec;   INR: 1.64 ratio         PTT - ( 28 Nov 2022 05:10 )  PTT:31.7 sec      RADIOLOGY & ADDITIONAL TESTS:  Studies reviewed.

## 2022-11-28 NOTE — PROGRESS NOTE ADULT - ATTENDING COMMENTS
Patient case re-reviewed.  He has not responded to 3 days of oral vitamin K, and his PT/INR remains prolonged.  Other vitamin K-dependent clotting factors are in the normal range except for F7 which is < 40%.  It is possible that he is not absorbing the oral vitamin K well, and so we will administer one dose of 10 mg IV.  Will check the Pt/INR and F7 levels again in the AM.    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  For deeply invasive surgeries or bleeding, supplementation with NovoSeven RT is recommended as follows:  1) For perioperative management: 15 to 30 mcg/kg/dose immediately before surgery; repeat every 4 to 6 hours for the duration of surgery and until hemostasis achieved. Doses as low as 10 mcg/kg have been effective.  2) For bleeding episodes: 15 to 30 mcg/kg/dose every 4 to 6 hours until hemostasis is achieved. Doses as low as 10 mcg/kg have been effective.    Findings have been reviewed with patient and daughter, and genetic inheritance was also discussed should his direct offspring wish to be tested for F7 deficiency.    Pete Temple MD  Hematology Attending  cell [phone]

## 2022-11-28 NOTE — PROVIDER CONTACT NOTE (CHANGE IN STATUS NOTIFICATION) - BACKGROUND
pt admitted s/p left pleural effusion & left vats 10/13; fall at home 11/17; 11/18 came into ed with shortness of breath & possible pneumonia; 11/25 right chest tube placed; hx afib controlled

## 2022-11-28 NOTE — PROGRESS NOTE ADULT - SUBJECTIVE AND OBJECTIVE BOX
Subjective: "I still feel very short of breath when I do things. I'm tired easily" Pt states minimal improvement in SOB since Rt PTC insertion. States sensation, coloring in bilat feet improved w Venodynes. Pt trying to eat more, drinking 1-2 supplements per day. States no BM x mult days. C/o pain to back, improved w Oxy.     Vital Signs:  Vital Signs Last 24 Hrs  T(C): 36.3 (11-28-22 @ 11:44), Max: 37.4 (11-27-22 @ 16:10)  T(F): 97.3 (11-28-22 @ 11:44), Max: 99.3 (11-27-22 @ 16:10)  HR: 113 (11-28-22 @ 11:44) (81 - 113)  BP: 98/67 (11-28-22 @ 11:44) (93/56 - 111/56)  RR: 18 (11-28-22 @ 11:44) (18 - 18)  SpO2: 95% (11-28-22 @ 11:44) (94% - 100%) on (O2)    Telemetry/Alarms: afib 100-110.   General: WN/WD NAD  Neurology: Awake, nonfocal, BAXTER x 4  Eyes: Scleras clear, PERRLA/ EOMI, Gross vision intact  ENT:Gross hearing intact, grossly patent pharynx, no stridor  Neck: Neck supple, trachea midline, No JVD,   Respiratory:Coarse BS with decreased BS to bilat LL  CV: Irreg, tachy, S1S2, no murmurs, rubs or gallops  Abdominal: Soft, NT, ND +BS, +flatus, no BM x 3 days.   Extremities: Trace LE leg/foot edema, + peripheral pulses  Skin: No Rashes, Hematoma, Ecchymosis  Lymphatic: No Neck, axilla, groin LAD  Psych: Oriented x 3, normal affect  Incisions: none  Tubes: rt PTC about 400cc serous fluid/24hrs on WS no AL. Left Pleurx 100cc/ws no AL  Relevant labs, radiology and Medications reviewed           CXR with improved Rt effusion, continued loculated left effusion.              10.1   16.69 )-----------( 317      ( 28 Nov 2022 05:10 )             33.8     11-28    135  |  101  |  41<H>  ----------------------------<  119<H>  4.9   |  26  |  1.39<H>    Ca    8.9      28 Nov 2022 05:10  Phos  2.5     11-28  Mg     2.00     11-28    TPro  6.5  /  Alb  2.8<L>  /  TBili  0.6  /  DBili  x   /  AST  41<H>  /  ALT  58<H>  /  AlkPhos  176<H>  11-27    PT/INR - ( 28 Nov 2022 05:10 )   PT: 19.1 sec;   INR: 1.64 ratio         PTT - ( 28 Nov 2022 05:10 )  PTT:31.7 sec    Pleural cultures from Rt side NGTD  Cytology pending.     MEDICATIONS  (STANDING):  alteplase  Injectable for Pleural Effusion 10 milliGRAM(s) IntraPleural. once  atorvastatin 40 milliGRAM(s) Oral at bedtime  dornase tamika Solution 2.5 milliGRAM(s) Inhalation daily  dornase tamika Solution for Pleural Effusion 5 milliGRAM(s) IntraPleural. once  ipratropium    for Nebulization 500 MICROGram(s) Nebulizer once  levalbuterol Inhalation 0.63 milliGRAM(s) Inhalation once  levoFLOXacin  Tablet 750 milliGRAM(s) Oral every 48 hours  levothyroxine 100 MICROGram(s) Oral daily  lidocaine   4% Patch 1 Patch Transdermal every 24 hours  lidocaine 1% Injectable 30 milliLiter(s) Local Injection once  metoprolol succinate ER 50 milliGRAM(s) Oral every 12 hours  phytonadione   Solution 10 milliGRAM(s) Oral daily  polyethylene glycol 3350 17 Gram(s) Oral two times a day  saline laxative (FLEET) Rectal Enema 1 Enema Rectal once  senna 2 Tablet(s) Oral at bedtime  sodium chloride 0.9%. 1000 milliLiter(s) (60 mL/Hr) IV Continuous <Continuous>  sodium chloride 3%  Inhalation 4 milliLiter(s) Inhalation every 12 hours    MEDICATIONS  (PRN):  bisacodyl 5 milliGRAM(s) Oral every 12 hours PRN Constipation  bisacodyl Suppository 10 milliGRAM(s) Rectal daily PRN Constipation  oxyCODONE    IR 5 milliGRAM(s) Oral every 6 hours PRN Moderate to severe pain (4 - 10)    Pertinent Physical Exam  I&O's Summary    27 Nov 2022 07:01  -  28 Nov 2022 07:00  --------------------------------------------------------  IN: 900 mL / OUT: 1475 mL / NET: -575 mL    28 Nov 2022 07:01  -  28 Nov 2022 12:59  --------------------------------------------------------  IN: 568 mL / OUT: 585 mL / NET: -17 mL    Social: No smoking, no ETOh  Lives with S.O.     Assessment  89y Male  w/ PAST MEDICAL & SURGICAL HISTORY:  HTN (hypertension)      HLD (hyperlipidemia)      Hyperthyroidism  treated with radioactive iodine      Hypothyroid      Atrial fibrillation  diagnosed many years ago   on anticoagulant      CVA (cerebral vascular accident)  2004  no residual      Spinal stenosis  h/o epidural injection      Prostate cancer  s/p radiation ? 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   Pleurx to waterseal, draining serous fluid. Pt INR elevated, awaiting down trend to do possible intervention on Rt effusion and possible MIST protocol via left Pleurx.     11/21/22: Right pleural effusion appears improved on repeat AM CXR from yesterday and today. No ptc required at this time. Left PleurX is draining, 200cc over the last 24hrs. Will hold off on MIST. INR continues to be elevated, eliquis continues to be on hold. IMANI overnight, on home meds. Consult pts cardiologist Dr Le, TTE pending. Cont with IV Abx for elevated WBC, no cs sent. Cr improving, making urine.  11/22: INR 1.58, hold MIST.  11/23: INR 1.7, No Thoracentesis today however bedside US showed small effusion on right. Elevated LFTS. GI and Heme consulted.  11/24: INR 1.6, No thoracentesis today.   11/25: INR 1.5. Will place Right sided PTC today per DR. Rodas. Will send fluid for cytology, culture, lytes. Will switch antibiotics to po. Plan MIST via left Pleurx tomorrow.       PLAN:  Neuro: Pain management  Pulm: Encourage coughing, deep breathing and use of incentive spirometry. Wean off supplemental oxygen as able. Daily CXR.   Cardio: Monitor telemetry/alarms  GI: Tolerating diet. Continue stool softeners.  Renal: monitor urine output, supplement electrolytes as needed  Vasc: Heparin SC/SCDs for DVT prophylaxis  Heme: Stable H/H. .   ID: Off antibiotics. Stable.  Therapy: OOB/ambulate  Tubes: Monitor Chest tube output  Disposition: Aim to D/C to home on  Discussed with Cardiothoracic Team at AM rounds.      Subjective: "I still feel very short of breath when I do things. I'm tired easily" Pt states minimal improvement in SOB since Rt PTC insertion. States sensation, coloring in bilat feet improved w Venodynes. Pt trying to eat more, drinking 1-2 supplements per day. States no BM x mult days. C/o pain to back, improved w Oxy.     Vital Signs:  Vital Signs Last 24 Hrs  T(C): 36.3 (11-28-22 @ 11:44), Max: 37.4 (11-27-22 @ 16:10)  T(F): 97.3 (11-28-22 @ 11:44), Max: 99.3 (11-27-22 @ 16:10)  HR: 113 (11-28-22 @ 11:44) (81 - 113)  BP: 98/67 (11-28-22 @ 11:44) (93/56 - 111/56)  RR: 18 (11-28-22 @ 11:44) (18 - 18)  SpO2: 95% (11-28-22 @ 11:44) (94% - 100%) on (O2)    Telemetry/Alarms: afib 100-110.   General: WN/WD NAD  Neurology: Awake, nonfocal, BAXTER x 4  Eyes: Scleras clear, PERRLA/ EOMI, Gross vision intact  ENT:Gross hearing intact, grossly patent pharynx, no stridor  Neck: Neck supple, trachea midline, No JVD,   Respiratory:Coarse BS with decreased BS to bilat LL  CV: Irreg, tachy, S1S2, no murmurs, rubs or gallops  Abdominal: Soft, NT, ND +BS, +flatus, no BM x 3 days.   Extremities: Trace LE leg/foot edema, + peripheral pulses  Skin: No Rashes, Hematoma, Ecchymosis  Lymphatic: No Neck, axilla, groin LAD  Psych: Oriented x 3, normal affect  Incisions: none  Tubes: rt PTC about 400cc serous fluid/24hrs on WS no AL. Left Pleurx 100cc/ws no AL  Relevant labs, radiology and Medications reviewed           CXR with improved Rt effusion, continued loculated left effusion.              10.1   16.69 )-----------( 317      ( 28 Nov 2022 05:10 )             33.8     11-28    135  |  101  |  41<H>  ----------------------------<  119<H>  4.9   |  26  |  1.39<H>    Ca    8.9      28 Nov 2022 05:10  Phos  2.5     11-28  Mg     2.00     11-28    TPro  6.5  /  Alb  2.8<L>  /  TBili  0.6  /  DBili  x   /  AST  41<H>  /  ALT  58<H>  /  AlkPhos  176<H>  11-27    PT/INR - ( 28 Nov 2022 05:10 )   PT: 19.1 sec;   INR: 1.64 ratio         PTT - ( 28 Nov 2022 05:10 )  PTT:31.7 sec    Pleural cultures from Rt side NGTD  Cytology pending.     MEDICATIONS  (STANDING):  alteplase  Injectable for Pleural Effusion 10 milliGRAM(s) IntraPleural. once  atorvastatin 40 milliGRAM(s) Oral at bedtime  dornase tamika Solution 2.5 milliGRAM(s) Inhalation daily  dornase tamika Solution for Pleural Effusion 5 milliGRAM(s) IntraPleural. once  ipratropium    for Nebulization 500 MICROGram(s) Nebulizer once  levalbuterol Inhalation 0.63 milliGRAM(s) Inhalation once  levoFLOXacin  Tablet 750 milliGRAM(s) Oral every 48 hours  levothyroxine 100 MICROGram(s) Oral daily  lidocaine   4% Patch 1 Patch Transdermal every 24 hours  lidocaine 1% Injectable 30 milliLiter(s) Local Injection once  metoprolol succinate ER 50 milliGRAM(s) Oral every 12 hours  phytonadione   Solution 10 milliGRAM(s) Oral daily  polyethylene glycol 3350 17 Gram(s) Oral two times a day  saline laxative (FLEET) Rectal Enema 1 Enema Rectal once  senna 2 Tablet(s) Oral at bedtime  sodium chloride 0.9%. 1000 milliLiter(s) (60 mL/Hr) IV Continuous <Continuous>  sodium chloride 3%  Inhalation 4 milliLiter(s) Inhalation every 12 hours    MEDICATIONS  (PRN):  bisacodyl 5 milliGRAM(s) Oral every 12 hours PRN Constipation  bisacodyl Suppository 10 milliGRAM(s) Rectal daily PRN Constipation  oxyCODONE    IR 5 milliGRAM(s) Oral every 6 hours PRN Moderate to severe pain (4 - 10)    Pertinent Physical Exam  I&O's Summary    27 Nov 2022 07:01  -  28 Nov 2022 07:00  --------------------------------------------------------  IN: 900 mL / OUT: 1475 mL / NET: -575 mL    28 Nov 2022 07:01  -  28 Nov 2022 12:59  --------------------------------------------------------  IN: 568 mL / OUT: 585 mL / NET: -17 mL    Social: No smoking, no ETOh  Lives with S.O.     Assessment  89y Male  w/ PAST MEDICAL & SURGICAL HISTORY:  HTN (hypertension)      HLD (hyperlipidemia)      Hyperthyroidism  treated with radioactive iodine      Hypothyroid      Atrial fibrillation  diagnosed many years ago   on anticoagulant      CVA (cerebral vascular accident)  2004  no residual      Spinal stenosis  h/o epidural injection      Prostate cancer  s/p radiation ? 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   Pleurx to waterseal, draining serous fluid. Pt INR elevated, awaiting down trend to do possible intervention on Rt effusion and possible MIST protocol via left Pleurx.     11/21/22: Right pleural effusion appears improved on repeat AM CXR from yesterday and today. No ptc required at this time. Left PleurX is draining, 200cc over the last 24hrs. Will hold off on MIST. INR continues to be elevated, eliquis continues to be on hold. IMANI overnight, on home meds. Consult pts cardiologist Dr Le, TTE pending. Cont with IV Abx for elevated WBC, no cs sent. Cr improving, making urine.  11/22: INR 1.58, hold MIST.  11/23: INR 1.7, No Thoracentesis today however bedside US showed small effusion on right. Elevated LFTS. GI and Heme consulted.  11/24: INR 1.6, No thoracentesis today.   11/25: INR 1.5. Will place Right sided PTC today per DR. Rodas. Will send fluid for cytology, culture, lytes. Will switch antibiotics to po. Plan MIST via left Pleurx tomorrow.   11/26-11/27-Continued to monitor INR with no improvement. Rt PTC with continued serous output.   11/28-IVF hydration started for inc'd creatinine. Will do MIST via Left Pleurx per Dr. Rodas. Called Heme Dr. Wilson for follow up who recommends continuing Vit K daily.       PLAN:  Neuro: Pain management  Pulm: Encourage coughing, deep breathing and use of incentive spirometry. Wean off supplemental oxygen as able. Daily CXR.   Cardio: Monitor telemetry/alarms. Cont to hold a/c and ASA. BB for HR control. Hold diuretics.   GI: Tolerating diet. Continue stool softeners. Cont nutritional supps. Encourage po intake. Will give Enema today  Renal: monitor urine output, supplement electrolytes as needed. pt with slight inc in BUN/Creat, will give gentle hydration.   Vasc: SCDs for DVT prophylaxis  Heme: Stable H/H. .   ID: On levaquin. continued leukocytosis with no fever.   Therapy: OOB/ambulate. Rehab planning  Tubes: Monitor Chest tube output to Rt and left. Will do MIST via Left Pleurx x 1 dose today  Disposition: Aim to D/C to Rehab when optimized.   Discussed with Cardiothoracic Team at AM rounds.

## 2022-11-29 NOTE — PROGRESS NOTE ADULT - SUBJECTIVE AND OBJECTIVE BOX
Subjective: "my breathing is better"    Vital Signs:  Vital Signs Last 24 Hrs  T(C): 36.4 (11-29-22 @ 11:41), Max: 37.1 (11-29-22 @ 00:15)  T(F): 97.6 (11-29-22 @ 11:41), Max: 98.8 (11-29-22 @ 00:15)  HR: 99 (11-29-22 @ 11:41) (80 - 114)  BP: 96/55 (11-29-22 @ 11:41) (96/55 - 120/67)  RR: 18 (11-29-22 @ 11:41) (18 - 20)  SpO2: 100% (11-29-22 @ 11:41) (96% - 100%) on (O2)    PE  Telemetry/Alarms: afib  General: WN/WD NAD  Neurology: Awake, nonfocal, BAXTER x 4  Eyes: Scleras clear, PERRLA/ EOMI, Gross vision intact  ENT:Gross hearing intact, grossly patent pharynx, no stridor  Neck: Neck supple, trachea midline, No JVD,   Respiratory: CTA B/L, No wheezing, rales, rhonchi  CV: RRR, S1S2, no murmurs, rubs or gallops  Abdominal: Soft, NT, ND +BS,   Extremities: No edema, + peripheral pulses  Skin: No Rashes, Hematoma, Ecchymosis  Lymphatic: No Neck, axilla, groin LAD  Psych: Oriented x 3, normal affect  Incisions: c,d,i  Tubes: L pleurX to -10sxn drained 720c/24h and R PTC on ws drained 345/24h no air leaks appreciated B/L  Relevant labs, radiology and Medications reviewed                        9.7    19.21 )-----------( 346      ( 29 Nov 2022 04:51 )             33.3     11-29    137  |  103  |  49<H>  ----------------------------<  116<H>  5.3   |  26  |  1.54<H>    Ca    9.0      29 Nov 2022 04:51  Phos  2.5     11-28  Mg     2.00     11-28      PT/INR - ( 29 Nov 2022 04:51 )   PT: 18.0 sec;   INR: 1.54 ratio         PTT - ( 28 Nov 2022 05:10 )  PTT:31.7 sec  MEDICATIONS  (STANDING):  alteplase  Injectable for Pleural Effusion 10 milliGRAM(s) IntraPleural. once  atorvastatin 40 milliGRAM(s) Oral at bedtime  dornase tamika Solution 2.5 milliGRAM(s) Inhalation daily  dornase tamika Solution for Pleural Effusion 5 milliGRAM(s) IntraPleural. once  ipratropium    for Nebulization 500 MICROGram(s) Nebulizer once  levalbuterol Inhalation 0.63 milliGRAM(s) Inhalation once  levoFLOXacin  Tablet 750 milliGRAM(s) Oral every 48 hours  levothyroxine 100 MICROGram(s) Oral daily  lidocaine   4% Patch 1 Patch Transdermal every 24 hours  lidocaine 1% Injectable 30 milliLiter(s) Local Injection once  metoprolol succinate ER 50 milliGRAM(s) Oral every 12 hours  phytonadione   Solution 10 milliGRAM(s) Oral daily  polyethylene glycol 3350 17 Gram(s) Oral two times a day  senna 2 Tablet(s) Oral at bedtime  sodium chloride 0.9%. 1000 milliLiter(s) (60 mL/Hr) IV Continuous <Continuous>  sodium chloride 3%  Inhalation 4 milliLiter(s) Inhalation every 12 hours    MEDICATIONS  (PRN):  bisacodyl 5 milliGRAM(s) Oral every 12 hours PRN Constipation  bisacodyl Suppository 10 milliGRAM(s) Rectal daily PRN Constipation  oxyCODONE    IR 5 milliGRAM(s) Oral every 6 hours PRN Moderate to severe pain (4 - 10)    Pertinent Physical Exam  I&O's Summary    28 Nov 2022 07:01  -  29 Nov 2022 07:00  --------------------------------------------------------  IN: 1548 mL / OUT: 2290 mL / NET: -742 mL    29 Nov 2022 07:01  -  29 Nov 2022 14:15  --------------------------------------------------------  IN: 900 mL / OUT: 560 mL / NET: 340 mL        Assessment  89M with multiple comorbidities, history of recent L VATS, pleurex catheter placement 10/13/22 for pleural effusion who presents with bilateral pleural effusions (L>R) with increasing SOB over the last three weeks with decreased drainage from the Pleurex catheter.   Pleurx to waterseal, draining serous fluid. Pt INR elevated, awaiting down trend to do possible intervention on Rt effusion and possible MIST protocol via left Pleurx.     11/21/22: Right pleural effusion appears improved on repeat AM CXR from yesterday and today. No ptc required at this time. Left PleurX is draining, 200cc over the last 24hrs. Will hold off on MIST. INR continues to be elevated, eliquis continues to be on hold. IMANI overnight, on home meds. Consult pts cardiologist Dr Le, TTE pending. Cont with IV Abx for elevated WBC, no cs sent. Cr improving, making urine.  11/22: INR 1.58, hold MIST.  11/23: INR 1.7, No Thoracentesis today however bedside US showed small effusion on right. Elevated LFTS. GI and Heme consulted.  11/24: INR 1.6, No thoracentesis today.   11/25: INR 1.5. Will place Right sided PTC today per DR. Rodas. Will send fluid for cytology, culture, lytes. Will switch antibiotics to po. Plan MIST via left Pleurx tomorrow.   11/26-11/27-Continued to monitor INR with no improvement. Rt PTC with continued serous output.   11/28-IVF hydration started for inc'd creatinine. MIST through L pleurX Called Bill Wilson for follow up who recommends continuing Vit K daily.   11/29 continue L pleurX to -10sxn and R PTC to waterseal      PLAN  Neuro: Pain management  Pulm: Encourage coughing, deep breathing and use of incentive spirometry. Wean off supplemental oxygen as able. Daily CXR.   Cardio: Monitor telemetry/alarms  GI: Tolerating diet. Continue stool softeners.  Renal: monitor urine output, supplement electrolytes as needed  Vasc: Heparin SC/SCDs for DVT prophylaxis  Heme: Stable H/H. .   ID: Off antibiotics. Stable.  Therapy: OOB/ambulate  Tubes: Monitor Chest tube output    Discussed with Cardiothoracic Team at AM rounds.

## 2022-11-30 NOTE — PROGRESS NOTE ADULT - SUBJECTIVE AND OBJECTIVE BOX
Subjective: Patient seen and examined.  patient continues to feel short of breath weak now with A. fib with a rapid ventricular response and low blood pressure  Patient continues to feel weak and tired and sob with mild exertion and even at rest  elevated LFTs elevated INR  s/p pleurex  afib VR sometimes over 100 presently with a rapid ventricular response   the patient has not been making any progress and in fact over the last day  his blood pressure has decreased and his A. fib has a more rapid ventricular response and his renal function has deteriorated.      His beta-blocker has been held  MEDICATIONS:  MEDICATIONS  (STANDING):  atorvastatin 40 milliGRAM(s) Oral at bedtime  dornase tamika Solution 2.5 milliGRAM(s) Inhalation daily  ipratropium    for Nebulization 500 MICROGram(s) Nebulizer once  levalbuterol Inhalation 0.63 milliGRAM(s) Inhalation once  levoFLOXacin  Tablet 750 milliGRAM(s) Oral every 48 hours  levothyroxine 100 MICROGram(s) Oral daily  lidocaine 1% Injectable 30 milliLiter(s) Local Injection once  metoprolol succinate ER 50 milliGRAM(s) Oral every 12 hours  phytonadione   Solution 10 milliGRAM(s) Oral daily  polyethylene glycol 3350 17 Gram(s) Oral two times a day  senna 2 Tablet(s) Oral at bedtime  sodium chloride 3%  Inhalation 4 milliLiter(s) Inhalation every 12 hours      Vital Signs Last 24 Hrs  T(C): 36.3 (11-30-22 @ 12:54), Max: 36.7 (11-29-22 @ 22:05)  T(F): 97.3 (11-30-22 @ 12:54), Max: 98.1 (11-29-22 @ 22:05)  HR: 107 (11-30-22 @ 12:54) (90 - 112)  BP: 98/61 (11-30-22 @ 12:54) (85/45 - 134/51)  BP(mean): --  RR: 18 (11-30-22 @ 12:54) (18 - 18)  SpO2: 100% (11-30-22 @ 12:54) (97% - 100%)      Appearance: Normal weak appears chronically ill thin has a cachectic look  Cardiovascular: Normal S1 S2, irregularly irregular hr 110  No JVD, N1/6 CAROLINE right base,  Respiratory: Lungs decreased BS left 1/4 up	  Gastrointestinal:  Soft, Non-tender, + BS	  Ext: No edema        LABS:    LABS:    CARDIAC MARKERS:                                8.9    14.84 )-----------( 321      ( 30 Nov 2022 05:57 )             30.7     11-30    138  |  107  |  54<H>  ----------------------------<  112<H>  5.3   |  23  |  1.63<H>    Ca    8.7      30 Nov 2022 05:57                    TELEMETRY: 	    ECG:  afib rapid vr 11/30/22	  RADIOLOGY:   < from: Xray Chest 1 View- PORTABLE-Routine (Xray Chest 1 View- PORTABLE-Routine in AM.) (11.25.22 @ 07:12) >  Frontal expiratory view of the chest shows the heart to be similar in   size. Left Pleurx catheter remains present.    The lungs show slight increase in pulmonary congestion with reduction of   left effusion. Small right effusion is similar and there is no evidence   of pneumothorax.    Chest one view 11/25/2022 6:43 AM  Compared to the prior study, the lungs show less pulmonary congestion.   Pleural effusions are similar. Small left base pneumothorax is present.    IMPRESSION:  Small pleural effusions. Small left base pneumothorax.        < from: Transthoracic Echocardiogram (11.21.22 @ 17:25) >  OBSERVATIONS:  Mitral Valve: Tethered mitral valve leaflets with normal  opening. Mitral annular calcification. Mild mitral  regurgitation.  Aortic Root: Normal aortic root.  Aortic Valve: Calcified trileaflet aortic valve with  decreased opening. The valve grossly appears significantly  stenotic. Unable to assess gradients, due to limited apical  windows (clinical status)  Left Atrium: Mild left atrial enlargement. LA volume index  = 34 cc/m2.  Left Ventricle: Normal left ventricular systolic function.  No segmental wall motion abnormalities. Normal left  ventricular internal dimensions and wall thicknesses.  Increased E/e'  is consistent with elevated left  ventricular filling pressure.  Right Heart: Normal right atrium. Normalright ventricular  size and function. Normal tricuspid valve. Normal pulmonic  valve.  Pericardium/PleuraNormal pericardium with no pericardial  effusion. Bilateral pleural effusions.  ------------------------------------------------------------------------  CONCLUSIONS:  1. Tethered mitral valve leaflets with normal opening.  Mitral annular calcification.  2. Calcified trileaflet aortic valve with decreased  opening. The valve grossly appears significantly stenotic.  Unable to assess gradients, due to limited apical windows  (clinical status)  3. Normal left ventricular systolic function. No segmental  wall motion abnormalities.  4. Increased E/e'  is consistent with elevated left  ventricular filling pressure.  5. Normal right ventricular size and function.  6. Bilateral pleural effusions.  ------------------------------------------------------------------------  Confirmed on  11/21/2022 - 18:22:29 by VANDANA Coles  ------------------------------------------------------------------------    echo reviewed  there is thickening of the septum and posterior wall, aortic stenosis probably not severe though the valve is calcified.  I suggested the possibility of amyloid as a unifying diagnosis    xr< from: Xray Chest 1 View- PORTABLE-Urgent (Xray Chest 1 View- PORTABLE-Urgent .) (11.30.22 @ 10:02) >  FINDINGS:    6:41 AM:  Bilateral chest tubes are seen Pleurx catheter on the left and pigtail   catheter on the right. Small loculated hydropneumothorax on the left   side. Lungs are free of focal abnormalities.    9:21 AM:  Since the last exam, the right-sided pigtail catheter has been removed.   No complicating pneumothorax. Left-sided chest tube remains with small   loculated basilar pneumothorax. No focal consolidations in either lung.        COMPARISON: November 29        IMPRESSION: Follow-up studies pre and post right chest tube removal   without complications.    --- End of Report ---    < end of copied text >

## 2022-11-30 NOTE — PROGRESS NOTE ADULT - ASSESSMENT
patient with recurrent left pleural effusion (?exudate) s/p pleurax right sided effusion improved sob afib with varying VR  Overall clinically and hemodynamically stable. LV function is normal and there is clinically modeate aortic stenosis gradients cannot be assessed however. Patient IS weak tired sob abnormal LFTs.  presently his renal function has deteriorated and his heart rate increased and he does have a full bladder and presently is refusing a Lott.  I think there is a strong element of prerenal azotemia and his  intravascular fluid volume decreased secondary to all the fluid removed from his pleural space.  1 potential unifying diagnosis would be amyloid with effusions A. fib thick myocardium.  It will be difficult to make this diagnosis without an MRI or a pyrophosphate scan which is not available in the hospital.  I do not think he can tolerate an MRI.  His overall prognosis is poor and we have made no progress to make him stronger despite removal of the pleural fluid     1. left pleural effusion  2. s/p pleurx catheter  3. afib VR not always controlled VR  4. aortic stenosis probably moderate   5. normal LV function  6. ?COPD  7. abnormal LFTs  8. weakness chronic sob-debiliatated and deconditioned   9.  Renal insufficiency hypoalbuminemia.      Recommend    Change metoprolol to 12.5 twice daily and increase as needed.  If blood pressure is marginal digoxin could be used to slow his ventricular rate being cautious about toxicity in light of his renal insufficiency   try salt poor albumin to bolster his intravascular space   sent bloods for AL light chain gammopathy which I doubt as if he has amyloid it is more likely to be transthyretin   I discussed at bedside with the patient his overall condition with his daughter as well   overall prognosis is guarded  etiology of effusions unclear  cardiac stable

## 2022-11-30 NOTE — CONSULT NOTE ADULT - ASSESSMENT
89 year old male with PMH of Chronic Afib- Eliquis Aortic valve calcification, HTN, HLD, CVA, CABG in 2012, prostate cancer s/p radiation, left pleural effusion s/p L VATS  Hypotension   TIM and hyperkalemia    89 year old male with PMH of Chronic Afib- Eliquis Aortic valve calcification, HTN, HLD, CVA, CABG in 2012, prostate cancer s/p radiation, left pleural effusion s/p L VATS  Hypotension   TIM and hyperkalemia   Rapid afib and Rapid ventricular rate     1 Renal- There seems to be hemodynamics of hypotension that is present from yesterday but this does not explain the rise of serum creatinine on the 28th   Bladder scan now( he has a [place])  Check UA  2 Diet- Lokelma x 1 and dc the ensure and change diet to no added potassium  3 CVS-Increase the lopressor to 25mg po bid.  This should not reduce the BP   4 ID-AIN in unlikely and lower on the differential     [facility]   [facility]   [phone]

## 2022-11-30 NOTE — CONSULT NOTE ADULT - SUBJECTIVE AND OBJECTIVE BOX
NEPHROLOGY - NSN    Patient seen and examined.    HPI:  89 year old male with PMH of Chronic Afib- Eliquis Aortic valve calcification, HTN, HLD, CVA, CABG in 2012, prostate cancer s/p radiation, left pleural effusion s/p L VATS, drainage of effusion, Pleurx catheter placement on 10/13/22. He presents to the ED today for increasing shortness of breath and mechanical fall yesterday. Patient states that he has noticed increasing shortness of breath over the last couple of weeks which worsens when laying down or with exertion. He gets his Pleurex drained twice a week with VNS on Mondays and Fridays and states that over the last three weeks, the fluid evacuated has decreased dramatically to less than 100cc per encounter. He additionally complains of chills and night sweats. He denies fevers, nausea, diarrhea. He complains of constipation.    Patient also states that he sustained a mechanical fall yesterday. He denies head injury, LOC, nausea, or vomiting. He denies any pain.   11/21/22:   Left PleurX is draining, 200cc over the last 24hrs.  TTE pending. Cont with IV Abx for elevated WBC, no cs sent. Cr improving, making urine.  11/22: INR 1.58, hold MIST.  11/23: INR 1.7, No Thoracentesis today however bedside US showed small effusion on right. Elevated LFTS. GI and Heme consulted.  11/24: INR 1.6, No thoracentesis today.   11/25: INR 1.5. Will place Right sided PTC today per DR. Rodas. Will send fluid for cytology, culture, lytes. Will switch antibiotics to po. Plan MIST via left Pleurx tomorrow.   11/26-11/27-Continued to monitor INR with no improvement. Rt PTC with continued serous output.   11/28-IVF hydration started for inc'd creatinine. MIST through L pleurX Called Bill Wilson for follow up who recommends continuing Vit K daily.   11/29 continue L pleurX to -10sxn and R PTC to waterseal  11/30 - Hypotensive overnight, given 5% albumin x2. PTC removed at bedside            PAST MEDICAL & SURGICAL HISTORY:  HTN (hypertension)      HLD (hyperlipidemia)      Hyperthyroidism  treated with radioactive iodine      Hypothyroid      Atrial fibrillation  diagnosed many years ago   on anticoagulant      CVA (cerebral vascular accident)  2004  no residual      Spinal stenosis  h/o epidural injection      Prostate cancer  s/p radiation ? 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 once  atorvastatin 40 milliGRAM(s) Oral at bedtime  dornase tamika Solution 2.5 milliGRAM(s) Inhalation daily  dornase tamika Solution for Pleural Effusion 5 milliGRAM(s) IntraPleural. once  ipratropium    for Nebulization 500 MICROGram(s) Nebulizer once  levalbuterol Inhalation 0.63 milliGRAM(s) Inhalation once  levoFLOXacin  Tablet 750 milliGRAM(s) Oral every 48 hours  levothyroxine 100 MICROGram(s) Oral daily  lidocaine   4% Patch 1 Patch Transdermal every 24 hours  lidocaine 1% Injectable 30 milliLiter(s) Local Injection once  metoprolol tartrate 12.5 milliGRAM(s) Oral every 12 hours  phytonadione   Solution 10 milliGRAM(s) Oral daily  polyethylene glycol 3350 17 Gram(s) Oral two times a day  senna 2 Tablet(s) Oral at bedtime  sodium chloride 0.9%. 1000 milliLiter(s) (60 mL/Hr) IV Continuous <Continuous>  sodium chloride 3%  Inhalation 4 milliLiter(s) Inhalation every 12 hours      Allergies    penicillin (Rash)    Intolerances        SOCIAL HISTORY:  Denies alcohol abuse, drug abuse or tobacco usage.     FAMILY HISTORY:      VITALS:  T(C): 36.4 (11-30-22 @ 08:49), Max: 36.7 (11-29-22 @ 22:05)  HR: 112 (11-30-22 @ 10:19) (90 - 112)  BP: 107/68 (11-30-22 @ 10:19) (85/45 - 134/51)  RR: 18 (11-30-22 @ 08:49) (18 - 18)  SpO2: 98% (11-30-22 @ 09:54) (97% - 100%)    REVIEW OF SYSTEMS:  Denies any nausea, vomiting, diarrhea, fever or chills. Denies chest pain, SOB, focal weakness, hematuria or dysuria. Good oral intake and denies fatigue or weakness. All other pertinent systems are reviewed and are negative.    PHYSICAL EXAM:  Constitutional: NAD  HEENT: EOMI  Neck:  No JVD, supple   Respiratory: CTA B/L  Cardiovascular: S1 and S2, RRR  Gastrointestinal: + BS, soft, NT, ND  Extremities: No peripheral edema, + peripheral pulses  Neurological: A/O x 3, CN2-12 intact  Psychiatric: Normal mood, normal affect  : No Lott  Skin: No rashes, C/D/I  Access: Not applicable    I and O's:    11-28 @ 07:01  -  11-29 @ 07:00  --------------------------------------------------------  IN: 1548 mL / OUT: 2290 mL / NET: -742 mL    11-29 @ 07:01 - 11-30 @ 07:00  --------------------------------------------------------  IN: 2370 mL / OUT: 1829 mL / NET: 541 mL    11-30 @ 07:01 - 11-30 @ 11:35  --------------------------------------------------------  IN: 120 mL / OUT: 230 mL / NET: -110 mL          LABS:                        8.9    14.84 )-----------( 321      ( 30 Nov 2022 05:57 )             30.7     11-30    138  |  107  |  54<H>  ----------------------------<  112<H>  5.3   |  23  |  1.63<H>    Ca    8.7      30 Nov 2022 05:57        URINE:      RADIOLOGY & ADDITIONAL STUDIES:     NEPHROLOGY - NSN    Patient seen and examined.    HPI:  89 year old male with PMH of Chronic Afib- Eliquis Aortic valve calcification, HTN, HLD, CVA, CABG in 2012, prostate cancer s/p radiation, left pleural effusion s/p L VATS, drainage of effusion, Pleurx catheter placement on 10/13/22. He presents to the ED today for increasing shortness of breath and mechanical fall yesterday. Patient states that he has noticed increasing shortness of breath over the last couple of weeks which worsens when laying down or with exertion. He gets his Pleurex drained twice a week with VNS on Mondays and Fridays and states that over the last three weeks, the fluid evacuated has decreased dramatically to less than 100cc per encounter. He additionally complains of chills and night sweats. He denies fevers, nausea, diarrhea. He complains of constipation.    Patient also states that he sustained a mechanical fall yesterday. He denies head injury, LOC, nausea, or vomiting. He denies any pain.   11/21/22:   Left PleurX is draining, 200cc over the last 24hrs.  TTE pending. Cont with IV Abx for elevated WBC, no cs sent. Cr improving, making urine.  11/22: INR 1.58, hold MIST.  11/23: INR 1.7, No Thoracentesis today however bedside US showed small effusion on right. Elevated LFTS. GI and Heme consulted.  11/24: INR 1.6, No thoracentesis today.   11/25: INR 1.5. Will place Right sided PTC today per DR. Rodas. Will send fluid for cytology, culture, lytes. Will switch antibiotics to po. Plan MIST via left Pleurx tomorrow.   11/26-11/27-Continued to monitor INR with no improvement. Rt PTC with continued serous output.   11/28-IVF hydration started for inc'd creatinine. MIST through L pleurX Called Bill Wilson for follow up who recommends continuing Vit K daily.   11/29 continue L pleurX to -10sxn and R PTC to waterseal  11/30 - Hypotensive overnight, given 5% albumin x2. PTC removed at bedside            PAST MEDICAL & SURGICAL HISTORY:  HTN (hypertension)      HLD (hyperlipidemia)      Hyperthyroidism  treated with radioactive iodine      Hypothyroid      Atrial fibrillation  diagnosed many years ago   on anticoagulant      CVA (cerebral vascular accident)  2004  no residual      Spinal stenosis  h/o epidural injection      Prostate cancer  s/p radiation ? 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 once  atorvastatin 40 milliGRAM(s) Oral at bedtime  dornase tamika Solution 2.5 milliGRAM(s) Inhalation daily  dornase tamika Solution for Pleural Effusion 5 milliGRAM(s) IntraPleural. once  ipratropium    for Nebulization 500 MICROGram(s) Nebulizer once  levalbuterol Inhalation 0.63 milliGRAM(s) Inhalation once  levoFLOXacin  Tablet 750 milliGRAM(s) Oral every 48 hours  levothyroxine 100 MICROGram(s) Oral daily  lidocaine   4% Patch 1 Patch Transdermal every 24 hours  lidocaine 1% Injectable 30 milliLiter(s) Local Injection once  metoprolol tartrate 12.5 milliGRAM(s) Oral every 12 hours  phytonadione   Solution 10 milliGRAM(s) Oral daily  polyethylene glycol 3350 17 Gram(s) Oral two times a day  senna 2 Tablet(s) Oral at bedtime  sodium chloride 0.9%. 1000 milliLiter(s) (60 mL/Hr) IV Continuous <Continuous>  sodium chloride 3%  Inhalation 4 milliLiter(s) Inhalation every 12 hours      Allergies    penicillin (Rash)    Intolerances        SOCIAL HISTORY:  Denies alcohol abuse, drug abuse or tobacco usage.     FAMILY HISTORY:      VITALS:  T(C): 36.4 (11-30-22 @ 08:49), Max: 36.7 (11-29-22 @ 22:05)  HR: 112 (11-30-22 @ 10:19) (90 - 112)  BP: 107/68 (11-30-22 @ 10:19) (85/45 - 134/51)  RR: 18 (11-30-22 @ 08:49) (18 - 18)  SpO2: 98% (11-30-22 @ 09:54) (97% - 100%)    REVIEW OF SYSTEMS:  Denies any nausea, vomiting, diarrhea, fever or chills.  All other pertinent systems are reviewed and are negative.    PHYSICAL EXAM:  Constitutional: NAD  HEENT: EOMI  Neck:  +  JVD, supple   Respiratory: CTA B/L  Cardiovascular: S1 and S2, RRR + tachycardia   Gastrointestinal: + BS, soft, NT, ND  Extremities: No peripheral edema, + peripheral pulses  Neurological: A/O x 3, CN2-12 intact  Psychiatric: Normal mood, normal affect  : No Lott + [place]   Skin: No rashes, C/D/I  Access: Not applicable    I and O's:    11-28 @ 07:01  -  11-29 @ 07:00  --------------------------------------------------------  IN: 1548 mL / OUT: 2290 mL / NET: -742 mL    11-29 @ 07:01 - 11-30 @ 07:00  --------------------------------------------------------  IN: 2370 mL / OUT: 1829 mL / NET: 541 mL    11-30 @ 07:01 - 11-30 @ 11:35  --------------------------------------------------------  IN: 120 mL / OUT: 230 mL / NET: -110 mL          LABS:                        8.9    14.84 )-----------( 321      ( 30 Nov 2022 05:57 )             30.7     11-30    138  |  107  |  54<H>  ----------------------------<  112<H>  5.3   |  23  |  1.63<H>    Ca    8.7      30 Nov 2022 05:57        URINE:      RADIOLOGY & ADDITIONAL STUDIES:    < from: Xray Chest 1 View- PORTABLE-Urgent (Xray Chest 1 View- PORTABLE-Urgent .) (11.30.22 @ 10:02) >    ACC: [id number] EXAM:  XR CHEST PORTABLE ROUTINE 1V                        ACC: [id number] EXAM:  XR CHEST PORTABLE URGENT 1V                          PROCEDURE DATE:  11/30/2022          INTERPRETATION:  CLINICAL INFORMATION: Follow-up studies pre andpost   chest tube removal.    TIME OF EXAMINATION: November 30, 2022 at 6:41 AM and 9:21 AM    EXAM: Portable chest    FINDINGS:    6:41 AM:  Bilateral chest tubes are seen Pleurx catheter on the left and pigtail   catheter on the right. Small loculated hydropneumothorax on the left   side. Lungs are free of focal abnormalities.    9:21 AM:  Since the last exam, the right-sided pigtail catheter has been removed.   No complicating pneumothorax. Left-sided chest tube remains with small   loculated basilar pneumothorax. No focal consolidations in either lung.        COMPARISON: November 29        IMPRESSION: Follow-up studies pre and post right chest tube removal   without complications.    --- End of Report ---            CAMILLA BARFIELD MD; Attending Radiologist  This document has been electronically signed. Nov 30 2022 11:16AM    < end of copied text >

## 2022-11-30 NOTE — PROGRESS NOTE ADULT - SUBJECTIVE AND OBJECTIVE BOX
Patient seen at bedside, resting in bed, in NAD  Patient states that he doesn't feel his breathing has really improved.  Overnight, patient was hypotensive and was given albumin x2.  Pressures in low 100s this am.  Denies any other acute events. Admits to feeling weak.    Vital Signs:  Vital Signs Last 24 Hrs  T(C): 36.4 (11-30-22 @ 08:49), Max: 36.7 (11-29-22 @ 22:05)  T(F): 97.6 (11-30-22 @ 08:49), Max: 98.1 (11-29-22 @ 22:05)  HR: 90 (11-30-22 @ 08:49) (90 - 104)  BP: 96/59 (11-30-22 @ 08:49) (85/45 - 134/51)  RR: 18 (11-30-22 @ 08:49) (18 - 18)  SpO2: 100% (11-30-22 @ 08:49) (97% - 100%)     Telemetry/Alarms: afib  General: Appears stated age, thin  Neurology: Awake, nonfocal, BAXTER x 4  Eyes: Scleras clear, PERRLA/ EOMI, Gross vision intact  ENT:Gross hearing intact, grossly patent pharynx, no stridor  Neck: Neck supple, trachea midline, No JVD,   Respiratory: On O2, in NAD, no accessory muscle use.  CV: afib, rate controlled  Abdominal: Soft, NT, ND +BS,   Extremities: Thin extremities, No edema, + peripheral pulses  Psych: Oriented x 3, normal affect  Incisions: c,d,i  Tubes: L pleurX to -10sxn drained 800c/24h, R PTC to ws 320cc/24h no air leaks appreciated B/L.  -R PTC removed at bedside today (11/30), f/u CXR pending.  Relevant labs, radiology and Medications reviewed    Relevant labs, radiology and Medications reviewed                        8.9    14.84 )-----------( 321      ( 30 Nov 2022 05:57 )             30.7     11-30    138  |  107  |  54<H>  ----------------------------<  112<H>  5.3   |  23  |  1.63<H>    Ca    8.7      30 Nov 2022 05:57      PT/INR - ( 29 Nov 2022 04:51 )   PT: 18.0 sec;   INR: 1.54 ratio           MEDICATIONS  (STANDING):  alteplase  Injectable for Pleural Effusion 10 milliGRAM(s) IntraPleural. once  atorvastatin 40 milliGRAM(s) Oral at bedtime  dornase tamika Solution 2.5 milliGRAM(s) Inhalation daily  dornase tamika Solution for Pleural Effusion 5 milliGRAM(s) IntraPleural. once  ipratropium    for Nebulization 500 MICROGram(s) Nebulizer once  levalbuterol Inhalation 0.63 milliGRAM(s) Inhalation once  levoFLOXacin  Tablet 750 milliGRAM(s) Oral every 48 hours  levothyroxine 100 MICROGram(s) Oral daily  lidocaine   4% Patch 1 Patch Transdermal every 24 hours  lidocaine 1% Injectable 30 milliLiter(s) Local Injection once  metoprolol succinate ER 25 milliGRAM(s) Oral every 12 hours  phytonadione   Solution 10 milliGRAM(s) Oral daily  polyethylene glycol 3350 17 Gram(s) Oral two times a day  senna 2 Tablet(s) Oral at bedtime  sodium chloride 0.9%. 1000 milliLiter(s) (60 mL/Hr) IV Continuous <Continuous>  sodium chloride 3%  Inhalation 4 milliLiter(s) Inhalation every 12 hours    MEDICATIONS  (PRN):  bisacodyl 5 milliGRAM(s) Oral every 12 hours PRN Constipation  bisacodyl Suppository 10 milliGRAM(s) Rectal daily PRN Constipation  oxyCODONE    IR 5 milliGRAM(s) Oral every 6 hours PRN Moderate to severe pain (4 - 10)    Pertinent Physical Exam  I&O's Summary    29 Nov 2022 07:01  -  30 Nov 2022 07:00  --------------------------------------------------------  IN: 2370 mL / OUT: 1829 mL / NET: 541 mL    30 Nov 2022 07:01  -  30 Nov 2022 09:24  --------------------------------------------------------  IN: 120 mL / OUT: 230 mL / NET: -110 mL        Assessment  89M with multiple comorbidities, history of recent L VATS, pleurex catheter placement 10/13/22 for pleural effusion who presents with bilateral pleural effusions (L>R) with increasing SOB over the last three weeks with decreased drainage from the Pleurex catheter.   Pleurx to waterseal, draining serous fluid. Pt INR elevated, awaiting down trend to do possible intervention on Rt effusion and possible MIST protocol via left Pleurx.     11/21/22: Right pleural effusion appears improved on repeat AM CXR from yesterday and today. No ptc required at this time. Left PleurX is draining, 200cc over the last 24hrs. Will hold off on MIST. INR continues to be elevated, eliquis continues to be on hold. IMANI overnight, on home meds. Consult pts cardiologist Dr Le, TTE pending. Cont with IV Abx for elevated WBC, no cs sent. Cr improving, making urine.  11/22: INR 1.58, hold MIST.  11/23: INR 1.7, No Thoracentesis today however bedside US showed small effusion on right. Elevated LFTS. GI and Heme consulted.  11/24: INR 1.6, No thoracentesis today.   11/25: INR 1.5. Will place Right sided PTC today per DR. Rodas. Will send fluid for cytology, culture, lytes. Will switch antibiotics to po. Plan MIST via left Pleurx tomorrow.   11/26-11/27-Continued to monitor INR with no improvement. Rt PTC with continued serous output.   11/28-IVF hydration started for inc'd creatinine. MIST through L pleurX Called Heme Dr. Wilson for follow up who recommends continuing Vit K daily.   11/29 continue L pleurX to -10sxn and R PTC to waterseal  11/30 - Hypotensive overnight, given 5% albumin x2. PTC removed at bedside      PLAN  Neuro: Pain management  Pulm: Encourage coughing, deep breathing and use of incentive spirometry. Wean off supplemental oxygen as able. Daily CXR.   Cardio: Dr. Le contacted - appreciate recs, Monitor telemetry/alarms  GI: Tolerating diet. Continue stool softeners.  Renal: Dr. Lam contacted will see, monitor urine output, supplement electrolytes as needed  Vasc: Heparin SC/SCDs for DVT prophylaxis  Heme: Stable H/H. .   ID: Off antibiotics. Stable.  Therapy: OOB/ambulate  Tubes: R PTC d/c today. PleurX to PleurEvac on -10sxn, monitor outputs.

## 2022-12-01 NOTE — ADVANCED PRACTICE NURSE CONSULT - RECOMMEDATIONS
Recommending Vascular consult.     Topical recommendations:     Offload nasal cannula from ears with Posey nasal cannula offloading device to relieve pressure behind ears.     Sacrum to B/L buttocks and B/L groin and upper inner thigh- Cleanse with skin cleanser, pat dry. Apply Ji antifungal moisture barrier cream twice a day and PRN with incontinent episodes. (primary team ANAM Duran made aware to follow up with fungal IAD and if not resolved in few days consider oral antifungal agents).     Continue low air loss bed therapy, continue heel elevation with Complete CAIR Boots, continue to turn & reposition as per protocol, soft pillow between bony prominences, continue moisture management as recommended above, Indweling catheter insertion will help resolve both - urinary retention and allow for fungal areas to heal  & single breathable pad, continue measures to decrease friction/shear/pressure.    Plan of care discussed with patient and his daughter, all questions answered to their satisfaction, ANAM Duran, and primary RN Nohemy Griffin at bedside.     Addendum at 6:20pm as per primary RN Indwelling catheter was inserted with use of Lidocaine gel and patient tolerated it well with no c/o pain and positive UO.     Please contact Wound/Ostomy Care Service Line if we can be of further assistance (ext [phone]).

## 2022-12-01 NOTE — PROGRESS NOTE ADULT - SUBJECTIVE AND OBJECTIVE BOX
NEPHROLOGY     Patient seen and examined, reports feeling the same, occasional sob, comfortable at present on 2L NC, noted bladder scanned yesterday with ~ 800cc retained, pt refusing francis due to past issues with francis placement and subsequent hematuria, he denies pain, in no acute distress.     MEDICATIONS  (STANDING):  atorvastatin 40 milliGRAM(s) Oral at bedtime  dornase tamika Solution 2.5 milliGRAM(s) Inhalation daily  ipratropium    for Nebulization 500 MICROGram(s) Nebulizer once  levalbuterol Inhalation 0.63 milliGRAM(s) Inhalation once  levoFLOXacin  Tablet 750 milliGRAM(s) Oral every 48 hours  levothyroxine 100 MICROGram(s) Oral daily  lidocaine   4% Patch 1 Patch Transdermal every 24 hours  lidocaine 1% Injectable 30 milliLiter(s) Local Injection once  metoprolol tartrate 25 milliGRAM(s) Oral two times a day  nystatin Powder 1 Application(s) Topical two times a day  phytonadione   Solution 10 milliGRAM(s) Oral daily  polyethylene glycol 3350 17 Gram(s) Oral two times a day  senna 2 Tablet(s) Oral at bedtime  sodium chloride 0.9%. 1000 milliLiter(s) (60 mL/Hr) IV Continuous <Continuous>  sodium chloride 3%  Inhalation 4 milliLiter(s) Inhalation every 12 hours    VITALS:  T(C): , Max: 37 (22 @ 23:55)  T(F): , Max: 98.6 (22 @ 23:55)  HR: 109 (22 @ 09:19)  BP: 97/53 (22 @ 09:19)  RR: 16 (22 @ 09:19)  SpO2: 96% (22 @ 09:19)    I and O's:     @ :  -   @ 07:00  --------------------------------------------------------  IN: 2090 mL / OUT: 1080 mL / NET: 1010 mL     @ 07:01  -   @ 12:00  --------------------------------------------------------  IN: 180 mL / OUT: 180 mL / NET: 0 mL    PHYSICAL EXAM:  Constitutional: NAD  HEENT: EOMI  Neck:  +  JVD, supple   Respiratory: diminished at bases   Cardiovascular: S1 and S2, RRR + tachycardia   Gastrointestinal: + BS, soft, NT, ND  Extremities: No peripheral edema, + peripheral pulses  Neurological: A/O x 3, CN2-12 intact  Psychiatric: Normal mood, normal affect  : No Francis + [place]   Skin: No rashes, C/D/I    LABS:                        9.1    14.43 )-----------( 347      ( 01 Dec 2022 06:30 )             31.6         138  |  108<H>  |  49<H>  ----------------------------<  97  4.9   |  23  |  1.50<H>    Ca    8.9      01 Dec 2022 06:30  Phos  3.2       Mg     2.00         TPro  6.1  /  Alb  x   /  TBili  x   /  DBili  x   /  AST  x   /  ALT  x   /  AlkPhos  x       Urine Studies:  Urinalysis Basic - ( 01 Dec 2022 00:54 )    Color: Yellow / Appearance: Slightly Turbid / S.023 / pH: x  Gluc: x / Ketone: Negative  / Bili: Negative / Urobili: 3 mg/dL   Blood: x / Protein: 30 mg/dL / Nitrite: Negative   Leuk Esterase: Moderate / RBC: 1 /HPF / WBC 13 /HPF   Sq Epi: x / Non Sq Epi: 2 /HPF / Bacteria: Negative    RADIOLOGY & ADDITIONAL STUDIES:    ACC: [id number] EXAM:  XR CHEST PORTABLE ROUTINE 1V                          PROCEDURE DATE:  2022          INTERPRETATION:  CLINICAL INFORMATION: Follow-up    TIME OF EXAMINATION: 2022 at 5:46 AM    EXAM: Portable chest    FINDINGS:  Perihilar airspace opacities increasing likely worsening pulmonary edema.   Small loculated left hydropneumothorax unchanged. Heart size is stable.    COMPARISON:     IMPRESSION: Increasing pulmonary edema.    --- End of Report ---      EDMAVERICK WIND MD; Attending Radiologist  This document has been electronically signed. Dec  1 2022 11:37AM    ASSESSMENT/PLAN:   89 year old male with PMH of Chronic Afib- Eliquis Aortic valve calcification, HTN, HLD, CVA, CABG in , prostate cancer s/p radiation, left pleural effusion s/p L VATS  Hypotension   TIM and hyperkalemia   Rapid afib and Rapid ventricular rate     1 Renal- There seems to be hemodynamics of hypotension that is present from yesterday but this does not explain the rise of serum creatinine on the    Bladder scan ~760 cc, [place] cath in place, refusing francis   2 Diet- s/p Lokelma x 1 and defer Ensure and continue to maintain no potassium in diet   3 CVS-on lopressor to 25mg po bid.  This should not reduce the BP   4 ID-AIN in unlikely and lower on the differential    NEPHROLOGY     Patient seen and examined, reports feeling the same, occasional sob, comfortable at present on 2L NC, noted bladder scanned yesterday with ~ 800cc retained, pt refusing francis due to past issues with francis placement and subsequent hematuria, he denies pain, in no acute distress.     MEDICATIONS  (STANDING):  atorvastatin 40 milliGRAM(s) Oral at bedtime  dornase tamika Solution 2.5 milliGRAM(s) Inhalation daily  ipratropium    for Nebulization 500 MICROGram(s) Nebulizer once  levalbuterol Inhalation 0.63 milliGRAM(s) Inhalation once  levoFLOXacin  Tablet 750 milliGRAM(s) Oral every 48 hours  levothyroxine 100 MICROGram(s) Oral daily  lidocaine   4% Patch 1 Patch Transdermal every 24 hours  lidocaine 1% Injectable 30 milliLiter(s) Local Injection once  metoprolol tartrate 25 milliGRAM(s) Oral two times a day  nystatin Powder 1 Application(s) Topical two times a day  phytonadione   Solution 10 milliGRAM(s) Oral daily  polyethylene glycol 3350 17 Gram(s) Oral two times a day  senna 2 Tablet(s) Oral at bedtime  sodium chloride 0.9%. 1000 milliLiter(s) (60 mL/Hr) IV Continuous <Continuous>  sodium chloride 3%  Inhalation 4 milliLiter(s) Inhalation every 12 hours    VITALS:  T(C): , Max: 37 (22 @ 23:55)  T(F): , Max: 98.6 (22 @ 23:55)  HR: 109 (22 @ 09:19)  BP: 97/53 (22 @ 09:19)  RR: 16 (22 @ 09:19)  SpO2: 96% (22 @ 09:19)    I and O's:     @ :  -   @ 07:00  --------------------------------------------------------  IN: 2090 mL / OUT: 1080 mL / NET: 1010 mL     @ 07:01  -   @ 12:00  --------------------------------------------------------  IN: 180 mL / OUT: 180 mL / NET: 0 mL    PHYSICAL EXAM:  Constitutional: NAD  HEENT: EOMI  Neck:  +  JVD, supple   Respiratory: diminished at bases   Cardiovascular: S1 and S2, RRR + tachycardia   Gastrointestinal: + BS, soft, NT, ND  Extremities: tr le edema  Neurological: A/O x 3, CN2-12 intact  Psychiatric: Normal mood, normal affect  : No Francis + [place]   Skin: No rashes, C/D/I    LABS:                        9.1    14.43 )-----------( 347      ( 01 Dec 2022 06:30 )             31.6         138  |  108<H>  |  49<H>  ----------------------------<  97  4.9   |  23  |  1.50<H>    Ca    8.9      01 Dec 2022 06:30  Phos  3.2       Mg     2.00         TPro  6.1  /  Alb  x   /  TBili  x   /  DBili  x   /  AST  x   /  ALT  x   /  AlkPhos  x       Urine Studies:  Urinalysis Basic - ( 01 Dec 2022 00:54 )    Color: Yellow / Appearance: Slightly Turbid / S.023 / pH: x  Gluc: x / Ketone: Negative  / Bili: Negative / Urobili: 3 mg/dL   Blood: x / Protein: 30 mg/dL / Nitrite: Negative   Leuk Esterase: Moderate / RBC: 1 /HPF / WBC 13 /HPF   Sq Epi: x / Non Sq Epi: 2 /HPF / Bacteria: Negative    RADIOLOGY & ADDITIONAL STUDIES:    ACC: [id number] EXAM:  XR CHEST PORTABLE ROUTINE 1V                          PROCEDURE DATE:  2022          INTERPRETATION:  CLINICAL INFORMATION: Follow-up    TIME OF EXAMINATION: 2022 at 5:46 AM    EXAM: Portable chest    FINDINGS:  Perihilar airspace opacities increasing likely worsening pulmonary edema.   Small loculated left hydropneumothorax unchanged. Heart size is stable.    COMPARISON:     IMPRESSION: Increasing pulmonary edema.    --- End of Report ---      CAMILLA BARFIELD MD; Attending Radiologist  This document has been electronically signed. Dec  1 2022 11:37AM    ASSESSMENT/PLAN:   89 year old male with PMH of Chronic Afib- Eliquis Aortic valve calcification, HTN, HLD, CVA, CABG in , prostate cancer s/p radiation, left pleural effusion s/p L VATS  Hypotension   TIM and hyperkalemia   Rapid afib and Rapid ventricular rate     1 Renal- There seems to be hemodynamics of hypotension that is present from yesterday but this does not explain the rise of serum creatinine on the    Bladder scan ~760 cc, [place] cath in place, refusing francis   2 Diet- s/p Lokelma x 1 and defer Ensure and continue to maintain no potassium in diet   3 CVS-on lopressor to 25mg po bid.  This should not reduce the BP   4 ID-AIN in unlikely and lower on the differential    NEPHROLOGY     Patient seen and examined, reports feeling the same, occasional sob, comfortable at present on 2L NC, noted bladder scanned yesterday with ~ 800cc retained, pt refusing francis due to past issues with francis placement and subsequent hematuria, he denies pain, in no acute distress.     MEDICATIONS  (STANDING):  atorvastatin 40 milliGRAM(s) Oral at bedtime  dornase tamika Solution 2.5 milliGRAM(s) Inhalation daily  ipratropium    for Nebulization 500 MICROGram(s) Nebulizer once  levalbuterol Inhalation 0.63 milliGRAM(s) Inhalation once  levoFLOXacin  Tablet 750 milliGRAM(s) Oral every 48 hours  levothyroxine 100 MICROGram(s) Oral daily  lidocaine   4% Patch 1 Patch Transdermal every 24 hours  lidocaine 1% Injectable 30 milliLiter(s) Local Injection once  metoprolol tartrate 25 milliGRAM(s) Oral two times a day  nystatin Powder 1 Application(s) Topical two times a day  phytonadione   Solution 10 milliGRAM(s) Oral daily  polyethylene glycol 3350 17 Gram(s) Oral two times a day  senna 2 Tablet(s) Oral at bedtime  sodium chloride 0.9%. 1000 milliLiter(s) (60 mL/Hr) IV Continuous <Continuous>  sodium chloride 3%  Inhalation 4 milliLiter(s) Inhalation every 12 hours    VITALS:  T(C): , Max: 37 (22 @ 23:55)  T(F): , Max: 98.6 (22 @ 23:55)  HR: 109 (22 @ 09:19)  BP: 97/53 (22 @ 09:19)  RR: 16 (22 @ 09:19)  SpO2: 96% (22 @ 09:19)    I and O's:     @ :  -   @ 07:00  --------------------------------------------------------  IN: 2090 mL / OUT: 1080 mL / NET: 1010 mL     @ 07:01  -   @ 12:00  --------------------------------------------------------  IN: 180 mL / OUT: 180 mL / NET: 0 mL    PHYSICAL EXAM:  Constitutional: NAD  HEENT: EOMI  Neck:  +  JVD, supple   Respiratory: diminished at bases   Cardiovascular: S1 and S2, RRR + tachycardia   Gastrointestinal: + BS, soft, NT, ND  Extremities: tr le edema  Neurological: A/O x 3, CN2-12 intact  Psychiatric: Normal mood, normal affect  : No Francis + [place]   Skin: No rashes, C/D/I    LABS:                        9.1    14.43 )-----------( 347      ( 01 Dec 2022 06:30 )             31.6         138  |  108<H>  |  49<H>  ----------------------------<  97  4.9   |  23  |  1.50<H>    Ca    8.9      01 Dec 2022 06:30  Phos  3.2       Mg     2.00         TPro  6.1  /  Alb  x   /  TBili  x   /  DBili  x   /  AST  x   /  ALT  x   /  AlkPhos  x       Urine Studies:  Urinalysis Basic - ( 01 Dec 2022 00:54 )    Color: Yellow / Appearance: Slightly Turbid / S.023 / pH: x  Gluc: x / Ketone: Negative  / Bili: Negative / Urobili: 3 mg/dL   Blood: x / Protein: 30 mg/dL / Nitrite: Negative   Leuk Esterase: Moderate / RBC: 1 /HPF / WBC 13 /HPF   Sq Epi: x / Non Sq Epi: 2 /HPF / Bacteria: Negative    RADIOLOGY & ADDITIONAL STUDIES:    ACC: [id number] EXAM:  XR CHEST PORTABLE ROUTINE 1V                          PROCEDURE DATE:  2022          INTERPRETATION:  CLINICAL INFORMATION: Follow-up    TIME OF EXAMINATION: 2022 at 5:46 AM    EXAM: Portable chest    FINDINGS:  Perihilar airspace opacities increasing likely worsening pulmonary edema.   Small loculated left hydropneumothorax unchanged. Heart size is stable.    COMPARISON:     IMPRESSION: Increasing pulmonary edema.    --- End of Report ---      CAMILLA BARFIELD MD; Attending Radiologist  This document has been electronically signed. Dec  1 2022 11:37AM  < from: Xray Chest 1 View- PORTABLE-Routine (Xray Chest 1 View- PORTABLE-Routine in AM.) (22 @ 06:03) >    ACC: [id number] EXAM:  XR CHEST PORTABLE ROUTINE 1V                          PROCEDURE DATE:  2022          INTERPRETATION:  CLINICAL INFORMATION: Follow-up    TIME OF EXAMINATION: 2022 at 5:46 AM    EXAM: Portable chest    FINDINGS:  Perihilar airspace opacities increasing likely worsening pulmonary edema.   Small loculated left hydropneumothorax unchanged. Heart size is stable.        COMPARISON:         IMPRESSION: Increasing pulmonary edema.    --- End of Report ---            CAMILLA BARFIELD MD; Attending Radiologist  This document has been electronically signed. Dec  1 2022 11:37AM    < end of copied text >

## 2022-12-01 NOTE — PROGRESS NOTE ADULT - SUBJECTIVE AND OBJECTIVE BOX
Patient seen at bedside, resting in bed, in NAD.  Patient feeling tired and weak.  Denies any other acute events. No other active complaints at this time.    AM beta blocker rescheduled d/t low pressure in the morning. Has been stable since.    Vital Signs:  Vital Signs Last 24 Hrs  T(C): 36.5 (12-01-22 @ 06:01), Max: 37 (11-30-22 @ 23:55)  T(F): 97.7 (12-01-22 @ 06:01), Max: 98.6 (11-30-22 @ 23:55)  HR: 102 (12-01-22 @ 06:01) (98 - 112)  BP: 97/56 (12-01-22 @ 06:10) (97/56 - 119/72)  RR: 18 (12-01-22 @ 06:01) (18 - 18)  SpO2: 99% (12-01-22 @ 06:01) (98% - 100%)     Telemetry/Alarms: afib  General: Appears stated age, thin  Neurology: Awake, nonfocal, BAXTER x 4  Eyes: Scleras clear, PERRLA/ EOMI, Gross vision intact  ENT:Gross hearing intact, grossly patent pharynx, no stridor  Neck: Neck supple, trachea midline, No JVD,   Respiratory: On O2, in NAD, no accessory muscle use.  CV: afib, rate controlled  Abdominal: Soft, NT, ND +BS,   Extremities: Thin extremities, No edema, + peripheral pulses  Psych: Oriented x 3, normal affect  Incisions: c,d,i  Tubes: L pleurX to -10sxn drained 480c/24h, condom catheter in place.    Relevant labs, radiology and Medications reviewed  Protein Electrophoresis, Serum (12.01.22 @ 06:30)   Protein Total, Serum: 6.1 g/dL   Urinalysis (12.01.22 @ 00:54)   Glucose Qualitative, Urine: Negative   Blood, Urine: Negative   pH Urine: 6.0   Color: Yellow   Urine Appearance: Slightly Turbid   Bilirubin: Negative   Ketone - Urine: Negative   Specific Gravity: 1.023   Protein, Urine: 30 mg/dL   Urobilinogen: 3 mg/dL   Nitrite: Negative   Leukocyte Esterase Concentration: Moderate       Historical Values  Urinalysis (12.01.22 @ 00:54)   Glucose Qualitative, Urine: Negative   Blood, Urine: Negative   pH Urine: 6.0   Color: Yellow   Urine Appearance: Slightly Turbid   Bilirubin: Negative   Ketone - Urine: Negative   Specific Gravity: 1.023   Protein, Urine: 30 mg/dL   Urobilinogen: 3 mg/dL   Nitrite: Negative   Leukocyte Esterase Concentration: Moderate Urine Microscopic-Add On (NC) (12.01.22 @ 00:54)   White Blood Cell - Urine: 13 /HPF   Red Blood Cell - Urine: 1 /HPF   Hyaline Casts: 4 /LPF   Epithelial Cells: 2 /HPF   Comment - Urine: yeast present   Bacteria: Negative Total Protein, Random Urine (12.01.22 @ 00:54)   Total Protein, Random Urine: 46: Reference range not established for this test mg/dL                         9.1    14.43 )-----------( 347      ( 01 Dec 2022 06:30 )             31.6     12-01    138  |  108<H>  |  49<H>  ----------------------------<  97  4.9   |  23  |  1.50<H>    Ca    8.9      01 Dec 2022 06:30  Phos  3.2     12-01  Mg     2.00     12-01    TPro  6.1  /  Alb  x   /  TBili  x   /  DBili  x   /  AST  x   /  ALT  x   /  AlkPhos  x   12-01    PT/INR - ( 01 Dec 2022 06:30 )   PT: 17.7 sec;   INR: 1.52 ratio         PTT - ( 01 Dec 2022 06:30 )  PTT:30.8 sec  MEDICATIONS  (STANDING):  atorvastatin 40 milliGRAM(s) Oral at bedtime  dornase tamika Solution 2.5 milliGRAM(s) Inhalation daily  ipratropium    for Nebulization 500 MICROGram(s) Nebulizer once  levalbuterol Inhalation 0.63 milliGRAM(s) Inhalation once  levoFLOXacin  Tablet 750 milliGRAM(s) Oral every 48 hours  levothyroxine 100 MICROGram(s) Oral daily  lidocaine   4% Patch 1 Patch Transdermal every 24 hours  lidocaine 1% Injectable 30 milliLiter(s) Local Injection once  metoprolol tartrate 25 milliGRAM(s) Oral two times a day  nystatin Powder 1 Application(s) Topical two times a day  phytonadione   Solution 10 milliGRAM(s) Oral daily  polyethylene glycol 3350 17 Gram(s) Oral two times a day  senna 2 Tablet(s) Oral at bedtime  sodium chloride 0.9%. 1000 milliLiter(s) (60 mL/Hr) IV Continuous <Continuous>  sodium chloride 3%  Inhalation 4 milliLiter(s) Inhalation every 12 hours    MEDICATIONS  (PRN):  bisacodyl 5 milliGRAM(s) Oral every 12 hours PRN Constipation  bisacodyl Suppository 10 milliGRAM(s) Rectal daily PRN Constipation  oxyCODONE    IR 5 milliGRAM(s) Oral every 6 hours PRN Moderate to severe pain (4 - 10)    Pertinent Physical Exam  I&O's Summary    30 Nov 2022 07:01  -  01 Dec 2022 07:00  --------------------------------------------------------  IN: 2090 mL / OUT: 1080 mL / NET: 1010 mL        Assessment  89M with multiple comorbidities, history of recent L VATS, pleurex catheter placement 10/13/22 for pleural effusion who presents with bilateral pleural effusions (L>R) with increasing SOB over the last three weeks with decreased drainage from the Pleurex catheter.   Pleurx to waterseal, draining serous fluid. Pt INR elevated, awaiting down trend to do possible intervention on Rt effusion and possible MIST protocol via left Pleurx.     11/21/22: Right pleural effusion appears improved on repeat AM CXR from yesterday and today. No ptc required at this time. Left PleurX is draining, 200cc over the last 24hrs. Will hold off on MIST. INR continues to be elevated, eliquis continues to be on hold. IMANI overnight, on home meds. Consult pts cardiologist Dr Le, TTE pending. Cont with IV Abx for elevated WBC, no cs sent. Cr improving, making urine.  11/22: INR 1.58, hold MIST.  11/23: INR 1.7, No Thoracentesis today however bedside US showed small effusion on right. Elevated LFTS. GI and Heme consulted.  11/24: INR 1.6, No thoracentesis today.   11/25: INR 1.5. Will place Right sided PTC today per DR. Rodas. Will send fluid for cytology, culture, lytes. Will switch antibiotics to po. Plan MIST via left Pleurx tomorrow.   11/26-11/27-Continued to monitor INR with no improvement. Rt PTC with continued serous output.   11/28-IVF hydration started for inc'd creatinine. MIST through L pleurX Called Heme Dr. Wilson for follow up who recommends continuing Vit K daily.   11/29 continue L pleurX to -10sxn and R PTC to waterseal  11/30 - Hypotensive overnight, given 5% albumin x2. PTC removed at bedside  12/ - Bladder scans Qshift      PLAN  Neuro: Pain management  Pulm: Encourage coughing, deep breathing and use of incentive spirometry. Wean off supplemental oxygen as able. Daily CXR.   Cardio: Dr. Le following- appreciate recs, pending amyloid tests. Monitor telemetry/alarms  GI: Tolerating diet. Continue stool softeners.  Renal: UA done yesterday. Dr. Lam following, appreciate recs, considering francis d/t high residual volume on bladder scan. monitor urine output, supplement electrolytes as needed  Vasc: SCDs for DVT prophylaxis  Heme: Stable H/H.   ID: Off antibiotics. Stable.  Therapy: OOB/ambulate  Tubes: PleurX to PleurEvac on -10sxn, monitor outputs.   Patient seen at bedside, resting in bed, in NAD.  Patient feeling tired and weak.  Denies any other acute events. No other active complaints at this time.    AM beta blocker rescheduled d/t low pressure in the morning. Has been stable since.    Vital Signs:  Vital Signs Last 24 Hrs  T(C): 36.5 (12-01-22 @ 06:01), Max: 37 (11-30-22 @ 23:55)  T(F): 97.7 (12-01-22 @ 06:01), Max: 98.6 (11-30-22 @ 23:55)  HR: 102 (12-01-22 @ 06:01) (98 - 112)  BP: 97/56 (12-01-22 @ 06:10) (97/56 - 119/72)  RR: 18 (12-01-22 @ 06:01) (18 - 18)  SpO2: 99% (12-01-22 @ 06:01) (98% - 100%)     Telemetry/Alarms: afib  General: Appears stated age, thin  Neurology: Awake, nonfocal, BAXTER x 4  Eyes: Scleras clear, PERRLA/ EOMI, Gross vision intact  ENT:Gross hearing intact, grossly patent pharynx, no stridor  Neck: Neck supple, trachea midline, No JVD,   Respiratory: On O2, in NAD, no accessory muscle use.  CV: afib, rate controlled  Abdominal: Soft, NT, ND +BS,   Extremities: Thin extremities, No edema, + peripheral pulses  Psych: Oriented x 3, normal affect  Incisions: c,d,i  Tubes: L pleurX to -10sxn drained 480c/24h, condom catheter in place.    Relevant labs, radiology and Medications reviewed  Protein Electrophoresis, Serum (12.01.22 @ 06:30)   Protein Total, Serum: 6.1 g/dL   Urinalysis (12.01.22 @ 00:54)   Glucose Qualitative, Urine: Negative   Blood, Urine: Negative   pH Urine: 6.0   Color: Yellow   Urine Appearance: Slightly Turbid   Bilirubin: Negative   Ketone - Urine: Negative   Specific Gravity: 1.023   Protein, Urine: 30 mg/dL   Urobilinogen: 3 mg/dL   Nitrite: Negative   Leukocyte Esterase Concentration: Moderate       Historical Values  Urinalysis (12.01.22 @ 00:54)   Glucose Qualitative, Urine: Negative   Blood, Urine: Negative   pH Urine: 6.0   Color: Yellow   Urine Appearance: Slightly Turbid   Bilirubin: Negative   Ketone - Urine: Negative   Specific Gravity: 1.023   Protein, Urine: 30 mg/dL   Urobilinogen: 3 mg/dL   Nitrite: Negative   Leukocyte Esterase Concentration: Moderate Urine Microscopic-Add On (NC) (12.01.22 @ 00:54)   White Blood Cell - Urine: 13 /HPF   Red Blood Cell - Urine: 1 /HPF   Hyaline Casts: 4 /LPF   Epithelial Cells: 2 /HPF   Comment - Urine: yeast present   Bacteria: Negative Total Protein, Random Urine (12.01.22 @ 00:54)   Total Protein, Random Urine: 46: Reference range not established for this test mg/dL                         9.1    14.43 )-----------( 347      ( 01 Dec 2022 06:30 )             31.6     12-01    138  |  108<H>  |  49<H>  ----------------------------<  97  4.9   |  23  |  1.50<H>    Ca    8.9      01 Dec 2022 06:30  Phos  3.2     12-01  Mg     2.00     12-01    TPro  6.1  /  Alb  x   /  TBili  x   /  DBili  x   /  AST  x   /  ALT  x   /  AlkPhos  x   12-01    PT/INR - ( 01 Dec 2022 06:30 )   PT: 17.7 sec;   INR: 1.52 ratio         PTT - ( 01 Dec 2022 06:30 )  PTT:30.8 sec  MEDICATIONS  (STANDING):  atorvastatin 40 milliGRAM(s) Oral at bedtime  dornase tamika Solution 2.5 milliGRAM(s) Inhalation daily  ipratropium    for Nebulization 500 MICROGram(s) Nebulizer once  levalbuterol Inhalation 0.63 milliGRAM(s) Inhalation once  levoFLOXacin  Tablet 750 milliGRAM(s) Oral every 48 hours  levothyroxine 100 MICROGram(s) Oral daily  lidocaine   4% Patch 1 Patch Transdermal every 24 hours  lidocaine 1% Injectable 30 milliLiter(s) Local Injection once  metoprolol tartrate 25 milliGRAM(s) Oral two times a day  nystatin Powder 1 Application(s) Topical two times a day  phytonadione   Solution 10 milliGRAM(s) Oral daily  polyethylene glycol 3350 17 Gram(s) Oral two times a day  senna 2 Tablet(s) Oral at bedtime  sodium chloride 0.9%. 1000 milliLiter(s) (60 mL/Hr) IV Continuous <Continuous>  sodium chloride 3%  Inhalation 4 milliLiter(s) Inhalation every 12 hours    MEDICATIONS  (PRN):  bisacodyl 5 milliGRAM(s) Oral every 12 hours PRN Constipation  bisacodyl Suppository 10 milliGRAM(s) Rectal daily PRN Constipation  oxyCODONE    IR 5 milliGRAM(s) Oral every 6 hours PRN Moderate to severe pain (4 - 10)    Pertinent Physical Exam  I&O's Summary    30 Nov 2022 07:01  -  01 Dec 2022 07:00  --------------------------------------------------------  IN: 2090 mL / OUT: 1080 mL / NET: 1010 mL        Assessment  89M with multiple comorbidities, history of recent L VATS, pleurex catheter placement 10/13/22 for pleural effusion who presents with bilateral pleural effusions (L>R) with increasing SOB over the last three weeks with decreased drainage from the Pleurex catheter.   Pleurx to waterseal, draining serous fluid. Pt INR elevated, awaiting down trend to do possible intervention on Rt effusion and possible MIST protocol via left Pleurx.     11/21/22: Right pleural effusion appears improved on repeat AM CXR from yesterday and today. No ptc required at this time. Left PleurX is draining, 200cc over the last 24hrs. Will hold off on MIST. INR continues to be elevated, eliquis continues to be on hold. IMANI overnight, on home meds. Consult pts cardiologist Dr Le, TTE pending. Cont with IV Abx for elevated WBC, no cs sent. Cr improving, making urine.  11/22: INR 1.58, hold MIST.  11/23: INR 1.7, No Thoracentesis today however bedside US showed small effusion on right. Elevated LFTS. GI and Heme consulted.  11/24: INR 1.6, No thoracentesis today.   11/25: INR 1.5. Will place Right sided PTC today per DR. Rodas. Will send fluid for cytology, culture, lytes. Will switch antibiotics to po. Plan MIST via left Pleurx tomorrow.   11/26-11/27-Continued to monitor INR with no improvement. Rt PTC with continued serous output.   11/28-IVF hydration started for inc'd creatinine. MIST through L pleurX Called Bill Wilson for follow up who recommends continuing Vit K daily.   11/29 continue L pleurX to -10sxn and R PTC to waterseal  11/30 - Hypotensive overnight, given 5% albumin x2. PTC removed at bedside        PLAN  Neuro: Pain management  Pulm: Encourage coughing, deep breathing and use of incentive spirometry. Wean off supplemental oxygen as able. Daily CXR.   Cardio: Dr. Le following- appreciate recs, pending amyloid tests. Monitor telemetry/alarms  GI: Tolerating diet. Continue stool softeners.  Renal: UA done yesterday. Dr. Lam following, appreciate recs, considering francis d/t high residual volume on bladder scan. monitor urine output, supplement electrolytes as needed  Vasc: SCDs for DVT prophylaxis  Heme: Stable H/H.   ID: Off antibiotics. Stable.  Therapy: OOB/ambulate  Tubes: PleurX to PleurEvac on -10sxn, monitor outputs.

## 2022-12-01 NOTE — PROGRESS NOTE ADULT - SUBJECTIVE AND OBJECTIVE BOX
Subjective: Patient seen and examined. No new events except as noted.   Patient feels and looks better today  bladder distended refusing francis  creatinine better HR improved BP better  MEDICATIONS:  MEDICATIONS  (STANDING):  atorvastatin 40 milliGRAM(s) Oral at bedtime  dornase tamika Solution 2.5 milliGRAM(s) Inhalation daily  ipratropium    for Nebulization 500 MICROGram(s) Nebulizer once  levalbuterol Inhalation 0.63 milliGRAM(s) Inhalation once  levoFLOXacin  Tablet 750 milliGRAM(s) Oral every 48 hours  levothyroxine 100 MICROGram(s) Oral daily  lidocaine   4% Patch 1 Patch Transdermal every 24 hours  lidocaine 1% Injectable 30 milliLiter(s) Local Injection once  metoprolol tartrate 25 milliGRAM(s) Oral two times a day  nystatin Powder 1 Application(s) Topical two times a day  phytonadione   Solution 10 milliGRAM(s) Oral daily  polyethylene glycol 3350 17 Gram(s) Oral two times a day  senna 2 Tablet(s) Oral at bedtime  sodium chloride 0.9%. 1000 milliLiter(s) (60 mL/Hr) IV Continuous <Continuous>  sodium chloride 3%  Inhalation 4 milliLiter(s) Inhalation every 12 hours      PHYSICAL EXAM:  T(C): 36.9 (12-01-22 @ 09:19), Max: 37 (11-30-22 @ 23:55)  HR: 109 (12-01-22 @ 09:19) (102 - 112)  BP: 97/53 (12-01-22 @ 09:19) (97/53 - 119/72)  RR: 16 (12-01-22 @ 09:19) (16 - 18)  SpO2: 96% (12-01-22 @ 09:19) (96% - 100%)  Wt(kg): --  I&O's Summary    30 Nov 2022 07:01  -  01 Dec 2022 07:00  --------------------------------------------------------  IN: 2090 mL / OUT: 1080 mL / NET: 1010 mL    01 Dec 2022 07:01  -  01 Dec 2022 10:17  --------------------------------------------------------  IN: 180 mL / OUT: 180 mL / NET: 0 mL          Appearance: looks more comfortable unshaven less dyspneic	  HEENT:   Normal oral mucosa, PERRL, EOMI	  Cardiovascular: Normal S1 S2, irregularly irregular 1/6 CAROLINE right base   Respiratory: Lungs  decreased BS on left 1/3 up right slight decreased  BS but movig air no rales rhinchi or whheezes  Gastrointestinal: feels distended  Skin: No rashes, No ecchymoses, No cyanosis, warm to touch  Musculoskeletal: Normal range of motion, normal strength  Psychiatry:  Mood & affect appropriate  Ext: No edema  Peripheral pulses palpable 2+ bilaterally      LABS:    CARDIAC MARKERS:                                9.1    14.43 )-----------( 347      ( 01 Dec 2022 06:30 )             31.6     12-01    138  |  108<H>  |  49<H>  ----------------------------<  97  4.9   |  23  |  1.50<H>    Ca    8.9      01 Dec 2022 06:30  Phos  3.2     12-01  Mg     2.00     12-01    TPro  6.1  /  Alb  x   /  TBili  x   /  DBili  x   /  AST  x   /  ALT  x   /  AlkPhos  x   12-01    proBNP:   Lipid Profile:   HgA1c:   TSH:           TELEMETRY: 	    ECG:  	  RADIOLOGY:   DIAGNOSTIC TESTING:  [ ] Echocardiogram:  [ ]  Catheterization:  [ ] Stress Test:    OTHER:

## 2022-12-01 NOTE — PROGRESS NOTE ADULT - ASSESSMENT
ASSESSMENT/PLAN:   89 year old male with PMH of Chronic Afib- Eliquis Aortic valve calcification, HTN, HLD, CVA, CABG in 2012, prostate cancer s/p radiation, left pleural effusion s/p L VATS  Hypotension   TIM and hyperkalemia   Rapid afib and Rapid ventricular rate     1 Renal-TIM at present from post obstruction   Bladder scan ~760 cc, [place] cath in place, refusing francis   His Urologist Dr Smith called   Lasix 40mg IVP x 1   2 Diet- s/p Lokelma x 1 and defer Ensure and continue to maintain no potassium in diet   3 CVS-Increase  lopressor to 37.5mg po bid.  This should not reduce the BP   DC the IVF        [facility]   [facility]   [phone]

## 2022-12-01 NOTE — CONSULT NOTE ADULT - ASSESSMENT
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 He has mild TIM.    -repeat renal US to determine if hydronephrosis  -I too recommended a francis catheter to the patient, explaining that he is putting his kidneys at risk by failing to empty his bladder. He is refusing due to history of catheter experience during episode of radiation cystitis. He had enema today and has bene constipated and is also using narcotics for pain.  -would recommend alternative to pain control - limit narcotic use  -enema/bowel regimen  -If fails to void well with residuals <200cc tomorrow, he seems amenable to catheter insertion. No reported history of difficult insertion - attempt may be made by nursing.  -send urine culture when voids or catheter placed to rule out UTI as cause of retention and possible balance loss/fall last week  -d/w patient and family at bedside    Marcial Garcia MD  [facility]  [address] [address], [place] [address]  Office: [phone] Fax: [phone]

## 2022-12-01 NOTE — PROGRESS NOTE ADULT - ASSESSMENT
1. atrial fibrillation VR improved today  2. recurrent pleural effusions etiology unclear  3. calcified aortic valve not severe AS probably mild to moderate  4. renal insufficiency  5. bladder retention  6. diastolic dysfunction    Patient today appears clinically improved. Underlying etiology of pleural effusions unclear. I raised possible diagnosis of amyloid    Recommend  continue metoprolol  renal consult pending  ?penny re:bladder retention  would hold diuresis for now

## 2022-12-01 NOTE — CONSULT NOTE ADULT - CONSULT REQUESTED DATE/TIME
22-Nov-2022 09:15
01-Dec-2022 15:13
23-Nov-2022 12:06
30-Nov-2022
23-Nov-2022 11:29
18-Nov-2022 16:29

## 2022-12-01 NOTE — CONSULT NOTE ADULT - SUBJECTIVE AND OBJECTIVE BOX
Patient is a 89y old  Male who presents with a chief complaint of TIM (01 Dec 2022 12:00)      HPI:  89 year old male with PMH of Chronic Afib- Eliquis Aortic valve calcification, HTN, HLD, CVA, CABG in , prostate cancer s/p radiation, left pleural effusion s/p L VATS, drainage of effusion, Pleurx catheter placement on 10/13/22. He presents to the ED today for increasing shortness of breath and mechanical fall yesterday. Patient states that he has noticed increasing shortness of breath over the last couple of weeks which worsens when laying down or with exertion. He gets his Pleurex drained twice a week with VNS on  and  and states that over the last three weeks, the fluid evacuated has decreased dramatically to less than 100cc per encounter. He additionally complains of chills and night sweats. He denies fevers, nausea, diarrhea. He complains of constipation.    Patient also states that he sustained a mechanical fall yesterday. He denies head injury, LOC, nausea, or vomiting. He denies any pain.    (2022 18:58)    His baseline Cr is 1.25 and it has been slightly higher over last few days, peaking at 1.6 22. It has started to trend down. Bladder scan revealed residual of 800cc and he appears to be in overflow incontinence. He has been refusing catheter.      PAST MEDICAL & SURGICAL HISTORY:  HTN (hypertension)      HLD (hyperlipidemia)      Hyperthyroidism  treated with radioactive iodine      Hypothyroid      Atrial fibrillation  diagnosed many years ago   on anticoagulant      CVA (cerebral vascular accident)    no residual      Spinal stenosis  h/o epidural injection      Prostate cancer  s/p radiation ? 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 No nausea, vomiting, or hematemesis; No diarrhea or constipation. No melena or hematochezia.  NEUROLOGICAL: No numbness or weakness  PSYCH: No depression, no mood changes  SKIN: No itching, burning, rashes, or lesions   All other review of systems is negative unless indicated above.    MEDICATIONS  (STANDING):  atorvastatin 40 milliGRAM(s) Oral at bedtime  dornase tamika Solution 2.5 milliGRAM(s) Inhalation daily  ipratropium    for Nebulization 500 MICROGram(s) Nebulizer once  levalbuterol Inhalation 0.63 milliGRAM(s) Inhalation once  levoFLOXacin  Tablet 750 milliGRAM(s) Oral every 48 hours  levothyroxine 100 MICROGram(s) Oral daily  lidocaine   4% Patch 1 Patch Transdermal every 24 hours  lidocaine 1% Injectable 30 milliLiter(s) Local Injection once  metoprolol tartrate 25 milliGRAM(s) Oral two times a day  nystatin Powder 1 Application(s) Topical two times a day  phytonadione   Solution 10 milliGRAM(s) Oral daily  polyethylene glycol 3350 17 Gram(s) Oral two times a day  senna 2 Tablet(s) Oral at bedtime  sodium chloride 3%  Inhalation 4 milliLiter(s) Inhalation every 12 hours    MEDICATIONS  (PRN):  bisacodyl 5 milliGRAM(s) Oral every 12 hours PRN Constipation  bisacodyl Suppository 10 milliGRAM(s) Rectal daily PRN Constipation  oxyCODONE    IR 5 milliGRAM(s) Oral every 6 hours PRN Moderate to severe pain (4 - 10)      Allergies    penicillin (Rash)    Intolerances        SOCIAL HISTORY: No illicit drug use    FAMILY HISTORY:      Vital Signs Last 24 Hrs  T(C): 36.6 (01 Dec 2022 13:19), Max: 37 (2022 23:55)  T(F): 97.8 (01 Dec 2022 13:19), Max: 98.6 (2022 23:55)  HR: 104 (01 Dec 2022 13:19) (102 - 119)  BP: 106/69 (01 Dec 2022 13:19) (97/53 - 123/78)  BP(mean): --  RR: 16 (01 Dec 2022 13:19) (16 - 18)  SpO2: 100% (01 Dec 2022 13:19) (96% - 100%)    Parameters below as of 01 Dec 2022 13:19  Patient On (Oxygen Delivery Method): nasal cannula  O2 Flow (L/min): 2      PHYSICAL EXAM:    Constitutional: NAD, well-developed  HEENT: JESÚS, EOMI, Normal Hearing, MMM  Neck: No JVD  Back: No CVA tenderness  Respiratory: No accessory respiratory muscle use  Abd: Soft, NT/ND  : palpable bladder  Extremities: No calf tenderness  Neurological: A/O x 3, no focal deficits  Psychiatric: Normal mood, normal affect  Skin: No rashes    I&O's Summary    2022 07:01  -  01 Dec 2022 07:00  --------------------------------------------------------  IN: 2090 mL / OUT: 1080 mL / NET: 1010 mL    01 Dec 2022 07:01  -  01 Dec 2022 15:18  --------------------------------------------------------  IN: 420 mL / OUT: 240 mL / NET: 180 mL        LABS:                        9.1    14.43 )-----------( 347      ( 01 Dec 2022 06:30 )             31.6     12-    138  |  108<H>  |  49<H>  ----------------------------<  97  4.9   |  23  |  1.50<H>    Ca    8.9      01 Dec 2022 06:30  Phos  3.2     12  Mg     2.00     12    TPro  6.1  /  Alb  x   /  TBili  x   /  DBili  x   /  AST  x   /  ALT  x   /  AlkPhos  x       PT/INR - ( 01 Dec 2022 06:30 )   PT: 17.7 sec;   INR: 1.52 ratio         PTT - ( 01 Dec 2022 06:30 )  PTT:30.8 sec  Urinalysis Basic - ( 01 Dec 2022 00:54 )    Color: Yellow / Appearance: Slightly Turbid / S.023 / pH: x  Gluc: x / Ketone: Negative  / Bili: Negative / Urobili: 3 mg/dL   Blood: x / Protein: 30 mg/dL / Nitrite: Negative   Leuk Esterase: Moderate / RBC: 1 /HPF / WBC 13 /HPF   Sq Epi: x / Non Sq Epi: 2 /HPF / Bacteria: Negative      Urine Culture: none    RADIOLOGY & ADDITIONAL STUDIES: US 22 Increased echogenicity of the right kidney, consistent with renal   parenchymal disease.

## 2022-12-01 NOTE — ADVANCED PRACTICE NURSE CONSULT - ASSESSMENT
Patient seen at 4pm. General: Patient is A&Ox 4, O2 NC, Right flank area chest tube removal site with dressing c/d/i, Left flank area with Pleurx cath covered by dressing c/d/i, bedbound, turned with 1 assist, incontinent of urine and stool. Patient dribbling moderate amount of urine continuously, however also retaining more than 800cc of urine as per primary RN who did bladder US. Patient refusing Indwelling Lott catheter at this time due to painful experience in the past. Daughter at bedside. Patient educated on importance of insertion of Lott catheter and complications due to urinary retention discussed with patient and his daughter. Covering surgical Team contacted - ANAM Duran and while at bedside plan was made to use Lidocaine gel before Lott insertion, patient and team agreed and will follow up. At this time lower Abdomen is distended and taut. Incontinent care provided for urine and stool during this assessment. Skin warm, dry with increased moisture in intertriginous folds, scattered areas of ecchymosis without hematoma on bilateral upper extremities.     Bilateral lower extremities with scattered areas of hyper and hypopigmentation. Dry, flaky skin. Fragile toenails, left 3rd toenail is missing, exposing pink nail bed. Increased coolness from midfoot to distal toes. + 4  Edema. Capillary refill >3 seconds. B/L feet with all toes and soles of feet and heels with bluish purplish discoloration. Unable to palpate DP/PT pulses, however, ANAM Duran explained that doppler was used yesterday by his team and they were able to hear pulses to B/L feet, + pallor on elevation and dependant rubor.    Sacrum to B/L buttocks- IAD incontinent associated dermatitis and trauma. All area with erythema, and moisture due to incontinence and patient fell at home before admission to the hospital on his buttocks, therefore 3 areas of denuded epidermis noted in a triangle manner- 1 to sacrum and one on each lower buttock with bruising at periwound each measuring 1cm x 1cm x 0.1cm, 100% mixture of pink dermis and fibrin.     B/L groin and upper inner thigh - Severe Incontinence Associated Dermatitis (IAD) complicated by suspected candidiasis as evidence by pinpoint satellite lesions/fluid filled vesicles visible at the periphery, As per patient he feels no pain in those areas. Hyperpigmentation of exposed area: dark william red hue, and erythema more intense in the center. Vesicles are more spread at the area where urethra was turned to, due to continuous urinary dribbling to those areas.

## 2022-12-02 NOTE — PROGRESS NOTE ADULT - ASSESSMENT
ASSESSMENT/PLAN:   89 year old male with PMH of Chronic Afib- Eliquis Aortic valve calcification, HTN, HLD, CVA, CABG in 2012, prostate cancer s/p radiation, left pleural effusion s/p L VATS  Hypotension   TIM and hyperkalemia   Rapid afib and Rapid ventricular rate .    1 Renal-TIM at present from post obstruction, Lott placed yesterday   s/p Lasix 40mg IVP x 1 yesterday and BP still a little too low for additional doses   2 Diet- s/p Lokelma x 1 and defer Ensure and continue to maintain no potassium in diet   Nepro shakes   3 CVS-Continue lopressor 37.5mg po bid.  This should not reduce the BP     [facility]   [phone]        ASSESSMENT/PLAN:   89 year old male with PMH of Chronic Afib- Eliquis Aortic valve calcification, HTN, HLD, CVA, CABG in 2012, prostate cancer s/p radiation, left pleural effusion s/p L VATS  Hypotension   TIM and hyperkalemia   Rapid afib and Rapid ventricular rate .    1 Renal-TIM at present from post obstruction, Lott placed yesterday .  Creatinine will start to normalize in 48 hours   s/p Lasix 40mg IVP x 1 yesterday and BP still a little too low for additional doses   2 Diet- s/p Lokelma x 1 and defer Ensure and continue to maintain no potassium in diet   Nepro shakes   3 CVS-Continue lopressor 37.5mg po bid.  This should not reduce the BP     [facility]   [phone]        ASSESSMENT/PLAN:   89 year old male with PMH of Chronic Afib- Eliquis Aortic valve calcification, HTN, HLD, CVA, CABG in 2012, prostate cancer s/p radiation, left pleural effusion s/p L VATS  Hypotension   TIM and hyperkalemia   Rapid afib and Rapid ventricular rate .    1 Renal-TIM at present from post obstruction, Francis placed yesterday .  Creatinine will start to normalize in 48 hours   s/p Lasix 40mg IVP x 1 yesterday and BP still a little too low for additional doses   2 Diet- s/p Lokelma x 1 and defer Ensure and continue to maintain no potassium in diet   Nepro shakes   3 CVS-Continue lopressor 37.5mg po bid.  This should not reduce the BP   4 -DC with the francis and outpt follow up c his outpt Urologist Dr Smith     [facility]   [phone]

## 2022-12-02 NOTE — PROGRESS NOTE ADULT - ASSESSMENT
1. atrial fibrillation VR improved today  2. recurrent pleural effusions etiology unclear  3. calcified aortic valve not severe AS probably mild to moderate  4. renal insufficiency  5. bladder retention now resolved after Lott cath  6. diastolic dysfunction    Patient today appears clinically improved. Underlying etiology of pleural effusions unclear. I raised possible diagnosis of amyloid    Recommend  continue metoprolol- increase dose to 37.5 twice a day and ultimately up to 50 twice a day as blood pressure tolerates   will eventually need gentle diuresis   follow BUN and creatinine   presently cardiac status appears to be stable

## 2022-12-02 NOTE — PROGRESS NOTE ADULT - SUBJECTIVE AND OBJECTIVE BOX
Patient seen at bedside with Thoracic team.  Resting in bed. In NAD.  Patient not complaining of abdominal discomfort or SOB.  Patient admits to back pain and pain on his buttock.  Denies any other acute overnight events, denies other acute complaints.    Vital Signs:  Vital Signs Last 24 Hrs  T(C): 36.8 (12-02-22 @ 04:30), Max: 37.2 (12-01-22 @ 16:21)  T(F): 98.2 (12-02-22 @ 04:30), Max: 99 (12-01-22 @ 16:21)  HR: 106 (12-02-22 @ 04:30) (104 - 120)  BP: 101/56 (12-02-22 @ 04:30) (85/51 - 123/78)  RR: 18 (12-02-22 @ 04:30) (16 - 18)  SpO2: 98% (12-02-22 @ 04:30) (96% - 100%)       Telemetry/Alarms: afib  General: Appears stated age, thin  Neurology: Awake, nonfocal, BAXTER x 4  Eyes: Scleras clear, PERRLA/ EOMI, Gross vision intact  ENT:Gross hearing intact, grossly patent pharynx, no stridor  Neck: Neck supple, trachea midline, No JVD,   Respiratory: On O2, in NAD, no accessory muscle use.  CV: afib, rate controlled  Abdominal: Soft, NT, ND +BS,   Extremities: Thin extremities, No edema, + peripheral pulses  Psych: Oriented x 3, normal affect  Incisions: c,d,i  Tubes: L pleurX capped. Lott catheter in place    Relevant labs, radiology and Medications reviewed                        9.1    14.43 )-----------( 347      ( 01 Dec 2022 06:30 )             31.6     12-01    138  |  108<H>  |  49<H>  ----------------------------<  97  4.9   |  23  |  1.50<H>    Ca    8.9      01 Dec 2022 06:30  Phos  3.2     12-01  Mg     2.00     12-01    TPro  6.1  /  Alb  2.6<L>  /  TBili  0.8  /  DBili  0.3  /  AST  34  /  ALT  26  /  AlkPhos  142<H>  12-02    PT/INR - ( 01 Dec 2022 06:30 )   PT: 17.7 sec;   INR: 1.52 ratio         PTT - ( 01 Dec 2022 06:30 )  PTT:30.8 sec  MEDICATIONS  (STANDING):  atorvastatin 40 milliGRAM(s) Oral at bedtime  dornase tamika Solution 2.5 milliGRAM(s) Inhalation daily  ipratropium    for Nebulization 500 MICROGram(s) Nebulizer once  levalbuterol Inhalation 0.63 milliGRAM(s) Inhalation once  levoFLOXacin  Tablet 750 milliGRAM(s) Oral every 48 hours  levothyroxine 100 MICROGram(s) Oral daily  lidocaine   4% Patch 1 Patch Transdermal every 24 hours  metoprolol tartrate 25 milliGRAM(s) Oral two times a day  nystatin Powder 1 Application(s) Topical two times a day  phytonadione   Solution 10 milliGRAM(s) Oral daily  polyethylene glycol 3350 17 Gram(s) Oral two times a day  senna 2 Tablet(s) Oral at bedtime  sodium chloride 3%  Inhalation 4 milliLiter(s) Inhalation every 12 hours    MEDICATIONS  (PRN):  acetaminophen     Tablet .. 650 milliGRAM(s) Oral every 6 hours PRN Mild Pain (1 - 3), Moderate Pain (4 - 6)  bisacodyl 5 milliGRAM(s) Oral every 12 hours PRN Constipation  bisacodyl Suppository 10 milliGRAM(s) Rectal daily PRN Constipation  oxyCODONE    IR 5 milliGRAM(s) Oral every 6 hours PRN Moderate to severe pain (4 - 10)    Pertinent Physical Exam  I&O's Summary    01 Dec 2022 07:01  -  02 Dec 2022 07:00  --------------------------------------------------------  IN: 1100 mL / OUT: 1590 mL / NET: -490 mL      Assessment  89M with multiple comorbidities, history of recent L VATS, pleurex catheter placement 10/13/22 for pleural effusion who presents with bilateral pleural effusions (L>R) with increasing SOB over the last three weeks with decreased drainage from the Pleurex catheter.   Pleurx to waterseal, draining serous fluid. Pt INR elevated, awaiting down trend to do possible intervention on Rt effusion and possible MIST protocol via left Pleurx.     11/21/22: Right pleural effusion appears improved on repeat AM CXR from yesterday and today. No ptc required at this time. Left PleurX is draining, 200cc over the last 24hrs. Will hold off on MIST. INR continues to be elevated, eliquis continues to be on hold. IMANI overnight, on home meds. Consult pts cardiologist Dr Le, TTE pending. Cont with IV Abx for elevated WBC, no cs sent. Cr improving, making urine.  11/22: INR 1.58, hold MIST.  11/23: INR 1.7, No Thoracentesis today however bedside US showed small effusion on right. Elevated LFTS. GI and Heme consulted.  11/24: INR 1.6, No thoracentesis today.   11/25: INR 1.5. Will place Right sided PTC today per DR. Rodas. Will send fluid for cytology, culture, lytes. Will switch antibiotics to po. Plan MIST via left Pleurx tomorrow.   11/26-11/27: Continued to monitor INR with no improvement. Rt PTC with continued serous output.   11/28: IVF hydration started for inc'd creatinine. MIST through L pleurX Called Bill Wilson for follow up who recommends continuing Vit K daily.   11/29: continue L pleurX to -10sxn and R PTC to waterseal  11/30: Hypotensive overnight, given 5% albumin x2. PTC removed at bedside  12/2: Lott placed last night (>1L out immediately)         PLAN  Neuro: Pain management  Pulm: Encourage coughing, deep breathing and use of incentive spirometry. Wean off supplemental oxygen as able. Daily CXR.   Cardio: Dr. Le following- appreciate recs, pending amyloid tests. Monitor telemetry/alarms  GI: Tolerating diet. Continue stool softeners.  Renal: Lott placed yesterday. Dr. Lam following, appreciate recs. monitor urine output, supplement electrolytes as needed  Vasc: SCDs for DVT prophylaxis  Heme: Stable H/H.   ID: Off antibiotics. Stable.  Therapy: OOB/ambulate.   Wound Care: Per wound care nurse: B/L Incontinence Associated Dermatitis (IAD) w/ associated candidiasis and sacral/buttock decubitus ulcers - recs Apply Ji antifungal moisture barrier cream twice a day and PRN with incontinent episodes  Tubes: PleurX capped, monitor outputs.  Dispo: D/C to rehab once stable and optimized.

## 2022-12-02 NOTE — PROGRESS NOTE ADULT - SUBJECTIVE AND OBJECTIVE BOX
Subjective: Patient seen and examined. No new events except as noted.   Patient looks better and feels better.    He denies chest pain or significant shortness of breath.    He now is on metoprolol 25 twice daily and his heart rate is  irregularly irregular   he had a Lott inserted with good amount of fluid removed from his bladder which is also making him feel better  MEDICATIONS:  MEDICATIONS  (STANDING):  atorvastatin 40 milliGRAM(s) Oral at bedtime  dornase tamika Solution 2.5 milliGRAM(s) Inhalation daily  levoFLOXacin  Tablet 750 milliGRAM(s) Oral every 48 hours  levothyroxine 100 MICROGram(s) Oral daily  lidocaine   4% Patch 1 Patch Transdermal every 24 hours  metoprolol tartrate 25 milliGRAM(s) Oral two times a day  nystatin Powder 1 Application(s) Topical two times a day  polyethylene glycol 3350 17 Gram(s) Oral two times a day  senna 2 Tablet(s) Oral at bedtime  sodium chloride 3%  Inhalation 4 milliLiter(s) Inhalation every 12 hours      PHYSICAL EXAM:  T(C): 36.3 (12-02-22 @ 12:51), Max: 37.2 (12-01-22 @ 16:21)  HR: 122 (12-02-22 @ 12:51) (106 - 122)  BP: 102/54 (12-02-22 @ 12:51) (85/51 - 112/60)  RR: 18 (12-02-22 @ 12:51) (18 - 18)  SpO2: 98% (12-02-22 @ 12:51) (96% - 100%)  Wt(kg): --  I&O's Summary    01 Dec 2022 07:01  -  02 Dec 2022 07:00  --------------------------------------------------------  IN: 1100 mL / OUT: 1590 mL / NET: -490 mL    02 Dec 2022 07:01  -  02 Dec 2022 15:02  --------------------------------------------------------  IN: 240 mL / OUT: 550 mL / NET: -310 mL          Appearance: Normal  chronically ill-appearing	  HEENT:   Normal oral mucosa, PERRL, EOMI	  Cardiovascular: Normal S1 S2,  irregularly irregular 1/6 systolic ejection murmur at the right base.  There is marked neck vein distention of the external jugular veins  Respiratory: Lungs clear to auscultation, normal effort  decreased breath sounds on the left one third up Pleurx catheter in place	  Gastrointestinal:  Soft, Non-tender, + BS	  Skin: No rashes, No ecchymoses, No cyanosis, warm to touch  Musculoskeletal: Normal range of motion, normal strength  Psychiatry:  Mood & affect appropriate.  His mood is improved  Ext: No edema      LABS:    CARDIAC MARKERS:                                9.1    14.43 )-----------( 347      ( 01 Dec 2022 06:30 )             31.6     12-01    138  |  108<H>  |  49<H>  ----------------------------<  97  4.9   |  23  |  1.50<H>    Ca    8.9      01 Dec 2022 06:30  Phos  3.2     12-01  Mg     2.00     12-01    TPro  6.1  /  Alb  2.6<L>  /  TBili  0.8  /  DBili  0.3  /  AST  34  /  ALT  26  /  AlkPhos  142<H>  12-02    proBNP:   Lipid Profile:   HgA1c:   TSH:           TELEMETRY: 	    ECG:  	 A. fib ventricular rate between 90 and 120  RADIOLOGY:   OTHER:

## 2022-12-02 NOTE — PROGRESS NOTE ADULT - SUBJECTIVE AND OBJECTIVE BOX
NEPHROLOGY     Patient seen and examined resting, reports of lower back pain, though controlled with medications, denies sob, on 2L NC, francis placed yesterday, in no acute distress.     MEDICATIONS  (STANDING):  atorvastatin 40 milliGRAM(s) Oral at bedtime  dornase tamika Solution 2.5 milliGRAM(s) Inhalation daily  ipratropium    for Nebulization 500 MICROGram(s) Nebulizer once  levalbuterol Inhalation 0.63 milliGRAM(s) Inhalation once  levoFLOXacin  Tablet 750 milliGRAM(s) Oral every 48 hours  levothyroxine 100 MICROGram(s) Oral daily  lidocaine   4% Patch 1 Patch Transdermal every 24 hours  metoprolol tartrate 25 milliGRAM(s) Oral two times a day  nystatin Powder 1 Application(s) Topical two times a day  phytonadione   Solution 10 milliGRAM(s) Oral daily  polyethylene glycol 3350 17 Gram(s) Oral two times a day  senna 2 Tablet(s) Oral at bedtime  sodium chloride 3%  Inhalation 4 milliLiter(s) Inhalation every 12 hours    VITALS:  T(C): , Max: 37.2 (22 @ 16:21)  T(F): , Max: 99 (22 @ 16:21)  HR: 106 (22 @ 04:30)  BP: 101/56 (22 @ 04:30)  BP(mean): 67 (22 @ 04:30)  RR: 18 (22 @ 04:30)  SpO2: 98% (22 @ 04:30)    I and O's:     @ 07:01  -   @ 07:00  --------------------------------------------------------  IN: 1100 mL / OUT: 1590 mL / NET: -490 mL    PHYSICAL EXAM:  Constitutional: NAD  HEENT: EOMI  Neck:  +  JVD, supple   Respiratory: diminished at bases   Cardiovascular: S1 and S2, RRR + tachycardia   Gastrointestinal: + BS, soft, NT, ND  Extremities: tr le edema  Neurological: A/O x 3, CN2-12 intact  Psychiatric: Normal mood, normal affect  : + francis   Skin: No rashes, C/D/I    LABS:                        9.1    14.43 )-----------( 347      ( 01 Dec 2022 06:30 )             31.6     12    138  |  108<H>  |  49<H>  ----------------------------<  97  4.9   |  23  |  1.50<H>    Ca    8.9      01 Dec 2022 06:30  Phos  3.2       Mg     2.00         TPro  6.1  /  Alb  2.6<L>  /  TBili  0.8  /  DBili  0.3  /  AST  34  /  ALT  26  /  AlkPhos  142<H>      Urine Studies:  Urinalysis Basic - ( 01 Dec 2022 00:54 )    Color: Yellow / Appearance: Slightly Turbid / S.023 / pH: x  Gluc: x / Ketone: Negative  / Bili: Negative / Urobili: 3 mg/dL   Blood: x / Protein: 30 mg/dL / Nitrite: Negative   Leuk Esterase: Moderate / RBC: 1 /HPF / WBC 13 /HPF   Sq Epi: x / Non Sq Epi: 2 /HPF / Bacteria: Negative    ASSESSMENT/PLAN:   89 year old male with PMH of Chronic Afib- Eliquis Aortic valve calcification, HTN, HLD, CVA, CABG in , prostate cancer s/p radiation, left pleural effusion s/p L VATS  Hypotension   TIM and hyperkalemia   Rapid afib and Rapid ventricular rate        NEPHROLOGY     Patient seen and examined resting, reports of lower back pain, though controlled with medications, denies sob, on 2L NC, francis placed yesterday, in no acute distress.     MEDICATIONS  (STANDING):  atorvastatin 40 milliGRAM(s) Oral at bedtime  dornase tamika Solution 2.5 milliGRAM(s) Inhalation daily  ipratropium    for Nebulization 500 MICROGram(s) Nebulizer once  levalbuterol Inhalation 0.63 milliGRAM(s) Inhalation once  levoFLOXacin  Tablet 750 milliGRAM(s) Oral every 48 hours  levothyroxine 100 MICROGram(s) Oral daily  lidocaine   4% Patch 1 Patch Transdermal every 24 hours  metoprolol tartrate 25 milliGRAM(s) Oral two times a day  nystatin Powder 1 Application(s) Topical two times a day  phytonadione   Solution 10 milliGRAM(s) Oral daily  polyethylene glycol 3350 17 Gram(s) Oral two times a day  senna 2 Tablet(s) Oral at bedtime  sodium chloride 3%  Inhalation 4 milliLiter(s) Inhalation every 12 hours    VITALS:  T(C): , Max: 37.2 (22 @ 16:21)  T(F): , Max: 99 (22 @ 16:21)  HR: 106 (22 @ 04:30)  BP: 101/56 (22 @ 04:30)  BP(mean): 67 (22 @ 04:30)  RR: 18 (22 @ 04:30)  SpO2: 98% (22 @ 04:30)    I and O's:     @ 07:01  -   @ 07:00  --------------------------------------------------------  IN: 1100 mL / OUT: 1590 mL / NET: -490 mL    PHYSICAL EXAM:  Constitutional: NAD  HEENT: EOMI  Neck:  +  JVD, supple   Respiratory: diminished at bases   Cardiovascular: S1 and S2, RRR + tachycardia   Gastrointestinal: + BS, soft, NT, ND  Extremities: tr le edema  Neurological: A/O x 3, CN2-12 intact  Psychiatric: Normal mood, normal affect  : + francis   Skin: No rashes, C/D/I    LABS:                        9.1    14.43 )-----------( 347      ( 01 Dec 2022 06:30 )             31.6     12    138  |  108<H>  |  49<H>  ----------------------------<  97  4.9   |  23  |  1.50<H>    Ca    8.9      01 Dec 2022 06:30  Phos  3.2       Mg     2.00         TPro  6.1  /  Alb  2.6<L>  /  TBili  0.8  /  DBili  0.3  /  AST  34  /  ALT  26  /  AlkPhos  142<H>      Urine Studies:  Urinalysis Basic - ( 01 Dec 2022 00:54 )    Color: Yellow / Appearance: Slightly Turbid / S.023 / pH: x  Gluc: x / Ketone: Negative  / Bili: Negative / Urobili: 3 mg/dL   Blood: x / Protein: 30 mg/dL / Nitrite: Negative   Leuk Esterase: Moderate / RBC: 1 /HPF / WBC 13 /HPF   Sq Epi: x / Non Sq Epi: 2 /HPF / Bacteria: Negative    ASSESSMENT/PLAN:   89 year old male with PMH of Chronic Afib- Eliquis Aortic valve calcification, HTN, HLD, CVA, CABG in , prostate cancer s/p radiation, left pleural effusion s/p L VATS  Hypotension   TIM and hyperkalemia   Rapid afib and Rapid ventricular rate     1 Renal-TIM at present from post obstruction, Francis placed yesterday   s/p Lasix 40mg IVP x 1 yesterday  2 Diet- s/p Lokelma x 1 and defer Ensure and continue to maintain no potassium in diet   3 CVS-Continue lopressor 37.5mg po bid.  This should not reduce the BP          NEPHROLOGY     Patient seen and examined resting, reports of lower back pain, though controlled with medications, denies sob, on 2L NC, penny placed yesterday, in no acute distress.     MEDICATIONS  (STANDING):  atorvastatin 40 milliGRAM(s) Oral at bedtime  dornase tamika Solution 2.5 milliGRAM(s) Inhalation daily  ipratropium    for Nebulization 500 MICROGram(s) Nebulizer once  levalbuterol Inhalation 0.63 milliGRAM(s) Inhalation once  levoFLOXacin  Tablet 750 milliGRAM(s) Oral every 48 hours.  levothyroxine 100 MICROGram(s) Oral daily  lidocaine   4% Patch 1 Patch Transdermal every 24 hours  metoprolol tartrate 25 milliGRAM(s) Oral two times a day  nystatin Powder 1 Application(s) Topical two times a day  phytonadione   Solution 10 milliGRAM(s) Oral daily  polyethylene glycol 3350 17 Gram(s) Oral two times a day  senna 2 Tablet(s) Oral at bedtime  sodium chloride 3%  Inhalation 4 milliLiter(s) Inhalation every 12 hours    VITALS:  T(C): , Max: 37.2 (22 @ 16:21)  T(F): , Max: 99 (22 @ 16:21)  HR: 106 (22 @ 04:30)  BP: 101/56 (22 @ 04:30).  BP(mean): 67 (22 @ 04:30)  RR: 18 (22 @ 04:30)  SpO2: 98% (22 @ 04:30)    I and O's:     @ 07:01  -   @ 07:00  --------------------------------------------------------  IN: 1100 mL / OUT: 1590 mL / NET: -490 mL    PHYSICAL EXAM:  Constitutional: NAD  HEENT: EOMI  Neck:  +  JVD, supple   Respiratory: diminished at bases   Cardiovascular: S1 and S2, RRR + tachycardia   Gastrointestinal: + BS, soft, NT, ND  Extremities: tr le edema  Neurological: A/O x 3, CN2-12 intact  Psychiatric: Normal mood, normal affect  : + francis   Skin: No rashes, C/D/I    LABS:                        9.1    14.43 )-----------( 347      ( 01 Dec 2022 06:30 )             31.6     12    138  |  108<H>  |  49<H>  ----------------------------<  97  4.9   |  23  |  1.50<H>    Ca    8.9      01 Dec 2022 06:30  Phos  3.2       Mg     2.00         TPro  6.1  /  Alb  2.6<L>  /  TBili  0.8  /  DBili  0.3  /  AST  34  /  ALT  26  /  AlkPhos  142<H>      Urine Studies:  Urinalysis Basic - ( 01 Dec 2022 00:54 )    Color: Yellow / Appearance: Slightly Turbid / S.023 / pH: x  Gluc: x / Ketone: Negative  / Bili: Negative / Urobili: 3 mg/dL   Blood: x / Protein: 30 mg/dL / Nitrite: Negative   Leuk Esterase: Moderate / RBC: 1 /HPF / WBC 13 /HPF   Sq Epi: x / Non Sq Epi: 2 /HPF / Bacteria: Negative   NEPHROLOGY     Patient seen and examined resting, reports of lower back pain, though controlled with medications, denies sob, on 2L NC, penny placed yesterday, in no acute distress.     MEDICATIONS  (STANDING):  atorvastatin 40 milliGRAM(s) Oral at bedtime  dornase tamika Solution 2.5 milliGRAM(s) Inhalation daily  ipratropium    for Nebulization 500 MICROGram(s) Nebulizer once  levalbuterol Inhalation 0.63 milliGRAM(s) Inhalation once  levoFLOXacin  Tablet 750 milliGRAM(s) Oral every 48 hours.  levothyroxine 100 MICROGram(s) Oral daily  lidocaine   4% Patch 1 Patch Transdermal every 24 hours  metoprolol tartrate 25 milliGRAM(s) Oral two times a day  nystatin Powder 1 Application(s) Topical two times a day  phytonadione   Solution 10 milliGRAM(s) Oral daily  polyethylene glycol 3350 17 Gram(s) Oral two times a day  senna 2 Tablet(s) Oral at bedtime  sodium chloride 3%  Inhalation 4 milliLiter(s) Inhalation every 12 hours.    VITALS:  T(C): , Max: 37.2 (22 @ 16:21)  T(F): , Max: 99 (22 @ 16:21)  HR: 106 (22 @ 04:30)  BP: 101/56 (22 @ 04:30).  BP(mean): 67 (22 @ 04:30)  RR: 18 (22 @ 04:30)  SpO2: 98% (22 @ 04:30)    I and O's:     @ 07:01  -   @ 07:00  --------------------------------------------------------  IN: 1100 mL / OUT: 1590 mL / NET: -490 mL    PHYSICAL EXAM:  Constitutional: NAD  HEENT: EOMI  Neck:  +  JVD, supple   Respiratory: diminished at bases   Cardiovascular: S1 and S2, RRR + tachycardia   Gastrointestinal: + BS, soft, NT, ND  Extremities: tr le edema  Neurological: A/O x 3, CN2-12 intact  Psychiatric: Normal mood, normal affect  : + francis   Skin: No rashes, C/D/I    LABS:                        9.1    14.43 )-----------( 347      ( 01 Dec 2022 06:30 )             31.6     12    138  |  108<H>  |  49<H>  ----------------------------<  97  4.9   |  23  |  1.50<H>    Ca    8.9      01 Dec 2022 06:30  Phos  3.2       Mg     2.00         TPro  6.1  /  Alb  2.6<L>  /  TBili  0.8  /  DBili  0.3  /  AST  34  /  ALT  26  /  AlkPhos  142<H>      Urine Studies:  Urinalysis Basic - ( 01 Dec 2022 00:54 )    Color: Yellow / Appearance: Slightly Turbid / S.023 / pH: x  Gluc: x / Ketone: Negative  / Bili: Negative / Urobili: 3 mg/dL   Blood: x / Protein: 30 mg/dL / Nitrite: Negative   Leuk Esterase: Moderate / RBC: 1 /HPF / WBC 13 /HPF   Sq Epi: x / Non Sq Epi: 2 /HPF / Bacteria: Negative

## 2022-12-03 NOTE — PROGRESS NOTE ADULT - ASSESSMENT
Assessment  89M with multiple comorbidities, history of recent L VATS, pleurex catheter placement 10/13/22 for pleural effusion who presents with bilateral pleural effusions (L>R) with increasing SOB over the last three weeks with decreased drainage from the Pleurex catheter.   Pleurx to waterseal, draining serous fluid. Pt INR elevated, awaiting down trend to do possible intervention on Rt effusion and possible MIST protocol via left Pleurx.     11/21/22: Right pleural effusion appears improved on repeat AM CXR from yesterday and today. No ptc required at this time. Left PleurX is draining, 200cc over the last 24hrs. Will hold off on MIST. INR continues to be elevated, eliquis continues to be on hold. IMANI overnight, on home meds. Consult pts cardiologist Dr Le, TTE pending. Cont with IV Abx for elevated WBC, no cs sent. Cr improving, making urine.  11/22: INR 1.58, hold MIST.  11/23: INR 1.7, No Thoracentesis today however bedside US showed small effusion on right. Elevated LFTS. GI and Heme consulted.  11/24: INR 1.6, No thoracentesis today.   11/25: INR 1.5. Will place Right sided PTC today per DR. Rodas. Will send fluid for cytology, culture, lytes. Will switch antibiotics to po. Plan MIST via left Pleurx tomorrow.   11/26-11/27: Continued to monitor INR with no improvement. Rt PTC with continued serous output.   11/28: IVF hydration started for inc'd creatinine. MIST through L pleurX Called Heme Dr. Wilson for follow up who recommends continuing Vit K daily.   11/29: continue L pleurX to -10sxn and R PTC to waterseal  11/30: Hypotensive overnight, given 5% albumin x2. PTC removed at bedside  12/2: Oltt placed last night (>1L out immediately)         PLAN  Neuro: Pain management  Pulm: Encourage coughing, deep breathing and use of incentive spirometry. Wean off supplemental oxygen as able. Daily CXR.   Cardio: F/u Cardiology(Dr. Le) recs, Monitor telemetry/alarms  GI: Tolerating diet. Continue stool softeners.  Renal: Lott placed yesterday. Dr. Lam following, appreciate recs. monitor urine output, supplement electrolytes as needed  Vasc: SCDs for DVT prophylaxis  Heme: Stable H/H.   ID: Off antibiotics. Stable.  Therapy: OOB/ambulate.   Wound Care: Per wound care nurse: B/L Incontinence Associated Dermatitis (IAD) w/ associated candidiasis and sacral/buttock decubitus ulcers - recs Apply Ji antifungal moisture barrier cream twice a day and PRN with incontinent episodes  Tubes: PleurX capped, monitor outputs.  Dispo: D/C to rehab once stable and optimized. Assessment  89M with multiple comorbidities, history of recent L VATS, pleurex catheter placement 10/13/22 for pleural effusion who presents with bilateral pleural effusions (L>R) with increasing SOB over the last three weeks with decreased drainage from the Pleurex catheter.   Pleurx to waterseal, draining serous fluid. Pt INR elevated, awaiting down trend to do possible intervention on Rt effusion and possible MIST protocol via left Pleurx.     11/21/22: Right pleural effusion appears improved on repeat AM CXR from yesterday and today. No ptc required at this time. Left PleurX is draining, 200cc over the last 24hrs. Will hold off on MIST. INR continues to be elevated, eliquis continues to be on hold. IMANI overnight, on home meds. Consult pts cardiologist Dr Le, TTE pending. Cont with IV Abx for elevated WBC, no cs sent. Cr improving, making urine.  11/22: INR 1.58, hold MIST.  11/23: INR 1.7, No Thoracentesis today however bedside US showed small effusion on right. Elevated LFTS. GI and Heme consulted.  11/24: INR 1.6, No thoracentesis today.   11/25: INR 1.5. Will place Right sided PTC today per DR. Rodas. Will send fluid for cytology, culture, lytes. Will switch antibiotics to po. Plan MIST via left Pleurx tomorrow.   11/26-11/27: Continued to monitor INR with no improvement. Rt PTC with continued serous output.   11/28: IVF hydration started for inc'd creatinine. MIST through L pleurX Called Heme Dr. Wilson for follow up who recommends continuing Vit K daily.   11/29: continue L pleurX to -10sxn and R PTC to waterseal  11/30: Hypotensive overnight, given 5% albumin x2. PTC removed at bedside  12/2: Lott placed last night (>1L out immediately)         PLAN  Neuro: Pain management  Pulm: Encourage coughing, deep breathing and use of incentive spirometry. Wean off supplemental oxygen as able. Daily CXR.   Cardio: F/u Cardiology(Dr. Le) recs, Monitor telemetry/alarms  GI: Tolerating diet. Continue stool softeners.  Renal: F/u renal recs. monitor urine output, supplement electrolytes as needed  Vasc: SCDs for DVT prophylaxis  Heme: Stable H/H.   ID: Off antibiotics. Stable.  Therapy: OOB/ambulate.   Wound Care: Per wound care nurse: B/L Incontinence Associated Dermatitis (IAD) w/ associated candidiasis and sacral/buttock decubitus ulcers - recs Apply Ji antifungal moisture barrier cream twice a day and PRN with incontinent episodes  Tubes: PleurX capped, monitor outputs.  Dispo: D/C to rehab once stable and optimized.

## 2022-12-03 NOTE — PROGRESS NOTE ADULT - SUBJECTIVE AND OBJECTIVE BOX
NEPHROLOGY     Patient seen and examined sitting in chair, reports feeling a little better, though still with some olea, no sob at rest, comfortable on 2L NC, pain controlled with medications, currently in no acute distress.     MEDICATIONS  (STANDING):  atorvastatin 40 milliGRAM(s) Oral at bedtime  dornase tamika Solution 2.5 milliGRAM(s) Inhalation daily  levoFLOXacin  Tablet 750 milliGRAM(s) Oral every 48 hours  levothyroxine 100 MICROGram(s) Oral daily  lidocaine   4% Patch 1 Patch Transdermal every 24 hours  metoprolol tartrate 25 milliGRAM(s) Oral two times a day  nystatin Powder 1 Application(s) Topical two times a day  polyethylene glycol 3350 17 Gram(s) Oral two times a day  sodium chloride 3%  Inhalation 4 milliLiter(s) Inhalation every 12 hours    VITALS:  T(C): , Max: 37.3 (12-02-22 @ 20:09)  T(F): , Max: 99.1 (12-02-22 @ 20:09)  HR: 111 (12-03-22 @ 11:58)  BP: 103/65 (12-03-22 @ 11:58)  BP(mean): 77 (12-03-22 @ 06:35)  RR: 17 (12-03-22 @ 11:58)  SpO2: 111% (12-03-22 @ 11:58)    I and O's:    12-02 @ 07:01  -  12-03 @ 07:00  --------------------------------------------------------  IN: 680 mL / OUT: 750 mL / NET: -70 mL    PHYSICAL EXAM:  Constitutional: NAD  HEENT: EOMI  Neck:  +  JVD, supple   Respiratory: diminished at bases   Cardiovascular: S1 and S2, RRR + tachycardia   Gastrointestinal: + BS, soft, NT, ND  Extremities: tr le edema  Neurological: A/O x 3, CN2-12 intact  Psychiatric: Normal mood, normal affect  : + francis   Skin: No rashes, C/D/I    LABS:                        8.9    16.12 )-----------( 298      ( 03 Dec 2022 07:00 )             30.1     12-03    145  |  102  |  52<H>  ----------------------------<  131<H>  4.8   |  24  |  1.57<H>    Ca    8.8      03 Dec 2022 07:00  Phos  3.2     12-03  Mg     2.10     12-03    TPro  6.1  /  Alb  2.6<L>  /  TBili  0.8  /  DBili  0.3  /  AST  34  /  ALT  26  /  AlkPhos  142<H>  12-02    RADIOLOGY & ADDITIONAL STUDIES:    ACC: [id number] EXAM:  XR CHEST PORTABLE URGENT 1V                          PROCEDURE DATE:  12/02/2022          INTERPRETATION:  CLINICAL INFORMATION: Follow-up    TIME OF EXAMINATION: December 2 at 7:16 AM    EXAM: Portable chest    FINDINGS:  Small right layering effusion and small left hydrocele   pneumothorax about the same. Vascular congestion is seen but no focal   consolidations. Heart size is stable.      COMPARISON: December 1      IMPRESSION: Follow-up left hydropneumothorax and right layering effusion   with underlying congestion.    --- End of Report ---      CAMILLA BARFIELD MD; Attending Radiologist  This document has been electronically signed. Dec  2 2022 10:16AM    ASSESSMENT/PLAN:   89 year old male with PMH of Chronic Afib- Eliquis Aortic valve calcification, HTN, HLD, CVA, CABG in 2012, prostate cancer s/p radiation, left pleural effusion s/p L VATS  Hypotension   TIM and hyperkalemia   Rapid afib and Rapid ventricular rate .    1 Renal - TIM at present from post obstruction, Francis placed,  Creatinine will start to normalize in 48 hours   s/p Lasix 40mg IVP (12/1) and BP still a little too low for additional doses   2 Diet- K+ improved s/p Lokelma x 1 and defer Ensure and continue to maintain no potassium in diet   Continue Nepro shakes   3 CVS - lopressor 37.5mg po bid.  This should not reduce the BP   4  - DC with the francis and outpt follow up with his outpt Urologist Dr Sarah Blackburn, NP-C  [facility]  [phone]

## 2022-12-03 NOTE — PROGRESS NOTE ADULT - SUBJECTIVE AND OBJECTIVE BOX
Thoracic Surgery Progress Note    SUBJECTIVE: Patient seen and examined at bedside with surgical team, patient without complaints.     Vital Signs Last 24 Hrs  T(C): 36.6 (03 Dec 2022 11:58), Max: 37.3 (02 Dec 2022 20:09)  T(F): 97.8 (03 Dec 2022 11:58), Max: 99.1 (02 Dec 2022 20:09)  HR: 111 (03 Dec 2022 11:58) (106 - 120)  BP: 103/65 (03 Dec 2022 11:58) (100/66 - 120/66)  BP(mean): 77 (03 Dec 2022 06:35) (77 - 77)  RR: 17 (03 Dec 2022 11:58) (17 - 18)  SpO2: 111% (03 Dec 2022 11:58) (94% - 111%)    Parameters below as of 03 Dec 2022 11:58  Patient On (Oxygen Delivery Method): nasal cannula  O2 Flow (L/min): 2  I&O's Detail    02 Dec 2022 07:01  -  03 Dec 2022 07:00  --------------------------------------------------------  IN:    Oral Fluid: 680 mL  Total IN: 680 mL    OUT:    Indwelling Catheter - Urethral (mL): 750 mL  Total OUT: 750 mL    Total NET: -70 mL        Medications  MEDICATIONS  (STANDING):  atorvastatin 40 milliGRAM(s) Oral at bedtime  dornase tamika Solution 2.5 milliGRAM(s) Inhalation daily  levoFLOXacin  Tablet 750 milliGRAM(s) Oral every 48 hours  levothyroxine 100 MICROGram(s) Oral daily  lidocaine   4% Patch 1 Patch Transdermal every 24 hours  metoprolol tartrate 25 milliGRAM(s) Oral two times a day  nystatin Powder 1 Application(s) Topical two times a day  polyethylene glycol 3350 17 Gram(s) Oral two times a day  sodium chloride 3%  Inhalation 4 milliLiter(s) Inhalation every 12 hours    MEDICATIONS  (PRN):  acetaminophen     Tablet .. 650 milliGRAM(s) Oral every 6 hours PRN mild pain  oxyCODONE    IR 5 milliGRAM(s) Oral every 6 hours PRN Moderate Pain (4 - 6)      Physical Exam  Constitutional: A&Ox3, NAD  Eyes: Scleras clear, PERRLA/ EOMI, Gross vision intact  Respiratory:  Gastrointestinal: Soft nontender, nondistended  Genitourinary:   Extremities: Moving all extremities, no edema  Vascular:  Tubes:  Skin: No Rashes, Hematoma, Ecchymosis    LABS:                        8.9    16.12 )-----------( 298      ( 03 Dec 2022 07:00 )             30.1     12-03    145  |  102  |  52<H>  ----------------------------<  131<H>  4.8   |  24  |  1.57<H>    Ca    8.8      03 Dec 2022 07:00  Phos  3.2     12-03  Mg     2.10     12-03    TPro  6.1  /  Alb  2.6<L>  /  TBili  0.8  /  DBili  0.3  /  AST  34  /  ALT  26  /  AlkPhos  142<H>  12-02      LIVER FUNCTIONS - ( 02 Dec 2022 06:15 )  Alb: 2.6 g/dL / Pro: 6.1 g/dL / ALK PHOS: 142 U/L / ALT: 26 U/L / AST: 34 U/L / GGT: x                  Thoracic Surgery Progress Note    SUBJECTIVE: Patient seen and examined at bedside with surgical team, patient without complaints.     Vital Signs Last 24 Hrs  T(C): 36.6 (03 Dec 2022 11:58), Max: 37.3 (02 Dec 2022 20:09)  T(F): 97.8 (03 Dec 2022 11:58), Max: 99.1 (02 Dec 2022 20:09)  HR: 111 (03 Dec 2022 11:58) (106 - 120)  BP: 103/65 (03 Dec 2022 11:58) (100/66 - 120/66)  BP(mean): 77 (03 Dec 2022 06:35) (77 - 77)  RR: 17 (03 Dec 2022 11:58) (17 - 18)  SpO2: 111% (03 Dec 2022 11:58) (94% - 111%)    Parameters below as of 03 Dec 2022 11:58  Patient On (Oxygen Delivery Method): nasal cannula  O2 Flow (L/min): 2  I&O's Detail    02 Dec 2022 07:01  -  03 Dec 2022 07:00  --------------------------------------------------------  IN:    Oral Fluid: 680 mL  Total IN: 680 mL    OUT:    Indwelling Catheter - Urethral (mL): 750 mL  Total OUT: 750 mL    Total NET: -70 mL        Medications  MEDICATIONS  (STANDING):  atorvastatin 40 milliGRAM(s) Oral at bedtime  dornase tamika Solution 2.5 milliGRAM(s) Inhalation daily  levoFLOXacin  Tablet 750 milliGRAM(s) Oral every 48 hours  levothyroxine 100 MICROGram(s) Oral daily  lidocaine   4% Patch 1 Patch Transdermal every 24 hours  metoprolol tartrate 25 milliGRAM(s) Oral two times a day  nystatin Powder 1 Application(s) Topical two times a day  polyethylene glycol 3350 17 Gram(s) Oral two times a day  sodium chloride 3%  Inhalation 4 milliLiter(s) Inhalation every 12 hours    MEDICATIONS  (PRN):  acetaminophen     Tablet .. 650 milliGRAM(s) Oral every 6 hours PRN mild pain  oxyCODONE    IR 5 milliGRAM(s) Oral every 6 hours PRN Moderate Pain (4 - 6)      Telemetry/Alarms: afib  General: Appears stated age, thin  Neurology: Awake, nonfocal, BAXTER x 4  Eyes: Scleras clear, PERRLA/ EOMI, Gross vision intact  ENT: Gross hearing intact, grossly patent pharynx, no stridor  Neck: Neck supple, trachea midline, No JVD,   Respiratory: On O2, in NAD, no accessory muscle use.  CV: afib, rate controlled  Abdominal: Soft, NT, ND +BS,   Extremities: Thin extremities, No edema, + peripheral pulses  Psych: Oriented x 3, normal affect  Incisions: c,d,i  Tubes: L pleurX capped. Lott catheter in place    LABS:                        8.9    16.12 )-----------( 298      ( 03 Dec 2022 07:00 )             30.1     12-03    145  |  102  |  52<H>  ----------------------------<  131<H>  4.8   |  24  |  1.57<H>    Ca    8.8      03 Dec 2022 07:00  Phos  3.2     12-03  Mg     2.10     12-03    TPro  6.1  /  Alb  2.6<L>  /  TBili  0.8  /  DBili  0.3  /  AST  34  /  ALT  26  /  AlkPhos  142<H>  12-02      LIVER FUNCTIONS - ( 02 Dec 2022 06:15 )  Alb: 2.6 g/dL / Pro: 6.1 g/dL / ALK PHOS: 142 U/L / ALT: 26 U/L / AST: 34 U/L / GGT: x                  Thoracic Surgery Progress Note    SUBJECTIVE: Patient seen and examined at bedside with surgical team. Pt did not endorse chest pain, SOB, chills, N/V.    Vital Signs Last 24 Hrs  T(C): 36.6 (03 Dec 2022 11:58), Max: 37.3 (02 Dec 2022 20:09)  T(F): 97.8 (03 Dec 2022 11:58), Max: 99.1 (02 Dec 2022 20:09)  HR: 111 (03 Dec 2022 11:58) (106 - 120)  BP: 103/65 (03 Dec 2022 11:58) (100/66 - 120/66)  BP(mean): 77 (03 Dec 2022 06:35) (77 - 77)  RR: 17 (03 Dec 2022 11:58) (17 - 18)  SpO2: 111% (03 Dec 2022 11:58) (94% - 111%)    Parameters below as of 03 Dec 2022 11:58  Patient On (Oxygen Delivery Method): nasal cannula  O2 Flow (L/min): 2  I&O's Detail    02 Dec 2022 07:01  -  03 Dec 2022 07:00  --------------------------------------------------------  IN:    Oral Fluid: 680 mL  Total IN: 680 mL    OUT:    Indwelling Catheter - Urethral (mL): 750 mL  Total OUT: 750 mL    Total NET: -70 mL        Medications  MEDICATIONS  (STANDING):  atorvastatin 40 milliGRAM(s) Oral at bedtime  dornase tamika Solution 2.5 milliGRAM(s) Inhalation daily  levoFLOXacin  Tablet 750 milliGRAM(s) Oral every 48 hours  levothyroxine 100 MICROGram(s) Oral daily  lidocaine   4% Patch 1 Patch Transdermal every 24 hours  metoprolol tartrate 25 milliGRAM(s) Oral two times a day  nystatin Powder 1 Application(s) Topical two times a day  polyethylene glycol 3350 17 Gram(s) Oral two times a day  sodium chloride 3%  Inhalation 4 milliLiter(s) Inhalation every 12 hours    MEDICATIONS  (PRN):  acetaminophen     Tablet .. 650 milliGRAM(s) Oral every 6 hours PRN mild pain  oxyCODONE    IR 5 milliGRAM(s) Oral every 6 hours PRN Moderate Pain (4 - 6)      Telemetry/Alarms: afib  General: Appears stated age, thin  Neurology: Awake, nonfocal, BAXTER x 4  Eyes: Scleras clear, PERRLA/ EOMI, Gross vision intact  ENT: Gross hearing intact, grossly patent pharynx, no stridor  Neck: Neck supple, trachea midline, No JVD,   Respiratory: On O2, in NAD, no accessory muscle use.  CV: afib, rate controlled  Abdominal: Soft, NT, ND +BS,   Extremities: Thin extremities, No edema, + peripheral pulses  Psych: Oriented x 3, normal affect  Incisions: c,d,i  Tubes: L pleurX capped. Lott catheter in place    LABS:                        8.9    16.12 )-----------( 298      ( 03 Dec 2022 07:00 )             30.1     12-03    145  |  102  |  52<H>  ----------------------------<  131<H>  4.8   |  24  |  1.57<H>    Ca    8.8      03 Dec 2022 07:00  Phos  3.2     12-03  Mg     2.10     12-03    TPro  6.1  /  Alb  2.6<L>  /  TBili  0.8  /  DBili  0.3  /  AST  34  /  ALT  26  /  AlkPhos  142<H>  12-02      LIVER FUNCTIONS - ( 02 Dec 2022 06:15 )  Alb: 2.6 g/dL / Pro: 6.1 g/dL / ALK PHOS: 142 U/L / ALT: 26 U/L / AST: 34 U/L / GGT: x

## 2022-12-04 NOTE — PROGRESS NOTE ADULT - ASSESSMENT
Patient is a 89 year old male with PMH of Chronic Afib- Eliquis Aortic valve calcification, HTN, HLD, CVA, CABG in 2012, prostate cancer s/p radiation, left pleural effusion s/p L VATS  Hypotension

## 2022-12-04 NOTE — PROGRESS NOTE ADULT - SUBJECTIVE AND OBJECTIVE BOX
Subjective: "My shortness of breath is about the same but my back pain is really bad this morning" Pt c/o chronic lower back pain. Taking oxy w some relief.   No CP. SOB w exertion. States + BM yesterday. Also states poor appetite. Not eating much of hospital food. Drinking Nepro once daily.     Vital Signs:  Vital Signs Last 24 Hrs  T(C): 36.7 (12-04-22 @ 09:40), Max: 36.7 (12-04-22 @ 04:46)  T(F): 98 (12-04-22 @ 09:40), Max: 98 (12-04-22 @ 04:46)  HR: 140 (12-04-22 @ 09:40) (108 - 140)  BP: 95/67 (12-04-22 @ 09:40) (94/60 - 103/65)  RR: 18 (12-04-22 @ 09:40) (17 - 18)  SpO2: 100% (12-04-22 @ 09:40) (97% - 111%) on (O2)    Telemetry/Alarms:  General: WN/WD NAD  Neurology: Awake, nonfocal, BAXTER x 4  Eyes: Scleras clear, PERRLA/ EOMI, Gross vision intact  ENT:Gross hearing intact, grossly patent pharynx, no stridor  Neck: Neck supple, trachea midline, No JVD,   Respiratory: CTA B/L, No wheezing, rales, rhonchi  CV: RRR, S1S2, no murmurs, rubs or gallops  Abdominal: Soft, NT, ND +BS,   Extremities: No edema, + peripheral pulses  Skin: No Rashes, Hematoma, Ecchymosis  Lymphatic: No Neck, axilla, groin LAD  Psych: Oriented x 3, normal affect  Incisions:   Tubes:  Relevant labs, radiology and Medications reviewed                        8.7    14.46 )-----------( 289      ( 04 Dec 2022 06:00 )             29.4     12-04    140  |  107  |  50<H>  ----------------------------<  101<H>  4.9   |  24  |  1.55<H>    Ca    9.1      04 Dec 2022 06:00  Phos  3.2     12-04  Mg     2.10     12-04        MEDICATIONS  (STANDING):  atorvastatin 40 milliGRAM(s) Oral at bedtime  dornase tamika Solution 2.5 milliGRAM(s) Inhalation daily  levoFLOXacin  Tablet 750 milliGRAM(s) Oral every 48 hours  levothyroxine 100 MICROGram(s) Oral daily  lidocaine   4% Patch 1 Patch Transdermal every 24 hours  metoprolol tartrate 37.5 milliGRAM(s) Oral two times a day  nystatin Powder 1 Application(s) Topical two times a day  polyethylene glycol 3350 17 Gram(s) Oral two times a day  sodium chloride 3%  Inhalation 4 milliLiter(s) Inhalation every 12 hours    MEDICATIONS  (PRN):  acetaminophen     Tablet .. 650 milliGRAM(s) Oral every 6 hours PRN mild pain  oxyCODONE    IR 5 milliGRAM(s) Oral every 6 hours PRN Moderate Pain (4 - 6)    Pertinent Physical Exam  I&O's Summary    03 Dec 2022 07:01  -  04 Dec 2022 07:00  --------------------------------------------------------  IN: 240 mL / OUT: 650 mL / NET: -410 mL      Marital Status:  (   )    (   ) Single    (   )    (  )   Lives with: (  ) alone  (  ) children   (  ) spouse   (  ) parents  (  ) other  Recent Travel: No recent travel  Occupation:    Substance Use (street drugs): ( x ) never used  (  ) other:  Tobacco Usage:  ( x  ) never smoked   (   ) former smoker   (   ) current smoker  (     ) pack year  Alcohol Usage: None     Assessment  89y Male  w/ PAST MEDICAL & SURGICAL HISTORY:  HTN (hypertension)      HLD (hyperlipidemia)      Hyperthyroidism  treated with radioactive iodine      Hypothyroid      Atrial fibrillation  diagnosed many years ago   on anticoagulant      CVA (cerebral vascular accident)  2004  no residual      Spinal stenosis  h/o epidural injection      Prostate cancer  s/p radiation ? 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  On (Date), patient underwent US guided chest tube insertion and drainage    . Postoperative course/issues:    PLAN  Neuro: Pain management  Pulm: Encourage coughing, deep breathing and use of incentive spirometry. Wean off supplemental oxygen as able. Daily CXR.   Cardio: Monitor telemetry/alarms  GI: Tolerating diet. Continue stool softeners.  Renal: monitor urine output, supplement electrolytes as needed  Vasc: Heparin SC/SCDs for DVT prophylaxis  Heme: Stable H/H. .   ID: Off antibiotics. Stable.  Therapy: OOB/ambulate  Tubes: Monitor Chest tube output  Disposition: Aim to D/C to home on  Discussed with Cardiothoracic Team at AM rounds.      Subjective: "My shortness of breath is about the same but my back pain is really bad this morning" Pt c/o chronic lower back pain. Taking oxy w some relief.   No CP. SOB w exertion. States + BM yesterday. Also states poor appetite. Not eating much of hospital food. Drinking Nepro once daily.     Vital Signs:  Vital Signs Last 24 Hrs  T(C): 36.7 (12-04-22 @ 09:40), Max: 36.7 (12-04-22 @ 04:46)  T(F): 98 (12-04-22 @ 09:40), Max: 98 (12-04-22 @ 04:46)  HR: 140 (12-04-22 @ 09:40) (108 - 140)  BP: 95/67 (12-04-22 @ 09:40) (94/60 - 103/65)  RR: 18 (12-04-22 @ 09:40) (17 - 18)  SpO2: 100% (12-04-22 @ 09:40) (97% - 111%) on (O2)    Telemetry/Alarms: tele Afib 100-130s w ambulation  General: WN/WD NAD  Neurology: Awake, nonfocal, BAXTER x 4  Eyes: Scleras clear, PERRLA/ EOMI, Gross vision intact  ENT:Gross hearing intact, grossly patent pharynx, no stridor  Neck: Neck supple, trachea midline, No JVD,   Respiratory:  Dec'd BS to bilat LL  CV: IIrreg, S1S2, no murmurs, rubs or gallops. Tachy on tele w activity  Abdominal: Soft, NT, ND +BS, +BM yesterday. Poor appetite. Francis for clear yellow urine  Extremities: 2+ bilat ankle/pedal pitting edema, + peripheral pulses  Skin: No Rashes, Hematoma, Ecchymosis  Lymphatic: No Neck, axilla, groin LAD  Psych: Oriented x 3, normal affect  Incisions: none  Tubes: Left Pleurx capped. To be drained today  Relevant labs, radiology and Medications reviewed           CXR stable sml bilat effusions.              8.7    14.46 )-----------( 289      ( 04 Dec 2022 06:00 )             29.4     12-04    140  |  107  |  50<H>  ----------------------------<  101<H>  4.9   |  24  |  1.55<H>    Ca    9.1      04 Dec 2022 06:00  Phos  3.2     12-04  Mg     2.10     12-04    Urine culture NGTD    MEDICATIONS  (STANDING):  atorvastatin 40 milliGRAM(s) Oral at bedtime  dornase tamika Solution 2.5 milliGRAM(s) Inhalation daily  levoFLOXacin  Tablet 750 milliGRAM(s) Oral every 48 hours  levothyroxine 100 MICROGram(s) Oral daily  lidocaine   4% Patch 1 Patch Transdermal every 24 hours  metoprolol tartrate 37.5 milliGRAM(s) Oral two times a day  nystatin Powder 1 Application(s) Topical two times a day  polyethylene glycol 3350 17 Gram(s) Oral two times a day  sodium chloride 3%  Inhalation 4 milliLiter(s) Inhalation every 12 hours    MEDICATIONS  (PRN):  acetaminophen     Tablet .. 650 milliGRAM(s) Oral every 6 hours PRN mild pain  oxyCODONE    IR 5 milliGRAM(s) Oral every 6 hours PRN Moderate Pain (4 - 6)    Pertinent Physical Exam  I&O's Summary    03 Dec 2022 07:01  -  04 Dec 2022 07:00  --------------------------------------------------------  IN: 240 mL / OUT: 650 mL / NET: -410 mL    Assessment and Plan:   · Assessment	  Assessment  89M with multiple comorbidities, history of recent L VATS, pleurex catheter placement 10/13/22 for pleural effusion who presents with bilateral pleural effusions (L>R) with increasing SOB over the last three weeks with decreased drainage from the Pleurex catheter.   Pleurx to waterseal, draining serous fluid. Pt INR elevated, awaiting down trend to do possible intervention on Rt effusion and possible MIST protocol via left Pleurx.     11/21/22: Right pleural effusion appears improved on repeat AM CXR from yesterday and today. No ptc required at this time. Left PleurX is draining, 200cc over the last 24hrs. Will hold off on MIST. INR continues to be elevated, eliquis continues to be on hold. IMANI overnight, on home meds. Consult pts cardiologist Dr Le, TTE pending. Cont with IV Abx for elevated WBC, no cs sent. Cr improving, making urine.  11/22: INR 1.58, hold MIST.  11/23: INR 1.7, No Thoracentesis today however bedside US showed small effusion on right. Elevated LFTS. GI and Heme consulted.  11/24: INR 1.6, No thoracentesis today.   11/25: INR 1.5. Will place Right sided PTC today per DR. Rodas. Will send fluid for cytology, culture, lytes. Will switch antibiotics to po. Plan MIST via left Pleurx tomorrow.   11/26-11/27: Continued to monitor INR with no improvement. Rt PTC with continued serous output.   11/28: IVF hydration started for inc'd creatinine. MIST through L pleurX Called Bill Wilson for follow up who recommends continuing Vit K daily.   11/29: continue L pleurX to -10sxn and R PTC to waterseal  11/30: Hypotensive overnight, given 5% albumin x2. PTC removed at bedside. Renal consult called for worsening BUN/creat. Found to be in urinary retention. Pt refusing francis  12/1: Francis placed  (>1L out immediately). BUN creat stabilizing. Leukocytosis to improved to 14K  12/2-12/3: continued to monitor labs. Titrating beta blockers. Renal/Cardiology follow up.     PLAN  Neuro: Pain management. Cont oxy/tylenol  Pulm: Encourage coughing, deep breathing and use of incentive spirometry. Daily CXR. Cont O2. Chest PT  Cardio: Monitor telemetry/alarms. Will inc BB to 37.5mg BID today for better HR control. Diuresis as Cardiology/Nephrology. Cont to hold A/c for now  GI: Tolerating diet. Continue stool softeners. : continue Francis. Encourage diet, po intake  Renal: monitor urine output, supplement electrolytes as needed. trend labs. FU any further renal instructions.   Vasc: SCDs for DVT prophylaxis  Heme: Stable H/H. .   ID: Continue Levaquin QOD  Therapy: OOB/ambulate. Rehab planning  Tubes: Drain left pleurx 3x per week.   Disposition: Aim to D/C to Rehab once medically optimized.   Discussed with Cardiothoracic Team at AM rounds.

## 2022-12-04 NOTE — PROGRESS NOTE ADULT - ASSESSMENT
1. atrial fibrillation VR i110  on metoprolol 25 BID  2. recurrent pleural effusions etiology unclear  3. calcified aortic valve not severe AS probably mild to moderate  4. renal insufficiency stable 1.5  5. bladder retention now resolved after Lott cath  6. diastolic dysfunction  7. depression        Recommend  continue metoprolol- increase dose to 37.5 twice a day and ultimately up to 50 twice a day as blood pressure tolerates   will eventually need gentle diuresis   follow BUN and creatinine   presently cardiac status appears to be stable

## 2022-12-04 NOTE — PROGRESS NOTE ADULT - SUBJECTIVE AND OBJECTIVE BOX
Subjective: Patient seen and examined. No new events except as noted.   uncomfortable c/o back pain francis in place intermittent sob  denies cp  MEDICATIONS:  MEDICATIONS  (STANDING):  atorvastatin 40 milliGRAM(s) Oral at bedtime  dornase tamika Solution 2.5 milliGRAM(s) Inhalation daily  levoFLOXacin  Tablet 750 milliGRAM(s) Oral every 48 hours  levothyroxine 100 MICROGram(s) Oral daily  lidocaine   4% Patch 1 Patch Transdermal every 24 hours  metoprolol tartrate 12.5 milliGRAM(s) Oral once  metoprolol tartrate 37.5 milliGRAM(s) Oral two times a day  nystatin Powder 1 Application(s) Topical two times a day  polyethylene glycol 3350 17 Gram(s) Oral two times a day  sodium chloride 3%  Inhalation 4 milliLiter(s) Inhalation every 12 hours      PHYSICAL EXAM:  T(C): 36.7 (12-04-22 @ 04:46), Max: 37 (12-03-22 @ 10:38)  HR: 110 (12-04-22 @ 04:46) (108 - 120)  BP: 94/60 (12-04-22 @ 04:46) (94/60 - 105/68)  RR: 18 (12-04-22 @ 04:46) (17 - 18)  SpO2: 97% (12-04-22 @ 04:46) (94% - 111%)  Wt(kg): --  I&O's Summary    03 Dec 2022 07:01  -  04 Dec 2022 07:00  --------------------------------------------------------  IN: 240 mL / OUT: 650 mL / NET: -410 mL          Appearance: elderly uncomfortable chronically ill appearing lying at 30 degrees comfortable  HEENT:   Normal oral mucosa, PERRL, EOMI	  Cardiovascular: Normal S1 S2, irregularly irregular mild JVD,   Respiratory: Lungs rleatively clear on right decreased BS on left 1/3 up pleurex catheter in place	  Gastrointestinal:  Soft, Non-tender, + BS	  Psychiatry:  depressed but awake alert oriented  Ext: trace edema        LABS:    CARDIAC MARKERS:                                8.7    14.46 )-----------( 289      ( 04 Dec 2022 06:00 )             29.4     12-04    140  |  107  |  50<H>  ----------------------------<  101<H>  4.9   |  24  |  1.55<H>    Ca    9.1      04 Dec 2022 06:00  Phos  3.2     12-04  Mg     2.10     12-04      proBNP:   Lipid Profile:   HgA1c:   TSH:           TELEMETRY: 	    ECG:  	afib   RADIOLOGY:   DIAGNOSTIC TESTING:  [ ] Echocardiogram:  [ ]  Catheterization:  [ ] Stress Test:    OTHER:

## 2022-12-04 NOTE — PROGRESS NOTE ADULT - SUBJECTIVE AND OBJECTIVE BOX
Name of Patient : BENJAMIN ARREDONDO  MRN: [id number]  Date of visit: 12-04-22       Subjective: Patient seen and examined. No new events except as noted.     REVIEW OF SYSTEMS:    CONSTITUTIONAL: No weakness, fevers or chills  EYES/ENT: No visual changes;  No vertigo or throat pain   NECK: No pain or stiffness  RESPIRATORY: No cough, wheezing, hemoptysis; No shortness of breath  CARDIOVASCULAR: No chest pain or palpitations  GASTROINTESTINAL: No abdominal or epigastric pain. No nausea, vomiting, or hematemesis; No diarrhea or constipation. No melena or hematochezia.  GENITOURINARY: No dysuria, frequency or hematuria  NEUROLOGICAL: No numbness or weakness  SKIN: No itching, burning, rashes, or lesions   All other review of systems is negative unless indicated above.    MEDICATIONS:  MEDICATIONS  (STANDING):  atorvastatin 40 milliGRAM(s) Oral at bedtime  dornase tamika Solution 2.5 milliGRAM(s) Inhalation daily  levoFLOXacin  Tablet 750 milliGRAM(s) Oral every 48 hours  levothyroxine 100 MICROGram(s) Oral daily  lidocaine   4% Patch 1 Patch Transdermal every 24 hours  metoprolol tartrate 37.5 milliGRAM(s) Oral two times a day  nystatin Powder 1 Application(s) Topical two times a day  polyethylene glycol 3350 17 Gram(s) Oral two times a day  sodium chloride 3%  Inhalation 4 milliLiter(s) Inhalation every 12 hours  tamsulosin 0.4 milliGRAM(s) Oral at bedtime      PHYSICAL EXAM:  T(C): 36.9 (12-04-22 @ 20:08), Max: 36.9 (12-04-22 @ 20:08)  HR: 105 (12-04-22 @ 20:08) (105 - 140)  BP: 96/61 (12-04-22 @ 20:08) (90/58 - 117/60)  RR: 19 (12-04-22 @ 20:08) (18 - 19)  SpO2: 100% (12-04-22 @ 20:08) (97% - 100%)  Wt(kg): --  I&O's Summary    03 Dec 2022 07:01  -  04 Dec 2022 07:00  --------------------------------------------------------  IN: 240 mL / OUT: 650 mL / NET: -410 mL    04 Dec 2022 07:01  -  04 Dec 2022 22:40  --------------------------------------------------------  IN: 0 mL / OUT: 525 mL / NET: -525 mL          Appearance: Normal	  HEENT:  PERRLA   Lymphatic: No lymphadenopathy   Cardiovascular: Normal S1 S2, no JVD  Respiratory: normal effort , clear, pleurx catheter    Gastrointestinal:  Soft, Non-tender  Skin: No rashes,  warm to touch  Psychiatry:  Mood & affect appropriate  Musculuskeletal: No edema      All labs, Imaging and EKGs personally reviewed       --------------------------------------------------------  IN: 240 mL / OUT: 650 mL / NET: -410 mL    12-04-22 @ 07:01  -  12-04-22 @ 22:40  --------------------------------------------------------  IN: 0 mL / OUT: 525 mL / NET: -525 mL                          8.7    14.46 )-----------( 289      ( 04 Dec 2022 06:00 )             29.4               12-04    140  |  107  |  50<H>  ----------------------------<  101<H>  4.9   |  24  |  1.55<H>    Ca    9.1      04 Dec 2022 06:00  Phos  3.2     12-04  Mg     2.10     12-04

## 2022-12-05 NOTE — PROGRESS NOTE ADULT - ASSESSMENT
ASSESSMENT/PLAN:   89 year old male with PMH of Chronic Afib- Eliquis Aortic valve calcification, HTN, HLD, CVA, CABG in 2012, prostate cancer s/p radiation, left pleural effusion s/p L VATS  Hypotension   TIM and hyperkalemia   Rapid afib and Rapid ventricular rate .    1 Renal - TIM at present from post obstruction, Francis placed,  Renal function stable   s/p Lasix 40mg IVP (12/1) and BP still a little too low for additional doses   2 Diet- K+ improved s/p Lokelma x 1 Continue Nepro shakes and no potassium in diet   3 CVS - Increase lopressor 50 mg po bid.  This should not reduce the BP.  Lowering heart rate will help the BP and then allow for standing diuretics   4  - DC with the francis and outpt follow up with his outpt Urologist Dr Sarah Prince MD  [facility]  [phone]

## 2022-12-05 NOTE — DISCHARGE NOTE NURSING/CASE MANAGEMENT/SOCIAL WORK - NSDCFUADDAPPT_GEN_ALL_CORE_FT
See Dr Rodas in about a week- call for an appointment and bring a new chest X-ray with you when you come.      Follow up with Dr Le (cardiology) in a week    See your urologist as soon as possible to evaluate francis removal

## 2022-12-05 NOTE — PROGRESS NOTE ADULT - NS ATTEND AMEND GEN_ALL_CORE FT
Patient seen and examined agree with above note as modified, where appropriate, by me.

## 2022-12-05 NOTE — PROGRESS NOTE ADULT - SUBJECTIVE AND OBJECTIVE BOX
Subjective: no acute complaints    Vital Signs:  Vital Signs Last 24 Hrs  T(C): 36.7 (12-05-22 @ 05:59), Max: 37.2 (12-05-22 @ 00:15)  T(F): 98 (12-05-22 @ 05:59), Max: 98.9 (12-05-22 @ 00:15)  HR: 118 (12-05-22 @ 05:59) (105 - 140)  BP: 101/61 (12-05-22 @ 05:59) (90/58 - 117/60)  RR: 18 (12-05-22 @ 05:59) (18 - 19)  SpO2: 96% (12-05-22 @ 05:59) (91% - 100%) on (O2)    Telemetry/Alarms:  General: WN/WD NAD  Neurology: Awake, nonfocal, BAXTER x 4  Eyes: Scleras clear, PERRLA/ EOMI, Gross vision intact  ENT:Gross hearing intact, grossly patent pharynx, no stridor  Neck: Neck supple, trachea midline, No JVD,   Respiratory: CTA B/L, No wheezing, rales, rhonchi  CV: RRR, S1S2, no murmurs, rubs or gallops  Abdominal: Soft, NT, ND +BS,   Extremities: No edema, + peripheral pulses  Skin: No Rashes, Hematoma, Ecchymosis  Lymphatic: No Neck, axilla, groin LAD  Psych: Oriented x 3, normal affect  Incisions:   Tubes:  Relevant labs, radiology and Medications reviewed                        8.7    14.46 )-----------( 289      ( 04 Dec 2022 06:00 )             29.4     12-04    140  |  107  |  50<H>  ----------------------------<  101<H>  4.9   |  24  |  1.55<H>    Ca    9.1      04 Dec 2022 06:00  Phos  3.2     12-04  Mg     2.10     12-04      PT/INR - ( 05 Dec 2022 05:10 )   PT: 18.4 sec;   INR: 1.58 ratio           MEDICATIONS  (STANDING):  atorvastatin 40 milliGRAM(s) Oral at bedtime  dornase tamika Solution 2.5 milliGRAM(s) Inhalation daily  levoFLOXacin  Tablet 750 milliGRAM(s) Oral every 48 hours  levothyroxine 100 MICROGram(s) Oral daily  lidocaine   4% Patch 1 Patch Transdermal every 24 hours  metoprolol tartrate 37.5 milliGRAM(s) Oral two times a day  nystatin Powder 1 Application(s) Topical two times a day  polyethylene glycol 3350 17 Gram(s) Oral two times a day  sodium chloride 3%  Inhalation 4 milliLiter(s) Inhalation every 12 hours  tamsulosin 0.4 milliGRAM(s) Oral at bedtime    MEDICATIONS  (PRN):  acetaminophen     Tablet .. 650 milliGRAM(s) Oral every 6 hours PRN mild pain  oxyCODONE    IR 5 milliGRAM(s) Oral every 6 hours PRN Moderate Pain (4 - 6)    Pertinent Physical Exam  I&O's Summary    04 Dec 2022 07:01  -  05 Dec 2022 07:00  --------------------------------------------------------  IN: 0 mL / OUT: 525 mL / NET: -525 mL        Assessment  89M with multiple comorbidities, history of recent L VATS, pleurex catheter placement 10/13/22 for pleural effusion who presents with bilateral pleural effusions (L>R) with increasing SOB over the last three weeks with decreased drainage from the Pleurex catheter.   Pleurx to waterseal, draining serous fluid. Pt INR elevated, awaiting down trend to do possible intervention on Rt effusion and possible MIST protocol via left Pleurx.     11/21/22: Right pleural effusion appears improved on repeat AM CXR from yesterday and today. No ptc required at this time. Left PleurX is draining, 200cc over the last 24hrs. Will hold off on MIST. INR continues to be elevated, eliquis continues to be on hold. IMANI overnight, on home meds. Consult pts cardiologist Dr Le, TTE pending. Cont with IV Abx for elevated WBC, no cs sent. Cr improving, making urine.  11/22: INR 1.58, hold MIST.  11/23: INR 1.7, No Thoracentesis today however bedside US showed small effusion on right. Elevated LFTS. GI and Heme consulted.  11/24: INR 1.6, No thoracentesis today.   11/25: INR 1.5. Will place Right sided PTC today per DR. Rodas. Will send fluid for cytology, culture, lytes. Will switch antibiotics to po. Plan MIST via left Pleurx tomorrow.   11/26-11/27: Continued to monitor INR with no improvement. Rt PTC with continued serous output.   11/28: IVF hydration started for inc'd creatinine. MIST through L pleurX Called Bill Wilson for follow up who recommends continuing Vit K daily.   11/29: continue L pleurX to -10sxn and R PTC to waterseal  11/30: Hypotensive overnight, given 5% albumin x2. PTC removed at bedside. Renal consult called for worsening BUN/creat. Found to be in urinary retention. Pt refusing francis  12/1: Francis placed  (>1L out immediately). BUN creat stabilizing. Leukocytosis to improved to 14K  12/2-12/3: continued to monitor labs. Titrating beta blockers. Renal/Cardiology follow up.     PLAN  Neuro: Pain management. Cont oxy/tylenol  Pulm: Encourage coughing, deep breathing and use of incentive spirometry. Daily CXR. Cont O2. Chest PT  Cardio: Monitor telemetry/alarms. Will inc BB to 37.5mg BID today for better HR control. Diuresis as Cardiology/Nephrology. Cont to hold A/c for now  GI: Tolerating diet. Continue stool softeners. : continue Francis. Encourage diet, po intake  Renal: monitor urine output, supplement electrolytes as needed. trend labs. FU any further renal instructions.   Vasc: SCDs for DVT prophylaxis  Heme: Stable H/H. .   ID: Continue Levaquin QOD  Therapy: OOB/ambulate. Rehab planning  Tubes: Drain left pleurx 3x per week.   Disposition: Aim to D/C to Rehab once medically optimized.   Discussed with Cardiothoracic Team at AM rounds.

## 2022-12-05 NOTE — PROGRESS NOTE ADULT - SUBJECTIVE AND OBJECTIVE BOX
[facility]ROLOGY[facility] [phone]        Patient seen and examined on 3L nasal cannula, no new complaints.         MEDICATIONS  (STANDING):  atorvastatin 40 milliGRAM(s) Oral at bedtime  dornase tamika Solution 2.5 milliGRAM(s) Inhalation daily  levoFLOXacin  Tablet 750 milliGRAM(s) Oral every 48 hours  levothyroxine 100 MICROGram(s) Oral daily  lidocaine   4% Patch 1 Patch Transdermal every 24 hours  metoprolol tartrate 37.5 milliGRAM(s) Oral two times a day  nystatin Powder 1 Application(s) Topical two times a day  polyethylene glycol 3350 17 Gram(s) Oral two times a day  sodium chloride 3%  Inhalation 4 milliLiter(s) Inhalation every 12 hours  tamsulosin 0.4 milliGRAM(s) Oral at bedtime      VITAL:  T(C): , Max: 37.2 (12-05-22 @ 00:15)  T(F): , Max: 98.9 (12-05-22 @ 00:15)  HR: 120 (12-05-22 @ 09:46)  BP: 93/67 (12-05-22 @ 09:46)  BP(mean): --  RR: 18 (12-05-22 @ 09:46)  SpO2: 100% (12-05-22 @ 09:46)  Wt(kg): --    I and O's:    12-04 @ 07:01  -  12-05 @ 07:00  --------------------------------------------------------  IN: 0 mL / OUT: 975 mL / NET: -975 mL          PHYSICAL EXAM:    Constitutional: NAD  Neck:  No JVD  Respiratory: +rhonchi   Chest: + L pleurx   Cardiovascular: tachy S1 and S2  Gastrointestinal: BS+, soft, NT/ND  Extremities: + LE peripheral edema  Neurological: A/O x 3, no focal deficits  Psychiatric: Normal mood, normal affect  : + Francis  Skin: No rashes  Access: Not applicable    LABS:                        9.2    13.16 )-----------( 263      ( 05 Dec 2022 05:10 )             31.3     12-05    140  |  105  |  54<H>  ----------------------------<  125<H>  4.9   |  25  |  1.67<H>    Ca    9.0      05 Dec 2022 05:10  Phos  3.2     12-04  Mg     2.10     12-04        RADIOLOGY & ADDITIONAL STUDIES:    < from: Xray Chest 1 View- PORTABLE-Routine (Xray Chest 1 View- PORTABLE-Routine in AM.) (12.04.22 @ 08:51) >    INTERPRETATION:  CLINICAL INFORMATION: Postop    TIME OF EXAMINATION: December 4 at 7:23 AM    EXAM: Portable chest    FINDINGS:  Left-sided chest tube in place. The basilar pneumothorax on the left side   is not seen on the current study although the effusion persists. Small to   moderate right effusion unchanged. Upper lung fields are free of focal   abnormalities.    Interstitial edema unchanged.        COMPARISON: December 3        IMPRESSION: Follow-up with bilateral effusions and poor visualization of   the pneumothorax on the left side with chest tube in place.    --- End of Report ---    < end of copied text >      ASSESSMENT/PLAN:   89 year old male with PMH of Chronic Afib- Eliquis Aortic valve calcification, HTN, HLD, CVA, CABG in 2012, prostate cancer s/p radiation, left pleural effusion s/p L VATS  Hypotension   TIM and hyperkalemia   Rapid afib and Rapid ventricular rate .    1 Renal - TIM at present from post obstruction, Francis placed,  Renal function stable   s/p Lasix 40mg IVP (12/1) and BP still a little too low for additional doses   2 Diet- K+ improved s/p Lokelma x 1 Continue Nepro shakes and no potassium in diet   3 CVS - lopressor 37.5mg po bid.  This should not reduce the BP   4  - DC with the francis and outpt follow up with his outpt Urologist Dr Sarah Chambers NP  [facility]  [phone]       NE[facility]ROLOGY[facility] [phone]        Patient seen and examined on 3L nasal cannula, no new complaints.         MEDICATIONS  (STANDING):  atorvastatin 40 milliGRAM(s) Oral at bedtime.  dornase tamika Solution 2.5 milliGRAM(s) Inhalation daily  levoFLOXacin  Tablet 750 milliGRAM(s) Oral every 48 hours  levothyroxine 100 MICROGram(s) Oral daily  lidocaine   4% Patch 1 Patch Transdermal every 24 hours  metoprolol tartrate 37.5 milliGRAM(s) Oral two times a day  nystatin Powder 1 Application(s) Topical two times a day  polyethylene glycol 3350 17 Gram(s) Oral two times a day  sodium chloride 3%  Inhalation 4 milliLiter(s) Inhalation every 12 hours  tamsulosin 0.4 milliGRAM(s) Oral at bedtime      VITAL:  T(C): , Max: 37.2 (12-05-22 @ 00:15)  T(F): , Max: 98.9 (12-05-22 @ 00:15)  HR: 120 (12-05-22 @ 09:46)  BP: 93/67 (12-05-22 @ 09:46)  BP(mean): --  RR: 18 (12-05-22 @ 09:46)  SpO2: 100% (12-05-22 @ 09:46)  Wt(kg): --    I and O's:    12-04 @ 07:01  -  12-05 @ 07:00  --------------------------------------------------------  IN: 0 mL / OUT: 975 mL / NET: -975 mL          PHYSICAL EXAM:    Constitutional: NAD  Neck:  No JVD  Respiratory: +rhonchi   Chest: + L pleurx   Cardiovascular: tachy S1 and S2  Gastrointestinal: BS+, soft, NT/ND  Extremities: + LE peripheral edema  Neurological: A/O x 3, no focal deficits  Psychiatric: Normal mood, normal affect  : + Lott  Skin: No rashes  Access: Not applicable    LABS:                        9.2    13.16 )-----------( 263      ( 05 Dec 2022 05:10 )             31.3     12-05    140  |  105  |  54<H>  ----------------------------<  125<H>  4.9   |  25  |  1.67<H>    Ca    9.0      05 Dec 2022 05:10  Phos  3.2     12-04  Mg     2.10     12-04        RADIOLOGY & ADDITIONAL STUDIES:    < from: Xray Chest 1 View- PORTABLE-Routine (Xray Chest 1 View- PORTABLE-Routine in AM.) (12.04.22 @ 08:51) >    INTERPRETATION:  CLINICAL INFORMATION: Postop    TIME OF EXAMINATION: December 4 at 7:23 AM    EXAM: Portable chest    FINDINGS:  Left-sided chest tube in place. The basilar pneumothorax on the left side   is not seen on the current study although the effusion persists. Small to   moderate right effusion unchanged. Upper lung fields are free of focal   abnormalities.    Interstitial edema unchanged.        COMPARISON: December 3        IMPRESSION: Follow-up with bilateral effusions and poor visualization of   the pneumothorax on the left side with chest tube in place.    --- End of Report ---    < end of copied text >

## 2022-12-05 NOTE — DISCHARGE NOTE NURSING/CASE MANAGEMENT/SOCIAL WORK - PATIENT PORTAL LINK FT
You can access the FollowMyHealth Patient Portal offered by [facility] by registering at the following website: [email or web address]. By joining [facility]’s FollowMyHealth portal, you will also be able to view your health information using other applications (apps) compatible with our system.

## 2022-12-05 NOTE — DISCHARGE NOTE NURSING/CASE MANAGEMENT/SOCIAL WORK - NSDPFAC_GEN_ALL_CORE
[facility]  [facility] [address] [place] [phone] ()

## 2022-12-05 NOTE — DISCHARGE NOTE NURSING/CASE MANAGEMENT/SOCIAL WORK - NSDCPEFALRISK_GEN_ALL_CORE
For information on Fall & Injury Prevention, visit: [email or web address] OR  [email or web address] OR  https://www.cdc.gov/steadi/patient.html

## 2022-12-06 NOTE — PROVIDER CONTACT NOTE (OTHER) - REASON
Repeat low BP
discharge planning
Low BP
PT. refused francis catheter
Right time for next Vancomycin dose.
Tachycardic 105-115s
NO PIV access.

## 2022-12-06 NOTE — PROGRESS NOTE ADULT - ASSESSMENT
ASSESSMENT/PLAN:   89 year old male with PMH of Chronic Afib- Eliquis Aortic valve calcification, HTN, HLD, CVA, CABG in 2012, prostate cancer s/p radiation, left pleural effusion s/p L VATS  Hypotension   TIM and hyperkalemia   Rapid afib and Rapid ventricular rate .    1 Renal - TIM at present from post obstruction, Francis placed, Renal function stable as of late   s/p Lasix 40mg IVP (12/1) and BP still a little too low for additional doses   If the BP allows then lasix 40mg po qd   2 Diet- K+ improved s/p Lokelma x 1 Continue Nepro shakes and no potassium in diet   3 CVS - Continue lopressor 50 mg po bid. Heart rate improved from yesterday   4  - DC with the francis and outpt follow up with his outpt Urologist Dr Smith     Outpt arrangements for subacute     [facility]   [phone]

## 2022-12-06 NOTE — PROVIDER CONTACT NOTE (OTHER) - ACTION/TREATMENT ORDERED:
Provider recommended repeat 250mL albumin. Repeat BP post albumin infusion.
Medication window open to give at any time, Provider OK with giving new dose in the morning since Trough level returned abnormally high.
OK to not have PIV access. pt pending d/c to rehab tomorrow.
Pt. offered francis catheter insertion and refused. Ongoing issue. Team made aware. No new orders.
Provider recommended 250mL albumin, given. Repeat BP after infusion completed.

## 2022-12-06 NOTE — PROVIDER CONTACT NOTE (OTHER) - SITUATION
Pt. refused francis catheter
Low BP
Repeat low BP
called to speak to pt/ family regarding services at home . Pt appears very debilitated -  89  year old male HTN, HLD, CVA CABG 2012, and prostate cancer s/p radiation, left pleurx catheter .
pt had no PIV access.
RN wants to know when to give next dose of Vancomycin since Trough level resulted abnormally. Vanco dose originally 1250 mg but decreased to 1000 mg since Trough level came back at 23.
Pt. sleeping and comfortable in bed but tachycardic on tele monitor. A-fib rhythm shown.

## 2022-12-06 NOTE — PROGRESS NOTE ADULT - SUBJECTIVE AND OBJECTIVE BOX
NEPHROLOGY     Patient seen and examined resting comfortably with daughter at bedside, no new complaints, comfortable on 2L NC, in no acute distress.     MEDICATIONS  (STANDING):  apixaban 5 milliGRAM(s) Oral two times a day  aspirin enteric coated 81 milliGRAM(s) Oral daily  atorvastatin 40 milliGRAM(s) Oral at bedtime  dornase tamika Solution 2.5 milliGRAM(s) Inhalation daily  levoFLOXacin  Tablet 750 milliGRAM(s) Oral every 48 hours  levothyroxine 100 MICROGram(s) Oral daily  lidocaine   4% Patch 1 Patch Transdermal every 24 hours  metoprolol tartrate 50 milliGRAM(s) Oral two times a day  nystatin Powder 1 Application(s) Topical two times a day  sodium chloride 3%  Inhalation 4 milliLiter(s) Inhalation every 12 hours  tamsulosin 0.4 milliGRAM(s) Oral at bedtime    VITALS:  T(C): , Max: 37 (12-05-22 @ 16:41)  T(F): , Max: 98.6 (12-05-22 @ 16:41)  HR: 70 (12-06-22 @ 10:05)  BP: 100/57 (12-06-22 @ 09:18)  RR: 18 (12-06-22 @ 09:18)  SpO2: 96% (12-06-22 @ 09:18)    I and O's:    12-05 @ 07:01  -  12-06 @ 07:00  --------------------------------------------------------  IN: 500 mL / OUT: 420 mL / NET: 80 mL    PHYSICAL EXAM:  Constitutional: NAD  Neck:  No JVD  Respiratory: +rhonchi   Chest: + L pleurx   Cardiovascular: tachy S1 and S2  Gastrointestinal: BS+, soft, NT/ND  Extremities: + LE peripheral edema  Neurological: A/O x 3, no focal deficits  Psychiatric: Normal mood, normal affect  : + Francis  Skin: No rashes    LABS:                        9.2    13.16 )-----------( 263      ( 05 Dec 2022 05:10 )             31.3     12-05    140  |  105  |  54<H>  ----------------------------<  125<H>  4.9   |  25  |  1.67<H>    Ca    9.0      05 Dec 2022 05:10    RADIOLOGY & ADDITIONAL STUDIES:    ACC: [id number] EXAM:  XR CHEST PORTABLE ROUTINE 1V                          PROCEDURE DATE:  12/05/2022          INTERPRETATION:  Chest one view    HISTORY: Pleural effusion    COMPARISON STUDY: 12/4/2022    Frontal expiratory view of the chest shows the heart to be similar in   size. Lower left chest tube is present.    The lungs show similar mild congestion with small bilateral pleural   effusions. Small pneumothorax is seen along the lower left chest.    IMPRESSION:  Small effusions. Small pneumothorax.        Thank you for the courtesy of this referral.    --- End of Report ---    ADAMS BALLESTEROS MD; Attending Interventional Radiologist  This document has been electronically signed. Dec  5 2022  1:56PM    ASSESSMENT/PLAN:   89 year old male with PMH of Chronic Afib- Eliquis Aortic valve calcification, HTN, HLD, CVA, CABG in 2012, prostate cancer s/p radiation, left pleural effusion s/p L VATS  Hypotension   TIM and hyperkalemia   Rapid afib and Rapid ventricular rate .    1 Renal - TIM at present from post obstruction, Francis placed, Renal function stable as of late, labs pending   s/p Lasix 40mg IVP (12/1) and BP still a little too low for additional doses   2 Diet- K+ improved s/p Lokelma x 1 Continue Nepro shakes and no potassium in diet   3 CVS - Continue lopressor 50 mg po bid.  This should not reduce the BP.  Lowering heart rate will help the BP and then allow for standing diuretics   4  - DC with the francis and outpt follow up with his outpt Urologist Dr Smith      NEPHROLOGY     Patient seen and examined resting comfortably with daughter at bedside, no new complaints, comfortable on 2L NC, in no acute distress.     MEDICATIONS  (STANDING):  apixaban 5 milliGRAM(s) Oral two times a day  aspirin enteric coated 81 milliGRAM(s) Oral daily  atorvastatin 40 milliGRAM(s) Oral at bedtime  dornase tamika Solution 2.5 milliGRAM(s) Inhalation daily.  levoFLOXacin  Tablet 750 milliGRAM(s) Oral every 48 hours  levothyroxine 100 MICROGram(s) Oral daily  lidocaine   4% Patch 1 Patch Transdermal every 24 hours  metoprolol tartrate 50 milliGRAM(s) Oral two times a day  nystatin Powder 1 Application(s) Topical two times a day  sodium chloride 3%  Inhalation 4 milliLiter(s) Inhalation every 12 hours  tamsulosin 0.4 milliGRAM(s) Oral at bedtime    VITALS:  T(C): , Max: 37 (12-05-22 @ 16:41)  T(F): , Max: 98.6 (12-05-22 @ 16:41)  HR: 70 (12-06-22 @ 10:05)  BP: 100/57 (12-06-22 @ 09:18)  RR: 18 (12-06-22 @ 09:18)  SpO2: 96% (12-06-22 @ 09:18)    I and O's:    12-05 @ 07:01  -  12-06 @ 07:00  --------------------------------------------------------  IN: 500 mL / OUT: 420 mL / NET: 80 mL    PHYSICAL EXAM:  Constitutional: NAD  Neck:  No JVD  Respiratory: +rhonchi   Chest: + L pleurx   Cardiovascular: tachy S1 and S2.  Gastrointestinal: BS+, soft, NT/ND  Extremities: + LE peripheral edema  Neurological: A/O x 3, no focal deficits  Psychiatric: Normal mood, normal affect  : + Lott  Skin: No rashes    LABS:                        9.2    13.16 )-----------( 263      ( 05 Dec 2022 05:10 )             31.3     12-05    140  |  105  |  54<H>  ----------------------------<  125<H>  4.9   |  25  |  1.67<H>    Ca    9.0      05 Dec 2022 05:10    RADIOLOGY & ADDITIONAL STUDIES:    ACC: [id number] EXAM:  XR CHEST PORTABLE ROUTINE 1V                          PROCEDURE DATE:  12/05/2022          INTERPRETATION:  Chest one view    HISTORY: Pleural effusion    COMPARISON STUDY: 12/4/2022    Frontal expiratory view of the chest shows the heart to be similar in   size. Lower left chest tube is present.    The lungs show similar mild congestion with small bilateral pleural   effusions. Small pneumothorax is seen along the lower left chest.    IMPRESSION:  Small effusions. Small pneumothorax.        Thank you for the courtesy of this referral.    --- End of Report ---    ADAMS BALLESTEROS MD; Attending Interventional Radiologist  This document has been electronically signed. Dec  5 2022  1:56PM

## 2022-12-06 NOTE — PROVIDER CONTACT NOTE (OTHER) - NAME OF MD/NP/PA/DO NOTIFIED:
Kalyan MENDI[place]
[place] [address]
Kalyan MENDIETA
[facility] [address] - Gi Clifton NP
[place] [address]
[place] [address]

## 2022-12-06 NOTE — PROVIDER CONTACT NOTE (OTHER) - ASSESSMENT
He would like [facility] information [facility] for [facility]. The case referrad to [facility] for further assistance
VSS. O2 sat > 95%. No complaint of pain, no chest pain or shortness of breath. Comfortable on 2L NC.
no acute distress noted. pt not on any IV medications. pt for d/c to rehab tomorrow.
Pt. A&Ox4, vital signs stable. Pt. urinated 150mL through condom cath. Bladder scanned and it showed over 760mL in the bladder.
Pt. A&Ox4. Vital signs stable except low BP repeated post albumin infusion. Pt. not complaining of dizziness.
Pt. A&Ox4. Vital signs stable except low BP. Pt. not experiencing dizziness.

## 2022-12-06 NOTE — ADVANCED PRACTICE NURSE CONSULT - REASON FOR CONSULT
Patient known to [facility] service line last seen on December 1, 2022. Patient seen on skin care rounds after wound care referral received for assessment of skin impairment and recommendations of topical management for sacral DTPI.  As per H and P, patient is 89 year old male with PMH of Chronic Afib- Eliquis Aortic valve calcification, HTN, HLD, CVA, CABG in 2012, prostate cancer s/p radiation, left pleural effusion s/p L VATS, drainage of effusion, Pleurx catheter placement on 10/13/22. He presents to the ED today for increasing shortness of breath and mechanical fall yesterday. Patient states that he has noticed increasing shortness of breath over the last couple of weeks which worsens when laying down or with exertion. He gets his Pleurex drained twice a week with VNS on Mondays and Fridays and states that over the last three weeks, the fluid evacuated has decreased dramatically to less than 100cc per encounter. He additionally complains of chills and night sweats. He denies fevers, nausea, diarrhea. He complains of constipation.  Patient also states that he sustained a mechanical fall November 17, 2022. He denies head injury, LOC, nausea, or vomiting    Chart reviewed: WBC 13.16, H/H 9.2/31.3, Platelets 263, INR 1.58, D Dimer assay 942, Serum albumin 2.6, BMI 22.2, Robbin 19.  
Patient seen on skin care rounds after wound care referral received for assessment of skin impairment and recommendations of topical management. As per H and P, patient is 89 year old male with PMH of Chronic Afib- Eliquis Aortic valve calcification, HTN, HLD, CVA, CABG in 2012, prostate cancer s/p radiation, left pleural effusion s/p L VATS, drainage of effusion, Pleurx catheter placement on 10/13/22. He presents to the ED today for increasing shortness of breath and mechanical fall yesterday. Patient states that he has noticed increasing shortness of breath over the last couple of weeks which worsens when laying down or with exertion. He gets his Pleurex drained twice a week with VNS on Mondays and Fridays and states that over the last three weeks, the fluid evacuated has decreased dramatically to less than 100cc per encounter. He additionally complains of chills and night sweats. He denies fevers, nausea, diarrhea. He complains of constipation.  Patient also states that he sustained a mechanical fall November 17, 2022. He denies head injury, LOC, nausea, or vomiting. He denies any pain.     Chart reviewed: WBC 14.43, H/H 9.1/31.6, Platelets 347, INR 1.52, D Dimer Assay 942, Serum albumin 2.8,  BMI 22.2, Robbin 17.

## 2022-12-06 NOTE — PROGRESS NOTE ADULT - SUBJECTIVE AND OBJECTIVE BOX
Patient seen and evaluated at bedside by Thoracic.  Patient expresses disappointment and frustration with not being taken to rehab yesterday.  Patient and family discussing rehab facilities today.    Vital Signs:  Vital Signs Last 24 Hrs  T(C): 36.4 (12-06-22 @ 09:18), Max: 37 (12-05-22 @ 16:41)  T(F): 97.6 (12-06-22 @ 09:18), Max: 98.6 (12-05-22 @ 16:41)  HR: 70 (12-06-22 @ 10:05) (12 - 118)  BP: 100/57 (12-06-22 @ 09:18) (95/57 - 127/61)  RR: 18 (12-06-22 @ 09:18) (18 - 18)  SpO2: 96% (12-06-22 @ 09:18) (96% - 100%)     General: Appears stated age, thin, weak  Neurology: Awake, nonfocal, BAXTER x 4  Eyes: Scleras clear, PERRLA/ EOMI, Gross vision intact  ENT:Gross hearing intact, grossly patent pharynx, no stridor  Neck: Neck supple, trachea midline, No JVD,   Respiratory: On O2, in NAD, no accessory muscle use.  CV: Well perfused, tachy  Abdominal: Soft, NT, ND +BS,   Extremities: Thin extremities, No edema, + peripheral pulses  Skin: 3 areas of ulceration noted on sacrum/buttock.  Psych: Oriented x 3, normal affect  Incisions: c,d,i  Tubes: L pleurX capped. Francis catheter in place      Relevant labs, radiology and Medications reviewed                        9.2    13.16 )-----------( 263      ( 05 Dec 2022 05:10 )             31.3     12-05    140  |  105  |  54<H>  ----------------------------<  125<H>  4.9   |  25  |  1.67<H>    Ca    9.0      05 Dec 2022 05:10      PT/INR - ( 05 Dec 2022 05:10 )   PT: 18.4 sec;   INR: 1.58 ratio           MEDICATIONS  (STANDING):  apixaban 5 milliGRAM(s) Oral two times a day  aspirin enteric coated 81 milliGRAM(s) Oral daily  atorvastatin 40 milliGRAM(s) Oral at bedtime  dornase tamika Solution 2.5 milliGRAM(s) Inhalation daily  levoFLOXacin  Tablet 750 milliGRAM(s) Oral every 48 hours  levothyroxine 100 MICROGram(s) Oral daily  lidocaine   4% Patch 1 Patch Transdermal every 24 hours  metoprolol tartrate 50 milliGRAM(s) Oral two times a day  nystatin Powder 1 Application(s) Topical two times a day  sodium chloride 3%  Inhalation 4 milliLiter(s) Inhalation every 12 hours  tamsulosin 0.4 milliGRAM(s) Oral at bedtime    MEDICATIONS  (PRN):  acetaminophen     Tablet .. 650 milliGRAM(s) Oral every 6 hours PRN mild pain  oxyCODONE    IR 5 milliGRAM(s) Oral every 6 hours PRN Moderate Pain (4 - 6)    Pertinent Physical Exam  I&O's Summary    05 Dec 2022 07:01  -  06 Dec 2022 07:00  --------------------------------------------------------  IN: 500 mL / OUT: 420 mL / NET: 80 mL        Assessment  89M with multiple comorbidities, history of recent L VATS, pleurex catheter placement 10/13/22 for pleural effusion who presents with bilateral pleural effusions (L>R) with increasing SOB over the last three weeks with decreased drainage from the Pleurex catheter.   Pleurx to waterseal, draining serous fluid. Pt INR elevated, awaiting down trend to do possible intervention on Rt effusion and possible MIST protocol via left Pleurx.     11/21/22: Right pleural effusion appears improved on repeat AM CXR from yesterday and today. No ptc required at this time. Left PleurX is draining, 200cc over the last 24hrs. Will hold off on MIST. INR continues to be elevated, eliquis continues to be on hold. IMANI overnight, on home meds. Consult pts cardiologist Dr Le, TTE pending. Cont with IV Abx for elevated WBC, no cs sent. Cr improving, making urine.  11/22: INR 1.58, hold MIST.  11/23: INR 1.7, No Thoracentesis today however bedside US showed small effusion on right. Elevated LFTS. GI and Heme consulted.  11/24: INR 1.6, No thoracentesis today.   11/25: INR 1.5. Will place Right sided PTC today per DR. Rodas. Will send fluid for cytology, culture, lytes. Will switch antibiotics to po. Plan MIST via left Pleurx tomorrow.   11/26-11/27: Continued to monitor INR with no improvement. Rt PTC with continued serous output.   11/28: IVF hydration started for inc'd creatinine. MIST through L pleurX Called Bill Wilson for follow up who recommends continuing Vit K daily.   11/29: continue L pleurX to -10sxn and R PTC to waterseal  11/30: Hypotensive overnight, given 5% albumin x2. PTC removed at bedside. Renal consult called for worsening BUN/creat. Found to be in urinary retention. Pt refusing francis  12/1: Francis placed  (>1L out immediately). BUN creat stabilizing. Leukocytosis to improved to 14K  12/2-12/3: continued to monitor labs. Titrating beta blockers. Renal/Cardiology follow up.   12/5-12/6: Restarted on Eliquis and Aspirin. Rejected from original rehab. Deciding on new rehab.    PLAN  Neuro: Pain management. Cont oxy/tylenol  Pulm: Encourage coughing, deep breathing and use of incentive spirometry. Daily CXR. Cont O2. Chest PT  Cardio: Monitor telemetry/alarms. Diuresis as Cardiology/Nephrology. AC restarted.  GI: Tolerating diet. Continue stool softeners. : continue Francis. Encourage diet, po intake  Renal: monitor urine output, supplement electrolytes as needed. trend labs. FU any further renal instructions.   Vasc: Restarted on Eliquis and Aspirin yesterday. SCDs for DVT prophylaxis  Heme: Stable H/H. .   ID: Continue Levaquin QOD  Skin: Patient with DTI - Wound care saw patient today, appreciate recs.  Therapy: OOB/ambulate. Rehab planning.   Tubes: Drain left pleurx 3x per week (M-W-F). Record outputs.   Disposition: Aim to D/C to Rehab tomorrow (once family decide on facility - were given list last night).   Discussed with Cardiothoracic Team at AM rounds.

## 2022-12-06 NOTE — CHART NOTE - NSCHARTNOTEFT_GEN_A_CORE
Informed consent obtained. Under aseptic technique 10mg Alteplase with 10mg Dornase instilled via left Pleurx with sterile NS flush before and after. Pt tolerated procedure well. No complaints. Pleurx to be kept clamped x 1hr then placed to -10cm suction. Will monitor. Above d/w Dr. Rodas.
NUTRITION FOLLOW-UP    Pt seen for nutrition follow up.    Pt is 89 year old male PMH chronic Afib (Eliquis Aortic valve calcification), HTN, HLD, CVA, CABG (2012), prostate cancer s/p radiation, left pleural effusion s/p L VATS, drainage of effusion, Pleurx catheter placement (10/13/22) presenting with SOB, found to have pleural effusion s/p drainage with significant WBC in fluid, TIM, elevated INR. Course complicated by Afib RVR, elevated LFTs.    Pt states his appetite continues to be poor. He does eat but is intake is small. He does drink Ensure supplements but does not finish them. Discussed food preferences and will provide them.   Pt had no complaint of GI distress (nausea, vomiting, diarrhea, constipation, bloating). Pt has no difficulty swallowing.    Weight: 68.5 kg  11/22/22  Please obtain current weight.    Labs: 11-30 Na 138 mmol/L Glu 112 mg/dL<H> K+ 5.3 mmol/L Cr 1.63 mg/dL<H> BUN 54 mg/dL<H> Phos n/a        Current Diet: Diet, Regular:   No Concentrated Potassium (11-30-22 @ 12:43)    Edema: No edema noted    Skin: Surgical incision pleurex insertion site, left chest tube site    Malnutrition Status: Ongoing severe malnutrition    RD to Remain Available: Aniya Perla MS, RDN, CDN pager [phone]     Additional Recommendations:  1) Monitor weight, labs, po intake and skin integrity.
Pt noted to have 800cc on Bladder scan done at bedside. Bladder feels somewhat distended. Ordered pt to have Lott catheter placed however pt refusing placement at this time. Had issue in the past with pain and hematuria and does not want to risk repeat occurrence. Pt's daughter at bedside as well. Risks of not relieving urinary retention d/w pt and his daughter at length and they have full understanding. Pt wants to think about it and will let us know if he changes his mind. Will continue to follow up and re-assess.
Per chart-----89M with multiple comorbidities, history of recent L VATS, Pleurx catheter placement 10/13/22 for pleural effusion who presents with bilateral pleural effusions (L>R) with increasing SOB over the last three weeks with decreased drainage from the Pleurx catheter.   Pleurx to water-seal, draining serous fluid. Pt INR elevated, awaiting down trend to do possible intervention on Rt effusion and possible MIST protocol via left Pleurx.     Nutrition follow up for severe malnutrition. Spoke with Pt and Daughter. Pt with extended hospitalization and reports lack of appetite related to frustration with not being able to be discharged as planned.  No reported chewing, swallowing difficulties or any nausea, vomiting, diarrhea, constipation. Last bowel movement 12/5. Pt is receiving Nepro Carb Steady once daily but dislikes it and would like to get Glucerna Shake alessandra flavor preferably. Pt does not have hx of Diabetes but states he likes this drink in particular. Pt with Hx of CRI, and had elevated Potassium. Current K is wnl. Pt was encouraged to try and increase his po intakes. Will provide alessandra pudding as requested. No new wt to assess.     DIET : Diet, Regular:   No Concentrated Potassium  Supplement Feeding Modality:  Oral  Nepro Cans or Servings Per Day:  1       Frequency:  Daily (12-01-22 @ 15:20)    WEIGHT: admit 64.4 kg    (11/22)  68.5      PERTINENT MEDICATIONS: MEDICATIONS  (STANDING):  apixaban 5 milliGRAM(s) Oral two times a day  aspirin enteric coated 81 milliGRAM(s) Oral daily  atorvastatin 40 milliGRAM(s) Oral at bedtime  dornase tamika Solution 2.5 milliGRAM(s) Inhalation daily  levoFLOXacin  Tablet 750 milliGRAM(s) Oral every 48 hours  levothyroxine 100 MICROGram(s) Oral daily  lidocaine   4% Patch 1 Patch Transdermal every 24 hours  metoprolol tartrate 50 milliGRAM(s) Oral two times a day  nystatin Powder 1 Application(s) Topical two times a day  sodium chloride 3%  Inhalation 4 milliLiter(s) Inhalation every 12 hours  tamsulosin 0.4 milliGRAM(s) Oral at bedtime    MEDICATIONS  (PRN):  acetaminophen     Tablet .. 650 milliGRAM(s) Oral every 6 hours PRN mild pain  oxyCODONE    IR 5 milliGRAM(s) Oral every 6 hours PRN Moderate Pain (4 - 6)    LABS:  12-05 Na140 mmol/L Glu 125 mg/dL<H> K+ 4.9 mmol/L Cr  1.67 mg/dL<H> BUN 54 mg/dL<H> 12-04 Phos 3.2 mg/dL 12-02 Alb 2.6 g/dL<L>    SKIN: Sacrum SDTI.     EDEMA: 1+ L/R Ankle and Feet.     Nutrient Needs:  [X] No Change from Initial Assessment.     Previous Nutrition Diagnosis: Ongoing severe malnutrition.                                       ~~~~~RECOMMEND~~~~~  1.  Consider a more liberalized diet----->Regular with Glucerna 2x daily. D/C Nepro Carb Steady.  2.  Monitor PO intake, diet tolerance, skin integrity and pertinent labs.    3.  Please obtain current weight.  4. Recommend daily multivitamin supplement.    ALBERT Crenshaw RD, CDN, CDCES

## 2022-12-06 NOTE — ADVANCED PRACTICE NURSE CONSULT - ASSESSMENT
General: Patient is A&Ox 4, O2 NC, Right flank area chest tube removal site with dressing c/d/i, Left flank area with Pleurx cath covered by dressing c/d/i, bedbound, turned with 1 assist, incontinent of urine and stool. Indwelling catheter is in place.  Daughter at bedside. Skin warm, dry with increased moisture in intertriginous folds, scattered areas of ecchymosis without hematoma on bilateral upper extremities.     Bilateral lower extremities with scattered areas of hyper and hypopigmentation. Dry, flaky skin. Fragile toenails, left 3rd toenail is missing, exposing pink nail bed. Increased coolness from midfoot to distal toes. + 4  Edema worse from ankles to feet. Capillary refill >3 seconds. B/L feet with all toes and soles of feet and heels with bluish purplish discoloration. Unable to palpate DP/PT pulses,  + pallor on elevation and dependant rubor.    Sacrum to B/L buttocks- Mixed etiology pressure va moisture and incontinent associated dermatitis and trauma. All area with nonblanching erythema, and moisture due to incontinence and patient fell at home before admission to the hospital on his buttocks, therefore 3 areas of denuded epidermis noted in a triangle manner- 1 to sacrum and one on each lower buttock with bruising at periwound each measuring 1cm x 1cm x 0.1cm, 100% mixture of pink dermis and fibrin.     B/L groin and upper inner thigh - Severe Incontinence Associated Dermatitis (IAD) complicated by suspected candidiasis Resolved.         General: Patient is A&Ox 4, O2 NC, Right flank area chest tube removal site with dressing c/d/i, Left flank area with Pleurx cath covered by dressing c/d/i, bedbound, turned with 1 assist, incontinent of urine and stool. Indwelling catheter is in place.  Daughter at bedside. Skin warm, dry with increased moisture in intertriginous folds, scattered areas of ecchymosis without hematoma on bilateral upper extremities.     Bilateral lower extremities with scattered areas of hyper and hypopigmentation. Dry, flaky skin. Fragile toenails, left 3rd toenail is missing, exposing pink nail bed. Increased coolness from midfoot to distal toes. + 4  Edema worse in ankles than feet. Capillary refill >3 seconds. B/L feet bluish purplish discoloration resolved and color is consistent with overall patient's skin tone. Unable to palpate DP/PT pulses, monophasic doppler sounds DP/PT present B/L.     Sacrum to B/L buttocks- Mixed etiology pressure va moisture and incontinent associated dermatitis and trauma. All area with nonblanching erythema, and moisture due to incontinence and patient fell at home before admission to the hospital on his buttocks, therefore 3 areas of denuded epidermis noted in a triangle manner- 1 to sacrum and one on each lower buttock with bruising at periwound each measuring 1cm x 1cm x 0.1cm, 100% mixture of pink dermis and fibrin.     B/L groin and upper inner thigh - Severe Incontinence Associated Dermatitis (IAD) complicated by suspected candidiasis Resolved.    Left flank area- deroofed blister, measuring 4cm x 2.5cn x0.1cm, 100% Pink moist dermis.          General: Patient is A&Ox 4, O2 NC, Right flank area chest tube removal site with dressing c/d/i, Left flank area with Pleurx cath covered by dressing c/d/i, bedbound, turned with 1 assist, incontinent of urine and stool. Indwelling catheter is in place.  Daughter at bedside. Skin warm, dry with increased moisture in intertriginous folds, scattered areas of ecchymosis without hematoma on bilateral upper extremities.     Bilateral lower extremities with scattered areas of hyper and hypopigmentation. Dry, flaky skin. Fragile toenails, left 3rd toenail is missing, exposing pink nail bed. Increased coolness from midfoot to distal toes. + 4  Edema worse in ankles than feet. Capillary refill >3 seconds. B/L feet bluish purplish discoloration resolved and color is consistent with overall patient's skin tone. Unable to palpate DP/PT pulses, monophasic doppler sounds DP/PT present B/L.     Sacrum to B/L buttocks- DTPI.  Active issues contributed to skin impartment: increased moisture, decreased mobility, patient oxygen dependent, plural effusion with Pleurx cath in place, A Fib, PAD, CAD, hypotensive and tachycardic events. Mixed etiology pressure complicated by moisture and incontinent associated dermatitis and trauma (s/p fall at home on the buttocks. All area with mixed tissue types consist of nonblanching erythema measuring 10cm x8cm, within that area- area of DPTPI at the sacral/gluteal fold that is not seen in natural anatomical position, when buttocks spread apart for assessment, area of DTPI measuring 4cm x 4cm x 0.2cm of purple maroon discoloration with slippage of skin at 12 o'clock, site of previous trauma from fall, before admission to the hospital. The 2 areas on B/L lower buttocks, that were also seen on previous assessment, also r/t trauma from previous fall, each measuring 1cm x1cm x0.1cm, today present with deep dermis. Sacrum to B/L buttocks boggy, Periwound hyperpigmentation and hypopigmentation. No increased warmth, no erythema, no induration, slight edema noted at this time. Goals of care: monitor for tissue type changes, off load, continue measures to decrease friction/shear/pressure and moisture.     B/L groin and upper inner thigh - Severe Incontinence Associated Dermatitis (IAD) complicated by suspected candidiasis Resolved.    Left flank area- deroofed blister, measuring 4cm x 2.5cn x0.1cm, 100% Pink moist dermis. Periwound no increased warmth, no erythema, no induration, no edema noted at this time.  General: Patient is A&Ox 4, O2 NC, Right flank area chest tube removal site with dressing c/d/i, Left flank area with Pleurx cath covered by dressing c/d/i, bedbound, turned with 1 assist, incontinent of urine and stool. Indwelling catheter is in place.  Daughter at bedside. Skin warm, dry with increased moisture in intertriginous folds, scattered areas of ecchymosis without hematoma on bilateral upper extremities.     Bilateral lower extremities with scattered areas of hyper and hypopigmentation. Dry, flaky skin. Fragile toenails, left 3rd toenail is missing, exposing pink nail bed. Increased coolness from midfoot to distal toes. + 4  Edema worse in ankles than feet. Capillary refill >3 seconds. B/L feet bluish purplish discoloration resolved and color is consistent with overall patient's skin tone. Unable to palpate DP/PT pulses, monophasic doppler sounds DP/PT present B/L.     Sacrum to B/L buttocks- DTPI (Deep Tissue Pressure Injury).  Mixed etiology pressure complicated by moisture and incontinent associated dermatitis and trauma (s/p fall at home on the buttocks. All area with mixed tissue types consist of nonblanching erythema measuring 10cm x8cm, within that area- area of DPTPI at the sacral/gluteal fold that is not seen in natural anatomical position, when buttocks spread apart for assessment, area of DTPI measuring 4cm x 4cm x 0.2cm of purple maroon discoloration with slippage of skin at 12 o'clock, site of previous trauma from fall, before admission to the hospital. The 2 areas on B/L lower buttocks, that were also seen on previous assessment, also related to trauma from previous fall, each measuring 1cm x1cm x0.1cm, today present with deep dermis. Sacrum to B/L buttocks boggy, Periwound hyperpigmentation and hypopigmentation. No increased warmth, no erythema, no induration, slight edema noted at this time. Goals of care: monitor for tissue type changes, off load, continue measures to decrease friction/shear/pressure and moisture. Active issues contributed to skin impartment: increased moisture, decreased mobility, patient oxygen dependent, plural effusion with Pleurx cath in place, A Fib, PAD, CAD, hypotensive and tachycardic events. Patient continues to be at high risk for skin breakdown.     B/L groin and upper inner thigh - Severe Incontinence Associated Dermatitis (IAD) complicated by suspected candidiasis Resolved.    Left flank area- deroofed blister, measuring 4cm x 2.5cn x0.1cm, 100% Pink moist dermis. Periwound no increased warmth, no erythema, no induration, no edema noted at this time.  General: Patient is A&Ox 4, O2 NC, Right flank area chest tube removal site with dressing c/d/i, Left flank area with Pleurx cath covered by dressing c/d/i, bedbound, turned with 1 assist, incontinent of urine and stool. Indwelling catheter is in place.  Daughter at bedside. Skin warm, dry with increased moisture in intertriginous folds, scattered areas of ecchymosis without hematoma on bilateral upper extremities.     Bilateral lower extremities with scattered areas of hyper and hypopigmentation. Dry, flaky skin. Fragile toenails, left 3rd toenail is missing, exposing pink nail bed. Increased coolness from midfoot to distal toes. + 4  Edema worse in ankles than feet. Capillary refill >3 seconds. B/L feet bluish purplish discoloration resolved and color is consistent with overall patient's skin tone. Unable to palpate DP/PT pulses, monophasic doppler sounds DP/PT present B/L.     Sacrum to B/L buttocks- DTPI (Deep Tissue Pressure Injury).  Mixed etiology pressure complicated by moisture and incontinent associated dermatitis and trauma (s/p fall at home on the buttocks. All area with mixed tissue types consist of nonblanching erythema measuring 10cm x8cm, within that area- area of DPTPI at the sacral/gluteal fold that is not seen in natural anatomical position, when buttocks spread apart for assessment, area of DTPI measuring 4cm x 4cm x 0.2cm of purple maroon discoloration with slippage of skin at 12 o'clock, site of previous trauma from fall, before admission to the hospital. The 2 areas on B/L lower buttocks, that were also seen on previous assessment, also related to trauma from previous fall, each measuring 1cm x1cm x0.1cm, today present with deep dermis. Sacrum to B/L buttocks boggy, Periwound hyperpigmentation and hypopigmentation. No increased warmth, no erythema, no induration, slight edema noted at this time. Goals of care: monitor for tissue type changes, off load, continue measures to decrease friction/shear/pressure and moisture. Active issues contributed to skin impartment: increased moisture, decreased mobility, patient oxygen dependent, plural effusion with Pleurx cath in place, A Fib, PAD, CAD, hypotensive and tachycardic events. Patient continues to be at high risk for skin breakdown. On assessment patient on a low air loss surface, heel offloading boots in place, positioning pillows utilized, moisture management in place with use of one incontinence pad. Turning and positioning as tolerated.    B/L groin and upper inner thigh - Severe Incontinence Associated Dermatitis (IAD) complicated by suspected candidiasis Resolved.    Left flank area- deroofed blister, measuring 4cm x 2.5cn x0.1cm, 100% Pink moist dermis. Periwound no increased warmth, no erythema, no induration, no edema noted at this time.

## 2022-12-06 NOTE — PROVIDER CONTACT NOTE (OTHER) - BACKGROUND
Pt. admitted for PNA.
S/p right chest tube and left pleurx.
S/p right chest tube and left pleurx.
Pt. admitted for shortness of breath post fall with a Pluerex in place.
Pt. s/p left pleurex and right chest tube removal.
wife requesting information on aide service. plan  today is for admission to inpatient. Discussed with pt [facility] vs Hospice care services . Per wife pt had a nurse from [facility] at home for pleurax
Pt. s/p left pleurex and right chest tube removal.

## 2022-12-06 NOTE — ADVANCED PRACTICE NURSE CONSULT - RECOMMEDATIONS
Topical recommendations:     Offload nasal cannula from ears with Posey nasal cannula offloading device to relieve pressure behind ears.     Sacrum to B/L buttocks and B/L groin and upper inner thigh-     Continue low air loss bed therapy, continue heel elevation with Complete CAIR Boots, continue to turn & reposition as per protocol, soft pillow between bony prominences, continue moisture management as recommended above, Indweling catheter insertion will help resolve both - urinary retention and allow for fungal areas to heal  & single breathable pad, continue measures to decrease friction/shear/pressure.    Plan of care discussed with patient and his daughter, all questions answered to their satisfaction,       Please contact Wound/Ostomy Care Service Line if we can be of further assistance (ext [phone]).                 Elena Mathew (RN) Topical recommendations:     Offload nasal cannula from ears with Posey nasal cannula offloading device to relieve pressure behind ears.     Sacrum to B/L buttocks and B/L groin and upper inner thigh-     Left flank area- Cleanse with NS, pat dry. Apply Liquid barrier film to periwound skin (allow to dry). Cover with silicone foam with borders. Change dressing daily and PRN.    Continue low air loss bed therapy, continue heel elevation with Complete CAIR Boots, continue to turn & reposition as per protocol, soft pillow between bony prominences, continue moisture management as recommended above, Indweling catheter insertion will help resolve both - urinary retention and allow for fungal areas to heal  & single breathable pad, continue measures to decrease friction/shear/pressure.    Plan of care discussed with patient and his daughter, all questions answered to their satisfaction,       Please contact Wound/Ostomy Care Service Line if we can be of further assistance (ext [phone]).                 Elena Mathew (RN) Topical recommendations:     Offload nasal cannula from ears with Posey nasal cannula offloading device to relieve pressure behind ears.     Sacrum to B/L buttocks - Cleanse with skin cleanser, pat dry. Apply  a thin layer of TRIAD paste/hydrophilic dressing twice a day and PRN with incontinent episodes. With episodes of incontinence only remove soiled layer of Triad, then reinforce with thin layer.     Left flank area- Cleanse with NS, pat dry. Apply Liquid barrier film to periwound skin (allow to dry). Cover with silicone foam with borders. Change dressing daily and PRN.    Continue low air loss bed therapy, continue heel elevation with Complete CAIR Boots, continue to turn & reposition as per protocol, soft pillow between bony prominences, continue moisture management as recommended above, continue measures to decrease friction/shear/pressure.    Plan of care discussed with patient and his daughter, all questions answered to their satisfaction, primary care RN Elena Mathew, and [facility] Sena #[phone].     Please contact Wound/Ostomy Care Service Line if we can be of further assistance (ext [phone]).

## 2022-12-07 NOTE — PROGRESS NOTE ADULT - REASON FOR ADMISSION
lung abnormality
Pleural effusions shortness of breath failure to thrive
SOB, fever
fevers
pleural effusion
sob afib plural effusion
pleural effsuion
pleural effusion
TIM
TIM
shortness of breath
shortness of breath

## 2022-12-07 NOTE — PROGRESS NOTE ADULT - SUBJECTIVE AND OBJECTIVE BOX
NEPHROLOGY     Patient seen and examined having breakfast with daughter at bedside, no new complaints, comfortable on 2L NC, in no acute distress.     MEDICATIONS  (STANDING):  apixaban 5 milliGRAM(s) Oral two times a day  aspirin enteric coated 81 milliGRAM(s) Oral daily  atorvastatin 40 milliGRAM(s) Oral at bedtime  dornase tamika Solution 2.5 milliGRAM(s) Inhalation daily  levoFLOXacin  Tablet 750 milliGRAM(s) Oral every 48 hours  levothyroxine 100 MICROGram(s) Oral daily  lidocaine   4% Patch 1 Patch Transdermal every 24 hours  metoprolol tartrate 50 milliGRAM(s) Oral two times a day  nystatin Powder 1 Application(s) Topical two times a day  sodium chloride 3%  Inhalation 4 milliLiter(s) Inhalation every 12 hours  tamsulosin 0.4 milliGRAM(s) Oral at bedtime    VITALS:  T(C): , Max: 36.6 (12-06-22 @ 20:00)  T(F): , Max: 97.8 (12-06-22 @ 20:00)  HR: 111 (12-07-22 @ 09:30)  BP: 109/65 (12-07-22 @ 09:30)  RR: 18 (12-07-22 @ 09:30)  SpO2: 96% (12-07-22 @ 09:30)    I and O's:    12-06 @ 07:01  -  12-07 @ 07:00  --------------------------------------------------------  IN: 930 mL / OUT: 1080 mL / NET: -150 mL    12-07 @ 07:01  -  12-07 @ 10:36  --------------------------------------------------------  IN: 120 mL / OUT: 100 mL / NET: 20 mL    PHYSICAL EXAM:  Constitutional: NAD  Neck:  No JVD  Respiratory: +rhonchi   Chest: + L pleurx   Cardiovascular: tachy S1 and S2.  Gastrointestinal: BS+, soft, NT/ND  Extremities: + LE peripheral edema  Neurological: A/O x 3, no focal deficits  Psychiatric: Normal mood, normal affect  : + Lott  Skin: No rashes    LABS:  No new labs     RADIOLOGY & ADDITIONAL STUDIES:      ACC: [id number] EXAM:  XR CHEST PORTABLE ROUTINE 1V                          PROCEDURE DATE:  12/06/2022          INTERPRETATION:  CLINICAL INFORMATION: Follow-up    TIME OF EXAMINATION: December 6 at 6:04 AM    EXAM: Portable chest    FINDINGS:  Perihilar haze and bilateral effusions about the same allowing for the   difference in technique consistent with edema. Heart size appears stable.    Left-sided Pleurx catheter in place with apparent decreasing left basilar   pneumothorax.      COMPARISON: December 5      IMPRESSION: Follow-up suspected pulmonary edema with effusions and left   Pleurx catheter in place.    --- End of Report ---            CAMILLA BARFIELD MD; Attending Radiologist  This document has been electronically signed. Dec  6 2022 12:01PM   NEPHROLOGY     Patient seen and examined having breakfast with daughter at bedside, no new complaints, comfortable on 2L NC, in no acute distress.     MEDICATIONS  (STANDING):  apixaban 5 milliGRAM(s) Oral two times a day  aspirin enteric coated 81 milliGRAM(s) Oral daily  atorvastatin 40 milliGRAM(s) Oral at bedtime  dornase tamika Solution 2.5 milliGRAM(s) Inhalation daily  levoFLOXacin  Tablet 750 milliGRAM(s) Oral every 48 hours  levothyroxine 100 MICROGram(s) Oral daily  lidocaine   4% Patch 1 Patch Transdermal every 24 hours  metoprolol tartrate 50 milliGRAM(s) Oral two times a day  nystatin Powder 1 Application(s) Topical two times a day  sodium chloride 3%  Inhalation 4 milliLiter(s) Inhalation every 12 hours  tamsulosin 0.4 milliGRAM(s) Oral at bedtime    VITALS:  T(C): , Max: 36.6 (12-06-22 @ 20:00)  T(F): , Max: 97.8 (12-06-22 @ 20:00)  HR: 111 (12-07-22 @ 09:30)  BP: 109/65 (12-07-22 @ 09:30)  RR: 18 (12-07-22 @ 09:30)  SpO2: 96% (12-07-22 @ 09:30)    I and O's:    12-06 @ 07:01  -  12-07 @ 07:00  --------------------------------------------------------  IN: 930 mL / OUT: 1080 mL / NET: -150 mL    12-07 @ 07:01  -  12-07 @ 10:36  --------------------------------------------------------  IN: 120 mL / OUT: 100 mL / NET: 20 mL    PHYSICAL EXAM:  Constitutional: NAD  Neck:  No JVD  Respiratory: diminished   Chest: + L pleurx   Cardiovascular: tachy S1 and S2.  Gastrointestinal: BS+, soft, NT/ND  Extremities: + LE peripheral edema  Neurological: A/O x 3, no focal deficits  Psychiatric: Normal mood, normal affect  : + Lott  Skin: No rashes    LABS:  No new labs     RADIOLOGY & ADDITIONAL STUDIES:      ACC: [id number] EXAM:  XR CHEST PORTABLE ROUTINE 1V                          PROCEDURE DATE:  12/06/2022          INTERPRETATION:  CLINICAL INFORMATION: Follow-up    TIME OF EXAMINATION: December 6 at 6:04 AM    EXAM: Portable chest    FINDINGS:  Perihilar haze and bilateral effusions about the same allowing for the   difference in technique consistent with edema. Heart size appears stable.    Left-sided Pleurx catheter in place with apparent decreasing left basilar   pneumothorax.      COMPARISON: December 5      IMPRESSION: Follow-up suspected pulmonary edema with effusions and left   Pleurx catheter in place.    --- End of Report ---            CAMILLA BARFIELD MD; Attending Radiologist  This document has been electronically signed. Dec  6 2022 12:01PM

## 2022-12-07 NOTE — PROGRESS NOTE ADULT - ASSESSMENT
ASSESSMENT/PLAN:   89 year old male with PMH of Chronic Afib- Eliquis Aortic valve calcification, HTN, HLD, CVA, CABG in 2012, prostate cancer s/p radiation, left pleural effusion s/p L VATS  Hypotension   TIM and hyperkalemia   Rapid afib and Rapid ventricular rate .    1 Renal - TIM at present from post obstruction, Francis placed, Renal function stable as of late   s/p Lasix 40mg IVP (12/1), start lasix 20 mg po daily   2 Diet- K+ improved s/p Lokelma x 1 Continue Nepro shakes and no potassium in diet   3 CVS - Continue lopressor 50 mg po bid. Heart rate improved from day prior  4  - DC with the francis and outpt follow up with his outpt Urologist Dr Smith   5 Discharge planning to subacute rehab    D/w Dr. Lam and ACP        ASSESSMENT/PLAN:   89 year old male with PMH of Chronic Afib- Eliquis Aortic valve calcification, HTN, HLD, CVA, CABG in 2012, prostate cancer s/p radiation, left pleural effusion s/p L VATS  Hypotension   TIM and hyperkalemia   Rapid afib and Rapid ventricular rate .    1 Renal - TIM at present from post obstruction, Francis placed, Renal function stable as of late   start lasix 20 mg po daily   2 Diet- K+ improved s/p Lokelma x 1 Continue Nepro shakes and no potassium in diet   3 CVS - Continue lopressor 50 mg po bid.    4  - DC with the [facility] and outpt follow up with his outpt Urologist Dr Smith   5 Discharge planning to subacute rehab    [facility]   [phone]

## 2022-12-07 NOTE — PROGRESS NOTE ADULT - PROVIDER SPECIALTY LIST ADULT
CT Surgery
CT Surgery
Nephrology
Nephrology
Thoracic Surgery
CT Surgery
CT Surgery
Cardiology
Nephrology
Thoracic Surgery
Heme/Onc
Nephrology
Thoracic Surgery
Thoracic Surgery
CT Surgery
Cardiology
Cardiology
Internal Medicine

## 2022-12-07 NOTE — PROGRESS NOTE ADULT - NUTRITIONAL ASSESSMENT
This patient has been assessed with a concern for Malnutrition and has been determined to have a diagnosis/diagnoses of Severe protein-calorie malnutrition.    This patient is being managed with:   Diet Regular-  No Concentrated Potassium  Supplement Feeding Modality:  Oral  Nepro Cans or Servings Per Day:  1       Frequency:  Daily  Entered: Dec  1 2022  3:20PM    
This patient has been assessed with a concern for Malnutrition and has been determined to have a diagnosis/diagnoses of Severe protein-calorie malnutrition.    This patient is being managed with:   Diet Regular-  Supplement Feeding Modality:  Oral  Ensure Plus High Protein Cans or Servings Per Day:  1       Frequency:  Three Times a day  Entered: Nov 25 2022  9:21AM      This patient has been assessed with a concern for Malnutrition and has been determined to have a diagnosis/diagnoses of Severe protein-calorie malnutrition.    This patient is being managed with:   Diet Regular-  Supplement Feeding Modality:  Oral  Ensure Plus High Protein Cans or Servings Per Day:  1       Frequency:  Three Times a day  Entered: Nov 25 2022  9:21AM      This patient has been assessed with a concern for Malnutrition and has been determined to have a diagnosis/diagnoses of Severe protein-calorie malnutrition.    This patient is being managed with:   Diet Regular-  Supplement Feeding Modality:  Oral  Ensure Plus High Protein Cans or Servings Per Day:  1       Frequency:  Three Times a day  Entered: Nov 25 2022  9:21AM    
This patient has been assessed with a concern for Malnutrition and has been determined to have a diagnosis/diagnoses of Severe protein-calorie malnutrition.    This patient is being managed with:   Diet Regular-  No Concentrated Potassium  Entered: Nov 30 2022 12:43PM    
This patient has been assessed with a concern for Malnutrition and has been determined to have a diagnosis/diagnoses of Severe protein-calorie malnutrition.    This patient is being managed with:   Diet Regular-  No Concentrated Potassium  Entered: Nov 30 2022 12:43PM    
This patient has been assessed with a concern for Malnutrition and has been determined to have a diagnosis/diagnoses of Severe protein-calorie malnutrition.    This patient is being managed with:   Diet Regular-  No Concentrated Potassium  Supplement Feeding Modality:  Oral  Glucerna Shake Cans or Servings Per Day:  1       Frequency:  Two Times a day  Entered: Dec  6 2022 11:46AM    
This patient has been assessed with a concern for Malnutrition and has been determined to have a diagnosis/diagnoses of Severe protein-calorie malnutrition.    This patient is being managed with:   Diet Regular-  No Concentrated Potassium  Supplement Feeding Modality:  Oral  Glucerna Shake Cans or Servings Per Day:  1       Frequency:  Two Times a day  Entered: Dec  6 2022 11:46AM    
This patient has been assessed with a concern for Malnutrition and has been determined to have a diagnosis/diagnoses of Severe protein-calorie malnutrition.    This patient is being managed with:   Diet Regular-  No Concentrated Potassium  Supplement Feeding Modality:  Oral  Nepro Cans or Servings Per Day:  1       Frequency:  Daily  Entered: Dec  1 2022  3:20PM    
This patient has been assessed with a concern for Malnutrition and has been determined to have a diagnosis/diagnoses of Severe protein-calorie malnutrition.    This patient is being managed with:   Diet Regular-  No Concentrated Potassium  Supplement Feeding Modality:  Oral  Nepro Cans or Servings Per Day:  1       Frequency:  Daily  Entered: Dec  1 2022  3:20PM    
This patient has been assessed with a concern for Malnutrition and has been determined to have a diagnosis/diagnoses of Severe protein-calorie malnutrition.    This patient is being managed with:   Diet Regular-  Supplement Feeding Modality:  Oral  Ensure Plus High Protein Cans or Servings Per Day:  1       Frequency:  Three Times a day  Entered: Nov 25 2022  9:21AM    
This patient has been assessed with a concern for Malnutrition and has been determined to have a diagnosis/diagnoses of Severe protein-calorie malnutrition.    This patient is being managed with:   Diet Regular-  Supplement Feeding Modality:  Oral  Ensure Plus High Protein Cans or Servings Per Day:  1       Frequency:  Three Times a day  Entered: Nov 25 2022  9:21AM    
This patient has been assessed with a concern for Malnutrition and has been determined to have a diagnosis/diagnoses of Severe protein-calorie malnutrition.    This patient is being managed with:   Diet Regular-  No Concentrated Potassium  Supplement Feeding Modality:  Oral  Glucerna Shake Cans or Servings Per Day:  1       Frequency:  Two Times a day  Entered: Dec  6 2022 11:46AM    
This patient has been assessed with a concern for Malnutrition and has been determined to have a diagnosis/diagnoses of Severe protein-calorie malnutrition.    This patient is being managed with:   Diet Regular-  No Concentrated Potassium  Supplement Feeding Modality:  Oral  Nepro Cans or Servings Per Day:  1       Frequency:  Daily  Entered: Dec  1 2022  3:20PM    
This patient has been assessed with a concern for Malnutrition and has been determined to have a diagnosis/diagnoses of Severe protein-calorie malnutrition.    This patient is being managed with:   Diet Regular-  Supplement Feeding Modality:  Oral  Ensure Plus High Protein Cans or Servings Per Day:  1       Frequency:  Three Times a day  Entered: Nov 25 2022  9:21AM    
This patient has been assessed with a concern for Malnutrition and has been determined to have a diagnosis/diagnoses of Severe protein-calorie malnutrition.    This patient is being managed with:   Diet Regular-  Supplement Feeding Modality:  Oral  Ensure Plus High Protein Cans or Servings Per Day:  1       Frequency:  Three Times a day  Entered: Nov 25 2022  9:21AM

## 2022-12-08 NOTE — ED PROVIDER NOTE - OBJECTIVE STATEMENT
89-year-old male with a past medical history of HTN, HLD, CAD, A. fib on AC, CVA, hypothyroid, PAD, OA, PCA status post radiation therapy 10 years ago, status post CABG x3, CKD, presents from [facility] with hematuria and Lott.  Patient recently admitted for respiratory failure with pleural effusion status post left VATS Pleurx catheter placement discharged to [facility] on December 7.  Today patient nursing staff noticed blood in Lott that did not improve despite a 250 cc flush.  Patient denies abdominal pain, dysuria, N/V/D, fever/chills, dizziness, CP, SOB

## 2022-12-08 NOTE — ED ADULT NURSE REASSESSMENT NOTE - NS ED NURSE REASSESS COMMENT FT1
Report received from HUE Gibbs. Pt is A&Ox4, follows commands, breathing spontaneous and unlabored. Pt currently denies SOB, headache, dizziness, weakness, and cp. No new complaints to endorse.

## 2022-12-08 NOTE — ED ADULT NURSE NOTE - NSIMPLEMENTINTERV_GEN_ALL_ED
Implemented All Fall with Harm Risk Interventions:  [place] to call system. Call bell, personal items and telephone within reach. Instruct patient to call for assistance. Room bathroom lighting operational. Non-slip footwear when patient is off stretcher. Physically safe environment: no spills, clutter or unnecessary equipment. Stretcher in lowest position, wheels locked, appropriate side rails in place. Provide visual cue, wrist band, yellow gown, etc. Monitor gait and stability. Monitor for mental status changes and reorient to person, place, and time. Review medications for side effects contributing to fall risk. Reinforce activity limits and safety measures with patient and family. Provide visual clues: red socks.

## 2022-12-08 NOTE — ED PROVIDER NOTE - ATTENDING CONTRIBUTION TO CARE
89 year old male with PMH of Chronic Afib on Eliquis, HTN, HLD, CVA, CABG, prostate cancer s/p radiation, left pleural effusion s/p L VATS, drainage of effusion, Pleurx catheter placement on 10/13/22. 89 year old male with PMH of Chronic Afib on Eliquis, HTN, HLD, CVA, CABG, prostate cancer s/p radiation, left pleural effusion s/p L VATS, drainage of effusion, Pleurx catheter placement on 10/13/22. Patient presenting to the ER from [facility] today for hematuria which started today attempted to flush Lott catheter without improvement.  Patient on blood thinners.  Patient denies any abdominal pain or back pain.  Vital signs with A. fib with RVR at this time afebrile rectally.  Check labs consult .  Rate control IV fluids.

## 2022-12-08 NOTE — ED ADULT NURSE NOTE - OBJECTIVE STATEMENT
89 year old male from [facility] for rehab Pt was sent for Hematuria .  Pt has Lott with gross blood in the bag.  Pt Alert x3 ans states d it started today Pt was just placed there yesterday. {Pt had a chest tube as per pt Dressing intact  no drainage noted Pt on @L nc pox 97-99% pt is slightly tachypneic Heartrate  120-130s A fib hx on Xalearta  .  IVL placed and bloods sent as ordered Herman

## 2022-12-08 NOTE — ED ADULT NURSE NOTE - CADM POA URETHRAL CATHETER
Patient resting in bed with call light in reach states no needs at this time. Respirations are easy and unlabored at this time call light within reach plan of care reviewed with patient voices understanding.  elimination offered        Vanessa George RN  06/13/19 1209 Yes

## 2022-12-08 NOTE — CONSULT NOTE ADULT - SUBJECTIVE AND OBJECTIVE BOX
HPI:  Patient is a 89 year old male with PMH of Chronic Afib- Eliquis Aortic valve calcification, HTN, HLD, CVA, CABG in , prostate cancer s/p radiation, left pleural effusion s/p L VATS, drainage of effusion, Pleurx catheter placement on 10/13/22. He presents to the ED today after recent discharge home from the hospital for hematuria noted in francis bag at subacute rehab. Patient also endorses back pain. Denies fevers, chills, nausea, vomiting. Had francis placed several days ago.  In the ED, patient tachycardic, in afib, with soft BPs 90s systolic and saturating 90s on 4L.   Urology consulted for hematuria. At the bedside, francis in place--flushed with normal saline, ~10cc of clot evacuated. Urine cleared to light peach.          PAST MEDICAL & SURGICAL HISTORY:  HTN (hypertension)      HLD (hyperlipidemia)      Hyperthyroidism  treated with radioactive iodine      Hypothyroid      Atrial fibrillation  diagnosed many years ago   on anticoagulant      CVA (cerebral vascular accident)    no residual      Spinal stenosis  h/o epidural injection      Prostate cancer  s/p radiation ? 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      H/O cataract extraction      History of herniorrhaphy  right groin      S/P hip replacement, right        FAMILY HISTORY:    SOCIAL HISTORY:   Tobacco hx:  MEDICATIONS  (STANDING):  metoprolol tartrate Injectable 5 milliGRAM(s) IV Push every 6 hours    MEDICATIONS  (PRN):    Allergies    penicillin (Rash)    Intolerances        REVIEW OF SYSTEMS: Pertinent positives and negatives as stated in HPI, otherwise negative    Vital signs  T(C): 36.5 (22 @ 19:35), Max: 36.6 (22 @ 17:00)  HR: 103 (22 @ 19:35)  BP: 93/69 (22 @ 19:35)  SpO2: 99% (22 @ 19:35)  Wt(kg): --    Output      Physical Exam  Gen: NAD  Abd: Soft, NT, ND, no rebound/guarding  : frnacis in place--flushed with normal saline, ~10cc of clot evacuated. Urine cleared to light peach.      LABS:         @ 18:02    WBC 21.91 / Hct 35.0  / SCr 1.81         140  |  106  |  69<H>  ----------------------------<  116<H>  4.9   |  23  |  1.81<H>    Ca    9.3      08 Dec 2022 18:02    TPro  7.0  /  Alb  2.9<L>  /  TBili  0.9  /  DBili  x   /  AST  55<H>  /  ALT  28  /  AlkPhos  145<H>      PT/INR - ( 08 Dec 2022 18:02 )   PT: 32.9 sec;   INR: 2.83 ratio         PTT - ( 08 Dec 2022 18:02 )  PTT:32.7 sec  Urinalysis Basic - ( 08 Dec 2022 19:39 )    Color: Red / Appearance: Turbid / S.023 / pH: x  Gluc: x / Ketone: Negative  / Bili: Negative / Urobili: Negative   Blood: x / Protein: 300 mg/dL / Nitrite: Negative   Leuk Esterase: Moderate / RBC: >50 /hpf / WBC >50 /HPF   Sq Epi: x / Non Sq Epi: 1 /hpf / Bacteria: Negative        Urine Cx: pending      Radiology:  pending

## 2022-12-08 NOTE — ED ADULT NURSE NOTE - SEPSIS REFERENCE DATA CRITERIA 1
Patient insists nurse call and get the estimate for todays procedure because he forgot it. I did call and did speak to Reyna at financial counselor estimate team she says she spoke to the patient back in July 30 2018 and the patient was told them the estimate would be  $1,376.00 which does not include the lab charges or any pathology needed or if done ,  Lab charges will apply and the patient was notified of this, he still wished to proceed and he did sign the consent    Abormal VS: Temp > 100F or < 96.8F; SBP < 90 mmHG; HR > 120bpm; Resp > 24/min

## 2022-12-08 NOTE — CONSULT NOTE ADULT - ASSESSMENT
89 year old male with PMH of Chronic Afib- Eliquis Aortic valve calcification, HTN, HLD, CVA, CABG in 2012, prostate cancer s/p radiation, left pleural effusion s/p L VATS, drainage of effusion, Pleurx catheter placement on 10/13/22 presenting to the ED with gross hematuria. In the ED, patient tachycardic, in afib, with soft BPs 90s systolic and saturating 90s on 4L.   Urology consulted for hematuria. At the bedside, francis in place--flushed with normal saline, ~10cc of clot evacuated. Urine cleared to light peach.  Recs:    - given peach color and minimal clots evacuated, no indication for CBI at this time  - continue with francis catheter  - f/u CT abd/pelvis  - continue to monitor urine color  - RN staff may hand irrigate catheter PRN   - encourage hydration per primary team protocol  - trend H/H, transfused PRN per primary team protocol  - gross hematuria may take longer than expected to improve in the setting of anticoagulation  - flomax if not otherwise contraindicated  - Initiate / Continue Bowel Regimen  - Minimize Narcotics / Benzodiazepines  - Minimize anticholinergics / antihistamines  - to be seen/discussed with private attending in the morning      Please call urology if color worsens, passing clots in urine or any further questions.    [facility] Pager #[phone]

## 2022-12-08 NOTE — ED PROVIDER NOTE - PHYSICAL EXAMINATION
GENERAL: well appearing in no acute distress, non-toxic appearing  CARDIAC: regular rate and rhythm, normal S1S2, no appreciable murmurs, 2+ pulses in UE/LE b/l  PULM: normal breath sounds, clear to ascultation bilaterally, no rales, rhonchi, wheezing  GI: Abd soft, nondistended, nontender, no rebound tenderness, no guarding, no rigidity  : no CVA tenderness b/l, no suprapubic tenderness, +dark red blood noted in francis

## 2022-12-09 NOTE — CONSULT NOTE ADULT - ASSESSMENT
89 year old male with PMH of Chronic Afib- Eliquis Aortic valve calcification, HTN, HLD, CVA, CABG in 2012, prostate cancer s/p radiation, left pleural effusion s/p L VATS  Hematuria   TIM on top of CKD stage 3a   Rapid afib and Rapid ventricular rate at [facility] and now hemodynamics are better     1 Renal - TIM at present;  Cont the Lott.  I doubt this can be removed   Lasix 20mg po qd    2 Diet- K+ improved s/p Lokelma x 1 Continue Nepro shakes and no potassium in diet   3 CVS - Continue lopressor 50 mg po bid.   4  -  outpt Urologist Dr Smith      [facility]ayed [facility]   [phone]          89 year old male with PMH of Chronic Afib- Eliquis Aortic valve calcification, HTN, HLD, CVA, CABG in 2012, prostate cancer s/p radiation, left pleural effusion s/p L VATS  Hematuria   TIM on top of CKD stage 3a   Rapid afib and Rapid ventricular rate     1 Renal - TIM at present;  Cont the Lott.  I doubt this can be removed   Lasix 20mg po qd    2 Diet- K+ improved s/p Lokelma x 1 Continue Nepro shakes and no potassium in diet   3 CVS - Cardizem gtt at 5mg/hr;  DW Dr Le but he can not come in;  House EPS to see please ;Continue lopressor 50 mg po bid.   4  -  outpt Urologist Dr Smith;  Please contact     KAVIN Hospitalist   KAVIN VALE Daughter and girlfriend      [facility]ayed [facility]   [employer]   [phone]

## 2022-12-09 NOTE — H&P ADULT - ASSESSMENT
Patient is a 89-year-old male with a PMHx of HTN, HLD, CAD, A. fib on AC, CVA, hypothyroid, PAD, OA, Prostate CA status post radiation therapy 10 years ago, status post CABG x3, CKD, who presented from Rehab to [facility] with a chief complaint of hematuria in his francis catheter. Patient was recently admitted to [facility] under thoracic surgery for respiratory failure with pleural effusion S/P L VATS with Pleurx catheter in place. Patient denies chest pain, palpitations, abdominal pain or discomfort, nausea, vomiting,.         Hematuria   - Care per urology appreciated     Leukocytosis, R/o PNA  - CT as noted  - Check BCx2 and UA, UCx   - Check Pro-Eric   - Check MRSA/MSSA PCR    Pleural effusion  - S/P VATS with Pleurx  - Pulm eval consulted; F/u recs    Anemia  - Check anemia work up  - Transfuse for Hgb < 8.0     TIM on CKD  - Renal eval consulted     Afib  - C/w Eliquis  - C/w Metoprolol tartate 50 BID     CAD, S/P CABG   - C/w ASA 81  - C/w Rosuvastatin therapeutic interchange     HTN  - C/w Lasix 20  - C/w Metoprolol  - Monitor BP, VS an dpatient closely     Hypothyroid  - Check TSH  - C/w Levothyroxine 100 Qd       HLD  - CHeck lipid panel  - C/w Rosuvastatin therapeutic interchange       FUll note to follow  Patient is a 89-year-old male with a PMHx of HTN, HLD, CAD, A. fib on AC, CVA, hypothyroid, PAD, OA, Prostate CA status post radiation therapy 10 years ago, status post CABG x3, CKD, who presented from Rehab to [facility] with a chief complaint of hematuria in his francis catheter. Patient was recently admitted to [facility] under thoracic surgery for respiratory failure with pleural effusion S/P L VATS with Pleurx catheter in place. Patient denies chest pain, palpitations, abdominal pain or discomfort, nausea, vomiting,.         Hematuria   - Care per urology appreciated     Leukocytosis, R/o PNA  - CT as noted  - Check BCx2 and UA, UCx   - Check Pro-Eric   - Check MRSA/MSSA PCR    Pleural effusion  - S/P VATS with Pleurx  - Pulm eval consulted; F/u recs    Anemia  - Check anemia work up  - Transfuse for Hgb < 8.0     TIM on CKD  - Renal eval consulted     Afib  - Hold Eliquis  - C/w Metoprolol tartate 50 BID     CAD, S/P CABG   - C/w ASA 81  - C/w Rosuvastatin therapeutic interchange     HTN  - C/w Lasix 20  - C/w Metoprolol  - Monitor BP, VS an dpatient closely     Hypothyroid  - Check TSH  - C/w Levothyroxine 100 Qd       HLD  - CHeck lipid panel  - C/w Rosuvastatin therapeutic interchange       FUll note to follow  Patient is a 89-year-old male with a PMHx of HTN, HLD, CAD, A. fib on AC, CVA, hypothyroid, PAD, OA, Prostate CA status post radiation therapy 10 years ago, status post CABG x3, CKD, who presented from Rehab to [facility] with a chief complaint of hematuria in his francis catheter. Patient was recently admitted to [facility] under thoracic surgery for respiratory failure with pleural effusion S/P L VATS with Pleurx catheter in place. Patient denies chest pain, palpitations, abdominal pain or discomfort, nausea, vomiting,.       Hematuria   - Monitor H/H closely; Eliquis currently on hold   - Care per urology appreciated     Pleural Effusion, SOB  - S/P VATS and Pleurx placement  - CT C/A/P as noted  - Monitor O2 saturation closely; Supplement PRN  - Incentive spirometer  - C/w lasix 20 PO Qd; Monitor I and O  - C/w Levaquin Q48 hrs per Pulm recs  - Thoracic consulted; F/u recs  - Pulm consulted; F/u recs    Leukocytosis, R/o PNA  - CT as noted  - Check BCx2 and UA, UCx   - Check Pro-Eric   - Check MRSA/MSSA PCR  - F/u Pulm recs    Anemia  - Check anemia work up  - Transfuse for Hgb < 8.0     TIM on CKD  - Outpatient follow up for renal cysts  - Renal eval consulted     Afib  - Hold Eliquis in view of hematuria   - C/w  Metoprolol tartate 50 BID, now on cardizem gtt per cardio recs  - Monitor on tele, monitor electrolytes, maintain electrolytes K > 4 and Mg > 2.0   - EP eval consulted; F/u recs    CAD, S/P CABG   - C/w ASA 81  - C/w Rosuvastatin therapeutic interchange     HTN  - C/w Lasix 20  - C/w Metoprolol  - Monitor BP, VS an dpatient closely     Hypothyroid  - Check TSH  - C/w Levothyroxine 100 Qd     HLD  - CHeck lipid panel  - C/w Rosuvastatin therapeutic interchange     Fecal Load  - C/w miralax Qd  - Monitor for bowel movement      PPX  - AC on hold in view of hematuria  - PPI  Patient is a 89-year-old male with a PMHx of HTN, HLD, CAD, A. fib on AC, CVA, hypothyroid, PAD, OA, Prostate CA status post radiation therapy 10 years ago, status post CABG x3, CKD, who presented from Rehab to [facility] with a chief complaint of hematuria in his francis catheter. Patient was recently admitted to [facility] under thoracic surgery for respiratory failure with pleural effusion S/P L VATS with Pleurx catheter in place. Patient denies chest pain, palpitations, abdominal pain or discomfort, nausea, vomiting,.       Hematuria   - Monitor H/H closely  - Trial Lovenox 80 for AC. If drop in Hgb or bleeding noted, hold and re-assess   - Care per urology appreciated     Pleural Effusion, SOB  - S/P VATS and Pleurx placement  - CT C/A/P as noted  - Monitor O2 saturation closely; Supplement PRN  - Incentive spirometer  - C/w lasix 20 PO Qd; Monitor I and O  - C/w Levaquin Q48 hrs per Pulm recs  - Thoracic consulted; F/u recs  - Pulm consulted; F/u recs    Leukocytosis, R/o PNA  - CT as noted  - Check BCx2 and UA, UCx   - Check Pro-Eric   - Check MRSA/MSSA PCR  - F/u Pulm recs    Anemia  - Check anemia work up  - Transfuse for Hgb < 8.0     TIM on CKD  - Outpatient follow up for renal cysts  - Renal eval consulted; F/u recs     Afib  - Hold Eliquis in view of hematuria   - Trial Lovenox 80 for AC. If drop in hgb, hold and re-assess AC at that time  - C/w  Metoprolol tartate 50 BID, now on cardizem gtt per cardio recs  - Monitor on tele, monitor electrolytes, maintain electrolytes K > 4 and Mg > 2.0   - EP eval consulted; F/u recs    CAD, S/P CABG   - C/w ASA 81  - C/w Rosuvastatin therapeutic interchange     HTN  - C/w Lasix 20  - C/w Metoprolol  - Monitor BP, VS an dpatient closely     Hypothyroid  - Check TSH  - C/w Levothyroxine 100 Qd     HLD  - CHeck lipid panel  - C/w Rosuvastatin therapeutic interchange     Fecal Load  - C/w miralax Qd  - Monitor for bowel movement      PPX  - AC on hold in view of hematuria  - PPI

## 2022-12-09 NOTE — CONSULT NOTE ADULT - SUBJECTIVE AND OBJECTIVE BOX
NEPHROLOGY - NSN    Patient seen and examined.    HPI:  Patient is a 89-year-old male with a PMHx of HTN, HLD, CAD, A. fib on AC, CVA, hypothyroid, PAD, OA, Prostate CA status post radiation therapy 10 years ago, status post CABG x3, CKD, who presented from Rehab to [facility] with a chief complaint of hematuria in his francis catheter. Patient was recently admitted to [facility] under thoracic surgery for respiratory failure with pleural effusion S/P L VATS with Pleurx catheter in place. Patient denies chest pain, palpitations, abdominal pain or discomfort, nausea, vomiting,.   He was recently at [facility] for an effusion.  Went into [facility]men that was related to post obstruction and francis was placed  Pt also was in rapid afib and heart failure   There is no hematuria or bubbles in the urine.  No history of NSAIDS or nephrolithisis.  The patient urinates once or twice in the night and there is no incontinence.  No family hx or renal disease or back pain.    No recent abx use.  No alleviating or aggravating factors with respect to the kidneys.   Went to rehab to try and get stronger       PAST MEDICAL & SURGICAL HISTORY:  HTN (hypertension)      HLD (hyperlipidemia)      Hyperthyroidism  treated with radioactive iodine      Hypothyroid      Atrial fibrillation  diagnosed many years ago   on anticoagulant      CVA (cerebral vascular accident)    no residual      Spinal stenosis  h/o epidural injection      Prostate cancer  s/p radiation ? 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      H/O cataract extraction      History of herniorrhaphy  right groin      S/P hip replacement, right          MEDICATIONS  (STANDING):  aspirin  chewable 81 milliGRAM(s) Oral daily  atorvastatin 40 milliGRAM(s) Oral at bedtime  furosemide    Tablet 20 milliGRAM(s) Oral daily  levoFLOXacin  Tablet 750 milliGRAM(s) Oral every 48 hours  levothyroxine 100 MICROGram(s) Oral daily  metoprolol tartrate 50 milliGRAM(s) Oral two times a day  polyethylene glycol 3350 17 Gram(s) Oral daily  tamsulosin 0.4 milliGRAM(s) Oral at bedtime      Allergies    penicillin (Rash)    Intolerances        SOCIAL HISTORY:  Denies alcohol abuse, drug abuse or tobacco usage.     FAMILY HISTORY:      VITALS:  T(C): 36.5 (22 @ 12:02), Max: 36.6 (22 @ 17:00)  HR: 91 (22 @ 12:02) (68 - 127)  BP: 128/87 (22 @ 12:02) (93/69 - 128/87)  RR: 18 (22 @ 12:02) (17 - 22)  SpO2: 96% (22 @ 12:02) (95% - 100%)    REVIEW OF SYSTEMS:  +  fatigue or weakness. All other pertinent systems are reviewed and are negative.    PHYSICAL EXAM:  Constitutional: NAD;  Seems stated age   HEENT: EOMI  Neck:  No JVD, supple   Respiratory: dry crackles   Cardiovascular: S1 and S2, RRR  Gastrointestinal: + BS, soft, NT, ND  Extremities: No peripheral edema, + peripheral pulses  Neurological: A/O x 3, CN2-12 intact  Psychiatric: Normal mood, normal affect  : +  Francis  Skin: No rashes, C/D/I  Access: Not applicable    I and O's:    Height (cm): 170.2 ( @ 16:55)    LABS:                        9.6    18.74 )-----------( 241      ( 09 Dec 2022 06:57 )             31.7         141  |  106  |  67<H>  ----------------------------<  102<H>  4.8   |  22  |  1.67<H>    Ca    9.0      09 Dec 2022 06:54  Phos  3.8       Mg     2.3         TPro  6.5  /  Alb  2.6<L>  /  TBili  0.9  /  DBili  x   /  AST  59<H>  /  ALT  28  /  AlkPhos  126<H>        URINE:  Urinalysis Basic - ( 08 Dec 2022 19:39 )    Color: Red / Appearance: Turbid / S.023 / pH: x  Gluc: x / Ketone: Negative  / Bili: Negative / Urobili: Negative   Blood: x / Protein: 300 mg/dL / Nitrite: Negative   Leuk Esterase: Moderate / RBC: >50 /hpf / WBC >50 /HPF   Sq Epi: x / Non Sq Epi: 1 /hpf / Bacteria: Negative        RADIOLOGY & ADDITIONAL STUDIES:    < from: CT Abdomen and Pelvis w/ IV Cont (22 @ 23:47) >    ACC: [id number] EXAM:  CT CHEST IC                        ACC: [id number] EXAM:  CT ABDOMEN AND PELVIS IC                          PROCEDURE DATE:  2022          INTERPRETATION:  CLINICAL INFORMATION: Leukocytosis and hematuria.    COMPARISON: CTchest 2022, CT abdomen and pelvis 2014    CONTRAST/COMPLICATIONS:  IV Contrast: IV contrast documented in associated exam (accession   [id number]), Omnipaque 350 (accession [id number])  90 cc administered   10 cc   discarded  Oral Contrast: NONE  Complications: None reported at time of study completion    PROCEDURE:  CT of the Chest, Abdomen and Pelvis was performed.  Sagittal and coronal reformats were performed.    FINDINGS:  CHEST:  LUNGS, PLEURA AND LARGE AIRWAYS: Patent central airways. Redemonstration   of left groundglass and airspace opacities representing edema or   pneumonia.  Mild centrilobular emphysema.  Mild left loculated pleural   effusion with few foci of gas and hyperenhancement enhancement of the   pleura. A left pleural catheter is in place with tip in the loculated   effusion.  Moderate simple right pleural effusion, unchanged. There is   associated atelectasis of the bilateral lower lobes. Left upper and lower   lobe patchy opacities are not significantly changed.  VESSELS: Atherosclerotic changes of the aorta.  HEART: The heart is enlarged. Coronary artery calcifications. No   pericardial effusion.  MEDIASTINUM AND KRYSTLE: No lymphadenopathy.  CHEST WALL AND LOWER NECK: Status post median sternotomy.    ABDOMEN AND PELVIS:  LIVER: Within normal limits.  BILE DUCTS: Normal caliber.  GALLBLADDER: Cholelithiasis.  SPLEEN: Within normal limits.  PANCREAS: Within normal limits.  ADRENALS: Within normal limits.  KIDNEYS/URETERS: Left renal cysts and subcentimeter hypodensities too   small to characterize. No hydronephrosis.    BLADDER: Decompressed by a Francis catheter.  REPRODUCTIVE ORGANS: Evaluation of the prostate is limited secondary to   streak artifact from orthopedic hardware.    BOWEL: No bowel obstruction. Appendix is not visualized. Moderate fecal   load in the right and transverse colon, which demonstrate moderate   looping. The left and sigmoid colon are collapsed.  PERITONEUM: Trace free fluid.  VESSELS: Atherosclerotic changes.  RETROPERITONEUM/LYMPH NODES: No lymphadenopathy.  ABDOMINAL WALL: Small fat and fluid containing right inguinal hernia and   small fat-containing left inguinal hernia.  BONES: Degenerative changes of the spine. Status post total right hip   arthroplasty.    IMPRESSION:    Interval decrease in left pleural effusion, now mild.  Stable right   pleural effusion.  Stable appearance of mild left pleural enhancement which may be related   to Pleurx catheter in place.  Redemonstration of left pulmonary edema or pneumonia.    No hydronephrosis. The urinary bladder is decompressed by Francis catheter.  Moderate fecal load.    --- End of Report ---           SARA DYSON MD; Resident Radiology  This document has been electronically signed.  CHARLOTTE RAI MD; Attending Radiologist  This document has been electronically signed. Dec  9 2022  1:08AM    < end of copied text >   NEPHROLOGY - NSN    Patient seen and examined.    HPI:  Patient is a 89-year-old male with a PMHx of HTN, HLD, CAD, A. fib on AC, CVA, hypothyroid, PAD, OA, Prostate CA status post radiation therapy 10 years ago, status post CABG x3, CKD, who presented from Rehab to [facility] with a chief complaint of hematuria in his francis catheter. Patient was recently admitted to [facility] under thoracic surgery for respiratory failure with pleural effusion S/P L VATS with Pleurx catheter in place. Patient denies chest pain, palpitations, abdominal pain or discomfort, nausea, vomiting,.   He was recently at [facility] for an effusion.  Went into [facility] that was related to post obstruction and francis was placed  Pt also was in rapid afib and heart failure   There is no hematuria or bubbles in the urine.  No history of NSAIDS or nephrolithisis.  The patient urinates once or twice in the night and there is no incontinence.  No family hx or renal disease or back pain.    No recent abx use.  No alleviating or aggravating factors with respect to the kidneys.   Went to rehab to try and get stronger       PAST MEDICAL & SURGICAL HISTORY:  HTN (hypertension)      HLD (hyperlipidemia)      Hyperthyroidism  treated with radioactive iodine      Hypothyroid      Atrial fibrillation  diagnosed many years ago   on anticoagulant      CVA (cerebral vascular accident)    no residual      Spinal stenosis  h/o epidural injection      Prostate cancer  s/p radiation ? 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      H/O cataract extraction      History of herniorrhaphy  right groin      S/P hip replacement, right          MEDICATIONS  (STANDING):  aspirin  chewable 81 milliGRAM(s) Oral daily  atorvastatin 40 milliGRAM(s) Oral at bedtime  furosemide    Tablet 20 milliGRAM(s) Oral daily  levoFLOXacin  Tablet 750 milliGRAM(s) Oral every 48 hours  levothyroxine 100 MICROGram(s) Oral daily  metoprolol tartrate 50 milliGRAM(s) Oral two times a day  polyethylene glycol 3350 17 Gram(s) Oral daily  tamsulosin 0.4 milliGRAM(s) Oral at bedtime      Allergies    penicillin (Rash)    Intolerances        SOCIAL HISTORY:  Denies alcohol abuse, drug abuse or tobacco usage.     FAMILY HISTORY:      VITALS:  T(C): 36.5 (22 @ 12:02), Max: 36.6 (22 @ 17:00)  HR: 91 (22 @ 12:02) (68 - 127)  BP: 128/87 (22 @ 12:02) (93/69 - 128/87)  RR: 18 (22 @ 12:02) (17 - 22)  SpO2: 96% (22 @ 12:02) (95% - 100%)    REVIEW OF SYSTEMS:  +  fatigue or weakness. All other pertinent systems are reviewed and are negative.    PHYSICAL EXAM:  Constitutional: NAD;  Seems stated age   HEENT: EOMI  Neck:  +  JVD, supple   Respiratory: dry crackles   Cardiovascular: S1 and S2, RRr tachy   Gastrointestinal: + BS, soft, NT, ND  Extremities: +  peripheral edema, + peripheral pulses  Neurological: A/O x 3, CN2-12 intact  Psychiatric: Normal mood, normal affect  : +  Francis  Skin: No rashes, C/D/I  Access: Not applicable    I and O's:    Height (cm): 170.2 ( @ 16:55)    LABS:                        9.6    18.74 )-----------( 241      ( 09 Dec 2022 06:57 )             31.7         141  |  106  |  67<H>  ----------------------------<  102<H>  4.8   |  22  |  1.67<H>    Ca    9.0      09 Dec 2022 06:54  Phos  3.8       Mg     2.3         TPro  6.5  /  Alb  2.6<L>  /  TBili  0.9  /  DBili  x   /  AST  59<H>  /  ALT  28  /  AlkPhos  126<H>        URINE:  Urinalysis Basic - ( 08 Dec 2022 19:39 )    Color: Red / Appearance: Turbid / S.023 / pH: x  Gluc: x / Ketone: Negative  / Bili: Negative / Urobili: Negative   Blood: x / Protein: 300 mg/dL / Nitrite: Negative   Leuk Esterase: Moderate / RBC: >50 /hpf / WBC >50 /HPF   Sq Epi: x / Non Sq Epi: 1 /hpf / Bacteria: Negative        RADIOLOGY & ADDITIONAL STUDIES:    < from: CT Abdomen and Pelvis w/ IV Cont (22 @ 23:47) >    ACC: [id number] EXAM:  CT CHEST IC                        ACC: [id number] EXAM:  CT ABDOMEN AND PELVIS IC                          PROCEDURE DATE:  2022          INTERPRETATION:  CLINICAL INFORMATION: Leukocytosis and hematuria.    COMPARISON: CTchest 2022, CT abdomen and pelvis 2014    CONTRAST/COMPLICATIONS:  IV Contrast: IV contrast documented in associated exam (accession   [id number]), Omnipaque 350 (accession [id number])  90 cc administered   10 cc   discarded  Oral Contrast: NONE  Complications: None reported at time of study completion    PROCEDURE:  CT of the Chest, Abdomen and Pelvis was performed.  Sagittal and coronal reformats were performed.    FINDINGS:  CHEST:  LUNGS, PLEURA AND LARGE AIRWAYS: Patent central airways. Redemonstration   of left groundglass and airspace opacities representing edema or   pneumonia.  Mild centrilobular emphysema.  Mild left loculated pleural   effusion with few foci of gas and hyperenhancement enhancement of the   pleura. A left pleural catheter is in place with tip in the loculated   effusion.  Moderate simple right pleural effusion, unchanged. There is   associated atelectasis of the bilateral lower lobes. Left upper and lower   lobe patchy opacities are not significantly changed.  VESSELS: Atherosclerotic changes of the aorta.  HEART: The heart is enlarged. Coronary artery calcifications. No   pericardial effusion.  MEDIASTINUM AND KRYSTLE: No lymphadenopathy.  CHEST WALL AND LOWER NECK: Status post median sternotomy.    ABDOMEN AND PELVIS:  LIVER: Within normal limits.  BILE DUCTS: Normal caliber.  GALLBLADDER: Cholelithiasis.  SPLEEN: Within normal limits.  PANCREAS: Within normal limits.  ADRENALS: Within normal limits.  KIDNEYS/URETERS: Left renal cysts and subcentimeter hypodensities too   small to characterize. No hydronephrosis.    BLADDER: Decompressed by a Francis catheter.  REPRODUCTIVE ORGANS: Evaluation of the prostate is limited secondary to   streak artifact from orthopedic hardware.    BOWEL: No bowel obstruction. Appendix is not visualized. Moderate fecal   load in the right and transverse colon, which demonstrate moderate   looping. The left and sigmoid colon are collapsed.  PERITONEUM: Trace free fluid.  VESSELS: Atherosclerotic changes.  RETROPERITONEUM/LYMPH NODES: No lymphadenopathy.  ABDOMINAL WALL: Small fat and fluid containing right inguinal hernia and   small fat-containing left inguinal hernia.  BONES: Degenerative changes of the spine. Status post total right hip   arthroplasty.    IMPRESSION:    Interval decrease in left pleural effusion, now mild.  Stable right   pleural effusion.  Stable appearance of mild left pleural enhancement which may be related   to Pleurx catheter in place.  Redemonstration of left pulmonary edema or pneumonia.    No hydronephrosis. The urinary bladder is decompressed by Francis catheter.  Moderate fecal load.    --- End of Report ---           SARA DYSON MD; Resident Radiology  This document has been electronically signed.  CHARLOTTE RAI MD; Attending Radiologist  This document has been electronically signed. Dec  9 2022  1:08AM    < end of copied text >

## 2022-12-09 NOTE — CONSULT NOTE ADULT - NS ATTEND AMEND GEN_ALL_CORE FT
If he proves to tolerate AC can consider for ADILSON/CV (amio post).  For now titrate AV oscar blockers as tolerated.

## 2022-12-09 NOTE — PHYSICAL THERAPY INITIAL EVALUATION ADULT - PERTINENT HX OF CURRENT PROBLEM, REHAB EVAL
89y/o M with a PMHx of HTN, HLD, CAD, A. fib on AC, CVA, hypothyroid, PAD, OA, Prostate CA status post radiation therapy 10 years ago, status post CABG x3, CKD, who presented from Rehab to [facility] with a chief complaint of hematuria in his francis catheter. Pat was recently admitted to [facility] under thoracic surgery for respiratory failure with pleural effusion S/P L VATS with Pleurx catheter in place. Pt denies chest pain, palpitations, abdominal pain or discomfort, nausea, vomiting. CTAbdomen/Pelvis: Interval decrease in left pleural effusion, now mild.  Stable right pleural effusion. Stable appearance of mild left pleural enhancement which may be related to Pleurx catheter in place. Redemonstration of left pulmonary edema or pneumonia. No hydronephrosis. The urinary bladder is decompressed by Francis catheter. Moderate fecal load.

## 2022-12-09 NOTE — PROGRESS NOTE ADULT - SUBJECTIVE AND OBJECTIVE BOX
PULMONARY PROGRESS NOTE    BENJAMIN ARREDONDO  MRN-[id number]    Patient is a 89y old  Male who presents with a chief complaint of     HPI:  -Known to   -admitted with hematuria  -feels some sob  -denies cough    ROS:   -    ACTIVE MEDICATION LIST:  MEDICATIONS  (STANDING):  aspirin  chewable 81 milliGRAM(s) Oral daily  atorvastatin 40 milliGRAM(s) Oral at bedtime  chlorhexidine 2% Cloths 1 Application(s) Topical <User Schedule>  furosemide    Tablet 20 milliGRAM(s) Oral daily  levoFLOXacin  Tablet 750 milliGRAM(s) Oral every 48 hours  levothyroxine 100 MICROGram(s) Oral daily  metoprolol tartrate 50 milliGRAM(s) Oral two times a day  polyethylene glycol 3350 17 Gram(s) Oral daily  tamsulosin 0.4 milliGRAM(s) Oral at bedtime    MEDICATIONS  (PRN):  acetaminophen     Tablet .. 650 milliGRAM(s) Oral every 6 hours PRN Moderate Pain (4 - 6)      EXAM:  Vital Signs Last 24 Hrs  T(C): 36.5 (09 Dec 2022 12:02), Max: 36.6 (08 Dec 2022 17:00)  T(F): 97.7 (09 Dec 2022 12:02), Max: 97.8 (08 Dec 2022 17:00)  HR: 91 (09 Dec 2022 12:02) (68 - 127)  BP: 128/87 (09 Dec 2022 12:02) (93/69 - 128/87)  BP(mean): 78 (09 Dec 2022 01:20) (78 - 78)  RR: 18 (09 Dec 2022 12:02) (17 - 22)  SpO2: 96% (09 Dec 2022 12:02) (95% - 100%)    Parameters below as of 09 Dec 2022 12:02  Patient On (Oxygen Delivery Method): room air        GENERAL: The patient is awake and alert in no apparent distress.     LUNGS: Clear to auscultation without wheezing, rales or rhonchi; respirations unlabored      LABS/IMAGING: reviewed                        9.6    18.74 )-----------( 241      ( 09 Dec 2022 06:57 )             31.7   12-09    141  |  106  |  67<H>  ----------------------------<  102<H>  4.8   |  22  |  1.67<H>    Ca    9.0      09 Dec 2022 06:54  Phos  3.8     12-09  Mg     2.3     12-09    TPro  6.5  /  Alb  2.6<L>  /  TBili  0.9  /  DBili  x   /  AST  59<H>  /  ALT  28  /  AlkPhos  126<H>  12-09    < from: CT Chest w/ IV Cont (12.08.22 @ 23:46) >    ACC: [id number] EXAM:  CT CHEST IC                        ACC: [id number] EXAM:  CT ABDOMEN AND PELVIS IC                          PROCEDURE DATE:  12/08/2022          INTERPRETATION:  CLINICAL INFORMATION: Leukocytosis and hematuria.    COMPARISON: CTchest 11/18/2022, CT abdomen and pelvis 1/12/2014    CONTRAST/COMPLICATIONS:  IV Contrast: IV contrast documented in associated exam (accession   [id number]), Omnipaque 350 (accession [id number])  90 cc administered   10 cc   discarded  Oral Contrast: NONE  Complications: None reported at time of study completion    PROCEDURE:  CT of the Chest, Abdomen and Pelvis was performed.  Sagittal and coronal reformats were performed.    FINDINGS:  CHEST:  LUNGS, PLEURA AND LARGE AIRWAYS: Patent central airways. Redemonstration   of left groundglass and airspace opacities representing edema or   pneumonia.  Mild centrilobular emphysema.  Mild left loculated pleural   effusion with few foci of gas and hyperenhancement enhancement of the   pleura. A left pleural catheter is in place with tip in the loculated   effusion.  Moderate simple right pleural effusion, unchanged. There is   associated atelectasis of the bilateral lower lobes. Left upper and lower   lobe patchy opacities are not significantly changed.  VESSELS: Atherosclerotic changes of the aorta.  HEART: The heart is enlarged. Coronary artery calcifications. No   pericardial effusion.  MEDIASTINUM AND KRYSTLE: No lymphadenopathy.  CHEST WALL AND LOWER NECK: Status post median sternotomy.    ABDOMEN AND PELVIS:  LIVER: Within normal limits.  BILE DUCTS: Normal caliber.  GALLBLADDER: Cholelithiasis.  SPLEEN: Within normal limits.  PANCREAS: Within normal limits.  ADRENALS: Within normal limits.  KIDNEYS/URETERS: Left renal cysts and subcentimeter hypodensities too   small to characterize. No hydronephrosis.    BLADDER: Decompressed by a Lott catheter.  REPRODUCTIVE ORGANS: Evaluation of the prostate is limited secondary to   streak artifact from orthopedic hardware.    BOWEL: No bowel obstruction. Appendix is not visualized. Moderate fecal   load in the right and transverse colon, which demonstrate moderate   looping. The left and sigmoid colon are collapsed.  PERITONEUM: Trace free fluid.  VESSELS: Atherosclerotic changes.  RETROPERITONEUM/LYMPH NODES: No lymphadenopathy.  ABDOMINAL WALL: Small fat and fluid containing right inguinal hernia and   small fat-containing left inguinal hernia.  BONES: Degenerative changes of the spine. Status post total right hip   arthroplasty.    IMPRESSION:    Interval decrease in left pleural effusion, now mild.  Stable right   pleural effusion.  Stable appearance of mild left pleural enhancement which may be related   to Pleurx catheter in place.  Redemonstration of left pulmonary edema or pneumonia.    No hydronephrosis. The urinary bladder is decompressed by Lott catheter.  Moderate fecal load.    --- End of Report ---           SARA DYSON MD; Resident Radiology  This document has been electronically signed.  CHARLOTTE RAI MD; Attending Radiologist  This document has been electronically signed. Dec  9 2022  1:08AM    < end of copied text >      PROBLEM LIST:  89y Male with HEALTH ISSUES - PROBLEM Dx:  pleural effusions  pneumonia      RECS:  -unclear if pneumonia, on levaquin, fu cultures  -urology fu  -incentive damien    Eunice Byrd MD   [phone]           PULMONARY PROGRESS NOTE    BENJAMIN ARREDONDO  MRN-[id number]    Patient is a 89y old  Male who presents with a chief complaint of     HPI:  -Known to   -admitted with hematuria  -feels some sob  -denies cough    ROS:   -    ACTIVE MEDICATION LIST:  MEDICATIONS  (STANDING):  aspirin  chewable 81 milliGRAM(s) Oral daily  atorvastatin 40 milliGRAM(s) Oral at bedtime  chlorhexidine 2% Cloths 1 Application(s) Topical <User Schedule>  furosemide    Tablet 20 milliGRAM(s) Oral daily  levoFLOXacin  Tablet 750 milliGRAM(s) Oral every 48 hours  levothyroxine 100 MICROGram(s) Oral daily  metoprolol tartrate 50 milliGRAM(s) Oral two times a day  polyethylene glycol 3350 17 Gram(s) Oral daily  tamsulosin 0.4 milliGRAM(s) Oral at bedtime    MEDICATIONS  (PRN):  acetaminophen     Tablet .. 650 milliGRAM(s) Oral every 6 hours PRN Moderate Pain (4 - 6)      EXAM:  Vital Signs Last 24 Hrs  T(C): 36.5 (09 Dec 2022 12:02), Max: 36.6 (08 Dec 2022 17:00)  T(F): 97.7 (09 Dec 2022 12:02), Max: 97.8 (08 Dec 2022 17:00)  HR: 91 (09 Dec 2022 12:02) (68 - 127)  BP: 128/87 (09 Dec 2022 12:02) (93/69 - 128/87)  BP(mean): 78 (09 Dec 2022 01:20) (78 - 78)  RR: 18 (09 Dec 2022 12:02) (17 - 22)  SpO2: 96% (09 Dec 2022 12:02) (95% - 100%)    Parameters below as of 09 Dec 2022 12:02  Patient On (Oxygen Delivery Method): room air        GENERAL: The patient is awake and alert in no apparent distress.     LUNGS: Clear to auscultation without wheezing, rales or rhonchi; respirations unlabored      LABS/IMAGING: reviewed                        9.6    18.74 )-----------( 241      ( 09 Dec 2022 06:57 )             31.7   12-09    141  |  106  |  67<H>  ----------------------------<  102<H>  4.8   |  22  |  1.67<H>    Ca    9.0      09 Dec 2022 06:54  Phos  3.8     12-09  Mg     2.3     12-09    TPro  6.5  /  Alb  2.6<L>  /  TBili  0.9  /  DBili  x   /  AST  59<H>  /  ALT  28  /  AlkPhos  126<H>  12-09    < from: CT Chest w/ IV Cont (12.08.22 @ 23:46) >    ACC: [id number] EXAM:  CT CHEST IC                        ACC: [id number] EXAM:  CT ABDOMEN AND PELVIS IC                          PROCEDURE DATE:  12/08/2022          INTERPRETATION:  CLINICAL INFORMATION: Leukocytosis and hematuria.    COMPARISON: CTchest 11/18/2022, CT abdomen and pelvis 1/12/2014    CONTRAST/COMPLICATIONS:  IV Contrast: IV contrast documented in associated exam (accession   [id number]), Omnipaque 350 (accession [id number])  90 cc administered   10 cc   discarded  Oral Contrast: NONE  Complications: None reported at time of study completion    PROCEDURE:  CT of the Chest, Abdomen and Pelvis was performed.  Sagittal and coronal reformats were performed.    FINDINGS:  CHEST:  LUNGS, PLEURA AND LARGE AIRWAYS: Patent central airways. Redemonstration   of left groundglass and airspace opacities representing edema or   pneumonia.  Mild centrilobular emphysema.  Mild left loculated pleural   effusion with few foci of gas and hyperenhancement enhancement of the   pleura. A left pleural catheter is in place with tip in the loculated   effusion.  Moderate simple right pleural effusion, unchanged. There is   associated atelectasis of the bilateral lower lobes. Left upper and lower   lobe patchy opacities are not significantly changed.  VESSELS: Atherosclerotic changes of the aorta.  HEART: The heart is enlarged. Coronary artery calcifications. No   pericardial effusion.  MEDIASTINUM AND KRYSTLE: No lymphadenopathy.  CHEST WALL AND LOWER NECK: Status post median sternotomy.    ABDOMEN AND PELVIS:  LIVER: Within normal limits.  BILE DUCTS: Normal caliber.  GALLBLADDER: Cholelithiasis.  SPLEEN: Within normal limits.  PANCREAS: Within normal limits.  ADRENALS: Within normal limits.  KIDNEYS/URETERS: Left renal cysts and subcentimeter hypodensities too   small to characterize. No hydronephrosis.    BLADDER: Decompressed by a Lott catheter.  REPRODUCTIVE ORGANS: Evaluation of the prostate is limited secondary to   streak artifact from orthopedic hardware.    BOWEL: No bowel obstruction. Appendix is not visualized. Moderate fecal   load in the right and transverse colon, which demonstrate moderate   looping. The left and sigmoid colon are collapsed.  PERITONEUM: Trace free fluid.  VESSELS: Atherosclerotic changes.  RETROPERITONEUM/LYMPH NODES: No lymphadenopathy.  ABDOMINAL WALL: Small fat and fluid containing right inguinal hernia and   small fat-containing left inguinal hernia.  BONES: Degenerative changes of the spine. Status post total right hip   arthroplasty.    IMPRESSION:    Interval decrease in left pleural effusion, now mild.  Stable right   pleural effusion.  Stable appearance of mild left pleural enhancement which may be related   to Pleurx catheter in place.  Redemonstration of left pulmonary edema or pneumonia.    No hydronephrosis. The urinary bladder is decompressed by Lott catheter.  Moderate fecal load.    --- End of Report ---           SARA DYSON MD; Resident Radiology  This document has been electronically signed.  CHARLOTTE RAI MD; Attending Radiologist  This document has been electronically signed. Dec  9 2022  1:08AM    < end of copied text >      PROBLEM LIST:  89y Male with HEALTH ISSUES - PROBLEM Dx:  pleural effusions  pneumonia      RECS:  -unclear if pneumonia, on levaquin, fu culture  -thoracic eval for effusion ()  -urology fu  -incentive damien    Eunice Byrd MD   [phone]

## 2022-12-09 NOTE — H&P ADULT - NS ATTEND AMEND GEN_ALL_CORE FT
Pt care and plan discussed and reviewed with PA. Plan as outlined above edited by me to reflect our discussion. Advanced care planning/advanced directives discussed with patient/family. DNR status including forceful chest compressions to attempt to restart the heart, ventilator support/artificial breathing, electric shock, artificial nutrition, health care proxy, Molst form all discussed with pt. Pt wishes to consider.    Discussed with family at the bedside

## 2022-12-09 NOTE — CONSULT NOTE ADULT - SUBJECTIVE AND OBJECTIVE BOX
CHIEF COMPLAINT: hematuria    HISTORY OF PRESENT ILLNESS: 89-year-old male with a PMHx of HTN, HLD, CAD, A. fib on AC, CVA, hypothyroid, PAD, OA, Prostate CA status post radiation therapy 10 years ago, status post CABG x3, CKD, who presented from Rehab to [facility] with a chief complaint of hematuria in his francis catheter. Patient was recently admitted to NS under thoracic surgery for respiratory failure with pleural effusion S/P L VATS with Pleurx catheter in place. On telemetry, patient noted to be in AF/-130 BPM. EP consulted for AF/AFL management.      Allergies    penicillin (Rash)    Intolerances    	    MEDICATIONS:  aspirin  chewable 81 milliGRAM(s) Oral daily  diltiazem Infusion 5 mG/Hr IV Continuous <Continuous>  enoxaparin Injectable 80 milliGRAM(s) SubCutaneous every 12 hours  furosemide    Tablet 20 milliGRAM(s) Oral daily  metoprolol tartrate 50 milliGRAM(s) Oral two times a day  levoFLOXacin  Tablet 750 milliGRAM(s) Oral every 48 hours  acetaminophen     Tablet .. 650 milliGRAM(s) Oral every 6 hours PRN  oxyCODONE    IR 5 milliGRAM(s) Oral every 6 hours PRN  pantoprazole    Tablet 40 milliGRAM(s) Oral before breakfast  polyethylene glycol 3350 17 Gram(s) Oral daily  atorvastatin 40 milliGRAM(s) Oral at bedtime  levothyroxine 100 MICROGram(s) Oral daily  chlorhexidine 2% Cloths 1 Application(s) Topical <User Schedule>  tamsulosin 0.4 milliGRAM(s) Oral at bedtime    PAST MEDICAL & SURGICAL HISTORY:  HTN (hypertension)  HLD (hyperlipidemia)  Hyperthyroidism  treated with radioactive iodine  Hypothyroid  Atrial fibrillation  diagnosed many years ago   on anticoagulant  CVA (cerebral vascular accident)  2004  no residual  Spinal stenosis  h/o epidural injection  Prostate cancer  s/p radiation ? 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 Otherwise, 10 point ROS done and otherwise negative.    PHYSICAL EXAM:  T(C): 36.4 (12-09-22 @ 13:27), Max: 36.6 (12-08-22 @ 23:40)  HR: 122 (12-09-22 @ 17:15) (91 - 131)  BP: 105/65 (12-09-22 @ 17:15) (93/69 - 128/87)  RR: 18 (12-09-22 @ 13:27) (17 - 22)  SpO2: 96% (12-09-22 @ 12:02) (95% - 100%)  Wt(kg): --  I&O's Summary    09 Dec 2022 07:01  -  09 Dec 2022 17:27  --------------------------------------------------------  IN: 200 mL / OUT: 0 mL / NET: 200 mL        Appearance: Alert. NAD	  Cardiovascular: +S1S2 RRR no m/g/r  Respiratory: CTA B/L	  Psychiatry: A & O x 3, Mood & affect appropriate  Skin: No rashes	  Neurologic: Non-focal  Extremities: 2+ B/L pitting edema  Vascular: Peripheral pulses palpable 2+ bilaterally      LABS:	 	    CBC Full  -  ( 09 Dec 2022 06:57 )  WBC Count : 18.74 K/uL  Hemoglobin : 9.6 g/dL  Hematocrit : 31.7 %  Platelet Count - Automated : 241 K/uL  Mean Cell Volume : 97.2 fl  Mean Cell Hemoglobin : 29.4 pg  Mean Cell Hemoglobin Concentration : 30.3 gm/dL  Auto Neutrophil # : x  Auto Lymphocyte # : x  Auto Monocyte # : x  Auto Eosinophil # : x  Auto Basophil # : x  Auto Neutrophil % : x  Auto Lymphocyte % : x  Auto Monocyte % : x  Auto Eosinophil % : x  Auto Basophil % : x    12-09    141  |  106  |  67<H>  ----------------------------<  102<H>  4.8   |  22  |  1.67<H>  12-08    140  |  106  |  69<H>  ----------------------------<  116<H>  4.9   |  23  |  1.81<H>    Ca    9.0      09 Dec 2022 06:54  Ca    9.3      08 Dec 2022 18:02  Phos  3.8     12-09  Mg     2.3     12-09    TPro  6.5  /  Alb  2.6<L>  /  TBili  0.9  /  DBili  x   /  AST  59<H>  /  ALT  28  /  AlkPhos  126<H>  12-09  TPro  7.0  /  Alb  2.9<L>  /  TBili  0.9  /  DBili  x   /  AST  55<H>  /  ALT  28  /  AlkPhos  145<H>  12-08    TELEMETRY: -110 BPM    ECG: AF @ 117 BPM	  	    PREVIOUS DIAGNOSTIC TESTING:    Echocardiogram: < from: Transthoracic Echocardiogram (11.21.22 @ 17:25) >  Patient name: Keyshawn Harry  YOB: 1932   Age: 89 (M)   MR#: [id number]  Study Date: 11/21/2022  Location: [id number]: RENETTA Harley  Study quality: Technically Fair  Referring Physician: Miguel Angel Rodas MD  Blood Pressure: 119/67 mmHg  Height: 170 cm  Weight: 64 kg  BSA: 1.8 m2  ------------------------------------------------------------------------  PROCEDURE: Transthoracic echocardiogram with 2-D, M-Mode  and complete spectral and color flow Doppler.  INDICATION: Dyspnea, unspecified (R06.00)  ------------------------------------------------------------------------  DIMENSIONS:  Dimensions:     Normal Values:  LA:     4.3 cm    2.0 - 4.0 cm  Ao:     2.9 cm    2.0 - 3.8 cm  SEPTUM: 1.2 cm    0.6 - 1.2 cm  PWT:    1.2 cm    0.6 - 1.1 cm  LVIDd:  3.5 cm    3.0 - 5.6 cm  LVIDs:    ---     1.8 - 4.0 cm  Derived Variables:  LVMI: 78 g/m2  RWT: 0.68  Ejection Fraction (Modified Garcia Rule): 68 %  ------------------------------------------------------------------------  OBSERVATIONS:  Mitral Valve: Tethered mitral valve leaflets with normal  opening. Mitral annular calcification. Mild mitral  regurgitation.  Aortic Root: Normal aortic root.  Aortic Valve: Calcified trileaflet aortic valve with  decreased opening. The valve grossly appears significantly  stenotic. Unable to assess gradients, due to limited apical  windows (clinical status)  Left Atrium: Mild left atrial enlargement. LA volume index  = 34 cc/m2.  Left Ventricle: Normal left ventricular systolic function.  No segmental wall motion abnormalities. Normal left  ventricular internal dimensions and wall thicknesses.  Increased E/e'  is consistent with elevated left  ventricular filling pressure.  Right Heart: Normal right atrium. Normalright ventricular  size and function. Normal tricuspid valve. Normal pulmonic  valve.  Pericardium/PleuraNormal pericardium with no pericardial  effusion. Bilateral pleural effusions.  ------------------------------------------------------------------------  CONCLUSIONS:  1. Tethered mitral valve leaflets with normal opening.  Mitral annular calcification.  2. Calcified trileaflet aortic valve with decreased  opening. The valve grossly appears significantly stenotic.  Unable to assess gradients, due to limited apical windows  (clinical status)  3. Normal left ventricular systolic function. No segmental  wall motion abnormalities.  4. Increased E/e'  is consistent with elevated left  ventricular filling pressure.  5. Normal right ventricular size and function.  6. Bilateral pleural effusions.  ------------------------------------------------------------------------  Confirmed on  11/21/2022 - 18:22:29 by Bert Barnes M.D. RPVI  ------------------------------------------------------------------------    < end of copied text >

## 2022-12-09 NOTE — CHART NOTE - NSCHARTNOTEFT_GEN_A_CORE
Pt is ordered for FULL Ac with Lovenox, previously on Eliquis , was placed on hold in setting of active non improving hematuria.   Hgb today 9.6 downtrend from 10.1 . INR 2.4.  Present TIM ; improving . Lott cath flushed ; large amount of blood / serosanguineous output, no cloths .   Will hold FULL AC tonight , re-eval in the Am based on hgb level, BUN /Cr , INR level and hemodynamics to determine if restarting full AC is safe for this pt at this time .   D/w team.

## 2022-12-09 NOTE — H&P ADULT - NSHPPHYSICALEXAM_GEN_ALL_CORE
Vital Signs Last 24 Hrs  T(C): 36.4 (09 Dec 2022 04:14), Max: 36.6 (08 Dec 2022 17:00)  T(F): 97.6 (09 Dec 2022 04:14), Max: 97.8 (08 Dec 2022 17:00)  HR: 96 (09 Dec 2022 04:14) (68 - 127)  BP: 116/68 (09 Dec 2022 04:14) (93/69 - 117/76)  BP(mean): 78 (09 Dec 2022 01:20) (78 - 78)  RR: 18 (09 Dec 2022 04:14) (17 - 22)  SpO2: 96% (09 Dec 2022 04:14) (95% - 100%)    Parameters below as of 09 Dec 2022 04:14  Patient On (Oxygen Delivery Method): room air        Appearance: Normal	  HEENT:   Normal oral mucosa, PERRL, EOMI	  Lymphatic: No lymphadenopathy , no edema  Cardiovascular: Normal S1 S2, No JVD, No murmurs , Peripheral pulses palpable 2+ bilaterally  Respiratory: Lungs clear to auscultation, normal effort 	  Gastrointestinal:  Soft, Non-tender, + BS	  Skin: No rashes, No ecchymoses, No cyanosis, warm to touch  Musculoskeletal: Normal range of motion, normal strength  Psychiatry:  Mood & affect appropriate  Ext: No edema Vital Signs Last 24 Hrs  T(C): 36.4 (09 Dec 2022 04:14), Max: 36.6 (08 Dec 2022 17:00)  T(F): 97.6 (09 Dec 2022 04:14), Max: 97.8 (08 Dec 2022 17:00)  HR: 96 (09 Dec 2022 04:14) (68 - 127)  BP: 116/68 (09 Dec 2022 04:14) (93/69 - 117/76)  BP(mean): 78 (09 Dec 2022 01:20) (78 - 78)  RR: 18 (09 Dec 2022 04:14) (17 - 22)  SpO2: 96% (09 Dec 2022 04:14) (95% - 100%)    Parameters below as of 09 Dec 2022 04:14  Patient On (Oxygen Delivery Method): room air        Appearance: Normal	  HEENT:   Normal oral mucosa, PERRL, EOMI	  Lymphatic: No lymphadenopathy , no edema  Cardiovascular: Normal S1 S2   Respiratory: L Sided Pleurx; Rhonchorous   Gastrointestinal:  Soft, Non-tender, + BS	  Skin: No rashes, No ecchymoses, No cyanosis, warm to touch  Musculoskeletal: Normal range of motion, normal strength  Psychiatry:  Mood & affect appropriate  Ext: No edema

## 2022-12-09 NOTE — CHART NOTE - NSCHARTNOTEFT_GEN_A_CORE
Wound Care Team Note:    Request for wound care consult for sacral/buttocks wound received from nursing. Attempted to see patient for evaluation. Mr. Harry complained of increasing SOB and declined assessment. Discussed with nurse who was at the bedside assessing his SOB. Will reattempt at a later time.    Aniya Fall, NP-C, CWOCN [id number]

## 2022-12-09 NOTE — PATIENT PROFILE ADULT - FALL HARM RISK - HARM RISK INTERVENTIONS

## 2022-12-09 NOTE — H&P ADULT - NSICDXPASTSURGICALHX_GEN_ALL_CORE_FT
89F with PMHx of hypothyroidism, atrial fibrillation, pacemaker, COPD, CAD, chronic diastolic heart failure, severe TR, severe pulm HTN who presents with acute on chronic hypoxic/hypercapnic respiratory failure in the setting of acute on chronic diastolic heart failure, COPD and RSV bronchitis.    #Acute on chronic hypoxic/hypercarbic respiratory failure 2/2 CHF exacerbation and RSV bronchitis.  - Present on admission  - ABG (11 Mar 2022 09:37): pH, Arterial: 7.29, pCO2: 81, pO2: 345, HCO3: 39, Base Excess: 12.3, SaO2: 99.7  - O2 sats improved on Bipap although WOB remains labored  - Continue methylprednisolone  - Patient is off BIPAP and doing well on NC  - Will plan for SLP eval and resuming oral medications as tolerating NC  - Seen by cardiology and pulmonary  - cardiology recs appreciated, recommending to continue lasix IV 40mg BID    #COPD  - Off BIPAP  - Continue methylprednisolone  - Continue duoneb  - Seen by pulmonary, recs appreciated    #Acute on chronic diastolic CHF  - BNP 11,290 on admission  - Continue BIPAP PRN  - On lasix, monitor UOP for response  - Strict I/Os  - Seen by cardiology, recs appreciated    #Hypothyroid  - Continue Synthroid    #HTN  - IV lopressor since NPO w/ hold parameters    #Gout  - Allopurinol on hold until respiratory status improves    #Insomnia  - Melatonin on hold until respiratory status improves    #DVT prophylaxis   - switch to PO xarelto once able to take PO meds    #ACP  - GOC discussed with Sister and brother(at bedside),  DNR/DNI MOLST form filled out.   - Patient states she is unsure about GOC, but says there have been some changes made with her children PAST SURGICAL HISTORY:  H/O cataract extraction     History of herniorrhaphy right groin    S/P CABG x 3 10/2011    S/P hip replacement, right

## 2022-12-09 NOTE — H&P ADULT - HISTORY OF PRESENT ILLNESS
Patient is a 89-year-old male with a PMHx of HTN, HLD, CAD, A. fib on AC, CVA, hypothyroid, PAD, OA, Prostate CA status post radiation therapy 10 years ago, status post CABG x3, CKD, who presented from Rehab to [facility] with a chief complaint of hematuria in his francis catheter. Patient was recently admitted to NS under thoracic surgery for respiratory failure with pleural effusion S/P L VATS with Pleurx catheter in place. Patient denies chest pain, palpitations, abdominal pain or discomfort, nausea, vomiting,.

## 2022-12-09 NOTE — H&P ADULT - NSHPREVIEWOFSYSTEMS_GEN_ALL_CORE
REVIEW OF SYSTEMS:    CONSTITUTIONAL: Generalized weakness   EYES/ENT: No visual changes;  No vertigo or throat pain   NECK: No pain or stiffness  RESPIRATORY: No cough, wheezing, hemoptysis; No shortness of breath; L Sided Pleurx   CARDIOVASCULAR: No chest pain or palpitations  GASTROINTESTINAL: No abdominal or epigastric pain. No nausea, vomiting, or hematemesis; No diarrhea or constipation. No melena or hematochezia.  GENITOURINARY: No dysuria, frequency or hematuria  NEUROLOGICAL: No numbness or weakness  SKIN: No itching, burning, rashes, or lesions   All other review of systems is negative unless indicated above. REVIEW OF SYSTEMS:    CONSTITUTIONAL: Generalized weakness   EYES/ENT: No visual changes;  No vertigo or throat pain   NECK: No pain or stiffness  RESPIRATORY: No cough, wheezing, hemoptysis; + shortness of breath; L Sided Pleurx   CARDIOVASCULAR: No chest pain or palpitations  GASTROINTESTINAL: No abdominal or epigastric pain. No nausea, vomiting, or hematemesis; No diarrhea or constipation. No melena or hematochezia.  GENITOURINARY: No dysuria, frequency or hematuria  NEUROLOGICAL: No numbness or weakness  SKIN: No itching, burning, rashes, or lesions   All other review of systems is negative unless indicated above.

## 2022-12-09 NOTE — CONSULT NOTE ADULT - ASSESSMENT
89-year-old male with a PMHx of HTN, HLD, CAD, A. fib on AC, CVA, hypothyroid, PAD, OA, Prostate CA status post radiation therapy 10 years ago, status post CABG x3, CKD, who presented from Rehab to [facility] with a chief complaint of hematuria in his francis catheter. Patient was recently admitted to NS under thoracic surgery for respiratory failure with pleural effusion S/P L VATS with Pleurx catheter in place. On telemetry, patient noted to be in AF/-130 BPM. EP consulted for AF/AFL management.    1. AF/AFL, rates 100-110  2. Hematuria    - Continue to monitor on telemetry  - Keep K>4 and Mg>2  - Currently off eliquis in setting of hematuria  - Continue diltiazem gtt for rate control  - Continue metoprolol 50mg Q12  - Further recommendations pending discussion with attending    Rohini Day PA-C  #[phone] 89-year-old male with a PMHx of HTN, HLD, CAD, A. fib on AC, CVA, hypothyroid, PAD, OA, Prostate CA status post radiation therapy 10 years ago, status post CABG x3, CKD, who presented from Rehab to [facility] with a chief complaint of hematuria in his francis catheter. Patient was recently admitted to NS under thoracic surgery for respiratory failure with pleural effusion S/P L VATS with Pleurx catheter in place. On telemetry, patient noted to be in AF/-130 BPM. EP consulted for AF/AFL management.    1. AF/AFL, rates 100-110  2. Hematuria    - Continue to monitor on telemetry  - Keep K>4 and Mg>2  - Currently off eliquis in setting of hematuria  - Continue diltiazem gtt for rate control  - Continue metoprolol 50mg Q12, uptitrate as BP allows    Rohini Day PA-C  #[phone] 89-year-old male with a PMHx of HTN, HLD, CAD, A. fib on AC, CVA, hypothyroid, PAD, OA, Prostate CA status post radiation therapy 10 years ago, status post CABG x3, CKD, who presented from Rehab to [facility] with a chief complaint of hematuria in his francis catheter. Patient was recently admitted to NS under thoracic surgery for respiratory failure with pleural effusion S/P L VATS with Pleurx catheter in place. On telemetry, patient noted to be in AF/-130 BPM. EP consulted for AF/AFL management.    1. AF/AFL, rates 100-110  2. Hematuria    - Continue to monitor on telemetry  - Keep K>4 and Mg>2  - Currently off eliquis in setting of hematuria  - Continue diltiazem gtt for rate control  - Continue metoprolol 50mg Q12, uptitrate as BP allows  - Resume AC when possible    RYLAN RyanC  #[phone]

## 2022-12-10 NOTE — SWALLOW BEDSIDE ASSESSMENT ADULT - SLP GENERAL OBSERVATIONS
Pt received upright at bedside, +4L o2 via NC (pt reports not on O2 at home); Aox3, follows majority of directives, verbally expressess all wants/needs +cachectic appearing. daughter and family friend present. pt reporting coughing once during every meal for the past few months; family friend reports same time of onset as pleural effusions, pt corroborates report. pt w/out known hx of dysphaga prior to a few months ago; endorses eating w/ upper and lower dentures, however, no denture adhesive present at this time and dentures easily displaced w/ minimal movement; dentures removed and placed at bedside for exam and returned at end of evaluation. Family friend also reports decrease in overall appetite even with supplemental liquids and with placement of dentures.

## 2022-12-10 NOTE — CHART NOTE - NSCHARTNOTEFT_GEN_A_CORE
Discussed case with on call urologist from Dr. Smith's office. Can monitor as urine is pink and w/o clots s/p irrigation. No indication for CBI at this time.    Lorie MCDUFFIE, PGY-2

## 2022-12-10 NOTE — PROGRESS NOTE ADULT - SUBJECTIVE AND OBJECTIVE BOX
Name of Patient : BENJAMIN ARREDONDO  MRN: [id number]        Subjective: Patient seen and examined. No new events except as noted.   cont ot have hematuria  AC on hold  improved SOB adn palpitaiton  on cardizem drip     REVIEW OF SYSTEMS:    CONSTITUTIONAL: No weakness, fevers or chills  EYES/ENT: No visual changes;  No vertigo or throat pain   NECK: No pain or stiffness  RESPIRATORY: No cough, wheezing, hemoptysis; No shortness of breath  CARDIOVASCULAR: No chest pain or palpitations  GASTROINTESTINAL: No abdominal or epigastric pain. No nausea, vomiting, or hematemesis; No diarrhea or constipation. No melena or hematochezia.  GENITOURINARY: No dysuria, frequency or hematuria  NEUROLOGICAL: No numbness or weakness  SKIN: No itching, burning, rashes, or lesions   All other review of systems is negative unless indicated above.    MEDICATIONS:  MEDICATIONS  (STANDING):  aspirin  chewable 81 milliGRAM(s) Oral daily  atorvastatin 40 milliGRAM(s) Oral at bedtime  chlorhexidine 2% Cloths 1 Application(s) Topical <User Schedule>  diltiazem Infusion 5 mG/Hr (5 mL/Hr) IV Continuous <Continuous>  furosemide    Tablet 20 milliGRAM(s) Oral daily  levoFLOXacin  Tablet 750 milliGRAM(s) Oral every 48 hours  levothyroxine 100 MICROGram(s) Oral daily  metoprolol tartrate 50 milliGRAM(s) Oral two times a day  pantoprazole    Tablet 40 milliGRAM(s) Oral before breakfast  polyethylene glycol 3350 17 Gram(s) Oral daily  tamsulosin 0.4 milliGRAM(s) Oral at bedtime      PHYSICAL EXAM:  T(C): 37 (12-10-22 @ 21:05), Max: 37 (12-10-22 @ 21:05)  HR: 99 (12-10-22 @ 21:05) (97 - 110)  BP: 104/64 (12-10-22 @ 21:05) (94/60 - 105/68)  RR: 18 (12-10-22 @ 21:05) (18 - 18)  SpO2: 91% (12-10-22 @ 21:05) (91% - 95%)  Wt(kg): --  I&O's Summary    09 Dec 2022 07:01  -  10 Dec 2022 07:00  --------------------------------------------------------  IN: 200 mL / OUT: 800 mL / NET: -600 mL    10 Dec 2022 07:01  -  11 Dec 2022 00:03  --------------------------------------------------------  IN: 480 mL / OUT: 350 mL / NET: 130 mL          Appearance: Normal	  HEENT:  PERRLA   Lymphatic: No lymphadenopathy   Cardiovascular: Normal S1 S2, no JVD  Respiratory: normal effort , clear  Gastrointestinal:  Soft, Non-tender  Skin: No rashes,  warm to touch  Psychiatry:  Mood & affect appropriate  Musculuskeletal: No edema      All labs, Imaging and EKGs personally reviewed     12-09-22 @ 07:01  -  12-10-22 @ 07:00  --------------------------------------------------------  IN: 200 mL / OUT: 800 mL / NET: -600 mL    12-10-22 @ 07:01  -  12-11-22 @ 00:03  --------------------------------------------------------  IN: 480 mL / OUT: 350 mL / NET: 130 mL                          8.7    27.19 )-----------( 270      ( 10 Dec 2022 18:18 )             30.2               12-10    141  |  105  |  75<H>  ----------------------------<  113<H>  5.0   |  22  |  1.96<H>    Ca    9.0      10 Dec 2022 07:07  Phos  4.1     12-10  Mg     2.4     12-10    TPro  6.3  /  Alb  2.7<L>  /  TBili  1.1  /  DBili  x   /  AST  56<H>  /  ALT  28  /  AlkPhos  114  12-10    PT/INR - ( 09 Dec 2022 06:57 )   PT: 28.2 sec;   INR: 2.43 ratio         PTT - ( 09 Dec 2022 06:57 )  PTT:33.1 sec

## 2022-12-10 NOTE — SWALLOW BEDSIDE ASSESSMENT ADULT - SWALLOW EVAL: DIAGNOSIS
Patient is a 89yoM with a PMHx of HTN, HLD, CAD, A. fib on AC, CVA, hypothyroid, PAD, OA, Prostate CA s/p RT 10 years ago, s/p CABG x3, CKD, who p/w hematuria. Pt p/w clinical s/sx of oropharygneal dysphagia likely acute on sub-acute given recent SOB and dysphagia PTA w/ onset of pleural effusions. Oral Phase: mildly prolonged oral transport, trace-mild palatal stasis of puree which is cleared with subsequent swallow. Pharyngeal Phase: delayed initiation of swallow, immediate consistent cough after thin liquids. Chewables not administered given absent denture adhesive and therefore unable to safely place in oral cavity for mastication. Diet modification and objective swallow testing is indicated at this time.

## 2022-12-10 NOTE — SWALLOW BEDSIDE ASSESSMENT ADULT - ASPIRATION PRECAUTIONS
Monitor for s/s aspiration/laryngeal penetration. If noted:  D/C p.o. intake, provide non-oral nutrition/hydration/meds, and contact this service @ [phone]/[id number]

## 2022-12-10 NOTE — SWALLOW BEDSIDE ASSESSMENT ADULT - NS SPL SWALLOW CLINIC TRIAL FT
Advanced chewables not administered given pt unable to place dentures due to absence of denture adhesive

## 2022-12-10 NOTE — SWALLOW BEDSIDE ASSESSMENT ADULT - SLP PERTINENT HISTORY OF CURRENT PROBLEM
Patient is a 89-year-old male with a PMHx of HTN, HLD, CAD, A. fib on AC, CVA, hypothyroid, PAD, OA, Prostate CA status post radiation therapy 10 years ago, status post CABG x3, CKD, who presented from Rehab to [facility] with a chief complaint of hematuria in his francis catheter. Patient was recently admitted to NS under thoracic surgery for respiratory failure with pleural effusion S/P L VATS with Pleurx catheter in place. Patient denies chest pain, palpitations, abdominal pain or discomfort, nausea, vomiting, Monitor H/H closely; Urology c/s; Neph following. Pleural effusion w/ SOB, c/w lasix, thoracic and pulm c/s.  Pulm noting pleural effusions, unclear if PNA, f/u culture and thoracic eval. Pt w/ leukocytosis r/o PNA; lab w/u in progress. TIM on CKD; rx outpt f/u for renal cysts. A-fib, eliquis held for hematuria, trial lovenox fo AC, if drop in hgb, hold and reassess AC; C/w metropolol, EP c/s and signed off rx to resume AC when possible. Wound c/s for sacral/buttock wound.

## 2022-12-10 NOTE — PROGRESS NOTE ADULT - SUBJECTIVE AND OBJECTIVE BOX
24H hour events: No acute overnight events    MEDICATIONS:  aspirin  chewable 81 milliGRAM(s) Oral daily  diltiazem Infusion 5 mG/Hr IV Continuous <Continuous>  furosemide    Tablet 20 milliGRAM(s) Oral daily  metoprolol tartrate 50 milliGRAM(s) Oral two times a day  levoFLOXacin  Tablet 750 milliGRAM(s) Oral every 48 hours  acetaminophen     Tablet .. 650 milliGRAM(s) Oral every 6 hours PRN  oxyCODONE    IR 5 milliGRAM(s) Oral every 6 hours PRN  pantoprazole    Tablet 40 milliGRAM(s) Oral before breakfast  polyethylene glycol 3350 17 Gram(s) Oral daily  atorvastatin 40 milliGRAM(s) Oral at bedtime  levothyroxine 100 MICROGram(s) Oral daily  chlorhexidine 2% Cloths 1 Application(s) Topical <User Schedule>  tamsulosin 0.4 milliGRAM(s) Oral at bedtime      REVIEW OF SYSTEMS:  See HPI, otherwise ROS negative.    PHYSICAL EXAM:  T(C): 36.7 (12-10-22 @ 06:50), Max: 36.9 (12-09-22 @ 20:45)  HR: 110 (12-10-22 @ 06:50) (91 - 131)  BP: 94/60 (12-10-22 @ 06:50) (94/60 - 128/87)  RR: 18 (12-10-22 @ 06:00) (18 - 18)  SpO2: 91% (12-10-22 @ 06:00) (91% - 96%)  Wt(kg): --  I&O's Summary    09 Dec 2022 07:01  -  10 Dec 2022 07:00  --------------------------------------------------------  IN: 200 mL / OUT: 800 mL / NET: -600 mL        Appearance: Alert. NAD	  Cardiovascular: +S1S2 RRR no m/g/r  Respiratory: CTA B/L	  Psychiatry: A & O x 3, Mood & affect appropriate  Gastrointestinal:  Soft, NT. ND. +BS	  Neurologic: Non-focal  Extremities: No edema BLE  Vascular: Peripheral pulses palpable 2+ bilaterally      LABS:	 	    CBC Full  -  ( 10 Dec 2022 07:09 )  WBC Count : 22.26 K/uL  Hemoglobin : 9.2 g/dL  Hematocrit : 31.5 %  Platelet Count - Automated : 241 K/uL  Mean Cell Volume : 97.2 fl  Mean Cell Hemoglobin : 28.4 pg  Mean Cell Hemoglobin Concentration : 29.2 gm/dL  Auto Neutrophil # : x  Auto Lymphocyte # : x  Auto Monocyte # : x  Auto Eosinophil # : x  Auto Basophil # : x  Auto Neutrophil % : x  Auto Lymphocyte % : x  Auto Monocyte % : x  Auto Eosinophil % : x  Auto Basophil % : x    12-10    141  |  105  |  75<H>  ----------------------------<  113<H>  5.0   |  22  |  1.96<H>  12-09    141  |  106  |  67<H>  ----------------------------<  102<H>  4.8   |  22  |  1.67<H>    Ca    9.0      10 Dec 2022 07:07  Ca    9.0      09 Dec 2022 06:54  Phos  4.1     12-10  Phos  3.8     12-09  Mg     2.4     12-10  Mg     2.3     12-09    TPro  6.3  /  Alb  2.7<L>  /  TBili  1.1  /  DBili  x   /  AST  56<H>  /  ALT  28  /  AlkPhos  114  12-10  TPro  6.5  /  Alb  2.6<L>  /  TBili  0.9  /  DBili  x   /  AST  59<H>  /  ALT  28  /  AlkPhos  126<H>  12-09      TELEMETRY: AF/-110 BPM

## 2022-12-10 NOTE — PROGRESS NOTE ADULT - ASSESSMENT
on nasal cannula  afebrile  +weakness   attests to poor po intake    updated daughter at bedside      acetaminophen     Tablet .. 650 milliGRAM(s) Oral every 6 hours PRN  aspirin  chewable 81 milliGRAM(s) Oral daily  atorvastatin 40 milliGRAM(s) Oral at bedtime  chlorhexidine 2% Cloths 1 Application(s) Topical <User Schedule>  diltiazem Infusion 5 mG/Hr IV Continuous <Continuous>  furosemide    Tablet 20 milliGRAM(s) Oral daily  levoFLOXacin  Tablet 750 milliGRAM(s) Oral every 48 hours  levothyroxine 100 MICROGram(s) Oral daily  metoprolol tartrate 50 milliGRAM(s) Oral two times a day  oxyCODONE    IR 5 milliGRAM(s) Oral every 6 hours PRN  pantoprazole    Tablet 40 milliGRAM(s) Oral before breakfast  polyethylene glycol 3350 17 Gram(s) Oral daily  tamsulosin 0.4 milliGRAM(s) Oral at bedtime      VITAL:  T(C): , Max: 36.9 (22 @ 20:45)  T(F): , Max: 98.4 (22 @ 20:45)  HR: 97 (12-10-22 @ 11:46)  BP: 102/67 (12-10-22 @ 11:46)  BP(mean): --  RR: 18 (12-10-22 @ 11:46)  SpO2: 95% (12-10-22 @ 11:46)  Wt(kg): --    22 @ 07:01  -  12-10-22 @ 07:00  --------------------------------------------------------  IN: 200 mL / OUT: 800 mL / NET: -600 mL    12-10-22 @ 07:01  -  12-10-22 @ 15:11  --------------------------------------------------------  IN: 480 mL / OUT: 350 mL / NET: 130 mL        PHYSICAL EXAM:    Constitutional: NAD;  Seems stated age , +weakness  HEENT: EOMI  Neck:  +  JVD, supple   Respiratory: dry crackles   Cardiovascular: S1 and S2, RRr tachy   Gastrointestinal: + BS, soft, NT, ND  Extremities: +  peripheral edema, + peripheral pulses  Neurological: A/O x 3, CN2-12 intact  Psychiatric: Normal mood, normal affect  : +  Francis with hematuria  Skin: No rashes, C/D/I  Access: Not applicable  LABS:                          9.2    22.26 )-----------( 241      ( 10 Dec 2022 07:09 )             31.5     Na(141)/K(5.0)/Cl(105)/HCO3(22)/BUN(75)/Cr(1.96)Glu(113)/Ca(9.0)/Mg(2.4)/PO4(4.1)    12-10 @ 07:07  Na(141)/K(4.8)/Cl(106)/HCO3(22)/BUN(67)/Cr(1.67)Glu(102)/Ca(9.0)/Mg(2.3)/PO4(3.8)     @ 06:54  Na(140)/K(4.9)/Cl(106)/HCO3(23)/BUN(69)/Cr(1.81)Glu(116)/Ca(9.3)/Mg(--)/PO4(--)     @ 18:02    Urinalysis Basic - ( 08 Dec 2022 19:39 )    Color: Red / Appearance: Turbid / S.023 / pH: x  Gluc: x / Ketone: Negative  / Bili: Negative / Urobili: Negative   Blood: x / Protein: 300 mg/dL / Nitrite: Negative   Leuk Esterase: Moderate / RBC: >50 /hpf / WBC >50 /HPF   Sq Epi: x / Non Sq Epi: 1 /hpf / Bacteria: Negative            ASSESSMENT/PLAN  89 year old male with PMH of Chronic Afib- Eliquis Aortic valve calcification, HTN, HLD, CVA, CABG in , prostate cancer s/p radiation, left pleural effusion s/p L VATS  Hematuria   TIM on top of CKD stage 3a -   Rapid afib and Rapid ventricular rate     1 Renal -  scr up relative to yesterday ; Cont the Francis.  I doubt this can be removed    continue Lasix 20mg po qd    2 Lytes - controlled at present , though  K+ is  on the high  normal side: Continue Nepro shakes and no potassium in diet   3 CVS -BP acceptable at present; HR @97bpm at present ; on  Cardizem gtt at 5mg/hr;   House EPS  following  ;Continue lopressor 50 mg po bid.   4  -  francis with hematuria at present  s/p irrigation; no indication for CBI at present per urology  5. Anemia- hgb down relative to yesterday      Jordan Munoz NP-BC  [employer]  [phone]

## 2022-12-10 NOTE — PROGRESS NOTE ADULT - ASSESSMENT
Patient is a 89-year-old male with a PMHx of HTN, HLD, CAD, A. fib on AC, CVA, hypothyroid, PAD, OA, Prostate CA status post radiation therapy 10 years ago, status post CABG x3, CKD, who presented from Rehab to [facility] with a chief complaint of hematuria in his francis catheter. Patient was recently admitted to [facility] under thoracic surgery for respiratory failure with pleural effusion S/P L VATS with Pleurx catheter in place. Patient denies chest pain, palpitations, abdominal pain or discomfort, nausea, vomiting,.       Hematuria   - Monitor H/H closely  - hold AC fo rnow in view of recurrent hematuria   - Care per urology appreciated     Pleural Effusion, SOB  - S/P VATS and Pleurx placement  - CT C/A/P as noted  - Monitor O2 saturation closely; Supplement PRN  - Incentive spirometer  - C/w lasix 20 PO Qd; Monitor I and O  - C/w Levaquin Q48 hrs per Pulm recs  - Thoracic consulted; F/u recs  - Pulm consulted; F/u recs    Leukocytosis, R/o PNA  - CT as noted  - Check BCx2 and UA, UCx   - Check Pro-Eric   - Check MRSA/MSSA PCR  - F/u Pulm recs    Anemia  - Check anemia work up  - Transfuse for Hgb < 8.0     TIM on CKD  - Outpatient follow up for renal cysts  - Renal eval consulted; F/u recs     Afib  - Hold Eliquis in view of hematuria   - C/w  Metoprolol tartate 50 BID, now on cardizem gtt per cardio recs  - Monitor on tele, monitor electrolytes, maintain electrolytes K > 4 and Mg > 2.0   - EP eval consulted; F/u recs    CAD, S/P CABG   - C/w ASA 81  - C/w Rosuvastatin therapeutic interchange     HTN  - C/w Lasix 20  - C/w Metoprolol  - Monitor BP, VS an dpatient closely     Hypothyroid  - Check TSH  - C/w Levothyroxine 100 Qd     HLD  - CHeck lipid panel  - C/w Rosuvastatin therapeutic interchange     Fecal Load  - C/w miralax Qd  - Monitor for bowel movement      PPX  - AC on hold in view of hematuria  - PPI

## 2022-12-10 NOTE — CHART NOTE - NSCHARTNOTEFT_GEN_A_CORE
Called to evaluate hematuria s/p francis placement in the setting of AC.  Pt seen and examined. Feeling lousy secondary to difficulty breathing. No suprapubic discomfort or francis issues. Urologist Dr. Denilson Smith.   on exam: nonpalp bladder. francis in place with maroon colored urine, no clots.   using traditional sterile technique, irrigated catheter with sterile water w/o aspiration of clots and color improvement to pink.   H/H: 9.8/31.5    #hematuria s/p francis placement in the setting of AC  - no indication for cbi at this time  - please call Dr. Smith's office for official consult  - hand irrigate catheter prn   - monitor color  - trend H/H, transfuse per primary team protocol  - if color worsens, passing clots or catheter not draining please call house team [phone] for reassessment if CBI warranted  - for  questions about plan please call Dr. Smith's office directly.     Dr. Smith's office #: [phone]    d/w Medicine ACP. Will call Dr. Smith's office for consult.

## 2022-12-10 NOTE — PROGRESS NOTE ADULT - SUBJECTIVE AND OBJECTIVE BOX
PULMONARY PROGRESS NOTE    BENJAMIN ARREDONDO  MRN-[id number]    Patient is a 89y old  Male who presents with a chief complaint of     HPI:  -Known to   -admitted with hematuria  -feels some sob    ROS:   -    MEDICATIONS  (STANDING):  aspirin  chewable 81 milliGRAM(s) Oral daily  atorvastatin 40 milliGRAM(s) Oral at bedtime  chlorhexidine 2% Cloths 1 Application(s) Topical <User Schedule>  diltiazem Infusion 5 mG/Hr (5 mL/Hr) IV Continuous <Continuous>  furosemide    Tablet 20 milliGRAM(s) Oral daily  levoFLOXacin  Tablet 750 milliGRAM(s) Oral every 48 hours  levothyroxine 100 MICROGram(s) Oral daily  metoprolol tartrate 50 milliGRAM(s) Oral two times a day  pantoprazole    Tablet 40 milliGRAM(s) Oral before breakfast  polyethylene glycol 3350 17 Gram(s) Oral daily  tamsulosin 0.4 milliGRAM(s) Oral at bedtime    MEDICATIONS  (PRN):  acetaminophen     Tablet .. 650 milliGRAM(s) Oral every 6 hours PRN Moderate Pain (4 - 6)  oxyCODONE    IR 5 milliGRAM(s) Oral every 6 hours PRN Severe Pain (7 - 10)        EXAM:  Vital Signs Last 24 Hrs  T(C): 36.6 (10 Dec 2022 11:46), Max: 36.9 (09 Dec 2022 20:45)  T(F): 97.8 (10 Dec 2022 11:46), Max: 98.4 (09 Dec 2022 20:45)  HR: 97 (10 Dec 2022 11:46) (97 - 122)  BP: 102/67 (10 Dec 2022 11:46) (94/60 - 105/65)  BP(mean): --  RR: 18 (10 Dec 2022 11:46) (18 - 18)  SpO2: 95% (10 Dec 2022 11:46) (91% - 96%)    Parameters below as of 10 Dec 2022 11:46  Patient On (Oxygen Delivery Method): nasal cannula  O2 Flow (L/min): 4          GENERAL: The patient is awake and alert in no apparent distress.     LUNGS:  respirations unlabored      LABS/IMAGING: reviewed                                   9.2    22.26 )-----------( 241      ( 10 Dec 2022 07:09 )             31.5   12-10    141  |  105  |  75<H>  ----------------------------<  113<H>  5.0   |  22  |  1.96<H>    Ca    9.0      10 Dec 2022 07:07  Phos  4.1     12-10  Mg     2.4     12-10    TPro  6.3  /  Alb  2.7<L>  /  TBili  1.1  /  DBili  x   /  AST  56<H>  /  ALT  28  /  AlkPhos  114  12-10      < from: CT Chest w/ IV Cont (12.08.22 @ 23:46) >    ACC: [id number] EXAM:  CT CHEST IC                        ACC: [id number] EXAM:  CT ABDOMEN AND PELVIS IC                          PROCEDURE DATE:  12/08/2022          INTERPRETATION:  CLINICAL INFORMATION: Leukocytosis and hematuria.    COMPARISON: CTchest 11/18/2022, CT abdomen and pelvis 1/12/2014    CONTRAST/COMPLICATIONS:  IV Contrast: IV contrast documented in associated exam (accession   [id number]), Omnipaque 350 (accession [id number])  90 cc administered   10 cc   discarded  Oral Contrast: NONE  Complications: None reported at time of study completion    PROCEDURE:  CT of the Chest, Abdomen and Pelvis was performed.  Sagittal and coronal reformats were performed.    FINDINGS:  CHEST:  LUNGS, PLEURA AND LARGE AIRWAYS: Patent central airways. Redemonstration   of left groundglass and airspace opacities representing edema or   pneumonia.  Mild centrilobular emphysema.  Mild left loculated pleural   effusion with few foci of gas and hyperenhancement enhancement of the   pleura. A left pleural catheter is in place with tip in the loculated   effusion.  Moderate simple right pleural effusion, unchanged. There is   associated atelectasis of the bilateral lower lobes. Left upper and lower   lobe patchy opacities are not significantly changed.  VESSELS: Atherosclerotic changes of the aorta.  HEART: The heart is enlarged. Coronary artery calcifications. No   pericardial effusion.  MEDIASTINUM AND KRYSTLE: No lymphadenopathy.  CHEST WALL AND LOWER NECK: Status post median sternotomy.    ABDOMEN AND PELVIS:  LIVER: Within normal limits.  BILE DUCTS: Normal caliber.  GALLBLADDER: Cholelithiasis.  SPLEEN: Within normal limits.  PANCREAS: Within normal limits.  ADRENALS: Within normal limits.  KIDNEYS/URETERS: Left renal cysts and subcentimeter hypodensities too   small to characterize. No hydronephrosis.    BLADDER: Decompressed by a Lott catheter.  REPRODUCTIVE ORGANS: Evaluation of the prostate is limited secondary to   streak artifact from orthopedic hardware.    BOWEL: No bowel obstruction. Appendix is not visualized. Moderate fecal   load in the right and transverse colon, which demonstrate moderate   looping. The left and sigmoid colon are collapsed.  PERITONEUM: Trace free fluid.  VESSELS: Atherosclerotic changes.  RETROPERITONEUM/LYMPH NODES: No lymphadenopathy.  ABDOMINAL WALL: Small fat and fluid containing right inguinal hernia and   small fat-containing left inguinal hernia.  BONES: Degenerative changes of the spine. Status post total right hip   arthroplasty.    IMPRESSION:    Interval decrease in left pleural effusion, now mild.  Stable right   pleural effusion.  Stable appearance of mild left pleural enhancement which may be related   to Pleurx catheter in place.  Redemonstration of left pulmonary edema or pneumonia.    No hydronephrosis. The urinary bladder is decompressed by Lott catheter.  Moderate fecal load.    --- End of Report ---           SARA DYSON MD; Resident Radiology  This document has been electronically signed.  CHARLOTTE RAI MD; Attending Radiologist  This document has been electronically signed. Dec  9 2022  1:08AM    < end of copied text >      PROBLEM LIST:  89y Male with HEALTH ISSUES - PROBLEM Dx:  pleural effusions  pneumonia      RECS:  -unclear if pneumonia, on levaquin, would complete 7 days  -thoracic eval for effusion ()  -urology fu  -incentive damien  -supplemental O2    Eunice Byrd MD   [phone]

## 2022-12-10 NOTE — SWALLOW BEDSIDE ASSESSMENT ADULT - PHARYNGEAL PHASE
No overt s/sx of aspiration +hyolaryngeal elevation upon digital palpation/Delayed pharyngeal swallow Cough post oral intake

## 2022-12-10 NOTE — PROGRESS NOTE ADULT - ASSESSMENT
89-year-old male with a PMHx of HTN, HLD, CAD, A. fib on AC, CVA, hypothyroid, PAD, OA, Prostate CA status post radiation therapy 10 years ago, status post CABG x3, CKD, who presented from Rehab to [facility] with a chief complaint of hematuria in his francis catheter. Patient was recently admitted to [facility] under thoracic surgery for respiratory failure with pleural effusion S/P L VATS with Pleurx catheter in place. On telemetry, patient noted to be in AF/-130 BPM. EP consulted for AF/AFL management.    1. AF/AFL, rates 100-110  2. Hematuria    - Keep K>4 and Mg>2  - Currently off eliquis in setting of hematuria  - Continue diltiazem gtt for rate control  - Continue metoprolol 50mg Q12, uptitrate as BP allows  - Resume AC when possible  - EP will sign off at this time. Reconsult PRN.    Rohini Day PA-C  #[phone]

## 2022-12-10 NOTE — SWALLOW BEDSIDE ASSESSMENT ADULT - ADDITIONAL RECOMMENDATIONS
Swallow: 1. Pt/family/caregiver will demonstrate understanding and carryover of dysphagia management (safe swallow guidelines, compensatory strategies, dysphagia diet).  2. Pt will complete dysphagia exercises to improve swallow function.  3. Pt will tolerate recommended diet with no overt, clinical s/s of aspiration.

## 2022-12-10 NOTE — SWALLOW BEDSIDE ASSESSMENT ADULT - ASR SWALLOW DENTITION
Uses upper and lower dentures to eat, however, no denture adhesive present today and session conducted w/ dentures removed

## 2022-12-10 NOTE — SWALLOW BEDSIDE ASSESSMENT ADULT - COMMENTS
12/10 pt c/o SOB and nausea. Episode of hematuria s/p francis placement i/s/o AC, catheter irrigated w/o aspiration of clots and w/ color improvement. No indication for CBI    12/8 CTchest IMPRESSION: Interval decrease in left pleural effusion, now mild.  Stable right pleural effusion.Stable appearance of mild left pleuralenhancement which may be related to Pleurx catheter in place.Redemonstration of left pulmonary edema or pneumonia. No hydronephrosis. The urinary bladder is decompressed by Francis catheter.Moderate fecal load.  12/6 CXR IMPRESSION: Follow-up suspected pulmonary edema with effusions and left   Pleurx catheter in place.    No prior SLP evalutions in SCM or MBS in SCM

## 2022-12-10 NOTE — SWALLOW BEDSIDE ASSESSMENT ADULT - ORAL PHASE
With puree: trace-mild palatal stasis, cleared w/ repeat volitional swallow/Decreased anterior-posterior movement of the bolus/Delayed oral transit time Decreased anterior-posterior movement of the bolus/Delayed oral transit time

## 2022-12-11 NOTE — DIETITIAN INITIAL EVALUATION ADULT - ADD RECOMMEND
1. Encouraged PO intake as tolerated. Honor food preferences as appropriate and available.  2. Consider multivitamin and vitamin C supplements if no medical contraindications to aid in wound healing.   3.  Monitor PO intake, GI tolerance, skin integrity and labs. RD remains available if needed, pt is aware.   4. Malnutrition Sticker placed in pt. chart

## 2022-12-11 NOTE — DIETITIAN INITIAL EVALUATION ADULT - REASON INDICATOR FOR ASSESSMENT
Stage 2 Pressure Injury or greater   Information obtained from: review of pt current medical record, interview with pt bedside, and previous RD documentation from recent admission to [facility] (RD note from 11/24/22).

## 2022-12-11 NOTE — CONSULT NOTE ADULT - ASSESSMENT
Patient is a 89-year-old male with a PMHx of HTN, HLD, CAD, A. fib on AC, CVA, hypothyroid, PAD, OA, Prostate CA status post radiation therapy 10 years ago, status post CABG x3, CKD, who presented from Rehab to [facility] with a chief complaint of hematuria in his francis catheter. Patient was recently admitted to [facility] under thoracic surgery for respiratory failure with pleural effusion S/P L VATS with Pleurx catheter in place. Patient denies chest pain, palpitations, abdominal pain or discomfort, nausea, vomiting,.     He was recently at [facility] for an effusion.  Went into [facility]men that was related to post obstruction and francis was placed    In the ED, patient tachycardic, in afib, with soft BPs 90s systolic and saturating 90s on 4L.   Urology consulted for hematuria. At the bedside, francis in place--flushed with normal saline, ~10cc of clot evacuated. Urine cleared to light peach.  12/10 swallow evaluated  today, 12/11 - urology Called to reassess gross hematuria.Urine color darker than yesterday, appears maroon color. Catheter in place not draining well.  Catheter upsized using traditional sterile technique to a 22F 3way. Return of dark maroon urine.  Urology Irrigated at the bedside with evacuation of approximately 25cc of clot and color clear to translucent strawberry. and then catheter advanced to hub and 20cc of sterile water in balloon. Francis secured. Pt tolerated well.  According to urology No indication for CBI at this time.     A/P    #Elevated WBC  multiple potential reasons for WBC elevation- UTI, asp pneumonia, skin breakdown and others  suggest  BC x 2 sets- please   send pleural fluid for cell count and chemistries  follow up CXR- await swallow study  start diflucan, follow Qtc- PT IS ALREADY ON LEVAQUIN - WHICH CAN ALSO RAISE THE QTC  if concern for pneumonia , then change abs to cefepime ( tolerated in the past) or meropenem  bladder, renal ultrasound  wound care    #ELEVATED Creat  ? worsening from contrast, from dehydration? from the levaquin?or from obstruction  for ultrasound    #sacral and back ulcers  await wound care input     #pleural effusion  ? etiology  check chemistries  check cytology  check cell count       Crys Ware M.D. ,   please reach via teams   If no answer, or after 5PM/ weekends,  then please call  [phone]    Assessment and plan discussed with Dr Maloney and the NP

## 2022-12-11 NOTE — PROGRESS NOTE ADULT - ASSESSMENT
Patient is a 89-year-old male with a PMHx of HTN, HLD, CAD, A. fib on AC, CVA, hypothyroid, PAD, OA, Prostate CA status post radiation therapy 10 years ago, status post CABG x3, CKD, who presented from Rehab to [facility] with a chief complaint of hematuria in his francis catheter. Patient was recently admitted to [facility] under thoracic surgery for respiratory failure with pleural effusion S/P L VATS with Pleurx catheter in place. Patient denies chest pain, palpitations, abdominal pain or discomfort, nausea, vomiting,.       Hematuria   - Monitor H/H closely  - hold AC fo rnow in view of recurrent hematuria   - Urology follow up appreciated; F/u recs     Pleural Effusion, SOB  - S/P VATS and Pleurx placement, drained 3X per week per RN   - CT C/A/P as noted  - Monitor O2 saturation closely; Supplement PRN  - Incentive spirometer  - C/w lasix 20 PO Qd; Monitor I and O  - C/w Levaquin Q48 hrs per Pulm recs  - Thoracic consulted; F/u recs   - Pulm consult appreciated; F/u recs    Leukocytosis   - CT as noted  - BCx1 W/ NGTD; F/u final   - Repeat BCx2 ordered; F/u recs  - Pro-neha 0.11  - MRSA/MSSA PCR negative   - F/u Pulm recs  - Worsening leukocytosis; UCx with yeast; Started on Diflucan per ID  - ID eval consulted; F/u recs    Anemia  - Check anemia work up - not consistent with deficiency; Likely in the setting of Hematuria  - Transfuse for Hgb < 8.0     TIM on CKD  - Outpatient follow up for renal cysts  - Renal eval consulted; F/u recs   - Worsening Cr, Renal US ordered to evaluate for obstruction  - F/u renal recs     Afib  - Hold Eliquis in view of hematuria   - Cardizem gtt D/C  - Metoprolol tartate increased to 100 BID; Midodrine 10 TID started for hypotension; Hold for SBP > 140  - Monitor on tele, monitor electrolytes, maintain electrolytes K > 4 and Mg > 2.0   - EP eval consulted; F/u recs    CAD, S/P CABG   - C/w ASA 81  - C/w Rosuvastatin therapeutic interchange     HTN  - C/w Lasix 20  - C/w Metoprolol  - Started on Midodrine 10 TID  - Monitor BP, VS and patient closely     Ulcers  - Wound care eval     Hypothyroid  - Check TSH  - C/w Levothyroxine 100 Qd     HLD  - CHeck lipid panel  - C/w Rosuvastatin therapeutic interchange     Fecal Load  - C/w miralax Qd  - Monitor for bowel movement      PPX  - AC on hold in view of hematuria  - PPI  Patient is a 89-year-old male with a PMHx of HTN, HLD, CAD, A. fib on AC, CVA, hypothyroid, PAD, OA, Prostate CA status post radiation therapy 10 years ago, status post CABG x3, CKD, who presented from Rehab to [facility] with a chief complaint of hematuria in his francis catheter. Patient was recently admitted to [facility] under thoracic surgery for respiratory failure with pleural effusion S/P L VATS with Pleurx catheter in place. Patient denies chest pain, palpitations, abdominal pain or discomfort, nausea, vomiting,.       Hematuria   - Monitor H/H closely  - hold AC fo rnow in view of recurrent hematuria   - Urology follow up appreciated; F/u recs     Pleural Effusion, SOB  - S/P VATS and Pleurx placement, drained 3X per week per RN   - CT C/A/P as noted  - Monitor O2 saturation closely; Supplement PRN  - Incentive spirometer  - C/w lasix 20 PO Qd; Monitor I and O  - C/w Levaquin Q48 hrs per Pulm recs  - Thoracic consulted; F/u recs   - Pulm consult appreciated; F/u recs    Leukocytosis   - CT as noted  - BCx1 W/ NGTD; F/u final   - Repeat BCx2 ordered; F/u recs  - Pro-neha 0.11  - MRSA/MSSA PCR negative   - F/u Pulm recs  - Worsening leukocytosis; UCx with yeast; Started on Diflucan per ID  - ID eval consulted; F/u recs    Anemia  - Check anemia work up - not consistent with deficiency; Likely in the setting of Hematuria  - Monitor H/H closely  - Maintain active T+S   - Transfuse for Hgb < 8.0 in view of CAD    TIM on CKD  - Outpatient follow up for renal cysts  - Renal eval consulted; F/u recs   - Worsening Cr, Renal US ordered to evaluate for obstruction  - F/u renal recs     Afib  - Hold Eliquis in view of hematuria   - Cardizem gtt D/C  - Metoprolol tartate increased to 100 BID; Midodrine 10 TID started for hypotension; Hold for SBP > 140  - Monitor on tele, monitor electrolytes, maintain electrolytes K > 4 and Mg > 2.0   - EP eval consulted; F/u recs    CAD, S/P CABG   - C/w ASA 81  - C/w Rosuvastatin therapeutic interchange     HTN  - C/w Lasix 20  - C/w Metoprolol  - Started on Midodrine 10 TID  - Monitor BP, VS and patient closely     Ulcers  - Wound care eval     Hypothyroid  - Check TSH  - C/w Levothyroxine 100 Qd     HLD  - CHeck lipid panel  - C/w Rosuvastatin therapeutic interchange     Fecal Load  - C/w miralax Qd  - Monitor for bowel movement      PPX  - AC on hold in view of hematuria  - PPI  Patient is a 89-year-old male with a PMHx of HTN, HLD, CAD, A. fib on AC, CVA, hypothyroid, PAD, OA, Prostate CA status post radiation therapy 10 years ago, status post CABG x3, CKD, who presented from Rehab to [facility] with a chief complaint of hematuria in his francis catheter. Patient was recently admitted to [facility] under thoracic surgery for respiratory failure with pleural effusion S/P L VATS with Pleurx catheter in place. Patient denies chest pain, palpitations, abdominal pain or discomfort, nausea, vomiting,.       Hematuria   - Monitor H/H closely  - hold AC fo rnow in view of recurrent hematuria   - Urology follow up appreciated; F/u recs     Pleural Effusion, SOB  - S/P VATS and Pleurx placement, drained 3X per week per RN   - CT C/A/P as noted  - Monitor O2 saturation closely; Supplement PRN  - Incentive spirometer  - C/w lasix 20 PO Qd; Monitor I and O  - C/w Levaquin Q48 hrs per Pulm recs  - Thoracic consulted; F/u recs   - Pulm consult appreciated; F/u recs    Leukocytosis   - CT as noted  - BCx1 W/ NGTD; F/u final   - Repeat BCx2 ordered; F/u recs  - Pro-neha 0.11  - MRSA/MSSA PCR negative   - F/u Pulm recs  - Worsening leukocytosis; UCx with yeast; Started on Diflucan per ID; Monitor QTc  - ID eval consulted; F/u recs    Anemia  - Check anemia work up - not consistent with deficiency; Likely in the setting of Hematuria  - Monitor H/H closely  - Maintain active T+S   - Transfuse for Hgb < 8.0 in view of CAD    TIM on CKD  - Outpatient follow up for renal cysts  - Renal eval consulted; F/u recs   - Worsening Cr, Renal US ordered to evaluate for obstruction  - F/u renal recs     Afib  - Hold Eliquis in view of hematuria   - Cardizem gtt D/C  - Metoprolol tartate increased to 100 BID; Midodrine 10 TID started for hypotension; Hold for SBP > 140  - Monitor on tele, monitor electrolytes, maintain electrolytes K > 4 and Mg > 2.0   - EP eval consulted; F/u recs    CAD, S/P CABG   - C/w ASA 81  - C/w Rosuvastatin therapeutic interchange     HTN  - C/w Lasix 20  - C/w Metoprolol  - Started on Midodrine 10 TID  - Monitor BP, VS and patient closely     Ulcers  - Wound care eval     Hypothyroid  - Check TSH  - C/w Levothyroxine 100 Qd     HLD  - CHeck lipid panel  - C/w Rosuvastatin therapeutic interchange     Fecal Load  - C/w miralax Qd  - Monitor for bowel movement      PPX  - AC on hold in view of hematuria  - PPI

## 2022-12-11 NOTE — DIETITIAN NUTRITION RISK NOTIFICATION - PHYSICAL ASSESSMENT SCAPULA
moderate Cyclophosphamide Pregnancy And Lactation Text: This medication is Pregnancy Category D and it isn't considered safe during pregnancy. This medication is excreted in breast milk.

## 2022-12-11 NOTE — DIETITIAN INITIAL EVALUATION ADULT - NS FNS DIET ORDER
Diet, Pureed:   DASH/TLC {Sodium & Cholesterol Restricted} (DASH)  Moderately Thick Liquids (MODTHICKLIQS)  Supplement Feeding Modality:  Oral  Glucerna Shake Cans or Servings Per Day:  1       Frequency:  Three Times a day (12-10-22 @ 15:22) [Active]

## 2022-12-11 NOTE — CONSULT NOTE ADULT - SUBJECTIVE AND OBJECTIVE BOX
Patient is a 89y old  Male who presents with a chief complaint of Hematuria (09 Dec 2022 12:12)      HPI:  Patient is a 89-year-old male with a PMHx of HTN, HLD, CAD, A. fib on AC, CVA, hypothyroid, PAD, OA, Prostate CA status post radiation therapy 10 years ago, status post CABG x3, CKD, who presented from Rehab to [facility] with a chief complaint of hematuria in his francis catheter. Patient was recently admitted to [facility] under thoracic surgery for respiratory failure with pleural effusion S/P L VATS with Pleurx catheter in place. Patient denies chest pain, palpitations, abdominal pain or discomfort, nausea, vomiting,.      (09 Dec 2022 10:21)      PAST MEDICAL & SURGICAL HISTORY:  HTN (hypertension)      HLD (hyperlipidemia)      Hyperthyroidism  treated with radioactive iodine      Hypothyroid      Atrial fibrillation  diagnosed many years ago   on anticoagulant      CVA (cerebral vascular accident)  2004  no residual      Spinal stenosis  h/o epidural injection      Prostate cancer  s/p radiation ? 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 Fevers absent    Skin:No rash  	  Ophthalmologic:Denies any visual complaints,discharge redness or photophobia  	  ENMT:No nasal discharge,headache,sinus congestion or throat pain.No dental complaints    Respiratory and Thorax:No cough,sputum or chest pain.Denies shortness of breath  	  Cardiovascular:	No chest pain,palpitaions or dizziness    Gastrointestinal:	NO nausea,abdominal pain or diarrhea.    Genitourinary:	No dysuria,frequency. No flank pain    Musculoskeletal:	No joint swelling or pain.No weakness    Neurological:No confusion,diziness.No extremity weakness.No bladder or bowel incontinence	    Psychiatric:No delusions or hallucinations	    Hematology/Lymphatics:	No LN swelling.No gum bleeding     Endocrine:	No recent weight gain or loss.No abnormal heat/cold intolerance    Allergic/Immunologic:	No hives or rash     Allergies    penicillin (Rash)    Intolerances        Antimicrobials:       MEDICATIONS  (prior antimicrobials ):  cefTRIAXone   IVPB   100 mL/Hr IV Intermittent (12-08-22 @ 22:08)    levoFLOXacin  Tablet   750 milliGRAM(s) Oral (12-09-22 @ 13:57)             levoFLOXacin  Tablet 750 milliGRAM(s) Oral every 48 hours      MEDICATIONS  (STANDING):  aspirin  chewable 81 milliGRAM(s) Oral daily  atorvastatin 40 milliGRAM(s) Oral at bedtime  calcium gluconate IVPB 1 Gram(s) IV Intermittent once  chlorhexidine 2% Cloths 1 Application(s) Topical <User Schedule>  furosemide    Tablet 20 milliGRAM(s) Oral daily  levoFLOXacin  Tablet 750 milliGRAM(s) Oral every 48 hours  levothyroxine 100 MICROGram(s) Oral daily  metoprolol tartrate 100 milliGRAM(s) Oral two times a day  midodrine. 10 milliGRAM(s) Oral three times a day  pantoprazole    Tablet 40 milliGRAM(s) Oral before breakfast  polyethylene glycol 3350 17 Gram(s) Oral daily  sodium zirconium cyclosilicate 10 Gram(s) Oral once  tamsulosin 0.4 milliGRAM(s) Oral at bedtime    MEDICATIONS  (PRN):  acetaminophen     Tablet .. 650 milliGRAM(s) Oral every 6 hours PRN Moderate Pain (4 - 6)  oxyCODONE    IR 5 milliGRAM(s) Oral every 6 hours PRN Severe Pain (7 - 10)        Vital Signs Last 24 Hrs  T(C): 36.6 (11 Dec 2022 12:01), Max: 37 (10 Dec 2022 21:05)  T(F): 97.8 (11 Dec 2022 12:01), Max: 98.6 (10 Dec 2022 21:05)  HR: 98 (11 Dec 2022 12:01) (81 - 105)  BP: 98/56 (11 Dec 2022 12:01) (93/58 - 105/68)  BP(mean): --  RR: 18 (11 Dec 2022 12:01) (18 - 18)  SpO2: 97% (11 Dec 2022 12:01) (91% - 97%)    Parameters below as of 11 Dec 2022 12:01  Patient On (Oxygen Delivery Method): nasal cannula  O2 Flow (L/min): 4      PHYSICAL EXAM:Pleasant patient in no acute distress.      Constitutional:Comfortable.Awake and alert  No cachexia     Eyes:PERRL EOMI.NO discharge or conjunctival injection    ENMT:No sinus tenderness.No thrush.No pharyngeal exudate or erythema.Fair dental hygiene    Neck:Supple,No LN,no JVD      Respiratory:Good air entry bilaterally,CTA    Cardiovascular:S1 S2 wnl, No murmurs,rub or gallops    Gastrointestinal:Soft BS(+) no tenderness no masses ,No rebound or guarding    Genitourinary:No CVA tendereness     Rectal:    Extremities:No cyanosis,clubbing or edema.    Vascular:peripheral pulses felt    Neurological:AAO X 3,No grossly focal deficits    Skin:No rash     Lymph Nodes:No palpable LNs    Musculoskeletal:No joint swelling or LOM    Psychiatric:Affect normal.                                8.7    23.28 )-----------( 234      ( 11 Dec 2022 09:22 )             30.3     LIVER FUNCTIONS - ( 10 Dec 2022 07:07 )  Alb: 2.7 g/dL / Pro: 6.3 g/dL / ALK PHOS: 114 U/L / ALT: 28 U/L / AST: 56 U/L / GGT: x             12-11    138  |  104  |  93<H>  ----------------------------<  140<H>  6.1<H>   |  23  |  2.89<H>    Ca    8.8      11 Dec 2022 09:22  Phos  4.1     12-10  Mg     2.4     12-10    TPro  6.3  /  Alb  2.7<L>  /  TBili  1.1  /  DBili  x   /  AST  56<H>  /  ALT  28  /  AlkPhos  114  12-10      MRSA/MSSA PCR (12.10.22 @ 07:09)    MRSA PCR Result.: NotDetec: The results of this test should be interpreted with consideration of  clinical context.  Not Detected result indicates the absence of organisms or that the number  of organisms is below the assay limit of detection.  Detected result indicates the presence of organism nucleic acid.  Indeterminate result may indicate the presence of amplification  inhibitors in specimen; presence or absence of organisms cannot be  determined. Consider collecting new specimen if further testing is still  needed.  This qualitative PCR assay is FDA-approved, and its performance was  established by [facility] [place].    Staph aureus PCR Result: NotDete      Culture - Blood (12.09.22 @ 13:25)    Specimen Source: .Blood Blood-Peripheral    Culture Results:   No growth to date.      Culture - Urine (12.08.22 @ 19:29)    Specimen Source: Clean Catch Clean Catch (Midstream)    Culture Results:   Culture in progress      Culture - Urine (09.08.19 @ 17:48)    -  Ampicillin: S <=2 Predicts results to ampicillin/sulbactam, amoxacillin-clavulanate and  piperacillin-tazobactam.    -  Ciprofloxacin: R >2    -  Levofloxacin: R >4    -  Nitrofurantoin: S <=32 Should not be used to treat pyelonephritis.    -  Tetra/Doxy: R >8    -  Vancomycin: S 2    Specimen Source: .Urine    Culture Results:   >100,000 CFU/ml Enterococcus faecalis    Organism Identification: Enterococcus faecalis    Organism: Enterococcus faecalis    Method Type: MAURO          Radiology:    < from: CT Abdomen and Pelvis w/ IV Cont (12.08.22 @ 23:47) >    ACC: [id number] EXAM:  CT CHEST IC                        ACC: [id number] EXAM:  CT ABDOMEN AND PELVIS IC                          PROCEDURE DATE:  12/08/2022          INTERPRETATION:  CLINICAL INFORMATION: Leukocytosis and hematuria.    COMPARISON: CTchest 11/18/2022, CT abdomen and pelvis 1/12/2014    CONTRAST/COMPLICATIONS:  IV Contrast: IV contrast documented in associated exam (accession   [id number]), Omnipaque 350 (accession [id number])  90 cc administered   10 cc   discarded  Oral Contrast: NONE  Complications: None reported at time of study completion    PROCEDURE:  CT of the Chest, Abdomen and Pelvis was performed.  Sagittal and coronal reformats were performed.    FINDINGS:  CHEST:  LUNGS, PLEURA AND LARGE AIRWAYS: Patent central airways. Redemonstration   of left groundglass and airspace opacities representing edema or   pneumonia.  Mild centrilobular emphysema.  Mild left loculated pleural   effusion with few foci of gas and hyperenhancement enhancement of the   pleura. A left pleural catheter is in place with tip in the loculated   effusion.  Moderate simple right pleural effusion, unchanged. There is   associated atelectasis of the bilateral lower lobes. Left upper and lower   lobe patchy opacities are not significantly changed.  VESSELS: Atherosclerotic changes of the aorta.  HEART: The heart is enlarged. Coronary artery calcifications. No   pericardial effusion.  MEDIASTINUM AND KRYSTLE: No lymphadenopathy.  CHEST WALL AND LOWER NECK: Status post median sternotomy.    ABDOMEN AND PELVIS:  LIVER: Within normal limits.  BILE DUCTS: Normal caliber.  GALLBLADDER: Cholelithiasis.  SPLEEN: Within normal limits.  PANCREAS: Within normal limits.  ADRENALS: Within normal limits.  KIDNEYS/URETERS: Left renal cysts and subcentimeter hypodensities too   small to characterize. No hydronephrosis.    BLADDER: Decompressed by a Francis catheter.  REPRODUCTIVE ORGANS: Evaluation of the prostate is limited secondary to   streak artifact from orthopedic hardware.    BOWEL: No bowel obstruction. Appendix is not visualized. Moderate fecal   load in the right and transverse colon, which demonstrate moderate   looping. The left and sigmoid colon are collapsed.  PERITONEUM: Trace free fluid.  VESSELS: Atherosclerotic changes.  RETROPERITONEUM/LYMPH NODES: No lymphadenopathy.  ABDOMINAL WALL: Small fat and fluid containing right inguinal hernia and   small fat-containing left inguinal hernia.  BONES: Degenerative changes of the spine. Status post total right hip   arthroplasty.    IMPRESSION:    Interval decrease in left pleural effusion, now mild.  Stable right   pleural effusion.  Stable appearance of mild left pleural enhancement which may be related   to Pleurx catheter in place.  Redemonstration of left pulmonary edema or pneumonia.    No hydronephrosis. The urinary bladder is decompressed by Francis catheter.  Moderate fecal load.    --- End of Report ---           SARA DYSON MD; Resident Radiology  This document has been electronically signed.  CHARLOTTE RAI MD; Attending Radiologist  This document has been electronically signed. Dec  9 2022  1:08AM    < end of copied text >           Patient is a 89y old  Male who presents with a chief complaint of Hematuria (09 Dec 2022 12:12)      HPI:  Patient is a 89-year-old male with a PMHx of HTN, HLD, CAD, A. fib on AC, CVA, hypothyroid, PAD, OA, Prostate CA status post radiation therapy 10 years ago, status post CABG x3, CKD, who presented from Rehab to [facility] with a chief complaint of hematuria in his francis catheter. Patient was recently admitted to [facility] under thoracic surgery for respiratory failure with pleural effusion S/P L VATS with Pleurx catheter in place. Patient denies chest pain, palpitations, abdominal pain or discomfort, nausea, vomiting,.     He was recently at [facility] for an effusion.  Went into carmen that was related to post obstruction and francis was placed    In the ED, patient tachycardic, in afib, with soft BPs 90s systolic and saturating 90s on 4L.   Urology consulted for hematuria. At the bedside, francis in place--flushed with normal saline, ~10cc of clot evacuated. Urine cleared to light peach.  12/10 swallow evaluated  today, 12/11 - urology Called to reassess gross hematuria.Urine color darker than yesterday, appears maroon color. Catheter in place not draining well.  Catheter upsized using traditional sterile technique to a 22F 3way. Return of dark maroon urine.  Urology Irrigated at the bedside with evacuation of approximately 25cc of clot and color clear to translucent strawberry. and then catheter advanced to hub and 20cc of sterile water in balloon. Francis secured. Pt tolerated well.  According to urology No indication for CBI at this time.       PAST MEDICAL & SURGICAL HISTORY:  HTN (hypertension)      HLD (hyperlipidemia)      Hyperthyroidism  treated with radioactive iodine      Hypothyroid      Atrial fibrillation  diagnosed many years ago   on anticoagulant      CVA (cerebral vascular accident)  2004  no residual      Spinal stenosis  h/o epidural injection      Prostate cancer  s/p radiation ? 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 with:  significant other of 40 years    Substance Use : denies  Tobacco Usage:  (   ) never smoked   (  x ) former smoker - 1 1/2 PPD  (   ) current smoker  (     ) pack year  (        ) last tobacco use date  Alcohol Usage: denies  Travel: denies  Pets: denies          FAMILY HISTORY:  mother and father lived into their 90s  father had CAD and h/o CVA    REVIEW OF SYSTEMS  General:	weak    Skin:No rash  	  Ophthalmologic:Denies any visual complaints,discharge redness or photophobia  	  ENMT:No nasal discharge,headache,sinus congestion or throat pain.No dental complaints    Respiratory and Thorax:No cough,sputum or chest pain.Denies shortness of breath  	  Cardiovascular:	No chest pain,palpitaions or dizziness    Gastrointestinal:	NO nausea,abdominal pain or diarrhea.    Genitourinary:	 as above    Musculoskeletal:	 according to daughter, unable to walk since recent falll    Neurological:No confusion,diziness. diffuse weakness    Psychiatric:No delusions or hallucinations	    Hematology/Lymphatics:	No LN swelling.No gum bleeding     Endocrine:	No recent weight gain or loss.No abnormal heat/cold intolerance    Allergic/Immunologic:	No hives or rash     Allergies    penicillin (Rash)    Intolerances        Antimicrobials:       MEDICATIONS  (prior antimicrobials ):  cefTRIAXone   IVPB   100 mL/Hr IV Intermittent (12-08-22 @ 22:08)    levoFLOXacin  Tablet   750 milliGRAM(s) Oral (12-09-22 @ 13:57)             levoFLOXacin  Tablet 750 milliGRAM(s) Oral every 48 hours      MEDICATIONS  (STANDING):  aspirin  chewable 81 milliGRAM(s) Oral daily  atorvastatin 40 milliGRAM(s) Oral at bedtime  calcium gluconate IVPB 1 Gram(s) IV Intermittent once  chlorhexidine 2% Cloths 1 Application(s) Topical <User Schedule>  furosemide    Tablet 20 milliGRAM(s) Oral daily  levoFLOXacin  Tablet 750 milliGRAM(s) Oral every 48 hours  levothyroxine 100 MICROGram(s) Oral daily  metoprolol tartrate 100 milliGRAM(s) Oral two times a day  midodrine. 10 milliGRAM(s) Oral three times a day  pantoprazole    Tablet 40 milliGRAM(s) Oral before breakfast  polyethylene glycol 3350 17 Gram(s) Oral daily  sodium zirconium cyclosilicate 10 Gram(s) Oral once  tamsulosin 0.4 milliGRAM(s) Oral at bedtime    MEDICATIONS  (PRN):  acetaminophen     Tablet .. 650 milliGRAM(s) Oral every 6 hours PRN Moderate Pain (4 - 6)  oxyCODONE    IR 5 milliGRAM(s) Oral every 6 hours PRN Severe Pain (7 - 10)        Vital Signs Last 24 Hrs  T(C): 36.6 (11 Dec 2022 12:01), Max: 37 (10 Dec 2022 21:05)  T(F): 97.8 (11 Dec 2022 12:01), Max: 98.6 (10 Dec 2022 21:05)  HR: 98 (11 Dec 2022 12:01) (81 - 105)  BP: 98/56 (11 Dec 2022 12:01) (93/58 - 105/68)  BP(mean): --  RR: 18 (11 Dec 2022 12:01) (18 - 18)  SpO2: 97% (11 Dec 2022 12:01) (91% - 97%)    Parameters below as of 11 Dec 2022 12:01  Patient On (Oxygen Delivery Method): nasal cannula  O2 Flow (L/min): 4      PHYSICAL EXAM:Pleasant patient in no acute distress. sleeping but awokem      Constitutional:Comfortable. cachectict      Eyes:PERRL EOMI.NO discharge or conjunctival injection    ENMT:No sinus tenderness. tongue coated with ? paste for dentures  stringy    Neck:Supple,No LN,no JVD      Respiratory: bilateral BS  Left pleurex    dressing over back ulcer    Cardiovascular:S1 S2    Gastrointestinal:Soft BS(+) no tenderness     Extremities: cyanotic tips of fingers and feet     Neurological:AAO X 3,No grossly focal deficits    Skin: sacral DTI, small ulcer    Lymph Nodes:No palpable LNs    Psychiatric:Affect normal.                                8.7    23.28 )-----------( 234      ( 11 Dec 2022 09:22 )             30.3     LIVER FUNCTIONS - ( 10 Dec 2022 07:07 )  Alb: 2.7 g/dL / Pro: 6.3 g/dL / ALK PHOS: 114 U/L / ALT: 28 U/L / AST: 56 U/L / GGT: x             12-11    138  |  104  |  93<H>  ----------------------------<  140<H>  6.1<H>   |  23  |  2.89<H>    Ca    8.8      11 Dec 2022 09:22  Phos  4.1     12-10  Mg     2.4     12-10    TPro  6.3  /  Alb  2.7<L>  /  TBili  1.1  /  DBili  x   /  AST  56<H>  /  ALT  28  /  AlkPhos  114  12-10      MRSA/MSSA PCR (12.10.22 @ 07:09)    MRSA PCR Result.: NotDete: The results of this test should be interpreted with consideration of  clinical context.  Not Detected result indicates the absence of organisms or that the number  of organisms is below the assay limit of detection.  Detected result indicates the presence of organism nucleic acid.  Indeterminate result may indicate the presence of amplification  inhibitors in specimen; presence or absence of organisms cannot be  determined. Consider collecting new specimen if further testing is still  needed.  This qualitative PCR assay is FDA-approved, and its performance was  established by [facility], [place], [place].    Staph aureus PCR Result: [facility]      Culture - Blood (12.09.22 @ 13:25)    Specimen Source: .Blood Blood-Peripheral    Culture Results:   No growth to date.      Culture - Urine (12.08.22 @ 19:29)    Specimen Source: Clean Catch Clean Catch (Midstream)    Culture Results:   Culture in progress      Culture - Urine (09.08.19 @ 17:48)    -  Ampicillin: S <=2 Predicts results to ampicillin/sulbactam, amoxacillin-clavulanate and  piperacillin-tazobactam.    -  Ciprofloxacin: R >2    -  Levofloxacin: R >4    -  Nitrofurantoin: S <=32 Should not be used to treat pyelonephritis.    -  Tetra/Doxy: R >8    -  Vancomycin: S 2    Specimen Source: .Urine    Culture Results:   >100,000 CFU/ml Enterococcus faecalis    Organism Identification: Enterococcus faecalis    Organism: Enterococcus faecalis    Method Type: MAURO          Radiology:    < from: CT Abdomen and Pelvis w/ IV Cont (12.08.22 @ 23:47) >    ACC: [id number] EXAM:  CT CHEST IC                        ACC: [id number] EXAM:  CT ABDOMEN AND PELVIS IC                          PROCEDURE DATE:  12/08/2022          INTERPRETATION:  CLINICAL INFORMATION: Leukocytosis and hematuria.    COMPARISON: CTchest 11/18/2022, CT abdomen and pelvis 1/12/2014    CONTRAST/COMPLICATIONS:  IV Contrast: IV contrast documented in associated exam (accession   [id number]), Omnipaque 350 (accession [id number])  90 cc administered   10 cc   discarded  Oral Contrast: NONE  Complications: None reported at time of study completion    PROCEDURE:  CT of the Chest, Abdomen and Pelvis was performed.  Sagittal and coronal reformats were performed.    FINDINGS:  CHEST:  LUNGS, PLEURA AND LARGE AIRWAYS: Patent central airways. Redemonstration   of left groundglass and airspace opacities representing edema or   pneumonia.  Mild centrilobular emphysema.  Mild left loculated pleural   effusion with few foci of gas and hyperenhancement enhancement of the   pleura. A left pleural catheter is in place with tip in the loculated   effusion.  Moderate simple right pleural effusion, unchanged. There is   associated atelectasis of the bilateral lower lobes. Left upper and lower   lobe patchy opacities are not significantly changed.  VESSELS: Atherosclerotic changes of the aorta.  HEART: The heart is enlarged. Coronary artery calcifications. No   pericardial effusion.  MEDIASTINUM AND KRYSTLE: No lymphadenopathy.  CHEST WALL AND LOWER NECK: Status post median sternotomy.    ABDOMEN AND PELVIS:  LIVER: Within normal limits.  BILE DUCTS: Normal caliber.  GALLBLADDER: Cholelithiasis.  SPLEEN: Within normal limits.  PANCREAS: Within normal limits.  ADRENALS: Within normal limits.  KIDNEYS/URETERS: Left renal cysts and subcentimeter hypodensities too   small to characterize. No hydronephrosis.    BLADDER: Decompressed by a Francis catheter.  REPRODUCTIVE ORGANS: Evaluation of the prostate is limited secondary to   streak artifact from orthopedic hardware.    BOWEL: No bowel obstruction. Appendix is not visualized. Moderate fecal   load in the right and transverse colon, which demonstrate moderate   looping. The left and sigmoid colon are collapsed.  PERITONEUM: Trace free fluid.  VESSELS: Atherosclerotic changes.  RETROPERITONEUM/LYMPH NODES: No lymphadenopathy.  ABDOMINAL WALL: Small fat and fluid containing right inguinal hernia and   small fat-containing left inguinal hernia.  BONES: Degenerative changes of the spine. Status post total right hip   arthroplasty.    IMPRESSION:    Interval decrease in left pleural effusion, now mild.  Stable right   pleural effusion.  Stable appearance of mild left pleural enhancement which may be related   to Pleurx catheter in place.  Redemonstration of left pulmonary edema or pneumonia.    No hydronephrosis. The urinary bladder is decompressed by Francis catheter.  Moderate fecal load.    --- End of Report ---           SARA DYSON MD; Resident Radiology  This document has been electronically signed.  CHARLOTTE RAI MD; Attending Radiologist  This document has been electronically signed. Dec  9 2022  1:08AM    < end of copied text >           Patient is a 89y old  Male who presents with a chief complaint of Hematuria (09 Dec 2022 12:12)      HPI:  Patient is a 89-year-old male with a PMHx of HTN, HLD, CAD, A. fib on AC, CVA, hypothyroid, PAD, OA, Prostate CA status post radiation therapy 10 years ago, status post CABG x3, CKD, who presented from Rehab to [facility] with a chief complaint of hematuria in his francis catheter. Patient was recently admitted to [facility] under thoracic surgery for respiratory failure with pleural effusion S/P L VATS with Pleurx catheter in place. Patient denies chest pain, palpitations, abdominal pain or discomfort, nausea, vomiting,.     He was recently at [facility] for an effusion.  Went into carmen that was related to post obstruction and francis was placed    In the ED, patient tachycardic, in afib, with soft BPs 90s systolic and saturating 90s on 4L.   Urology consulted for hematuria. At the bedside, francis in place--flushed with normal saline, ~10cc of clot evacuated. Urine cleared to light peach.  12/10 swallow evaluated  today, 12/11 - urology Called to reassess gross hematuria.Urine color darker than yesterday, appears maroon color. Catheter in place not draining well.  Catheter upsized using traditional sterile technique to a 22F 3way. Return of dark maroon urine.  Urology Irrigated at the bedside with evacuation of approximately 25cc of clot and color clear to translucent strawberry. and then catheter advanced to hub and 20cc of sterile water in balloon. Francis secured. Pt tolerated well.  According to urology No indication for CBI at this time.       PAST MEDICAL & SURGICAL HISTORY:  HTN (hypertension)      HLD (hyperlipidemia)      Hyperthyroidism  treated with radioactive iodine      Hypothyroid      Atrial fibrillation  diagnosed many years ago   on anticoagulant      CVA (cerebral vascular accident)  2004  no residual      Spinal stenosis  h/o epidural injection      Prostate cancer  s/p radiation ? 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 with:  significant other of 40 years    Substance Use : denies  Tobacco Usage:  (   ) never smoked   (  x ) former smoker - 1 1/2 PPD  (   ) current smoker  (     ) pack year  (        ) last tobacco use date  Alcohol Usage: denies  Travel: denies  Pets: denies          FAMILY HISTORY:  mother and father lived into their 90s  father had CAD and h/o CVA    REVIEW OF SYSTEMS  General:	weak    Skin:No rash  	  Ophthalmologic:Denies any visual complaints,discharge redness or photophobia  	  ENMT:No nasal discharge,headache,sinus congestion or throat pain.No dental complaints    Respiratory and Thorax:No cough,sputum or chest pain.Denies shortness of breath  	  Cardiovascular:	No chest pain,palpitaions or dizziness    Gastrointestinal:	NO nausea,abdominal pain or diarrhea.    Genitourinary:	 as above    Musculoskeletal:	 according to daughter, unable to walk since recent falll    Neurological:No confusion,diziness. diffuse weakness    Psychiatric:No delusions or hallucinations	    Hematology/Lymphatics:	No LN swelling.No gum bleeding     Endocrine:	No recent weight gain or loss.No abnormal heat/cold intolerance    Allergic/Immunologic:	No hives or rash     Allergies    penicillin (Rash)    Intolerances        Antimicrobials:       MEDICATIONS  (prior antimicrobials ):  cefTRIAXone   IVPB   100 mL/Hr IV Intermittent (12-08-22 @ 22:08)    levoFLOXacin  Tablet   750 milliGRAM(s) Oral (12-09-22 @ 13:57)             levoFLOXacin  Tablet 750 milliGRAM(s) Oral every 48 hours      MEDICATIONS  (STANDING):  aspirin  chewable 81 milliGRAM(s) Oral daily  atorvastatin 40 milliGRAM(s) Oral at bedtime  calcium gluconate IVPB 1 Gram(s) IV Intermittent once  chlorhexidine 2% Cloths 1 Application(s) Topical <User Schedule>  furosemide    Tablet 20 milliGRAM(s) Oral daily  levoFLOXacin  Tablet 750 milliGRAM(s) Oral every 48 hours  levothyroxine 100 MICROGram(s) Oral daily  metoprolol tartrate 100 milliGRAM(s) Oral two times a day  midodrine. 10 milliGRAM(s) Oral three times a day  pantoprazole    Tablet 40 milliGRAM(s) Oral before breakfast  polyethylene glycol 3350 17 Gram(s) Oral daily  sodium zirconium cyclosilicate 10 Gram(s) Oral once  tamsulosin 0.4 milliGRAM(s) Oral at bedtime    MEDICATIONS  (PRN):  acetaminophen     Tablet .. 650 milliGRAM(s) Oral every 6 hours PRN Moderate Pain (4 - 6)  oxyCODONE    IR 5 milliGRAM(s) Oral every 6 hours PRN Severe Pain (7 - 10)        Vital Signs Last 24 Hrs  T(C): 36.6 (11 Dec 2022 12:01), Max: 37 (10 Dec 2022 21:05)  T(F): 97.8 (11 Dec 2022 12:01), Max: 98.6 (10 Dec 2022 21:05)  HR: 98 (11 Dec 2022 12:01) (81 - 105)  BP: 98/56 (11 Dec 2022 12:01) (93/58 - 105/68)  BP(mean): --  RR: 18 (11 Dec 2022 12:01) (18 - 18)  SpO2: 97% (11 Dec 2022 12:01) (91% - 97%)    Parameters below as of 11 Dec 2022 12:01  Patient On (Oxygen Delivery Method): nasal cannula  O2 Flow (L/min): 4      PHYSICAL EXAM:Pleasant patient in no acute distress. sleeping but awokem      Constitutional:Comfortable. cachectict      Eyes:PERRL EOMI.NO discharge or conjunctival injection    ENMT:No sinus tenderness. tongue coated with ? paste for dentures  organ string secretions     Neck:Supple,No LN,no JVD      Respiratory: bilateral BS  Left pleurex    dressing over back ulcer    Cardiovascular:S1 S2    Gastrointestinal:Soft BS(+) no tenderness     Extremities: cyanotic tips of fingers and feet     Neurological:AAO X 3,No grossly focal deficits    Skin: sacral DTI, small ulcer    Lymph Nodes:No palpable LNs    Psychiatric:Affect normal.                                8.7    23.28 )-----------( 234      ( 11 Dec 2022 09:22 )             30.3     LIVER FUNCTIONS - ( 10 Dec 2022 07:07 )  Alb: 2.7 g/dL / Pro: 6.3 g/dL / ALK PHOS: 114 U/L / ALT: 28 U/L / AST: 56 U/L / GGT: x             12-11    138  |  104  |  93<H>  ----------------------------<  140<H>  6.1<H>   |  23  |  2.89<H>    Ca    8.8      11 Dec 2022 09:22  Phos  4.1     12-10  Mg     2.4     12-10    TPro  6.3  /  Alb  2.7<L>  /  TBili  1.1  /  DBili  x   /  AST  56<H>  /  ALT  28  /  AlkPhos  114  12-10      MRSA/MSSA PCR (12.10.22 @ 07:09)    MRSA PCR Result.: [facility]: The results of this test should be interpreted with consideration of  clinical context.  Not Detected result indicates the absence of organisms or that the number  of organisms is below the assay limit of detection.  Detected result indicates the presence of organism nucleic acid.  Indeterminate result may indicate the presence of amplification  inhibitors in specimen; presence or absence of organisms cannot be  determined. Consider collecting new specimen if further testing is still  needed.  This qualitative PCR assay is FDA-approved, and its performance was  established by [facility], [place], [place].    Staph aureus PCR Result: [facility]      Culture - Blood (12.09.22 @ 13:25)    Specimen Source: .Blood Blood-Peripheral    Culture Results:   No growth to date.      Culture - Urine (12.08.22 @ 19:29)    Specimen Source: Clean Catch Clean Catch (Midstream)    Culture Results:   Culture in progress  (CALLED Lab - growing > yeast    Culture - Urine (09.08.19 @ 17:48)    -  Ampicillin: S <=2 Predicts results to ampicillin/sulbactam, amoxacillin-clavulanate and  piperacillin-tazobactam.    -  Ciprofloxacin: R >2    -  Levofloxacin: R >4    -  Nitrofurantoin: S <=32 Should not be used to treat pyelonephritis.    -  Tetra/Doxy: R >8    -  Vancomycin: S 2    Specimen Source: .Urine    Culture Results:   >100,000 CFU/ml Enterococcus faecalis    Organism Identification: Enterococcus faecalis    Organism: Enterococcus faecalis    Method Type: MAURO          Radiology:    < from: CT Abdomen and Pelvis w/ IV Cont (12.08.22 @ 23:47) >    ACC: [id number] EXAM:  CT CHEST IC                        ACC: [id number] EXAM:  CT ABDOMEN AND PELVIS IC                          PROCEDURE DATE:  12/08/2022          INTERPRETATION:  CLINICAL INFORMATION: Leukocytosis and hematuria.    COMPARISON: CTchest 11/18/2022, CT abdomen and pelvis 1/12/2014    CONTRAST/COMPLICATIONS:  IV Contrast: IV contrast documented in associated exam (accession   [id number]), Omnipaque 350 (accession [id number])  90 cc administered   10 cc   discarded  Oral Contrast: NONE  Complications: None reported at time of study completion    PROCEDURE:  CT of the Chest, Abdomen and Pelvis was performed.  Sagittal and coronal reformats were performed.    FINDINGS:  CHEST:  LUNGS, PLEURA AND LARGE AIRWAYS: Patent central airways. Redemonstration   of left groundglass and airspace opacities representing edema or   pneumonia.  Mild centrilobular emphysema.  Mild left loculated pleural   effusion with few foci of gas and hyperenhancement enhancement of the   pleura. A left pleural catheter is in place with tip in the loculated   effusion.  Moderate simple right pleural effusion, unchanged. There is   associated atelectasis of the bilateral lower lobes. Left upper and lower   lobe patchy opacities are not significantly changed.  VESSELS: Atherosclerotic changes of the aorta.  HEART: The heart is enlarged. Coronary artery calcifications. No   pericardial effusion.  MEDIASTINUM AND KRYSTLE: No lymphadenopathy.  CHEST WALL AND LOWER NECK: Status post median sternotomy.    ABDOMEN AND PELVIS:  LIVER: Within normal limits.  BILE DUCTS: Normal caliber.  GALLBLADDER: Cholelithiasis.  SPLEEN: Within normal limits.  PANCREAS: Within normal limits.  ADRENALS: Within normal limits.  KIDNEYS/URETERS: Left renal cysts and subcentimeter hypodensities too   small to characterize. No hydronephrosis.    BLADDER: Decompressed by a Francis catheter.  REPRODUCTIVE ORGANS: Evaluation of the prostate is limited secondary to   streak artifact from orthopedic hardware.    BOWEL: No bowel obstruction. Appendix is not visualized. Moderate fecal   load in the right and transverse colon, which demonstrate moderate   looping. The left and sigmoid colon are collapsed.  PERITONEUM: Trace free fluid.  VESSELS: Atherosclerotic changes.  RETROPERITONEUM/LYMPH NODES: No lymphadenopathy.  ABDOMINAL WALL: Small fat and fluid containing right inguinal hernia and   small fat-containing left inguinal hernia.  BONES: Degenerative changes of the spine. Status post total right hip   arthroplasty.    IMPRESSION:    Interval decrease in left pleural effusion, now mild.  Stable right   pleural effusion.  Stable appearance of mild left pleural enhancement which may be related   to Pleurx catheter in place.  Redemonstration of left pulmonary edema or pneumonia.    No hydronephrosis. The urinary bladder is decompressed by Francis catheter.  Moderate fecal load.    --- End of Report ---           SARA DYSON MD; Resident Radiology  This document has been electronically signed.  CHARLOTTE RAI MD; Attending Radiologist  This document has been electronically signed. Dec  9 2022  1:08AM    < end of copied text >

## 2022-12-11 NOTE — DIETITIAN INITIAL EVALUATION ADULT - OTHER INFO
Current Weight: Drug Dosing Weight: 81.6 kg/179.9 pounds  (11/9/22)   IBW:  130 pounds %IBW: 138 % UBW: As per pt, he reports his UBW to be 150 pounds   Weight History:  Per previous RD documentation: 142 pounds (11/19/22), 140 pounds (10/4), 145 pounds (3/29/22)  Weight trend: ?accuracy of current dosing weight, unable to get bed scale weight during RD visit secondary to scale not working. Will monitor weight  status as able during present admission.

## 2022-12-11 NOTE — PROGRESS NOTE ADULT - ASSESSMENT
patient with persistent gross hematuria with piror rt for prostate cancer  vss  urine gross hematuria with dark blood  francis to sd and cbi   [facility]/ Sarah

## 2022-12-11 NOTE — DIETITIAN INITIAL EVALUATION ADULT - DIET TYPE
Recommend discontinue DASH/TLC restriction. Defer diet/texture advancement to medical team/SLP as indicated

## 2022-12-11 NOTE — DIETITIAN INITIAL EVALUATION ADULT - REASON FOR ADMISSION
Chart Reviewed, Events Noted  "Patient is a 89-year-old male with a PMHx of HTN, HLD, CAD, A. fib on AC, CVA, hypothyroid, PAD, OA, Prostate CA status post radiation therapy 10 years ago, status post CABG x3, CKD, who presented from Rehab to [facility] with a chief complaint of hematuria in his francis catheter. Patient was recently admitted to [facility] under thoracic surgery for respiratory failure with pleural effusion S/P L VATS with Pleurx catheter in place. Patient denies chest pain, palpitations, abdominal pain or discomfort, nausea, vomiting."

## 2022-12-11 NOTE — PROGRESS NOTE ADULT - ASSESSMENT
on nasal cannula  afebrile  poor po intake    acetaminophen     Tablet .. 650 milliGRAM(s) Oral every 6 hours PRN  aspirin  chewable 81 milliGRAM(s) Oral daily  atorvastatin 40 milliGRAM(s) Oral at bedtime  chlorhexidine 2% Cloths 1 Application(s) Topical <User Schedule>  diltiazem Infusion 5 mG/Hr IV Continuous <Continuous>  furosemide    Tablet 20 milliGRAM(s) Oral daily  levoFLOXacin  Tablet 750 milliGRAM(s) Oral every 48 hours  levothyroxine 100 MICROGram(s) Oral daily  metoprolol tartrate 50 milliGRAM(s) Oral two times a day  oxyCODONE    IR 5 milliGRAM(s) Oral every 6 hours PRN  pantoprazole    Tablet 40 milliGRAM(s) Oral before breakfast  polyethylene glycol 3350 17 Gram(s) Oral daily  tamsulosin 0.4 milliGRAM(s) Oral at bedtime      VITAL:  T(C): , Max: 37 (12-10-22 @ 21:05)  T(F): , Max: 98.6 (12-10-22 @ 21:05)  HR: 81 (12-11-22 @ 06:32)  BP: 93/58 (12-11-22 @ 06:32)  BP(mean): --  RR: 18 (12-11-22 @ 04:42)  SpO2: 92% (12-11-22 @ 04:42)  Wt(kg): --    12-10-22 @ 07:01  -  12-11-22 @ 07:00  --------------------------------------------------------  IN: 480 mL / OUT: 550 mL / NET: -70 mL        PHYSICAL EXAM:  Constitutional: NAD;  Seems stated age , +weakness  HEENT: EOMI  Neck:  +  JVD, supple   Respiratory: dry crackles   Cardiovascular: S1 and S2, RRr tachy   Gastrointestinal: + BS, soft, NT, ND  Extremities: +  peripheral edema, + peripheral pulses  Neurological: A/O x 3, CN2-12 intact  Psychiatric: Normal mood, normal affect  : +  Francis with hematuria  Skin: No rashes, C/D/I  Access: Not applicable    LABS:                          8.7    27.19 )-----------( 270      ( 10 Dec 2022 18:18 )             30.2     Na(141)/K(5.0)/Cl(105)/HCO3(22)/BUN(75)/Cr(1.96)Glu(113)/Ca(9.0)/Mg(2.4)/PO4(4.1)    12-10 @ 07:07  Na(141)/K(4.8)/Cl(106)/HCO3(22)/BUN(67)/Cr(1.67)Glu(102)/Ca(9.0)/Mg(2.3)/PO4(3.8)    12-09 @ 06:54  Na(140)/K(4.9)/Cl(106)/HCO3(23)/BUN(69)/Cr(1.81)Glu(116)/Ca(9.3)/Mg(--)/PO4(--)    12-08 @ 18:02            ASSESSMENT/PLAN  Assessment  89 year old male with PMH of Chronic Afib- Eliquis Aortic valve calcification, HTN, HLD, CVA, CABG in 2012, prostate cancer s/p radiation, left pleural effusion s/p L VATS  Hematuria   TIM on top of CKD stage 3a -   Rapid afib and Rapid ventricular rate     1 Renal -  scr up relative to yesterday ; Cont the Francis.  I doubt this can be removed    continue Lasix 20mg po qd    2 Lytes - controlled at present , though  K+ is  on the high  normal side: Continue Nepro shakes and no potassium in diet   3 CVS -BP acceptable at present; HR @97bpm at present ; on  Cardizem gtt at 5mg/hr;   House EPS  following  ;Continue lopressor 50 mg po bid.   4  -  francis with hematuria at present  s/p irrigation; no indication for CBI at present per urology  5. Anemia- hgb down relative to yesterday    Jordan Munoz NP-BC  [employer]  [phone]   on nasal cannula  afebrile  poor po intake    acetaminophen     Tablet .. 650 milliGRAM(s) Oral every 6 hours PRN  aspirin  chewable 81 milliGRAM(s) Oral daily  atorvastatin 40 milliGRAM(s) Oral at bedtime  chlorhexidine 2% Cloths 1 Application(s) Topical <User Schedule>  diltiazem Infusion 5 mG/Hr IV Continuous <Continuous>  furosemide    Tablet 20 milliGRAM(s) Oral daily  levoFLOXacin  Tablet 750 milliGRAM(s) Oral every 48 hours  levothyroxine 100 MICROGram(s) Oral daily  metoprolol tartrate 50 milliGRAM(s) Oral two times a day  oxyCODONE    IR 5 milliGRAM(s) Oral every 6 hours PRN  pantoprazole    Tablet 40 milliGRAM(s) Oral before breakfast  polyethylene glycol 3350 17 Gram(s) Oral daily  tamsulosin 0.4 milliGRAM(s) Oral at bedtime      VITAL:  T(C): , Max: 37 (12-10-22 @ 21:05)  T(F): , Max: 98.6 (12-10-22 @ 21:05)  HR: 81 (12-11-22 @ 06:32)  BP: 93/58 (12-11-22 @ 06:32)  BP(mean): --  RR: 18 (12-11-22 @ 04:42)  SpO2: 92% (12-11-22 @ 04:42)  Wt(kg): --    12-10-22 @ 07:01  -  12-11-22 @ 07:00  --------------------------------------------------------  IN: 480 mL / OUT: 550 mL / NET: -70 mL        PHYSICAL EXAM:  Constitutional: NAD;  Seems stated age , +weakness  HEENT: EOMI  Neck:  +  JVD, supple   Respiratory: dry crackles   Cardiovascular: S1 and S2, RRr tachy   Gastrointestinal: + BS, soft, NT, ND  Extremities: +  peripheral edema, + peripheral pulses  Neurological: A/O x 3, CN2-12 intact  Psychiatric: Normal mood, normal affect  : +  Francis with hematuria  Skin: No rashes, C/D/I  Access: Not applicable    LABS:                          8.7    27.19 )-----------( 270      ( 10 Dec 2022 18:18 )             30.2     Na(141)/K(5.0)/Cl(105)/HCO3(22)/BUN(75)/Cr(1.96)Glu(113)/Ca(9.0)/Mg(2.4)/PO4(4.1)    12-10 @ 07:07  Na(141)/K(4.8)/Cl(106)/HCO3(22)/BUN(67)/Cr(1.67)Glu(102)/Ca(9.0)/Mg(2.3)/PO4(3.8)    12-09 @ 06:54  Na(140)/K(4.9)/Cl(106)/HCO3(23)/BUN(69)/Cr(1.81)Glu(116)/Ca(9.3)/Mg(--)/PO4(--)    12-08 @ 18:02            ASSESSMENT/PLAN  Assessment  89 year old male with PMH of Chronic Afib- Eliquis Aortic valve calcification, HTN, HLD, CVA, CABG in 2012, prostate cancer s/p radiation, left pleural effusion s/p L VATS  Hematuria   TIM on top of CKD stage 3a -   Rapid afib and Rapid ventricular rate     1 Renal -  scr is worse ; Cont the Francis.  I doubt this can be removed    continue Lasix 20mg po qd    2 Hyperkalemia- Lokelma 10gm x 1  3 CVS -BP is soft at present  ; on  Cardizem gtt at 5mg/hr;   House EPS  following  ; on lopressor 50 mg po bid.   4  -  francis with hematuria at present  s/p irrigation; no indication for CBI at present per urology  5. Anemia- hgb is stable     Jordan Munoz NP-BC  [employer]  [phone]   on nasal cannula  afebrile  poor po intake    acetaminophen     Tablet .. 650 milliGRAM(s) Oral every 6 hours PRN  aspirin  chewable 81 milliGRAM(s) Oral daily  atorvastatin 40 milliGRAM(s) Oral at bedtime  chlorhexidine 2% Cloths 1 Application(s) Topical <User Schedule>  diltiazem Infusion 5 mG/Hr IV Continuous <Continuous>  furosemide    Tablet 20 milliGRAM(s) Oral daily  levoFLOXacin  Tablet 750 milliGRAM(s) Oral every 48 hours  levothyroxine 100 MICROGram(s) Oral daily  metoprolol tartrate 50 milliGRAM(s) Oral two times a day  oxyCODONE    IR 5 milliGRAM(s) Oral every 6 hours PRN  pantoprazole    Tablet 40 milliGRAM(s) Oral before breakfast  polyethylene glycol 3350 17 Gram(s) Oral daily  tamsulosin 0.4 milliGRAM(s) Oral at bedtime      VITAL:  T(C): , Max: 37 (12-10-22 @ 21:05)  T(F): , Max: 98.6 (12-10-22 @ 21:05)  HR: 81 (12-11-22 @ 06:32)  BP: 93/58 (12-11-22 @ 06:32)  BP(mean): --  RR: 18 (12-11-22 @ 04:42)  SpO2: 92% (12-11-22 @ 04:42)  Wt(kg): --    12-10-22 @ 07:01  -  12-11-22 @ 07:00  --------------------------------------------------------  IN: 480 mL / OUT: 550 mL / NET: -70 mL        PHYSICAL EXAM:  Constitutional: NAD;  Seems stated age , +weakness  HEENT: EOMI  Neck:  +  JVD, supple   Respiratory: dry crackles   Cardiovascular: S1 and S2, RRr tachy   Gastrointestinal: + BS, soft, NT, ND  Extremities: +  peripheral edema, + peripheral pulses  Neurological: A/O x 3, CN2-12 intact  Psychiatric: Normal mood, normal affect  : +  Francis with hematuria  Skin: No rashes, C/D/I  Access: Not applicable    LABS:                          8.7    27.19 )-----------( 270      ( 10 Dec 2022 18:18 )             30.2     Na(141)/K(5.0)/Cl(105)/HCO3(22)/BUN(75)/Cr(1.96)Glu(113)/Ca(9.0)/Mg(2.4)/PO4(4.1)    12-10 @ 07:07  Na(141)/K(4.8)/Cl(106)/HCO3(22)/BUN(67)/Cr(1.67)Glu(102)/Ca(9.0)/Mg(2.3)/PO4(3.8)    12-09 @ 06:54  Na(140)/K(4.9)/Cl(106)/HCO3(23)/BUN(69)/Cr(1.81)Glu(116)/Ca(9.3)/Mg(--)/PO4(--)    12-08 @ 18:02            ASSESSMENT/PLAN  Assessment  89 year old male with PMH of Chronic Afib- Eliquis Aortic valve calcification, HTN, HLD, CVA, CABG in 2012, prostate cancer s/p radiation, left pleural effusion s/p L VATS  Hematuria   TIM on top of CKD stage 3a -   Rapid afib and Rapid ventricular rate     1 Renal -  scr is worse ; Cont the Francis.  I doubt this can be removed    continue Lasix 20mg po qd    2 Hyperkalemia- Lokelma 10gm x 1  3 CVS -BP is soft at present  ; House EPS  following  ; on lopressor 50 mg po bid.   4  -  francis with hematuria at present  s/p irrigation; no indication for CBI at present per urology  5. Anemia- hgb is stable     Jordan Munoz NP-BC  [employer]  [phone]

## 2022-12-11 NOTE — DIETITIAN INITIAL EVALUATION ADULT - EDUCATION DIETARY MODIFICATIONS
Encourage PO intake of protein-rich, nutrient dense foods./(1) partially meets; needs review/practice/verbalization

## 2022-12-11 NOTE — DIETITIAN INITIAL EVALUATION ADULT - ORAL INTAKE PTA/DIET HISTORY
Pt reports no known food allergies/intolerances, pt reports history of diarrhea,  Pt lives at home with his girlfriend, denies issues with access to food as they are able to cook and receive Meals on Wheels. Pt supplements with vanilla Glucerna. Pt reports "lousy" appetite/PO intake PTA.

## 2022-12-11 NOTE — CHART NOTE - NSCHARTNOTEFT_GEN_A_CORE
Notified by RN, pt with 7 beats of WCT, asymptomatic. Resolved spontaneously. Patient seen and examined. Denies CP, SOB, palpitations, dizziness/lightheadedness or any other complaints at this time. Afib on tele, rate controlled with Lopressor. Vital signs notable for BP 98/56 mmHg on Midodrine. Patient with K of 6.1, IV calcium gluconate and lokelma ordered. Otherwise, Electrolytes WNL. Will continue to monitor.    Vital Signs Last 24 Hrs  T(C): 36.6 (11 Dec 2022 12:01), Max: 37 (10 Dec 2022 21:05)  T(F): 97.8 (11 Dec 2022 12:01), Max: 98.6 (10 Dec 2022 21:05)  HR: 98 (11 Dec 2022 12:01) (81 - 105)  BP: 98/56 (11 Dec 2022 12:01) (93/58 - 105/68)  BP(mean): --  RR: 18 (11 Dec 2022 12:01) (18 - 18)  SpO2: 97% (11 Dec 2022 12:01) (91% - 97%)    Parameters below as of 11 Dec 2022 12:01  Patient On (Oxygen Delivery Method): nasal cannula  O2 Flow (L/min): 4

## 2022-12-11 NOTE — DIETITIAN INITIAL EVALUATION ADULT - PERTINENT LABORATORY DATA
12-11    138  |  104  |  93<H>  ----------------------------<  140<H>  6.1<H>   |  23  |  2.89<H>    Ca    8.8      11 Dec 2022 09:22  Phos  4.1     12-10  Mg     2.4     12-10    TPro  6.3  /  Alb  2.7<L>  /  TBili  1.1  /  DBili  x   /  AST  56<H>  /  ALT  28  /  AlkPhos  114  12-10

## 2022-12-11 NOTE — PROGRESS NOTE ADULT - NUTRITIONAL ASSESSMENT
This patient has been assessed with a concern for Malnutrition and has been determined to have a diagnosis/diagnoses of Severe protein-calorie malnutrition.    This patient is being managed with:   Diet Pureed-  Moderately Thick Liquids (MODTHICKLIQS)  Supplement Feeding Modality:  Oral  Glucerna Shake Cans or Servings Per Day:  3       Frequency:  Daily  Entered: Dec 11 2022  1:05PM    Diet Pureed-  DASH/TLC {Sodium & Cholesterol Restricted} (DASH)  Moderately Thick Liquids (MODTHICKLIQS)  Supplement Feeding Modality:  Oral  Glucerna Shake Cans or Servings Per Day:  1       Frequency:  Three Times a day  Entered: Dec 10 2022  3:23PM    The following pending diet order is being considered for treatment of Severe protein-calorie malnutrition:null

## 2022-12-11 NOTE — DIETITIAN INITIAL EVALUATION ADULT - PERTINENT MEDS FT
MEDICATIONS  (STANDING):  aspirin  chewable 81 milliGRAM(s) Oral daily  atorvastatin 40 milliGRAM(s) Oral at bedtime  calcium gluconate IVPB 1 Gram(s) IV Intermittent once  chlorhexidine 2% Cloths 1 Application(s) Topical <User Schedule>  furosemide    Tablet 20 milliGRAM(s) Oral daily  levoFLOXacin  Tablet 750 milliGRAM(s) Oral every 48 hours  levothyroxine 100 MICROGram(s) Oral daily  metoprolol tartrate 100 milliGRAM(s) Oral two times a day  midodrine. 10 milliGRAM(s) Oral three times a day  pantoprazole    Tablet 40 milliGRAM(s) Oral before breakfast  polyethylene glycol 3350 17 Gram(s) Oral daily  sodium zirconium cyclosilicate 10 Gram(s) Oral once  tamsulosin 0.4 milliGRAM(s) Oral at bedtime    MEDICATIONS  (PRN):  acetaminophen     Tablet .. 650 milliGRAM(s) Oral every 6 hours PRN Moderate Pain (4 - 6)  oxyCODONE    IR 5 milliGRAM(s) Oral every 6 hours PRN Severe Pain (7 - 10)

## 2022-12-11 NOTE — CHART NOTE - NSCHARTNOTEFT_GEN_A_CORE
Called to reassess gross hematuria.  Urine color darker than yesterday. Catheter in place not draining well.  Catheter upsized using traditional sterile technique to a 22F 3way. Return of dark maroon urine.   Irrigated at the bedside with evacuation of approximately 25cc of clot and color clear to translucent strawberry.  catheter advanced to hub and 20cc of sterile water in balloon. Francis secured. Pt tolerated well.  No indication for CBI at this time.   Continue to monitor color.  Trend H/H, transfuse prn.  Keep francis.  d/w Dr. Denilson Smith who will inform on call urologist for group of pt and follow.   f/u recs from Dr. Smith. Called to reassess gross hematuria.  Pt without complaints.  Urine color darker than yesterday, appears maroon color. Catheter in place not draining well.  Catheter upsized using traditional sterile technique to a 22F 3way. Return of dark maroon urine.   Irrigated at the bedside with evacuation of approximately 25cc of clot and color clear to translucent strawberry.  catheter advanced to hub and 20cc of sterile water in balloon. Francis secured. Pt tolerated well.  No indication for CBI at this time.   Continue to monitor color.  Trend H/H, transfuse prn.  Keep francis.  d/w Dr. Denilson Smith who will inform on call urologist for group of pt and follow.   f/u recs from Dr. Smith's group as they are the primary urology group following this patient. Called to reassess gross hematuria.  Pt without complaints.  Urine color darker than yesterday, appears maroon color. Catheter in place not draining well.  Catheter upsized using traditional sterile technique to a 22F 3way. Return of dark maroon urine.   Irrigated at the bedside with evacuation of approximately 25cc of clot and color clear to translucent strawberry.  catheter advanced to hub and 20cc of sterile water in balloon. Francis secured. Pt tolerated well.  No indication for CBI at this time.   Continue to monitor color.  Trend H/H, transfuse prn.  Keep francis.  Pt getting renal/bladder US to assess cause of TIM, f/u results.   d/w Dr. Denilson Smith who will inform on call urologist for group of pt and follow.   f/u recs from Dr. Smith's group as they are the primary urology group following this patient.

## 2022-12-12 NOTE — ADVANCED PRACTICE NURSE CONSULT - ASSESSMENT
The pt was encountered on [employer]- Mr Harry is in a [employer] P 500 support surface and needs assistance with T&P. Will recommend COmplete Cair boots to off-load his heels.   His daughter who was the bedside reports " he refuses to eat." the pt was seen by nutrition and found to have severe chronic malnutrition. His daughter became tearful and spoke about her dads deterioration since he fell. Emotional support was given.  She visulaized the skin when we turned the pt - he was incontinent of stool and percare was provided. staff had applied a foam dressing which was contaminated and removed.  On the sacurm and b/l buttocks was an evovling deep tissue injury present on admisssion. The wound measures 9cmx 12cm x0.2cm with approximately 40% open moist pink skin, 30% slough and 30% intact deep purple skin. the periwound skin presented with blanchable erythema. I explained that this was a deep tissue injury and was likely to continue evolving- I explained the purpose of the Triad which is the recommended treatment.   We also discussed the role of nutrition in PI prevention.  On the spine were several areas of deep purple intact skin that together measure 8cmx 5cm x0cm. Will classify as deep tissue injuries. will recommend a foam to cushion as the spinal precesses are protuberant.

## 2022-12-12 NOTE — CONSULT NOTE ADULT - SUBJECTIVE AND OBJECTIVE BOX
HPI:  Patient is a 89-year-old male with a PMHx of HTN, HLD, CAD, A. fib on AC, CVA, hypothyroid, PAD, OA, Prostate CA status post radiation therapy 10 years ago, status post CABG x3, CKD, who presented from Rehab to [facility] with a chief complaint of hematuria in his francis catheter. Patient was recently admitted to NS under thoracic surgery for respiratory failure with pleural effusion S/P L VATS with Pleurx catheter in place. Patient denies chest pain, palpitations, abdominal pain or discomfort, nausea, vomiting. Now with shock (? Septic), TIM, Hyperkalemia, Uremia, and likely entering his dying process. Geriatrics and Palliative Medicine (GaP) Team was called for PCU eval.     PERTINENT PM/SXH:   HTN (hypertension)    HLD (hyperlipidemia)    Hyperthyroidism    Hypothyroid    Atrial fibrillation    CVA (cerebral vascular accident)    Spinal stenosis    Prostate cancer    CAD (coronary artery disease)    CRI (chronic renal insufficiency)    History of hemorrhoids    Chronic dryness of both eyes    PAD (peripheral artery disease)    History of colitis    Lumbar spinal stenosis    OA (osteoarthritis)      S/P CABG x 3    H/O cataract extraction    History of herniorrhaphy    S/P hip replacement, right      FAMILY HISTORY:    Family Hx substance abuse [ ]yes [ ]no  ITEMS NOT CHECKED ARE NOT PRESENT    SOCIAL HISTORY:   Significant other/partner[ ]  Children[ ]  Jainism/Spirituality:  Substance hx:  [ ]   Tobacco hx:  [ ]   Alcohol hx: [ ]   Home Opioid hx:  [ ] I-Stop Reference No:  Living Situation: [ ]Home  [ ]Long term care  [ ]Rehab [ ]Other  Pt significant other reports that  pre-hospitalization was independent in ADL's, owned and ambulated with a  rollator. However, after recent hospitalization declined significantly and had  to be discharged to [facility].  As per significant other, unfortunately patient stay  at [facility] was brief only 1 day, before re-admission to hospital, upon readiness  for discharge, requesting that he return to [facility].   ADVANCE DIRECTIVES:    DNR/MOLST  [ ]  Living Will  [ ]   DECISION MAKER(s):  [ ] Health Care Proxy(s)  [ ] Surrogate(s)  [ ] Guardian           Name(s): Phone Number(s):    BASELINE (I)ADL(s) (prior to admission):  [place]: [ ]Total  [ ] Moderate [ ]Dependent    Allergies    penicillin (Rash)    Intolerances    MEDICATIONS  (STANDING):  ascorbic acid 500 milliGRAM(s) Oral daily  aspirin  chewable 81 milliGRAM(s) Oral daily  atorvastatin 40 milliGRAM(s) Oral at bedtime  chlorhexidine 2% Cloths 1 Application(s) Topical <User Schedule>  fluconAZOLE   Tablet 200 milliGRAM(s) Oral daily  levoFLOXacin  Tablet 750 milliGRAM(s) Oral every 48 hours  levothyroxine 100 MICROGram(s) Oral daily  metoprolol tartrate 100 milliGRAM(s) Oral two times a day  midodrine. 20 milliGRAM(s) Oral three times a day  multivitamin 1 Tablet(s) Oral daily  pantoprazole    Tablet 40 milliGRAM(s) Oral before breakfast  polyethylene glycol 3350 17 Gram(s) Oral daily  sodium zirconium cyclosilicate 10 Gram(s) Oral every 8 hours  tamsulosin 0.4 milliGRAM(s) Oral at bedtime    MEDICATIONS  (PRN):  acetaminophen     Tablet .. 650 milliGRAM(s) Oral every 6 hours PRN Moderate Pain (4 - 6)  oxyCODONE    IR 5 milliGRAM(s) Oral every 6 hours PRN Severe Pain (7 - 10)    PRESENT SYMPTOMS: [ ]Unable to self-report  [ ] CPOT [ ] PAINADs [ ] RDOS  Source if other than patient:  [ ]Family   [ ]Team     Pain: [ ]yes [ ]no  QOL impact -   Location -                    Aggravating factors -  Quality -  Radiation -  Timing-  Severity (0-10 scale):  Minimal acceptable level (0-10 scale):     CPOT:    https://www.[facility].org/getattachment/hob40w95-9m3b-6a2x-4l4n-6428q6143d4q/Critical-Care-Pain-Observation-Tool-(CPOT)    PAINAD Score: See PAINAD tool and score below     Dyspnea:                           [ ]Mild [ ]Moderate [ ]Severe    RDOS: See RDOS tool and score below   0 to 2  minimal or no respiratory distress   3  mild distress  4 to 6 moderate distress  >7 severe distress      Anxiety:                             [ ]Mild [ ]Moderate [ ]Severe  Fatigue:                             [ ]Mild [ ]Moderate [ ]Severe  Nausea:                             [ ]Mild [ ]Moderate [ ]Severe  Loss of appetite:              [ ]Mild [ ]Moderate [ ]Severe  Constipation:                    [ ]Mild [ ]Moderate [ ]Severe    PCSSQ[Palliative Care Spiritual Screening Question]   Severity (0-10):  Score of 4 or > indicate consideration of Chaplaincy referral.  Chaplaincy Referral: [ ] yes [ ] refused [ ] following [ ] Deferred     Caregiver [place]? : [ ] yes [ ] no [ ] Deferred [ ] Declined             Social work referral [ ] Patient & Family Centered Care Referral [ ]     Anticipatory Grief present?:  [ ] yes [ ] no  [ ] Deferred                  Social work referral [ ] Chaplaincy Referral [ ]    		  Other Symptoms:  [ ]All other review of systems negative     Palliative Performance Status Version 2:   See PPSv2 tool and score below          PHYSICAL EXAM:  Vital Signs Last 24 Hrs  T(C): 36.7 (12 Dec 2022 11:08), Max: 36.7 (12 Dec 2022 04:15)  T(F): 98 (12 Dec 2022 11:08), Max: 98 (12 Dec 2022 04:15)  HR: 106 (12 Dec 2022 11:08) (84 - 106)  BP: 80/51 (12 Dec 2022 14:38) (72/48 - 99/66)  BP(mean): --  RR: 18 (12 Dec 2022 11:08) (18 - 18)  SpO2: 95% (12 Dec 2022 11:08) (95% - 95%)    Parameters below as of 12 Dec 2022 11:08  Patient On (Oxygen Delivery Method): nasal cannula  O2 Flow (L/min): 4   I&O's Summary    11 Dec 2022 07:01  -  12 Dec 2022 07:00  --------------------------------------------------------  IN: 680 mL / OUT: 400 mL / NET: 280 mL    12 Dec 2022 07:01  -  12 Dec 2022 14:49  --------------------------------------------------------  IN: 240 mL / OUT: 200 mL / NET: 40 mL      GENERAL: [ ]Cachexia    [ ]Alert  [ ]Oriented x   [ ]Lethargic  [ ]Unarousable  [ ]Verbal  [ ]Non-Verbal  Behavioral:   [ ] Anxiety  [ ] Delirium [ ] Agitation [ ] Other  HEENT:  [ ]Normal   [ ]Dry mouth   [ ]ET Tube/Trach  [ ]Oral lesions  PULMONARY:   [ ]Clear [ ]Tachypnea  [ ]Audible excessive secretions   [ ]Rhonchi        [ ]Right [ ]Left [ ]Bilateral  [ ]Crackles        [ ]Right [ ]Left [ ]Bilateral  [ ]Wheezing     [ ]Right [ ]Left [ ]Bilateral  [ ]Diminished breath sounds [ ]right [ ]left [ ]bilateral  CARDIOVASCULAR:    [ ]Regular [ ]Irregular [ ]Tachy  [ ]Jose [ ]Murmur [ ]Other  GASTROINTESTINAL:  [ ]Soft  [ ]Distended   [ ]+BS  [ ]Non tender [ ]Tender  [ ]Other [ ]PEG [ ]OGT/ NGT  Last BM:  GENITOURINARY:  [ ]Normal [ ] Incontinent   [ ]Oliguria/Anuria   [ ]Francis  MUSCULOSKELETAL:   [ ]Normal   [ ]Weakness  [ ]Bed/Wheelchair bound [ ]Edema  NEUROLOGIC:   [ ]No focal deficits  [ ]Cognitive impairment  [ ]Dysphagia [ ]Dysarthria [ ]Paresis [ ]Other   SKIN:   [ ]Normal  [ ]Rash  [ ]Other  [ ]Pressure ulcer(s)       Present on admission [ ]y [ ]n    CRITICAL CARE:  [ ] Shock Present  [ ]Septic [ ]Cardiogenic [ ]Neurologic [ ]Hypovolemic  [ ]  Vasopressors [ ]  Inotropes   [ ]Respiratory failure present [ ]Mechanical ventilation [ ]Non-invasive ventilatory support [ ]High flow    [ ]Acute  [ ]Chronic [ ]Hypoxic  [ ]Hypercarbic [ ]Other  [ ]Other organ failure     LABS:                        8.5    20.00 )-----------( 250      ( 12 Dec 2022 07:23 )             29.7   12-12    137  |  101  |  105<H>  ----------------------------<  129<H>  6.6<HH>   |  23  |  3.76<H>    Ca    9.0      12 Dec 2022 07:05  Phos  7.9     12-12  Mg     2.7     12-12    TPro  6.4  /  Alb  2.9<L>  /  TBili  1.0  /  DBili  x   /  AST  73<H>  /  ALT  34  /  AlkPhos  112  12-12        RADIOLOGY & ADDITIONAL STUDIES:  < from: CT Abdomen and Pelvis w/ IV Cont (12.08.22 @ 23:47) >  IMPRESSION:    Interval decrease in left pleural effusion, now mild.  Stable right   pleural effusion.  Stable appearance of mild left pleural enhancement which may be related   to Pleurx catheter in place.  Redemonstration of left pulmonary edema or pneumonia.    No hydronephrosis. The urinary bladder is decompressed by Francis catheter.  Moderate fecal load.    --- End of Report ---           SARA DSYON MD; Resident Radiology  This document has been electronically signed.  CHARLOTTE RAI MD; Attending Radiologist  This document has been electronically signed. Dec  9 2022  1:08AM    < end of copied text >    PROTEIN CALORIE MALNUTRITION PRESENT: [ ]mild [ ]moderate [ ]severe [ ]underweight [ ]morbid obesity  https://www.andeal.org/vault/2440/web/files/ONC/Table_Clinical%20Characteristics%20to%20Document%20Malnutrition-White%20JV%20et%20al%202012.pdf    Height (cm): 162.6 (12-09-22 @ 13:27), 170.2 (11-19-22 @ 11:12), 162.6 (10-13-22 @ 09:21)  Weight (kg): 81.6 (12-09-22 @ 13:27), 64.41 (11-19-22 @ 11:12), 63.5 (10-13-22 @ 09:21)  BMI (kg/m2): 30.9 (12-09-22 @ 13:27), 22.2 (11-19-22 @ 11:12), 24 (10-13-22 @ 09:21)    [ ]PPSV2 < or = to 30% [ ]significant weight loss  [ ]poor nutritional intake  [ ]anasarca[ ]Artificial Nutrition      Other REFERRALS:  [ ]Hospice  [ ]Child Life  [ ]Social Work  [ ]Case management [ ]Holistic Therapy     Goals of Care Document:   HPI:  Patient is a 89-year-old male with a PMHx of HTN, HLD, CAD, A. fib on AC, CVA, hypothyroid, PAD, OA, Prostate CA status post radiation therapy 10 years ago, status post CABG x3, CKD, who presented from Rehab to [facility] with a chief complaint of hematuria in his francis catheter. Patient was recently admitted to NS under thoracic surgery for respiratory failure with pleural effusion S/P L VATS with Pleurx catheter in place. Patient denies chest pain, palpitations, abdominal pain or discomfort, nausea, vomiting. Now with shock (? Septic), TIM, Hyperkalemia, Uremia, and likely entering his dying process. Geriatrics and Palliative Medicine (GaP) Team was called for PCU eval.     PERTINENT PM/SXH:   HTN (hypertension)    HLD (hyperlipidemia)    Hyperthyroidism    Hypothyroid    Atrial fibrillation    CVA (cerebral vascular accident)    Spinal stenosis    Prostate cancer    CAD (coronary artery disease)    CRI (chronic renal insufficiency)    History of hemorrhoids    Chronic dryness of both eyes    PAD (peripheral artery disease)    History of colitis    Lumbar spinal stenosis    OA (osteoarthritis)      S/P CABG x 3    H/O cataract extraction    History of herniorrhaphy    S/P hip replacement, right      FAMILY HISTORY:    Family Hx substance abuse [ ]yes [ ]no  ITEMS NOT CHECKED ARE NOT PRESENT    SOCIAL HISTORY:   Significant other/partner[x ]  Children[ x]  Sabianist/Spirituality: Yazidism   Substance hx:  [ ]   Tobacco hx:  [ ]   Alcohol hx: [ ]   Home Opioid hx:  [ ] I-Stop Reference No:  Living Situation: [ ]Home  [ ]Long term care  [x ]Rehab [ ]Other  As per care coordination notes: Pt significant other reports that  pre-hospitalization was independent in ADL's, owned and ambulated with a  rollator. However, after recent hospitalization declined significantly and had  to be discharged to [facility].  As per significant other, unfortunately patient stay  at [facility] was brief only 1 day, before re-admission to hospital, upon readiness  for discharge, requesting that he return to [facility].   ADVANCE DIRECTIVES:    DNR/MOLST  [ ]  Living Will  [ ]   DECISION MAKER(s):  [ ] Health Care Proxy(s)  [x ] Surrogate(s)  [ ] Guardian           Name(s): Phone Number(s): [place] (significant other). [phone]    BASELINE (I)ADL(s) (prior to admission):  [place]: [ ]Total  [ ] Moderate [x ]Dependent    Allergies    penicillin (Rash)    Intolerances    MEDICATIONS  (STANDING):  ascorbic acid 500 milliGRAM(s) Oral daily  aspirin  chewable 81 milliGRAM(s) Oral daily  atorvastatin 40 milliGRAM(s) Oral at bedtime  chlorhexidine 2% Cloths 1 Application(s) Topical <User Schedule>  fluconAZOLE   Tablet 200 milliGRAM(s) Oral daily  levoFLOXacin  Tablet 750 milliGRAM(s) Oral every 48 hours  levothyroxine 100 MICROGram(s) Oral daily  metoprolol tartrate 100 milliGRAM(s) Oral two times a day  midodrine. 20 milliGRAM(s) Oral three times a day  multivitamin 1 Tablet(s) Oral daily  pantoprazole    Tablet 40 milliGRAM(s) Oral before breakfast  polyethylene glycol 3350 17 Gram(s) Oral daily  sodium zirconium cyclosilicate 10 Gram(s) Oral every 8 hours  tamsulosin 0.4 milliGRAM(s) Oral at bedtime    MEDICATIONS  (PRN):  acetaminophen     Tablet .. 650 milliGRAM(s) Oral every 6 hours PRN Moderate Pain (4 - 6)  oxyCODONE    IR 5 milliGRAM(s) Oral every 6 hours PRN Severe Pain (7 - 10)    PRESENT SYMPTOMS: [ x]Unable to self-report  [ ] CPOT [x ] PAINADs [x ] RDOS  Source if other than patient:  [ ]Family   [ ]Team     Pain: [ ]yes [x ]no  QOL impact -   Location -                    Aggravating factors -  Quality -  Radiation -  Timing-  Severity (0-10 scale):  Minimal acceptable level (0-10 scale):     CPOT:    https://www.[facility].org/getattachment/iui53v45-8i3q-3m4l-9s4f-2064d1641d4v/Critical-Care-Pain-Observation-Tool-(CPOT)    PAINAD Score: See PAINAD tool and score below     Dyspnea:                           [ ]Mild [ ]Moderate [ ]Severe    RDOS: See RDOS tool and score below   0 to 2  minimal or no respiratory distress   3  mild distress  4 to 6 moderate distress  >7 severe distress      Anxiety:                             [ ]Mild [ ]Moderate [ ]Severe  Fatigue:                             [ ]Mild [ ]Moderate [ ]Severe  Nausea:                             [ ]Mild [ ]Moderate [ ]Severe  Loss of appetite:              [ ]Mild [ ]Moderate [ ]Severe  Constipation:                    [ ]Mild [ ]Moderate [ ]Severe    PCSSQ[Palliative Care Spiritual Screening Question]   Severity (0-10):  Score of 4 or > indicate consideration of Chaplaincy referral.  Chaplaincy Referral: [x ] yes [ ] refused [ ] following [ ] Deferred     Caregiver [place]? : [ ] yes [ x] no [ ] Deferred [ ] Declined             Social work referral [ ] Patient & Family Centered Care Referral [ ]     Anticipatory Grief present?:  [ x] yes [ ] no  [ ] Deferred                  Social work referral [ ] Chaplaincy Referral [x ]    		  Other Symptoms:  [ ]All other review of systems negative     Palliative Performance Status Version 2:   See PPSv2 tool and score below          PHYSICAL EXAM:  Vital Signs Last 24 Hrs  T(C): 36.7 (12 Dec 2022 11:08), Max: 36.7 (12 Dec 2022 04:15)  T(F): 98 (12 Dec 2022 11:08), Max: 98 (12 Dec 2022 04:15)  HR: 106 (12 Dec 2022 11:08) (84 - 106)  BP: 80/51 (12 Dec 2022 14:38) (72/48 - 99/66)  BP(mean): --  RR: 18 (12 Dec 2022 11:08) (18 - 18)  SpO2: 95% (12 Dec 2022 11:08) (95% - 95%)    Parameters below as of 12 Dec 2022 11:08  Patient On (Oxygen Delivery Method): nasal cannula  O2 Flow (L/min): 4   I&O's Summary    11 Dec 2022 07:01  -  12 Dec 2022 07:00  --------------------------------------------------------  IN: 680 mL / OUT: 400 mL / NET: 280 mL    12 Dec 2022 07:01  -  12 Dec 2022 14:49  --------------------------------------------------------  IN: 240 mL / OUT: 200 mL / NET: 40 mL      GENERAL: [ ]Cachexia    [ ]Alert  [ ]Oriented x   [x ]Lethargic  [ ]Unarousable  [ ]Verbal  [ ]Non-Verbal  Behavioral:   [ ] Anxiety  [ ] Delirium [ ] Agitation [x ] Other: Encephalopathy   HEENT:  [ ]Normal   [x ]Dry mouth   [ ]ET Tube/Trach  [ ]Oral lesions  [x] Oral Secretions   PULMONARY:   [ ]Clear [ ]Tachypnea  [ ]Audible excessive secretions   [ ]Rhonchi        [ ]Right [ ]Left [ ]Bilateral  [ ]Crackles        [ ]Right [ ]Left [ ]Bilateral  [ ]Wheezing     [ ]Right [ ]Left [ ]Bilateral  [x]Diminished breath sounds [ ]right [ ]left [ ]bilateral  [x] Gurgling breath sound transmitted from upper respiratory airway.   CARDIOVASCULAR:    [ ]Regular [x ]Irregular [ ]Tachy  [ ]Jose [ ]Murmur [ ]Other  GASTROINTESTINAL:  [x ]Soft  [ ]Distended   [x ]+BS  [x ]Non tender [ ]Tender  [ ]Other [ ]PEG [ ]OGT/ NGT  Last BM:12/12  GENITOURINARY:  [ ]Normal [ ] Incontinent   [ ]Oliguria/Anuria   [x]Francis  MUSCULOSKELETAL:   [ ]Normal   [ ]Weakness  [x] Bed/Wheelchair bound [ ]Edema  NEUROLOGIC:   [ ]No focal deficits  [ ]Cognitive impairment  [ ]Dysphagia [ ]Dysarthria [ ]Paresis [x ]Other: Encephalopathy    SKIN:   [ ]Normal  [ ]Rash  [ ]Other  [x ]Pressure ulcer(s)       Present on admission [ ]y [ ]n  Stage III Sacrum   CRITICAL CARE:  [ ] Shock Present  [ ]Septic [ ]Cardiogenic [ ]Neurologic [ ]Hypovolemic  [ ]  Vasopressors [ ]  Inotropes   [ ]Respiratory failure present [ ]Mechanical ventilation [ ]Non-invasive ventilatory support [ ]High flow    [ ]Acute  [ ]Chronic [ ]Hypoxic  [ ]Hypercarbic [ ]Other  [ ]Other organ failure     LABS:                        8.5    20.00 )-----------( 250      ( 12 Dec 2022 07:23 )             29.7   12-12    137  |  101  |  105<H>  ----------------------------<  129<H>  6.6<HH>   |  23  |  3.76<H>    Ca    9.0      12 Dec 2022 07:05  Phos  7.9     12-12  Mg     2.7     12-12    TPro  6.4  /  Alb  2.9<L>  /  TBili  1.0  /  DBili  x   /  AST  73<H>  /  ALT  34  /  AlkPhos  112  12-12        RADIOLOGY & ADDITIONAL STUDIES:  < from: CT Abdomen and Pelvis w/ IV Cont (12.08.22 @ 23:47) >  IMPRESSION:    Interval decrease in left pleural effusion, now mild.  Stable right   pleural effusion.  Stable appearance of mild left pleural enhancement which may be related   to Pleurx catheter in place.  Redemonstration of left pulmonary edema or pneumonia.    No hydronephrosis. The urinary bladder is decompressed by Francis catheter.  Moderate fecal load.    --- End of Report ---           SARA DYSON MD; Resident Radiology  This document has been electronically signed.  CHARLOTTE RAI MD; Attending Radiologist  This document has been electronically signed. Dec  9 2022  1:08AM    < end of copied text >    PROTEIN CALORIE MALNUTRITION PRESENT: [ ]mild [ ]moderate [ ]severe [ ]underweight [ ]morbid obesity  https://www.andeal.org/vault/2440/web/files/ONC/Table_Clinical%20Characteristics%20to%20Document%20Malnutrition-White%20JV%20et%20al%202012.pdf    Height (cm): 162.6 (12-09-22 @ 13:27), 170.2 (11-19-22 @ 11:12), 162.6 (10-13-22 @ 09:21)  Weight (kg): 81.6 (12-09-22 @ 13:27), 64.41 (11-19-22 @ 11:12), 63.5 (10-13-22 @ 09:21)  BMI (kg/m2): 30.9 (12-09-22 @ 13:27), 22.2 (11-19-22 @ 11:12), 24 (10-13-22 @ 09:21)    [ ]PPSV2 < or = to 30% [ ]significant weight loss  [ ]poor nutritional intake  [ ]anasarca[ ]Artificial Nutrition      Other REFERRALS:  [ ]Hospice  [ ]Child Life  [ ]Social Work  [ ]Case management [ ]Holistic Therapy     Goals of Care Document:

## 2022-12-12 NOTE — CONSULT NOTE ADULT - PROBLEM SELECTOR RECOMMENDATION 6
Will continue to f/u for symptoms.   Will transfer to PCU when a bed becomes available and if stable for transferring.         Mitesh Fallon MD  Associate Chief Geriatrics and Palliative Care ([facility]) [facility]   [place] [facility]   , [employer] [facility]      Please contact me via Teams from Monday through Friday between 9am-5pm. If not answering, please call the palliative care pager [phone]    After 5pm and on weekends, please see the contact information below:    In the event of newly developing, evolving, or worsening symptoms, please contact the Palliative Medicine team via pager (if the patient is at [facility] #[phone] or if the patient is at [facility] #[phone]) The Geriatric and Palliative Medicine service has coverage 24 hours a day/ 7 days a week to provide medical recommendations regarding symptom management needs via telephone

## 2022-12-12 NOTE — PROGRESS NOTE ADULT - NS ATTEND AMEND GEN_ALL_CORE FT
resume cbi  further intervention per dr parr
c/w strategy of rate control for now. Will reassess for rhythm control when we can resume AC.  Reconsult as needed.
Pt care and plan discussed and reviewed with PA. Plan as outlined above edited by me to reflect our discussion. Advanced care planning/advanced directives discussed with patient/family. DNR status including forceful chest compressions to attempt to restart the heart, ventilator support/artificial breathing, electric shock, artificial nutrition, health care proxy, Molst form all discussed with pt. Pt wishes to consider.
Pt care and plan discussed and reviewed with PA. Plan as outlined above edited by me to reflect our discussion.

## 2022-12-12 NOTE — CONSULT NOTE ADULT - PROBLEM SELECTOR RECOMMENDATION 9
2/2 to PNA +/- fluid overload   GOC are for symptoms Rx.  Dilaudid 0.2mg IV q 2PRN respiratory distress.   Ativan 0.25mg IV q 2PRN for intractable symptoms.

## 2022-12-12 NOTE — PROVIDER CONTACT NOTE (OTHER) - SITUATION
pt had 7B WCT
Repeat BP 83/56 1 hour after midodrine given
patient complaining of shortness of breath and nausea
patient -140 on tele

## 2022-12-12 NOTE — ADVANCED PRACTICE NURSE CONSULT - RECOMMEDATIONS
Will recommend the followin. Sacrum, b/l buttocks: continue to monitor, apply Triad paste twice daily and prn for incontinence  2. Spine: Allevyn foam, hopson every 3 days  3. continue with T&P  4. complete Cair boots  5. Seat cushion when OOB to chair  6. Nutrition support as pt condition allows  Tx plan discussed with RN

## 2022-12-12 NOTE — PROGRESS NOTE ADULT - SUBJECTIVE AND OBJECTIVE BOX
Name of Patient : BENJAMIN ARREDONDO  MRN: [id number]  Date of visit: 12-12-22 @ 13:27      Subjective: Patient seen and examined. No new events except as noted.   Patient seen earlier this AM. Patient is lethargic and actively dying. Daughter Bernie at the bedside.   Discussed with family members - Laney and Kristina via telephone per daughter Bernie's request.     REVIEW OF SYSTEMS:  Unable to obtain ROS due to clinical condition     MEDICATIONS:  MEDICATIONS  (STANDING):  ascorbic acid 500 milliGRAM(s) Oral daily  aspirin  chewable 81 milliGRAM(s) Oral daily  atorvastatin 40 milliGRAM(s) Oral at bedtime  chlorhexidine 2% Cloths 1 Application(s) Topical <User Schedule>  fluconAZOLE   Tablet 200 milliGRAM(s) Oral daily  levoFLOXacin  Tablet 750 milliGRAM(s) Oral every 48 hours  levothyroxine 100 MICROGram(s) Oral daily  metoprolol tartrate 100 milliGRAM(s) Oral two times a day  midodrine. 20 milliGRAM(s) Oral three times a day  multivitamin 1 Tablet(s) Oral daily  pantoprazole    Tablet 40 milliGRAM(s) Oral before breakfast  polyethylene glycol 3350 17 Gram(s) Oral daily  sodium zirconium cyclosilicate 10 Gram(s) Oral every 8 hours  tamsulosin 0.4 milliGRAM(s) Oral at bedtime      PHYSICAL EXAM:  T(C): 36.7 (12-12-22 @ 11:08), Max: 36.7 (12-12-22 @ 04:15)  HR: 106 (12-12-22 @ 11:08) (84 - 106)  BP: 72/48 (12-12-22 @ 11:08) (72/48 - 99/66)  RR: 18 (12-12-22 @ 11:08) (18 - 18)  SpO2: 95% (12-12-22 @ 11:08) (95% - 95%)  Wt(kg): --  I&O's Summary    11 Dec 2022 07:01  -  12 Dec 2022 07:00  --------------------------------------------------------  IN: 680 mL / OUT: 400 mL / NET: 280 mL    12 Dec 2022 07:01  -  12 Dec 2022 13:27  --------------------------------------------------------  IN: 240 mL / OUT: 0 mL / NET: 240 mL          Appearance: Lethargic, poorly responsive   HEENT: Eyes are closely   Lymphatic: No lymphadenopathy   Cardiovascular: Tachycardic   Respiratory: Increased effort; NC  Gastrointestinal:  Soft   Skin: No rashes grossly   Psychiatry:  Lethargic  : Lott with hematuria   Musculoskeletal: No edema          12-11-22 @ 07:01  -  12-12-22 @ 07:00  --------------------------------------------------------  IN: 680 mL / OUT: 400 mL / NET: 280 mL    12-12-22 @ 07:01  -  12-12-22 @ 13:27  --------------------------------------------------------  IN: 240 mL / OUT: 0 mL / NET: 240 mL                                  8.5    20.00 )-----------( 250      ( 12 Dec 2022 07:23 )             29.7               12-12    137  |  101  |  105<H>  ----------------------------<  129<H>  6.6<HH>   |  23  |  3.76<H>    Ca    9.0      12 Dec 2022 07:05  Phos  7.9     12-12  Mg     2.7     12-12    TPro  6.4  /  Alb  2.9<L>  /  TBili  1.0  /  DBili  x   /  AST  73<H>  /  ALT  34  /  AlkPhos  112  12-12                         Culture - Blood (12.09.22 @ 13:25)   Specimen Source: .Blood Blood-Peripheral   Culture Results:   No growth to date.     Culture - Urine (12.08.22 @ 19:29)   Specimen Source: Clean Catch Clean Catch (Midstream)   Culture Results:   >100,000 CFU/ml Candida albicans "Susceptibilities not performed"

## 2022-12-12 NOTE — ADVANCED PRACTICE NURSE CONSULT - REASON FOR CONSULT
Requested by staff to assess skin status: sacrum. PMH is noted:  Patient is a 89-year-old male with a PMHx of HTN, HLD, CAD, A. fib on AC, CVA, hypothyroid, PAD, OA, Prostate CA status post radiation therapy 10 years ago, status post CABG x3, CKD, who presented from Rehab to [facility] with a chief complaint of hematuria in his francis catheter. Patient was recently admitted to NS under thoracic surgery for respiratory failure with pleural effusion S/P L VATS with Pleurx catheter in place. Patient denies chest pain, palpitations, abdominal pain or discomfort, nausea, vomiting,.

## 2022-12-12 NOTE — PROGRESS NOTE ADULT - ASSESSMENT
ASSESSMENT/PLAN  Assessment  89 year old male with PMH of Chronic Afib- Eliquis Aortic valve calcification, HTN, HLD, CVA, CABG in 2012, prostate cancer s/p radiation, left pleural effusion s/p L VATS  Hematuria   TIM on top of CKD stage 3a - Worse   Hypotension   Lethargy   Hyperkalemia     1 Renal -  scr is worse ; Cont the Francis.  I doubt this can be removed    Lasix 20mg po qd  but this has hold parameters   2 Hyperkalemia- Lokelma 10gm q 8 hours x 6 doses  3 CVS -Hypotension this am;  ON Midodrine at present and very weary to give IVF with such complex cardiac anatomy    4  -  francis with hematuria at present  s/p irrigation; no indication for CBI at present per urology  5. Anemia- hgb is stable     DW ACP   Pt seems to be dying.  Spoke with the daughter.  He is not a candidate for HD given hypotension/age-comorbidities (HD will be a catalyst for his death)   >60 minutes spent on total encounter and more then 50% of the visit was spent counseling and/or coordinating care by the attending physician       Sayed Suresh   Susie INTTRA  [phone]

## 2022-12-12 NOTE — PROGRESS NOTE ADULT - SUBJECTIVE AND OBJECTIVE BOX
The patient has persistent hematuria. Lethargic and actively dying per notes.    Vital Signs Last 24 Hrs  T(C): 36.7 (12 Dec 2022 11:08), Max: 36.7 (12 Dec 2022 04:15)  T(F): 98 (12 Dec 2022 11:08), Max: 98 (12 Dec 2022 04:15)  HR: 106 (12 Dec 2022 11:08) (84 - 106)  BP: 80/51 (12 Dec 2022 14:38) (72/48 - 99/66)  BP(mean): --  RR: 18 (12 Dec 2022 11:08) (18 - 18)  SpO2: 95% (12 Dec 2022 11:08) (95% - 95%)    Parameters below as of 12 Dec 2022 11:08  Patient On (Oxygen Delivery Method): nasal cannula  O2 Flow (L/min): 4      PHYSICAL EXAM:    NAD, well-developed  Abd: soft, NT/ND, No CVAT  Urine: bloody but draining well    I&O's Summary    11 Dec 2022 07:01  -  12 Dec 2022 07:00  --------------------------------------------------------  IN: 680 mL / OUT: 400 mL / NET: 280 mL    12 Dec 2022 07:01  -  12 Dec 2022 14:58  --------------------------------------------------------  IN: 240 mL / OUT: 200 mL / NET: 40 mL        LABS:                        8.5    20.00 )-----------( 250      ( 12 Dec 2022 07:23 )             29.7     12-12    137  |  101  |  105<H>  ----------------------------<  129<H>  6.6<HH>   |  23  |  3.76<H>    Ca    9.0      12 Dec 2022 07:05  Phos  7.9     12-12  Mg     2.7     12-12    TPro  6.4  /  Alb  2.9<L>  /  TBili  1.0  /  DBili  x   /  AST  73<H>  /  ALT  34  /  AlkPhos  112  12-12      Urine Culture: candida    Prior notes/chart reviewed.

## 2022-12-12 NOTE — PROGRESS NOTE ADULT - PROVIDER SPECIALTY LIST ADULT
Internal Medicine
Nephrology
Nephrology
Electrophysiology
Internal Medicine
Internal Medicine
Nephrology
Pulmonology
Pulmonology
Urology
Urology

## 2022-12-12 NOTE — PROGRESS NOTE ADULT - ASSESSMENT
Patient is a 89-year-old male with a PMHx of HTN, HLD, CAD, A. fib on AC, CVA, hypothyroid, PAD, OA, Prostate CA status post radiation therapy 10 years ago, status post CABG x3, CKD, who presented from Rehab to [facility] with a chief complaint of hematuria in his francis catheter. Patient was recently admitted to NS under thoracic surgery for respiratory failure with pleural effusion S/P L VATS with Pleurx catheter in place. Patient denies chest pain, palpitations, abdominal pain or discomfort, nausea, vomiting,.     Hypotension, Lethargy  - Midodrine increased to 20 TID, patient with poor prognosis   - Discussed with daughter Nigel, Family members Laney and Kristina via telephone X 2 per daughter's request  - Emotional support provided. Family states that they will be coming in  - F/u palliative eval     Hyperkalemia  - Ordered for Lokelma per renal  - Per renal, patient is not a candidate for HD at this time    Hematuria   - Monitor H/H closely  - hold AC fo rnow in view of recurrent hematuria   - Urology follow up appreciated; F/u recs     Pleural Effusion, SOB  - S/P VATS and Pleurx placement, drained 3X per week per RN   - CT C/A/P as noted  - Monitor O2 saturation closely; Supplement PRN  - Incentive spirometer  - C/w lasix 20 PO Qd; Monitor I and O  - C/w Levaquin Q48 hrs per Pulm recs  - Thoracic consulted; F/u recs   - Pulm consult appreciated; F/u recs    Leukocytosis   - CT as noted  - BCx1 W/ NGTD; F/u final   - Repeat BCx2 ordered; F/u recs  - Pro-neha 0.11  - MRSA/MSSA PCR negative   - F/u Pulm recs  - Worsening leukocytosis; UCx with yeast; Started on Diflucan per ID; Monitor QTc  - ID eval consulted; F/u recs    Anemia  - Check anemia work up - not consistent with deficiency; Likely in the setting of Hematuria  - Monitor H/H closely  - Maintain active T+S   - Transfuse for Hgb < 8.0 in view of CAD    TIM on CKD  - Outpatient follow up for renal cysts  - Renal eval consulted; F/u recs   - Worsening Cr, Renal US ordered to evaluate for obstruction  - F/u renal recs     Afib  - Hold Eliquis in view of hematuria   - Cardizem gtt D/C  - Metoprolol tartate increased to 100 BID; Midodrine 20 TID started for hypotension; Hold for SBP > 140  - Monitor on tele, monitor electrolytes, maintain electrolytes K > 4 and Mg > 2.0   - EP eval consulted; F/u recs    CAD, S/P CABG   - C/w ASA 81  - C/w Rosuvastatin therapeutic interchange     HTN  - C/w Lasix 20  - C/w Metoprolol  - Started on Midodrine 20 TID  - Monitor BP, VS and patient closely     Ulcers  - Wound care eval     Hypothyroid  - Check TSH  - C/w Levothyroxine 100 Qd     HLD  - LDL of 22   - C/w Rosuvastatin therapeutic interchange     Fecal Load  - C/w miralax Qd  - Monitor for bowel movement      PPX  - AC on hold in view of hematuria  - PPI  Patient is a 89-year-old male with a PMHx of HTN, HLD, CAD, A. fib on AC, CVA, hypothyroid, PAD, OA, Prostate CA status post radiation therapy 10 years ago, status post CABG x3, CKD, who presented from Rehab to [facility] with a chief complaint of hematuria in his francis catheter. Patient was recently admitted to NS under thoracic surgery for respiratory failure with pleural effusion S/P L VATS with Pleurx catheter in place. Patient denies chest pain, palpitations, abdominal pain or discomfort, nausea, vomiting,.     Hypotension, Lethargy  - Midodrine increased to 20 TID, patient with poor prognosis   - Discussed with daughter Nigel, Family members Laney and Kristina via telephone X 2 per daughter's request  - Emotional support provided. Family states that they will be coming in  - F/u palliative eval     Hyperkalemia  - Ordered for Lokelma per renal  - Per renal, patient is not a candidate for HD at this time    Hematuria   - Monitor H/H closely  - hold AC fo rnow in view of recurrent hematuria   - Urology follow up appreciated; F/u recs     Pleural Effusion, SOB  - S/P VATS and Pleurx placement, drained 3X per week per RN   - CT C/A/P as noted  - Monitor O2 saturation closely; Supplement PRN  - Incentive spirometer  - C/w lasix 20 PO Qd; Monitor I and O  - C/w Levaquin Q48 hrs per Pulm recs  - Thoracic consulted; F/u recs   - Pulm consult appreciated; F/u recs    Leukocytosis   - CT as noted  - BCx1 W/ NGTD; F/u final   - Repeat BCx2 ordered; F/u recs  - Pro-neha 0.11  - MRSA/MSSA PCR negative   - F/u Pulm recs  - Worsening leukocytosis; UCx with yeast; Started on Diflucan per ID; Monitor QTc  - ID eval consulted; F/u recs    Anemia  - Check anemia work up - not consistent with deficiency; Likely in the setting of Hematuria  - Monitor H/H closely  - Maintain active T+S   - Transfuse for Hgb < 8.0 in view of CAD    TIM on CKD  - Outpatient follow up for renal cysts  - Renal eval consulted; F/u recs   - Worsening Cr, Renal US ordered to evaluate for obstruction  - F/u renal recs     Afib  - Hold Eliquis in view of hematuria   - Cardizem gtt D/C  - Metoprolol tartate increased to 100 BID; Midodrine 20 TID started for hypotension; Hold for SBP > 140  - Monitor on tele, monitor electrolytes, maintain electrolytes K > 4 and Mg > 2.0   - EP eval consulted; F/u recs    CAD, S/P CABG   - C/w ASA 81  - C/w Rosuvastatin therapeutic interchange     HTN  - C/w Lasix 20  - C/w Metoprolol  - Started on Midodrine 20 TID  - Monitor BP, VS and patient closely     Ulcers  - Wound care eval     Hypothyroid  - Check TSH  - C/w Levothyroxine 100 Qd     HLD  - LDL of 22   - C/w Rosuvastatin therapeutic interchange     Fecal Load  - C/w miralax Qd  - Monitor for bowel movement      PPX  - AC on hold in view of hematuria  - PPI       Discussed with Daughter Bernie at the bedside, Son-in-law Kristina and Family member Lanye GENTILE 2 via telephone. Emotional support provided. Pending Palliative eval

## 2022-12-12 NOTE — PROVIDER CONTACT NOTE (OTHER) - ACTION/TREATMENT ORDERED:
Provider ordered hold 6 am dose of lasix and metoprolol and give 250ml of sodium chloride 0.9% bolus.
Duoneb & zofran ordered and administered
cardizem drip started as per order , will continue to monitor
will continue to monitor.

## 2022-12-12 NOTE — CONSULT NOTE ADULT - ASSESSMENT
full note to f/u.   Met with the patient's significant other (Laney), hid daughter Baldomero and his Nephew (Den). They gave verbalized understanding about the patient's advanced illness and active dying process. They agreed with GOC towards prioritizing on symptoms Rx. They also agreed with starting opioids and benzos PRN for pain, respiratory distress, anxiety and agitation. If hemodynamically stable (will only transfer to PCU if BP is > or equal to 80/50), they also agreed with PCU transferring.     Will start:   Dilaudid 0.2mg IV q 2PRN moderate to severe pain or respiratory distress.   Ativan: 0.25mg IV q 2PRN anxiety, agitation, or intractable respiratory distress.   Gyco: 0.4mg IV q 4 ATC secretions.   Tylenol: 650mg IL PRN bid fever.   Will DC ASA, MVI, Lokelma, and Lipitor.   For now, will continue Abx and synthroid.   d/w the primary team.         Mitesh Fallon MD  Associate Chief Geriatrics and Palliative Care ([facility]) [facility]   [facility]   , [employer] [facility]      Please contact me via Teams from Monday through Friday between 9am-5pm. If not answering, please call the palliative care pager [phone]    After 5pm and on weekends, please see the contact information below:    In the event of newly developing, evolving, or worsening symptoms, please contact the Palliative Medicine team via pager (if the patient is at [facility] #[phone] or if the patient is at [facility] #[phone]) The Geriatric and Palliative Medicine service has coverage 24 hours a day/ 7 days a week to provide medical recommendations regarding symptom management needs via telephone Patient is a 89-year-old male with a PMHx of HTN, HLD, CAD, A. fib on AC, CVA, hypothyroid, PAD, OA, Prostate CA status post radiation therapy 10 years ago, status post CABG x3, CKD, who presented from Rehab to [facility] with a chief complaint of hematuria in his francis catheter. Patient was recently admitted to NS under thoracic surgery for respiratory failure with pleural effusion S/P L VATS with Pleurx catheter in place. Patient denies chest pain, palpitations, abdominal pain or discomfort, nausea, vomiting. Now with shock (? Septic), TIM, Hyperkalemia, Uremia, and likely entering his dying process. Geriatrics and Palliative Medicine (GaP) Team was called for PCU eval.

## 2022-12-12 NOTE — PROGRESS NOTE ADULT - NUTRITIONAL ASSESSMENT
This patient has been assessed with a concern for Malnutrition and has been determined to have a diagnosis/diagnoses of Severe protein-calorie malnutrition.    This patient is being managed with:   Diet Pureed-  Moderately Thick Liquids (MODTHICKLIQS)  Supplement Feeding Modality:  Oral  Glucerna Shake Cans or Servings Per Day:  3       Frequency:  Daily  Entered: Dec 11 2022  1:05PM

## 2022-12-12 NOTE — PROGRESS NOTE ADULT - ASSESSMENT
89M with hypotension, lethargy, hematuria, guarded prognosis awaiting palliative consultation  -catheter draining red urine without clots, HH stable  -no need for CBI at this time  -may consider antifungal for candida UTI pending palliative recommendations    Marcial Garcia MD  [facility] [facility]  [address], [address], [place] [address]  Office: [phone] Fax: [phone]

## 2022-12-12 NOTE — PROVIDER CONTACT NOTE (OTHER) - REASON
patient -140 on tele
patient complaining of SOB and nausea
Repeat BP 83/56 1 hour after midodrine given
pt had 7B WCT

## 2022-12-12 NOTE — PROVIDER CONTACT NOTE (OTHER) - BACKGROUND
pt admitted for hematuria
pt admitted for hematuria
Pt was admitted with hematuria, pleural effusion, PNA.
hx: HTN, HLD, CAD  dx: hematuria, CKD  patient on 2L NC

## 2022-12-12 NOTE — PROGRESS NOTE ADULT - SUBJECTIVE AND OBJECTIVE BOX
NEPHROLOGY-NSN [phone]        Patient seen and examined in bed and lethargic  Daughter at bedside     ros-unable         MEDICATIONS  (STANDING):  ascorbic acid 500 milliGRAM(s) Oral daily  aspirin  chewable 81 milliGRAM(s) Oral daily  atorvastatin 40 milliGRAM(s) Oral at bedtime  chlorhexidine 2% Cloths 1 Application(s) Topical <User Schedule>  fluconAZOLE   Tablet 200 milliGRAM(s) Oral daily  furosemide    Tablet 20 milliGRAM(s) Oral daily  levoFLOXacin  Tablet 750 milliGRAM(s) Oral every 48 hours  levothyroxine 100 MICROGram(s) Oral daily  metoprolol tartrate 100 milliGRAM(s) Oral two times a day  midodrine. 20 milliGRAM(s) Oral three times a day  multivitamin 1 Tablet(s) Oral daily  pantoprazole    Tablet 40 milliGRAM(s) Oral before breakfast  polyethylene glycol 3350 17 Gram(s) Oral daily  sodium zirconium cyclosilicate 10 Gram(s) Oral every 8 hours  tamsulosin 0.4 milliGRAM(s) Oral at bedtime      VITAL:  T(C): , Max: 36.7 (12-12-22 @ 04:15)  T(F): , Max: 98 (12-12-22 @ 04:15)  HR: 99 (12-12-22 @ 06:57)  BP: 76/48 (12-12-22 @ 09:05)  BP(mean): --  RR: 18 (12-12-22 @ 04:15)  SpO2: 95% (12-12-22 @ 04:15)  Wt(kg): --    I and O's:    12-11 @ 07:01  -  12-12 @ 07:00  --------------------------------------------------------  IN: 680 mL / OUT: 400 mL / NET: 280 mL    12-12 @ 07:01  -  12-12 @ 10:57  --------------------------------------------------------  IN: 0 mL / OUT: 0 mL / NET: 0 mL          PHYSICAL EXAM:    Constitutional: NAD  Neck:  No JVD  Respiratory: CTAB/L  Cardiovascular: S1 and S2  Gastrointestinal: BS+, soft, NT/ND  Extremities: No peripheral edema  Neurological: A/O x 3, no focal deficits  Psychiatric: Normal mood, normal affect  : No Lott  Skin: No rashes  Access: Not applicable    LABS:                        8.5    20.00 )-----------( 250      ( 12 Dec 2022 07:23 )             29.7     12-12    137  |  101  |  105<H>  ----------------------------<  129<H>  6.6<HH>   |  23  |  3.76<H>    Ca    9.0      12 Dec 2022 07:05  Phos  7.9     12-12  Mg     2.7     12-12    TPro  6.4  /  Alb  2.9<L>  /  TBili  1.0  /  DBili  x   /  AST  73<H>  /  ALT  34  /  AlkPhos  112  12-12          Urine Studies:          RADIOLOGY & ADDITIONAL STUDIES:  < from: CT Abdomen and Pelvis w/ IV Cont (12.08.22 @ 23:47) >    ACC: [id number] EXAM:  CT CHEST IC                        ACC: [id number] EXAM:  CT ABDOMEN AND PELVIS IC                          PROCEDURE DATE:  12/08/2022          INTERPRETATION:  CLINICAL INFORMATION: Leukocytosis and hematuria.    COMPARISON: CTchest 11/18/2022, CT abdomen and pelvis 1/12/2014    CONTRAST/COMPLICATIONS:  IV Contrast: IV contrast documented in associated exam (accession   [id number]), Omnipaque 350 (accession [id number])  90 cc administered   10 cc   discarded  Oral Contrast: NONE  Complications: None reported at time of study completion    PROCEDURE:  CT of the Chest, Abdomen and Pelvis was performed.  Sagittal and coronal reformats were performed.    FINDINGS:  CHEST:  LUNGS, PLEURA AND LARGE AIRWAYS: Patent central airways. Redemonstration   of left groundglass and airspace opacities representing edema or   pneumonia.  Mild centrilobular emphysema.  Mild left loculated pleural   effusion with few foci of gas and hyperenhancement enhancement of the   pleura. A left pleural catheter is in place with tip in the loculated   effusion.  Moderate simple right pleural effusion, unchanged. There is   associated atelectasis of the bilateral lower lobes. Left upper and lower   lobe patchy opacities are not significantly changed.  VESSELS: Atherosclerotic changes of the aorta.  HEART: The heart is enlarged. Coronary artery calcifications. No   pericardial effusion.  MEDIASTINUM AND KRYSTLE: No lymphadenopathy.  CHEST WALL AND LOWER NECK: Status post median sternotomy.    ABDOMEN AND PELVIS:  LIVER: Within normal limits.  BILE DUCTS: Normal caliber.  GALLBLADDER: Cholelithiasis.  SPLEEN: Within normal limits.  PANCREAS: Within normal limits.  ADRENALS: Within normal limits.  KIDNEYS/URETERS: Left renal cysts and subcentimeter hypodensities too   small to characterize. No hydronephrosis.    BLADDER: Decompressed by a Lott catheter.  REPRODUCTIVE ORGANS: Evaluation of the prostate is limited secondary to   streak artifact from orthopedic hardware.    BOWEL: No bowel obstruction. Appendix is not visualized. Moderate fecal   load in the right and transverse colon, which demonstrate moderate   looping. The left and sigmoid colon are collapsed.  PERITONEUM: Trace free fluid.  VESSELS: Atherosclerotic changes.  RETROPERITONEUM/LYMPH NODES: No lymphadenopathy.  ABDOMINAL WALL: Small fat and fluid containing right inguinal hernia and   small fat-containing left inguinal hernia.  BONES: Degenerative changes of the spine. Status post total right hip   arthroplasty.    IMPRESSION:    Interval decrease in left pleural effusion, now mild.  Stable right   pleural effusion.  Stable appearance of mild left pleural enhancement which may be related   to Pleurx catheter in place.  Redemonstration of left pulmonary edema or pneumonia.    No hydronephrosis. The urinary bladder is decompressed by Lott catheter.  Moderate fecal load.    --- End of Report ---           SARA DYSON MD; Resident Radiology  This document has been electronically signed.  CHARLOTTE RIA MD; Attending Radiologist  This document has been electronically signed. Dec  9 2022  1:08AM    < end of copied text >

## 2022-12-12 NOTE — CONSULT NOTE ADULT - PROBLEM SELECTOR RECOMMENDATION 2
Likely 2/2 to infection and metabolic issues.   No further work up is planned.   Ativan PRN for anxiety or agitation.

## 2022-12-12 NOTE — PROGRESS NOTE ADULT - NS ATTEND OPT1 GEN_ALL_CORE
I independently performed the documented:
I attest my time as attending is greater than 50% of the total combined time spent on qualifying patient care activities by the PA/NP and attending.

## 2022-12-12 NOTE — PROVIDER CONTACT NOTE (OTHER) - ASSESSMENT
patient A&O x4, at rest in bed complaining of shortness of breath and nausea  HR: 101  BR: 97/62  O2: 91% on 4L
vitals noted , SOB present , denies any chest pain , SOB , dizziness at this time
Pt A&OX4 and asymptomatic.
pt asymptomatic

## 2022-12-13 NOTE — DISCHARGE NOTE FOR THE EXPIRED PATIENT - HOSPITAL COURSE
Patient is a 89-year-old male with a PMHx of HTN, HLD, CAD, A. fib on AC, CVA, hypothyroid, PAD, OA, Prostate CA status post radiation therapy 10 years ago, status post CABG x3, CKD, who presented from Rehab to [facility] with a chief complaint of hematuria in his francis catheter. Patient was recently admitted to [facility] under thoracic surgery for respiratory failure with pleural effusion S/P L VATS with Pleurx catheter in place. Patient denies chest pain, palpitations, abdominal pain or discomfort, nausea, vomiting,.     Hypotension, Lethargy  - Midodrine increased to 20 TID, patient with poor prognosis   - Discussed with daughter Nigel, Family members Laney and Kristina via telephone X 2 per daughter's request  - Emotional support provided. Family states that they will be coming in  - F/u palliative eval     Hyperkalemia  - Ordered for Lokelma per renal  - Per renal, patient is not a candidate for HD at this time    Hematuria   - Monitor H/H closely  - hold AC fo rnow in view of recurrent hematuria   - Urology follow up appreciated; F/u recs     Pleural Effusion, SOB  - S/P VATS and Pleurx placement, drained 3X per week per RN   - CT C/A/P as noted  - Monitor O2 saturation closely; Supplement PRN  - Incentive spirometer  - C/w lasix 20 PO Qd; Monitor I and O  - C/w Levaquin Q48 hrs per Pulm recs  - Thoracic consulted; F/u recs   - Pulm consult appreciated; F/u recs    Leukocytosis   - CT as noted  - BCx1 W/ NGTD; F/u final   - Repeat BCx2 ordered; F/u recs  - Pro-neha 0.11  - MRSA/MSSA PCR negative   - F/u Pulm recs  - Worsening leukocytosis; UCx with yeast; Started on Diflucan per ID; Monitor QTc  - ID eval consulted; F/u recs    Anemia  - Check anemia work up - not consistent with deficiency; Likely in the setting of Hematuria  - Monitor H/H closely  - Maintain active T+S   - Transfuse for Hgb < 8.0 in view of CAD    TIM on CKD  - Outpatient follow up for renal cysts  - Renal eval consulted; F/u recs   - Worsening Cr, Renal US ordered to evaluate for obstruction  - F/u renal recs     Afib  - Hold Eliquis in view of hematuria   - Cardizem gtt D/C  - Metoprolol tartate increased to 100 BID; Midodrine 20 TID started for hypotension; Hold for SBP > 140  - Monitor on tele, monitor electrolytes, maintain electrolytes K > 4 and Mg > 2.0   - EP eval consulted; F/u recs    CAD, S/P CABG   - C/w ASA 81  - C/w Rosuvastatin therapeutic interchange     HTN  - C/w Lasix 20  - C/w Metoprolol  - Started on Midodrine 20 TID  - Monitor BP, VS and patient closely     Ulcers  - Wound care eval     Hypothyroid  - Check TSH  - C/w Levothyroxine 100 Qd     HLD  - LDL of 22   - C/w Rosuvastatin therapeutic interchange     Fecal Load  - C/w miralax Qd  - Monitor for bowel movement      PPX  - AC on hold in view of hematuria  - PPI  Patient is a 89-year-old male with a PMHx of HTN, HLD, CAD, A. fib on AC, CVA, hypothyroid, PAD, OA, Prostate CA status post radiation therapy 10 years ago, status post CABG x3, CKD, who presented from Rehab to [facility] with a chief complaint of hematuria in his francis catheter. Patient was recently admitted to [facility] under thoracic surgery for respiratory failure with pleural effusion S/P L VATS with Pleurx catheter in place.  Hospital course complicated by sepsis/septic shock secondary to UTI +/- PNA. Patient is DNR/DNI and entered active dying process. Seen by palliative and awaiting PCU bed. Patient  on 2022 2:35 AM.

## 2022-12-13 NOTE — CHART NOTE - NSCHARTNOTEFT_GEN_A_CORE
MEDICINE NP-EPISODIC NOTE    Called by RN to pronounce patient with cardiopulmonary arrest secondary to shock 2/2 likely 2/2 to sepsis [2/2 to UTI +/- PNA) and advanced age. Patient was found in bed. Family at bedside.  Upon physical examination, hearts sounds absent, no spontaneous respirations, no palpable pulse or blood pressure ,pupils fixed and dilated. Pronounced at 2:35AM.     [id number] MEDICINE NP-EPISODIC NOTE    Called by RN to pronounce patient with cardiopulmonary arrest, respiratory failure secondary to shock 2/2 likely 2/2 to sepsis [2/2 to UTI +/- PNA). Patient was found in bed. Family at bedside.  Upon physical examination, hearts sounds absent, no spontaneous respirations, no palpable pulse or blood pressure ,pupils fixed and dilated. Pronounced at 2:35AM.     [id number]

## 2022-12-14 LAB
CULTURE RESULTS: SIGNIFICANT CHANGE UP
SPECIMEN SOURCE: SIGNIFICANT CHANGE UP

## 2022-12-17 LAB
CULTURE RESULTS: SIGNIFICANT CHANGE UP
SPECIMEN SOURCE: SIGNIFICANT CHANGE UP

## 2022-12-19 ENCOUNTER — APPOINTMENT (OUTPATIENT)
Dept: THORACIC SURGERY | Facility: CLINIC | Age: 87
End: 2022-12-19

## 2022-12-24 LAB
CULTURE RESULTS: SIGNIFICANT CHANGE UP
SPECIMEN SOURCE: SIGNIFICANT CHANGE UP

## 2023-01-12 LAB
CARDIOLIPIN IGM SER-MCNC: 4.1 MPL — SIGNIFICANT CHANGE UP (ref 0–9)
CARDIOLIPIN IGM SER-MCNC: 8.7 GPL — SIGNIFICANT CHANGE UP (ref 0–18)

## 2023-02-10 NOTE — H&P PST ADULT - PROBLEM SELECTOR PLAN 3
Patient instructed to take levothyroxine with a sip of water on the morning of procedure. 1 = Total assistance

## 2023-03-15 NOTE — CONSULT NOTE ADULT - CONVERSATION DETAILS
3
Met with the patient's significant other (Laney), hid daughter Baldomero and his Nephew (Den). They gave verbalized understanding about the patient's advanced illness (shock 2/2 likely 2/2 to sepsis [2/2 to UTI +/- PNA), TIM, hyperkalemia, and encephalopathy, and dysphagia with risk for aspiration) and active dying process. They agreed with GOC towards prioritizing on symptoms Rx. They also agreed with starting opioids and benzos PRN for pain, respiratory distress, anxiety and agitation. If hemodynamically stable (will only transfer to PCU if BP is > or equal to 80/50), they also agreed with PCU transferring. They also accepted stopping meds that were not changing symptoms Rx (Will DC ASA, MVI, Lokelma, and Lipitor). For now, will continue Abx and synthroid.

## 2023-03-28 NOTE — PATIENT PROFILE ADULT - LIVING ENVIRONMENT
Group Topic:  Group OT    Date: 3/28/2023  Start Time:  2:15 PM  End Time:  3:00 PM  Facilitators: Scarlett Hamilton OT    Focus: Self Compassion/Goals  Number in attendance: 7  Pt viewed video on self compassion by Mitra Pandey, Phd \"the space between self compassion and self esteem\".  Discussion to follow about the importance of practicing self-compassion.  Pt established evening goals.     Method: Group and Audio/Visual  Attendance: Present  Participation: Moderate  Patient Response: Quiet  Mood: Depressed  Affect: Type: Depressed   Range: Blunted/flat   Congruency: Congruent   Stability: Stable  Behavior/Socialization: Cooperative and Passive  Thought Process: Focused  Task Performance: Follows directions  Patient Evaluation: Independent - full participation   Pt viewed the video and was quiet during discussion to follow.  Pt shared her evening goals: to get some flowers and to call her tax person as she has been putting this off.       
Group Topic:  Group OT    Date: 3/28/2023  Start Time: 11:15 AM  End Time: 12:00 PM  Facilitators: Scarlett Hamilton OT    Focus: Task Skills/Interpersonal Relationship Skills  Number in attendance: 7  Pt offered a variety of leisure/art tasks to engage in to assess ability to focus on, organize, and complete task while helping to develop mindful leisure skills/interests.  Group environment welcomes socialization in a relaxed setting.    Method: Group  Attendance: Present  Participation: Active  Patient Response: Indifferent and Quiet  Mood: Depressed  Affect: Type: Depressed   Range: Blunted/flat   Congruency: Congruent   Stability: Stable  Behavior/Socialization: Engaged  Thought Process: Focused  Task Performance: Follows directions  Patient Evaluation: Encouragement - needs prompts   Pt engaged in putting another layer of paint on a planter that she painted in previous O.T. group.  Pt focused well on this and completed.  Pt sat quietly and writer asked if pt wanted to pick out a plant, pt looked through the plants and chose on that she liked.  Goal is to plant the cutting in next O.T. tasks group.  Pt ws quiet with minimal social interactions.       
Group Topic: BH Coping Skills Education    Date: 3/28/2023  Start Time: 10:15 AM  End Time: 11:00 AM  Facilitators: Sae Frederick LCSW    Focus:  Adaptive behavior skills - creating healthy habits / smart goals   Number in attendance: 6    Education was provided on tips to consider when creating healthy habits.  We discussed creating healthy habits for ones self, at work, at home, or in relationships.  Writer then explained how to create smart goals, and the benefits of using smart goals.      Method: Group  Attendance: Called out for:RN  Participation: Active  Patient Response: Appropriate feedback, Attentive, Good eye contact and Quiet  Mood: flat   Affect: Type: flat    Range: Blunted/flat   Congruency: Congruent   Stability: Stable  Behavior/Socialization: Appropriate to group, Cooperative and Engaged  Thought Process: Focused  Task Performance: Follows directions  Patient Evaluation: Independent - full participation        
Group Topic: BH Process Group     Date: 3/28/2023  Start Time:  1:15 PM  End Time:  2:10 PM  Facilitators: BILLY Lozano    Focus: Problem Solving Skills - Problem Solving Individual Concerns  Number in attendance: 7    Patients were encouraged to identify individual stressors and healthy ways to manage them.      Method: Group  Attendance: Present  Participation: Moderate  Patient Response: Passive and Quiet  Mood: Depressed  Affect: Type: Depressed   Range: Blunted/flat   Congruency: Congruent   Stability: Stable  Behavior/Socialization: Cooperative and Passive  Thought Process: Focused  Task Performance: Follows directions  Patient Evaluation: Encouragement - needs prompts     Patient reports she had less trouble coming to treatment today. Reports working on challenging her thoughts yesterday by thinking today will be better then yesterday and working on gratitude this morning. Patient noted she is finding herself missing her therapist even though she has telephone sessions weekly, and recognized after ending PT she felt lonely. Patient identified she might benefit from social activities to get involved in.    ELIDA Lozano    
no

## 2023-04-11 NOTE — PATIENT PROFILE ADULT - NSPROGENANESREACTION_GEN_A_NUR
Cardiac event monitor    Impression    She was monitored for 21 days.   Sinus rhythm, average HR 74 bpm.   No obvious sustained arrhythmias.   Symptoms were mainly associated with sinus tachycardia.     Dr. Schulte reviewed the above Holter which revealed no dysrhythmias.  Okay to call and no change in current regimen.    Ms. Gonzalez called and left a voice message as stated above.  Advised to call with any questions or concerns.  Office number provided  
no previous reaction

## 2023-05-03 NOTE — PROVIDER CONTACT NOTE (OTHER) - ASSESSMENT
A&OX4, VS and assessment per flowsheet. Pt c/o "42/10" pain, in IMANI to 171 on tele, HTN. Appears to be having spasms on CBI [phone]

## 2023-05-04 NOTE — PACU DISCHARGE NOTE - NAUSEA/VOMITING:
May 4, 2023    Hello, may I speak with Mihir Camilo?    My name is         I am  with [facility]  [address] [place] [id number] [phone].    Before we get started may I verify your date of birth? 1975    I am calling to officially welcome you to our practice and ask about your recent visit. Is this a good time to talk? No, left VM    Tell me about your visit with us. What things went well?         We're always looking for ways to make our patients' experiences even better. Do you have recommendations on ways we may improve?      Overall were you satisfied with your first visit to our practice?        I appreciate you taking the time to speak with me today. Is there anything else I can do for you?       Thank you, and have a great day.       None

## 2023-06-19 NOTE — PATIENT PROFILE ADULT - NSPROSPIRITUALVALUESFT_GEN_A_NUR
Past Medical History:   Diagnosis Date    Hypertension     Other pulmonary embolism without acute cor pulmonale     approx greater than 4 years    Stroke     TIA       Past Surgical History:   Procedure Laterality Date    TONSILLECTOMY         Review of patient's allergies indicates:   Allergen Reactions    Penicillins Hives    Bactrim [sulfamethoxazole-trimethoprim] Hives       No current facility-administered medications on file prior to encounter.     Current Outpatient Medications on File Prior to Encounter   Medication Sig    apixaban (ELIQUIS) 2.5 mg Tab Take 2.5 mg by mouth.    atorvastatin (LIPITOR) 20 MG tablet Take 20 mg by mouth once daily.    docusate sodium (COLACE) 100 MG capsule Take 1 capsule (100 mg total) by mouth 2 (two) times daily as needed for Constipation.    EScitalopram oxalate (LEXAPRO) 10 MG tablet Take 10 mg by mouth once daily.    fluticasone-umeclidin-vilanter (TRELEGY ELLIPTA) 100-62.5-25 mcg DsDv Inhale 1 puff into the lungs.    pantoprazole (PROTONIX) 40 MG tablet Take 40 mg by mouth 2 (two) times a day.    albuterol (PROVENTIL/VENTOLIN HFA) 90 mcg/actuation inhaler Inhale into the lungs daily as needed.    HYDROcodone-acetaminophen (NORCO) 7.5-325 mg per tablet Take 1 tablet by mouth every 6 (six) hours as needed for Pain.    ondansetron (ZOFRAN) 4 MG tablet Take 1 tablet (4 mg total) by mouth every 6 (six) hours.     Family History    None       Tobacco Use    Smoking status: Former     Packs/day: 3.00     Years: 35.00     Pack years: 105.00     Types: Cigarettes     Quit date:      Years since quittin.0    Smokeless tobacco: Not on file   Substance and Sexual Activity    Alcohol use: Not on file    Drug use: No    Sexual activity: Not on file     Review of Systems   Constitutional: Positive for malaise/fatigue. Negative for chills, diaphoresis, night sweats, weight gain and weight loss.   HENT:  Negative for congestion, hoarse voice, sore throat and stridor.    Eyes:   Negative for double vision and pain.   Cardiovascular:  Positive for chest pain, dyspnea on exertion, leg swelling and orthopnea. Negative for claudication, cyanosis, irregular heartbeat, near-syncope, palpitations, paroxysmal nocturnal dyspnea and syncope.   Respiratory:  Negative for cough, hemoptysis, shortness of breath, sleep disturbances due to breathing, snoring, sputum production and wheezing.    Endocrine: Negative for cold intolerance, heat intolerance and polydipsia.   Hematologic/Lymphatic: Negative for bleeding problem. Does not bruise/bleed easily.   Skin:  Negative for color change, dry skin and rash.   Musculoskeletal:  Negative for joint swelling and muscle cramps.   Gastrointestinal:  Negative for bloating, abdominal pain, constipation, diarrhea, dysphagia, melena, nausea and vomiting.   Genitourinary:  Negative for flank pain and urgency.   Neurological:  Negative for dizziness, focal weakness, headaches, light-headedness, loss of balance, seizures and weakness.   Psychiatric/Behavioral:  Negative for altered mental status and memory loss. The patient is not nervous/anxious.    Objective:     Vital Signs (Most Recent):  Temp: 97.4 °F (36.3 °C) (01/20/23 1140)  Pulse: 100 (01/20/23 1140)  Resp: 18 (01/20/23 1140)  BP: (!) 140/81 (01/20/23 1140)  SpO2: (!) 94 % (01/20/23 1140)   Vital Signs (24h Range):  Temp:  [97.3 °F (36.3 °C)-98.6 °F (37 °C)] 97.4 °F (36.3 °C)  Pulse:  [] 100  Resp:  [17-26] 18  SpO2:  [91 %-100 %] 94 %  BP: (112-140)/(63-81) 140/81     Weight: 70.3 kg (155 lb)  Body mass index is 25.02 kg/m².    SpO2: (!) 94 %       No intake or output data in the 24 hours ending 01/20/23 1207    Lines/Drains/Airways       Peripheral Intravenous Line  Duration                  Peripheral IV - Single Lumen 01/19/23 2038 18 G Right Antecubital <1 day         Peripheral IV - Single Lumen 01/19/23 2044 18 G Left Antecubital <1 day                    Physical Exam  Eyes:      Pupils: Pupils  are equal, round, and reactive to light.   Neck:      Trachea: No tracheal deviation.   Cardiovascular:      Rate and Rhythm: Regular rhythm. Tachycardia present.      Pulses: Intact distal pulses.           Carotid pulses are 2+ on the right side and 2+ on the left side.       Radial pulses are 2+ on the right side and 2+ on the left side.        Femoral pulses are 2+ on the right side and 2+ on the left side.       Popliteal pulses are 2+ on the right side and 2+ on the left side.        Dorsalis pedis pulses are 2+ on the right side and 2+ on the left side.        Posterior tibial pulses are 2+ on the right side and 2+ on the left side.      Heart sounds: No murmur heard.    No friction rub. Gallop present.   Pulmonary:      Effort: Pulmonary effort is normal. No respiratory distress.      Breath sounds: No stridor. Rales present. No wheezing.   Chest:      Chest wall: No tenderness.   Abdominal:      General: There is no distension.      Tenderness: There is no abdominal tenderness. There is no rebound.   Musculoskeletal:         General: No tenderness.      Cervical back: Normal range of motion.      Right lower leg: Edema present.      Left lower leg: Edema present.   Skin:     General: Skin is warm and dry.   Neurological:      Mental Status: She is alert and oriented to person, place, and time.       Significant Labs: CMP   Recent Labs   Lab 01/19/23 2044 01/20/23  0406    141   K 3.4* 3.4*    106   CO2 23 26   * 107   BUN 21 18   CREATININE 1.0 0.8   CALCIUM 9.3 8.5*   PROT 7.0 6.0   ALBUMIN 3.3* 2.9*   BILITOT 0.7 0.6   ALKPHOS 108 90   AST 20 18   ALT 19 16   ANIONGAP 13 9   , CBC   Recent Labs   Lab 01/19/23  2341 01/20/23  0406 01/20/23  0540   WBC 8.51 7.59 7.41   HGB 10.8* 10.0* 10.2*   HCT 34.6* 31.7* 31.8*    277 275   , and Troponin   Recent Labs   Lab 01/19/23 2044 01/19/23  2341 01/20/23  0607   TROPONINI 0.745* 0.758* 1.019*       Significant Imaging: Echocardiogram:  Transthoracic echo (TTE) complete (Cupid Only):   Results for orders placed or performed during the hospital encounter of 01/19/23   Echo   Result Value Ref Range    BSA 1.81 m2    TDI SEPTAL 0.09 m/s    LV LATERAL E/E' RATIO 15.75 m/s    LV SEPTAL E/E' RATIO 14.00 m/s    LA WIDTH 3.80 cm    IVC diameter 1.69 cm    Left Ventricular Outflow Tract Mean Velocity 0.63 cm/s    Left Ventricular Outflow Tract Mean Gradient 1.70 mmHg    TDI LATERAL 0.08 m/s    LVIDd 5.08 3.5 - 6.0 cm    IVS 1.51 (A) 0.6 - 1.1 cm    Posterior Wall 1.40 (A) 0.6 - 1.1 cm    Ao root annulus 2.46 cm    LVIDs 4.49 (A) 2.1 - 4.0 cm    FS 12 28 - 44 %    LA volume 47.84 cm3    Sinus 2.41 cm    STJ 2.53 cm    Ascending aorta 2.70 cm    LV mass 315.92 g    LA size 3.52 cm    TAPSE 1.78 cm    Left Ventricle Relative Wall Thickness 0.55 cm    AV mean gradient 17 mmHg    AV valve area 0.88 cm2    AV Velocity Ratio 0.30     AV index (prosthetic) 0.37     MV valve area p 1/2 method 3.23 cm2    E/A ratio 1.19     Mean e' 0.09 m/s    E wave deceleration time 234.50 msec    IVRT 62.80 msec    LVOT diameter 1.74 cm    LVOT area 2.4 cm2    LVOT peak sarmad 0.74 m/s    LVOT peak VTI 17.50 cm    Ao peak sarmad 2.49 m/s    Ao VTI 47.1 cm    RVOT peak sarmad 0.82 m/s    RVOT peak VTI 16.4 cm    Mr max sarmad 5.20 m/s    LVOT stroke volume 41.59 cm3    AV peak gradient 25 mmHg    PV mean gradient 2.05 mmHg    E/E' ratio 14.82 m/s    MV Peak E Sarmad 1.26 m/s    TR Max Sarmad 3.40 m/s    MV stenosis pressure 1/2 time 68.01 ms    MV Peak A Sarmad 1.06 m/s    LV Systolic Volume 91.93 mL    LV Systolic Volume Index 51.4 mL/m2    LV Diastolic Volume 122.55 mL    LV Diastolic Volume Index 68.46 mL/m2    LA Volume Index 26.7 mL/m2    LV Mass Index 176 g/m2    RA Major Axis 4.22 cm    Left Atrium Minor Axis 4.31 cm    Left Atrium Major Axis 4.11 cm    Triscuspid Valve Regurgitation Peak Gradient 46 mmHg    RA Width 3.03 cm    Right Atrial Pressure (from IVC) 15 mmHg    EF 25 %    TV rest  pulmonary artery pressure 61 mmHg    Narrative    · The left ventricle is moderately enlarged with concentric hypertrophy   and severely decreased systolic function.  · The estimated ejection fraction is 25%.  · Grade II left ventricular diastolic dysfunction.  · There is severe left ventricular global hypokinesis.  · Normal right ventricular size with normal right ventricular systolic   function.  · There is mild-to-moderate aortic valve stenosis.  · Aortic valve area is 0.88 cm2; peak velocity is 2.49 m/s; mean gradient   is 17 mmHg.  · Moderate mitral regurgitation.  · Moderate tricuspid regurgitation.  · Elevated central venous pressure (15 mmHg).  · The estimated PA systolic pressure is 61 mmHg.  · There is pulmonary hypertension.  · Trivial circumferential pericardial effusion. Effusion is fluid.         show n

## 2023-07-03 NOTE — PATIENT PROFILE ADULT - NSPROGENOTHERPROVIDER_GEN_A_NUR
Patient : Filemon Odonnell Age: 56 year old Sex: male   MRN: [id number] Encounter Date: 7/3/2023    History     Chief Complaint   Patient presents with   • Eye Problem       HPI    Filemon Odonnell is a 56 year old male presenting to the emergency department complaining of a foreign body sensation beneath his upper eyelid on the right.  Patient reports it began after he wiped his face with a wet washcloth.  He denies any vision changes or drainage/discharge from the eye.  He wears glasses but not contacts.      Allergies   Allergen Reactions   • Haldol      Tongue swelling         Discharge Medication List as of 7/3/2023 10:37 AM      Prior to Admission Medications    Details   atropine (ISOPTO ATROPINE) 1 % ophthalmic solution Place 1 drop under the tongue 2 times daily as needed (for moderate to severe drooling). Morning and eveningEprescribe, Disp-15 mL, R-3      fluvoxamine (LUVOX) 100 MG tablet Take 1 tablet by mouth nightly.Eprescribe, Disp-90 tablet, R-1      cloNIDine (CATAPRES) 0.1 MG tablet Take 1 tablet by mouth nightly.Eprescribe, Disp-90 tablet, R-1      clonazePAM (KlonoPIN) 1 MG tablet Take 1 tablet by mouth every 12 hours.Eprescribe, Disp-60 tablet, R-5      benztropine (COGENTIN) 1 MG tablet Take 1 tablet by mouth 2 times daily.Eprescribe, Disp-180 tablet, R-1Indications: Extrapyramidal Reaction caused by Medications      clomiPRAMINE (ANAFRANIL) 50 MG capsule Take 1 capsule by mouth nightly.Eprescribe, Disp-90 capsule, R-1      glycopyrrolate (ROBINUL) 1 MG tablet Take 1 tablet by mouth 2 times daily as needed (Excessive drooling).Eprescribe, Disp-60 tablet, R-5New dosing      cloZAPine (CLOZARIL) 100 MG tablet Take 3 tablets by mouth nightly.Eprescribe, Disp-90 tablet, R-3      lactulose (Constulose) 10 GM/15ML solution Take 30 mLs by mouth in the morning and 30 mLs at noon and 30 mLs in the evening.Eprescribe, Disp-946 mL, R-11      atorvastatin (LIPITOR) 20 MG tablet Take 1 tablet by mouth  daily.Eprescribe, Disp-90 tablet, R-1      benzonatate (TESSALON PERLES) 200 MG capsule Take 1 capsule by mouth every 8 hours as needed for Cough.Eprescribe, Disp-30 capsule, R-0      prochlorperazine (COMPAZINE) 10 MG tablet Take 1 tablet by mouth every 6 hours as needed for Nausea or Vomiting.Eprescribe, Disp-30 tablet, R-5      docusate sodium-sennosides (SENOKOT S) 50-8.6 MG per tablet Take 1 tablet by mouth 2 times daily as needed for Constipation.OTC      Magnesium Hydroxide (MILK OF MAGNESIA PO) Take 1 Dose by mouth daily.Historical Med      BATH/SHOWER CHAIR as directedEprescribe, Disp-1 each, R-0      bisacodyl (DULCOLAX) 5 MG EC tablet Take 1 tablet by mouth daily as needed for Constipation. Please take if no BM by 01/7/23. Take a second dose on 01/8/23 if still no BM.Eprescribe, Disp-5 tablet, R-0      acetaminophen (TYLENOL) 325 MG tablet Take 2 tablets by mouth every 4 hours as needed for Pain.OTC      GuaiFENesin 1200 MG 12 hr tablet Take 1 tablet by mouth every 12 hours.Eprescribe, Disp-28 tablet, R-0      ammonium lactate (LAC-HYDRIN) 12 % cream Apply to affected area twice dailyDisp-385 g, R-11, Eprescribe      omeprazole (PriLOSEC) 40 MG capsule Take 1 capsule by mouth daily.Eprescribe, Disp-90 capsule, R-3      magnesium citrate 1.745 GM/30ML Solution Take 150 mLs by mouth 2 times daily as needed (constipation).Eprescribe, Disp-900 mL, R-0      linaclotide (Linzess) 290 MCG Take 1 capsule by mouth daily.Eprescribe, Disp-90 capsule, R-3      polyethylene glycol (MIRALAX) 17 g packet Take 17 g by mouth 2 times daily. Stir and dissolve powder in any 4 to 8 ounces of beverage, then drink.Add to IP AVS      Multiple Vitamins-Minerals (MULTIVITAMIN MEN 50+) Tab Take 1 tablet by mouth daily.Historical Med         New Prescriptions    Details   erythromycin (ILOTYCIN) ophthalmic ointment Place 0.5 inches into right eye every 6 hours for 3 days.Disp-3.5 g, R-0, Eprescribe             Past Medical History:    Diagnosis Date   • Anxiety    • Constipation    • Depression    • Difficulty in walking(719.7)    • GERD (gastroesophageal reflux disease)    • High cholesterol    • Paranoid schizophrenia (CMD)    • Rash    • Redundant colon    • Squamous cell carcinoma of skin     GROIN       Past Surgical History:   Procedure Laterality Date   • Colonoscopy diagnostic  2017    Dr. Ismael Santacruz.    • Colonoscopy w biopsy  2016    Dr. Ismael Santacruz. Colon polyp removed adenomatous. Repeat colonoscopy    • Ct colonography N/A 10/6/17   • Ir lung biopsy  10/31/2022   • Lung lobectomy Right 2023    S/p robotic right thoracoscopy (VATS) lower lobectomy with lymph node dissection and washout   • Lymph node dissection Right 2021    R groin - Dr. Chavez, Sartorius muscle flap - Dr. Clarke - [facility]       Family History   Problem Relation Age of Onset   • Dementia/Alzheimers Mother    • Cancer, Prostate Father        Social History     Tobacco Use   • Smoking status: Former     Current packs/day: 0.00     Types: Cigarettes     Quit date: 2/3/2016     Years since quittin.4   • Smokeless tobacco: Never   Vaping Use   • Vaping Use: never used   Substance Use Topics   • Alcohol use: No     Alcohol/week: 0.0 standard drinks of alcohol   • Drug use: No       Review of Systems     Review of Systems   Constitutional: Negative for activity change, chills and fever.   HENT: Negative for congestion, facial swelling and rhinorrhea.    Eyes: Positive for pain. Negative for discharge, redness and visual disturbance.   Respiratory: Negative for shortness of breath and wheezing.    Gastrointestinal: Negative for nausea and vomiting.   Genitourinary: Negative for decreased urine volume and dysuria.   Musculoskeletal: Negative.  Negative for gait problem and joint swelling.   Skin: Negative.  Negative for color change and rash.   Neurological: Negative.  Negative for tremors and speech difficulty.   Psychiatric/Behavioral:  Negative.  Negative for agitation and confusion.   All other systems reviewed and are negative.      Physical Exam     ED Triage Vitals [07/03/23 1017]   ED Triage Vitals Group      Temp 97.9 °F (36.6 °C)      Heart Rate (!) 122      Resp 16      /60      SpO2 97 %      EtCO2 mmHg       Height 5' 4\" (1.626 m)      Weight 158 lb 6.4 oz (71.8 kg)      Weight Scale Used Standing scale      BMI (Calculated) 27.19      IBW/kg (Calculated) 59.2       Physical Exam  Vitals and nursing note reviewed.   Constitutional:       General: He is not in acute distress.     Appearance: Normal appearance. He is well-developed. He is not toxic-appearing.   HENT:      Head: Normocephalic and atraumatic.      Right Ear: External ear normal.      Left Ear: External ear normal.      Nose: Nose normal.   Eyes:      General: Lids are normal. Lids are everted, no foreign bodies appreciated. Vision grossly intact. Gaze aligned appropriately.         Right eye: No foreign body or discharge.      Extraocular Movements: Extraocular movements intact.      Conjunctiva/sclera: Conjunctivae normal.      Right eye: Right conjunctiva is not injected. No chemosis or exudate.     Pupils:      Right eye: No corneal abrasion or fluorescein uptake. Mei exam negative.   Pulmonary:      Effort: Pulmonary effort is normal. No respiratory distress.   Abdominal:      General: There is no distension.   Musculoskeletal:         General: Normal range of motion.      Cervical back: Normal range of motion. No rigidity.   Skin:     General: Skin is warm and dry.      Coloration: Skin is not pale.      Findings: No rash.   Neurological:      Mental Status: He is alert and oriented to person, place, and time.      GCS: GCS eye subscore is 4. GCS verbal subscore is 5. GCS motor subscore is 6.      Cranial Nerves: No cranial nerve deficit.      Coordination: Coordination normal.   Psychiatric:         Mood and Affect: Mood is anxious.         Speech: Speech  normal.         Behavior: Behavior normal. Behavior is cooperative.         Thought Content: Thought content normal.         Judgment: Judgment normal.         Procedures     Procedures    Lab Results     No results found for this visit on 07/03/23.    EKG                 Radiology Results     Imaging Results    None         Medications   fluorescein 1 MG ophthalmic strip 1 strip (1 strip Right Eye Handoff 7/3/23 1032)   proparacaine (ALCAINE) 0.5 % ophthalmic solution 1 drop (1 drop Right Eye Handoff 7/3/23 1032)       ED Course          No cardiac monitor or imaging reviewed    MDM      Patient is a 56 year old with complaint of eye pain.  My differential diagnosis includes but is not limited to conjunctival foreign body, corneal foreign body, corneal abrasion, conjunctivitis.  Fluorescein stain was unremarkable.  Lids everted and there is no foreign body visualized.  Vision is unchanged.  Will prescribe antibiotic ointment as a precaution.  The patient will not require admission. Return precautions were discussed. All questions were answered and pt verbalized understanding and comfort with plan of care.     Disposition       Clinical Impression and Diagnosis  3:36 PM       ED Diagnosis     Diagnosis Comment Associated Orders       Final diagnosis    Acute foreign body of conjunctiva, initial encounter -- --          Follow Up:  Tony Haynes MD  [address]  [address]  [facility] [address]  [phone]      As needed for eye follow up          Summary of your Discharge Medications      Take these Medications      Details   erythromycin ophthalmic ointment  Commonly known as: ILOTYCIN   Place 0.5 inches into right eye every 6 hours for 3 days.            Pt is discharged to home/self care in stable condition.        Discharge 7/3/2023 10:37 AM  Filemon Odonnell discharge to home/self care.             Jack Lemus PA-C  07/03/23 1535     none

## 2023-09-28 NOTE — ED ADULT NURSE NOTE - CHIEF COMPLAINT
FREE:[LAST:[your primary care provider],PHONE:[(   )    -],FAX:[(   )    -]] The patient is a 89y Male complaining of

## 2023-10-24 NOTE — PATIENT PROFILE ADULT - LONGEST PERIOD TOBACCO-FREE
Assistance OOB with selected safe patient handling equipment if applicable/Assistance with ambulation/Communicate fall risk and risk factors to all staff, patient, and family/Encourage patient to sit up slowly, dangle for a short time, stand at bedside before walking/Monitor gait and stability/Monitor for mental status changes and reorient to person, place, and time, as needed/Orthostatic vital signs/Provide visual cue: yellow wristband, yellow gown, etc/Reinforce activity limits and safety measures with patient and family/Toileting schedule using arm’s reach rule for commode and bathroom/Use of alarms - bed, stretcher, chair and/or video monitoring/Call bell, personal items and telephone in reach/Instruct patient to call for assistance before getting out of bed/chair/stretcher/Non-slip footwear applied when patient is off stretcher/[place] to call system/Physically safe environment - no spills, clutter or unnecessary equipment/Purposeful Proactive Rounding/Room/bathroom lighting operational, light cord in reach
14

## 2023-11-23 NOTE — DISCHARGE NOTE NURSING/CASE MANAGEMENT/SOCIAL WORK - NSDCPNINST_GEN_ALL_CORE
Shower daily, wash incisions with mild soap and water, pat dry. No lotion , creams, or powder on incisions. Check incisions daily for signs of infection, redness, swelling drainage or temp greater than 101.0, CALL MD. No heavy lifting greater than 10-15 lbs. No driving if taking narcotics or until cleared by MD. Keep all follow up appointments and take all medications as prescribed. 
behavioral health

## 2024-05-16 NOTE — PRE-OP CHECKLIST - 1.
Writer called and LVM for pt regarding the following.   Orders placed for 1.5mg Trulicity per Dr. Wall.   Start 1.5mg x 4 weeks, f/b 3mg weekly      emotional support and preop teaching provided to pt and family.

## 2025-03-21 NOTE — PROGRESS NOTE ADULT - SUBJECTIVE AND OBJECTIVE BOX
Weight Loss Advice :  A) Eat plant based diet and avoid ultra processed and fast foods.  B) Your portions should be a dinner plate. 1/2 should be vegetables, 1/4 lean protein (chicken, fish, turkey. Tofu), and 1/4 can be carbohydrates.   C)  Your main drink should be water rather than soda or alcohol or sweet coffees  D). Please try to exercise ( brisk walking or jogging) at least 30 minutes five times/week; and do muscle strengthening exercises ( lifting the weight, doing push ups or yoga)  twice a week    E). It is very important to get 7-9 hours of sleep per night     Name of Patient : BENJAMIN ARREDONDO  MRN: [id number]  Date of visit: 12-11-22 @ 14:55      Subjective: Patient seen and examined. No new events except as noted.   Patient seen earlier this AM. Lott with hematuria  Cardizem drip discontinued  Started on Midodrine for BP support  ID consulted      REVIEW OF SYSTEMS:    CONSTITUTIONAL: + Weakness   EYES/ENT: No visual changes;  No vertigo or throat pain   NECK: No pain or stiffness  RESPIRATORY: No cough, wheezing, hemoptysis; No shortness of breath  CARDIOVASCULAR: No chest pain or palpitations  GASTROINTESTINAL: No abdominal or epigastric pain. No nausea, vomiting, or hematemesis; No diarrhea or constipation. No melena or hematochezia.  GENITOURINARY: No dysuria, frequency or hematuria  NEUROLOGICAL: No numbness or weakness  SKIN: No itching, burning, rashes, or lesions   All other review of systems is negative unless indicated above.    MEDICATIONS:  MEDICATIONS  (STANDING):  aspirin  chewable 81 milliGRAM(s) Oral daily  atorvastatin 40 milliGRAM(s) Oral at bedtime  calcium gluconate IVPB 1 Gram(s) IV Intermittent once  chlorhexidine 2% Cloths 1 Application(s) Topical <User Schedule>  fluconAZOLE   Tablet 200 milliGRAM(s) Oral daily  furosemide    Tablet 20 milliGRAM(s) Oral daily  levoFLOXacin  Tablet 750 milliGRAM(s) Oral every 48 hours  levothyroxine 100 MICROGram(s) Oral daily  metoprolol tartrate 100 milliGRAM(s) Oral two times a day  midodrine. 10 milliGRAM(s) Oral three times a day  pantoprazole    Tablet 40 milliGRAM(s) Oral before breakfast  polyethylene glycol 3350 17 Gram(s) Oral daily  tamsulosin 0.4 milliGRAM(s) Oral at bedtime      PHYSICAL EXAM:  T(C): 36.6 (12-11-22 @ 12:01), Max: 37 (12-10-22 @ 21:05)  HR: 98 (12-11-22 @ 12:01) (81 - 105)  BP: 98/56 (12-11-22 @ 12:01) (93/58 - 105/68)  RR: 18 (12-11-22 @ 12:01) (18 - 18)  SpO2: 97% (12-11-22 @ 12:01) (91% - 97%)  Wt(kg): --  I&O's Summary    10 Dec 2022 07:01  -  11 Dec 2022 07:00  --------------------------------------------------------  IN: 480 mL / OUT: 550 mL / NET: -70 mL    11 Dec 2022 07:01  -  11 Dec 2022 14:55  --------------------------------------------------------  IN: 480 mL / OUT: 400 mL / NET: 80 mL          Appearance: Awake, weak appearing male 	  HEENT: Eyes are open    Lymphatic: No lymphadenopathy   Cardiovascular: Normal S1 S2, no JVD  Respiratory: + NC; Decreased BS  Gastrointestinal:  Soft, Non-tender  Skin: No rashes,  warm to touch  Psychiatry:  Mood & affect appropriate  Musculoskeletal: No edema        12-10-22 @ 07:01  -  12-11-22 @ 07:00  --------------------------------------------------------  IN: 480 mL / OUT: 550 mL / NET: -70 mL    12-11-22 @ 07:01  -  12-11-22 @ 14:55  --------------------------------------------------------  IN: 480 mL / OUT: 400 mL / NET: 80 mL                              8.7    23.28 )-----------( 234      ( 11 Dec 2022 09:22 )             30.3               12-11    138  |  104  |  95<H>  ----------------------------<  147<H>  6.1<H>   |  23  |  2.97<H>    Ca    8.9      11 Dec 2022 12:48  Phos  4.1     12-10  Mg     2.4     12-10    TPro  6.3  /  Alb  2.7<L>  /  TBili  1.1  /  DBili  x   /  AST  56<H>  /  ALT  28  /  AlkPhos  114  12-10                           Culture - Blood (12.09.22 @ 13:25)   Specimen Source: .Blood Blood-Peripheral   Culture Results:   No growth to date.       Culture - Urine (12.08.22 @ 19:29)   Specimen Source: Clean Catch Clean Catch (Midstream)   Culture Results:   >100,000 CFU/ml Yeast

## 2025-06-08 NOTE — ED ADULT TRIAGE NOTE - HEIGHT IN FEET
history, past surgical history, medications, and allergies and agree with nursing documentation.  I have reviewed all the patient's vital signs while in the emergency department.  I ordered and independently reviewed/reviewed all laboratory and/or radiology test performed.  I ordered and reviewed all medications given.  I reviewed all of the pertinent past medical records available in epic, from the patient.  Prior external records that were presented were reviewed.  Chronic illnesses impacting care as noted in HPI were considered.  All social determinants of care that were relevant to the patient's disposition and care were considered.  Consider decision regarding hospitalization or escalation of hospital care.  Shared decision making was considered for this patient's care and workup.        Shreyas Galvez MD  06/08/25 1644     5

## (undated) DEVICE — SUT SILK 2-0 24" TIES

## (undated) DEVICE — VISITEC 4X4

## (undated) DEVICE — FOLEY TRAY 16FR 5CC LF UMETER CLOSED

## (undated) DEVICE — DRAPE MAGNETIC INSTRUMENT MEDIUM

## (undated) DEVICE — ENDOCATCH II 15MM

## (undated) DEVICE — TUBING SUCTION NONCONDUCTIVE 6MM X 12FT

## (undated) DEVICE — DISSECTOR ENDO PEANUT 5MM

## (undated) DEVICE — DRSG STERISTRIPS 0.5 X 4"

## (undated) DEVICE — STAPLER ECHELON FLEX POWERED PLUS 340MM

## (undated) DEVICE — SUT SILK 2-0 30" TIES

## (undated) DEVICE — SYR SLIP 10CC

## (undated) DEVICE — DRSG TEGADERM 4X4.75"

## (undated) DEVICE — DRSG TELFA 3 X 8

## (undated) DEVICE — ELCTR GROUNDING PAD ADULT COVIDIEN

## (undated) DEVICE — ELCTR BOVIE TIP BLADE INSULATED 6.5" EDGE

## (undated) DEVICE — SUT VICRYL 0 27" CT-1 UNDYED

## (undated) DEVICE — HAND-AID ARTERIAL WRIST SUPPORT

## (undated) DEVICE — SUT SILK 0 18" TIES

## (undated) DEVICE — WARMING BLANKET LOWER ADULT

## (undated) DEVICE — ELCTR BOVIE TIP BLADE INSULATED 2.75" EDGE

## (undated) DEVICE — SUT VICRYL 3-0 27" SH UNDYED

## (undated) DEVICE — ADAPTER FIBEROPTIC BRONCHOSCOPE DUAL AXIS SWIVEL

## (undated) DEVICE — SOL ANTI FOG

## (undated) DEVICE — VENODYNE/SCD SLEEVE CALF MEDIUM

## (undated) DEVICE — PREP CHLORAPREP HI-LITE ORANGE 26ML

## (undated) DEVICE — TAPE SILK 3"

## (undated) DEVICE — ELCTR EXTENSION STRAIGHT

## (undated) DEVICE — DRAPE LARGE SHEET 72X85"

## (undated) DEVICE — STAPLER SKIN VISI-STAT 35 WIDE

## (undated) DEVICE — SUT VICRYL 2-0 27" UR-6

## (undated) DEVICE — SUT MONOCRYL 4-0 27" PS-2 UNDYED

## (undated) DEVICE — ENDOCATCH 10MM SPECIMEN POUCH

## (undated) DEVICE — CHEST DRAIN PLEUR-EVAC DRY/WET ADULT-PEDS SINGLE (QUICK)

## (undated) DEVICE — DRSG BENZOIN 0.6CC

## (undated) DEVICE — DRAPE IOBAN 33" X 23"

## (undated) DEVICE — DISSECTOR ENDOSCOPIC KITTNER SINGLE TIP

## (undated) DEVICE — SUT VICRYL 1 27" CTX

## (undated) DEVICE — GLV 7.5 PROTEXIS (WHITE)

## (undated) DEVICE — SUT PROLENE 0 30" CT-1

## (undated) DEVICE — GLV 7 PROTEXIS (WHITE)

## (undated) DEVICE — PACK MAJOR ABDOMINAL W ENDO DRAPE

## (undated) DEVICE — CONNECTOR REDUCING STRAIGHT 3/8X0.25"

## (undated) DEVICE — POSITIONER STRAP ARMBOARD VELCRO TS-30